# Patient Record
Sex: FEMALE | Race: WHITE | NOT HISPANIC OR LATINO | Employment: UNEMPLOYED | URBAN - NONMETROPOLITAN AREA
[De-identification: names, ages, dates, MRNs, and addresses within clinical notes are randomized per-mention and may not be internally consistent; named-entity substitution may affect disease eponyms.]

---

## 2017-01-01 ENCOUNTER — APPOINTMENT (OUTPATIENT)
Dept: GENERAL RADIOLOGY | Facility: HOSPITAL | Age: 82
End: 2017-01-01

## 2017-01-01 ENCOUNTER — HOSPITAL ENCOUNTER (INPATIENT)
Facility: HOSPITAL | Age: 82
LOS: 5 days | End: 2017-08-09
Attending: EMERGENCY MEDICINE | Admitting: INTERNAL MEDICINE

## 2017-01-01 ENCOUNTER — LAB REQUISITION (OUTPATIENT)
Dept: LAB | Facility: HOSPITAL | Age: 82
End: 2017-01-01

## 2017-01-01 ENCOUNTER — INFUSION (OUTPATIENT)
Dept: ONCOLOGY | Facility: HOSPITAL | Age: 82
End: 2017-01-01

## 2017-01-01 ENCOUNTER — HOSPITAL ENCOUNTER (OUTPATIENT)
Dept: GENERAL RADIOLOGY | Facility: HOSPITAL | Age: 82
Discharge: HOME | End: 2017-07-07

## 2017-01-01 ENCOUNTER — APPOINTMENT (OUTPATIENT)
Dept: ULTRASOUND IMAGING | Facility: HOSPITAL | Age: 82
End: 2017-01-01

## 2017-01-01 ENCOUNTER — APPOINTMENT (OUTPATIENT)
Dept: ONCOLOGY | Facility: HOSPITAL | Age: 82
End: 2017-01-01

## 2017-01-01 ENCOUNTER — HOSPITAL ENCOUNTER (INPATIENT)
Facility: HOSPITAL | Age: 82
LOS: 6 days | Discharge: NURSING, REHAB OR LONG-TERM CARE | End: 2017-06-28
Attending: EMERGENCY MEDICINE | Admitting: INTERNAL MEDICINE

## 2017-01-01 ENCOUNTER — HOSPITAL ENCOUNTER (INPATIENT)
Facility: HOSPITAL | Age: 82
LOS: 4 days | End: 2017-05-24
Attending: EMERGENCY MEDICINE | Admitting: HOSPITALIST

## 2017-01-01 ENCOUNTER — TELEPHONE (OUTPATIENT)
Dept: ORTHOPEDIC SURGERY | Facility: CLINIC | Age: 82
End: 2017-01-01

## 2017-01-01 ENCOUNTER — DOCUMENTATION (OUTPATIENT)
Dept: NUTRITION | Facility: HOSPITAL | Age: 82
End: 2017-01-01

## 2017-01-01 ENCOUNTER — OFFICE VISIT (OUTPATIENT)
Dept: ONCOLOGY | Facility: CLINIC | Age: 82
End: 2017-01-01

## 2017-01-01 ENCOUNTER — APPOINTMENT (OUTPATIENT)
Dept: CT IMAGING | Facility: HOSPITAL | Age: 82
End: 2017-01-01

## 2017-01-01 ENCOUNTER — LAB (OUTPATIENT)
Dept: ONCOLOGY | Facility: HOSPITAL | Age: 82
End: 2017-01-01

## 2017-01-01 ENCOUNTER — OFFICE VISIT (OUTPATIENT)
Dept: ORTHOPEDIC SURGERY | Facility: CLINIC | Age: 82
End: 2017-01-01

## 2017-01-01 ENCOUNTER — HOSPITAL ENCOUNTER (OUTPATIENT)
Dept: INTERVENTIONAL RADIOLOGY/VASCULAR | Facility: HOSPITAL | Age: 82
Discharge: HOME | End: 2017-07-07
Admitting: INTERNAL MEDICINE

## 2017-01-01 ENCOUNTER — ANESTHESIA (OUTPATIENT)
Dept: PERIOP | Facility: HOSPITAL | Age: 82
End: 2017-01-01

## 2017-01-01 ENCOUNTER — HOSPITAL ENCOUNTER (INPATIENT)
Facility: HOSPITAL | Age: 82
LOS: 14 days | Discharge: NURSING, REHAB OR LONG-TERM CARE | End: 2017-06-07
Attending: FAMILY MEDICINE | Admitting: FAMILY MEDICINE

## 2017-01-01 ENCOUNTER — APPOINTMENT (OUTPATIENT)
Dept: CARDIOLOGY | Facility: HOSPITAL | Age: 82
End: 2017-01-01
Attending: INTERNAL MEDICINE

## 2017-01-01 ENCOUNTER — HOSPITAL ENCOUNTER (OUTPATIENT)
Dept: ULTRASOUND IMAGING | Facility: HOSPITAL | Age: 82
Discharge: HOME | End: 2017-07-07

## 2017-01-01 ENCOUNTER — TELEPHONE (OUTPATIENT)
Dept: ONCOLOGY | Facility: HOSPITAL | Age: 82
End: 2017-01-01

## 2017-01-01 ENCOUNTER — APPOINTMENT (OUTPATIENT)
Dept: INTERVENTIONAL RADIOLOGY/VASCULAR | Facility: HOSPITAL | Age: 82
End: 2017-01-01
Attending: FAMILY MEDICINE

## 2017-01-01 ENCOUNTER — ANESTHESIA EVENT (OUTPATIENT)
Dept: PERIOP | Facility: HOSPITAL | Age: 82
End: 2017-01-01

## 2017-01-01 ENCOUNTER — HOSPITAL ENCOUNTER (EMERGENCY)
Facility: HOSPITAL | Age: 82
Discharge: NURSING, REHAB OR LONG-TERM CARE | End: 2017-06-13
Attending: EMERGENCY MEDICINE | Admitting: EMERGENCY MEDICINE

## 2017-01-01 VITALS
OXYGEN SATURATION: 97 % | BODY MASS INDEX: 24.55 KG/M2 | TEMPERATURE: 95.6 F | WEIGHT: 133.4 LBS | SYSTOLIC BLOOD PRESSURE: 134 MMHG | DIASTOLIC BLOOD PRESSURE: 61 MMHG | HEIGHT: 62 IN | RESPIRATION RATE: 18 BRPM | HEART RATE: 61 BPM

## 2017-01-01 VITALS
HEART RATE: 64 BPM | OXYGEN SATURATION: 90 % | BODY MASS INDEX: 21.71 KG/M2 | RESPIRATION RATE: 20 BRPM | HEIGHT: 62 IN | WEIGHT: 118 LBS | SYSTOLIC BLOOD PRESSURE: 115 MMHG | DIASTOLIC BLOOD PRESSURE: 56 MMHG | TEMPERATURE: 96.6 F

## 2017-01-01 VITALS
TEMPERATURE: 97.7 F | SYSTOLIC BLOOD PRESSURE: 141 MMHG | HEART RATE: 67 BPM | RESPIRATION RATE: 18 BRPM | DIASTOLIC BLOOD PRESSURE: 64 MMHG

## 2017-01-01 VITALS
DIASTOLIC BLOOD PRESSURE: 57 MMHG | RESPIRATION RATE: 17 BRPM | OXYGEN SATURATION: 88 % | HEART RATE: 60 BPM | HEIGHT: 62 IN | WEIGHT: 121.7 LBS | SYSTOLIC BLOOD PRESSURE: 107 MMHG | TEMPERATURE: 98.6 F | BODY MASS INDEX: 22.39 KG/M2

## 2017-01-01 VITALS
TEMPERATURE: 98.9 F | DIASTOLIC BLOOD PRESSURE: 69 MMHG | SYSTOLIC BLOOD PRESSURE: 121 MMHG | RESPIRATION RATE: 18 BRPM | HEART RATE: 59 BPM

## 2017-01-01 VITALS
WEIGHT: 128.8 LBS | RESPIRATION RATE: 20 BRPM | DIASTOLIC BLOOD PRESSURE: 53 MMHG | OXYGEN SATURATION: 86 % | HEIGHT: 61 IN | BODY MASS INDEX: 24.32 KG/M2 | SYSTOLIC BLOOD PRESSURE: 110 MMHG | HEART RATE: 60 BPM | TEMPERATURE: 97 F

## 2017-01-01 VITALS
DIASTOLIC BLOOD PRESSURE: 72 MMHG | BODY MASS INDEX: 20.98 KG/M2 | TEMPERATURE: 97.7 F | SYSTOLIC BLOOD PRESSURE: 163 MMHG | RESPIRATION RATE: 18 BRPM | WEIGHT: 114 LBS | HEIGHT: 62 IN | HEART RATE: 76 BPM | OXYGEN SATURATION: 93 %

## 2017-01-01 VITALS
SYSTOLIC BLOOD PRESSURE: 128 MMHG | HEART RATE: 61 BPM | RESPIRATION RATE: 16 BRPM | TEMPERATURE: 96.8 F | DIASTOLIC BLOOD PRESSURE: 72 MMHG | BODY MASS INDEX: 22.14 KG/M2 | WEIGHT: 120.3 LBS | HEIGHT: 62 IN

## 2017-01-01 DIAGNOSIS — R53.81 OTHER MALAISE: ICD-10-CM

## 2017-01-01 DIAGNOSIS — I50.9 ACUTE ON CHRONIC CONGESTIVE HEART FAILURE, UNSPECIFIED CONGESTIVE HEART FAILURE TYPE: ICD-10-CM

## 2017-01-01 DIAGNOSIS — D64.9 ANEMIA, UNSPECIFIED TYPE: ICD-10-CM

## 2017-01-01 DIAGNOSIS — D50.9 IRON DEFICIENCY ANEMIA: ICD-10-CM

## 2017-01-01 DIAGNOSIS — Z45.2 ENCOUNTER FOR ASSESSMENT OF PERIPHERALLY INSERTED CENTRAL VENOUS CATHETER (PICC): ICD-10-CM

## 2017-01-01 DIAGNOSIS — S72.145A CLOSED NONDISPLACED INTERTROCHANTERIC FRACTURE OF LEFT FEMUR, INITIAL ENCOUNTER (HCC): Primary | ICD-10-CM

## 2017-01-01 DIAGNOSIS — S72.145D CLOSED NONDISPLACED INTERTROCHANTERIC FRACTURE OF LEFT FEMUR WITH ROUTINE HEALING, SUBSEQUENT ENCOUNTER: Primary | ICD-10-CM

## 2017-01-01 DIAGNOSIS — D50.0 IRON DEFICIENCY ANEMIA DUE TO CHRONIC BLOOD LOSS: ICD-10-CM

## 2017-01-01 DIAGNOSIS — D50.0 IRON DEFICIENCY ANEMIA DUE TO CHRONIC BLOOD LOSS: Primary | ICD-10-CM

## 2017-01-01 DIAGNOSIS — Z45.2 ENCOUNTER FOR VENOUS ACCESS DEVICE CARE: Primary | ICD-10-CM

## 2017-01-01 DIAGNOSIS — Z78.9 IMPAIRED MOBILITY AND ACTIVITIES OF DAILY LIVING: ICD-10-CM

## 2017-01-01 DIAGNOSIS — J90 PLEURAL EFFUSION: Primary | ICD-10-CM

## 2017-01-01 DIAGNOSIS — Z74.09 IMPAIRED MOBILITY AND ACTIVITIES OF DAILY LIVING: Primary | ICD-10-CM

## 2017-01-01 DIAGNOSIS — J18.9 PNEUMONIA OF LEFT LOWER LOBE DUE TO INFECTIOUS ORGANISM: Primary | ICD-10-CM

## 2017-01-01 DIAGNOSIS — D64.9 ANEMIA, UNSPECIFIED TYPE: Primary | ICD-10-CM

## 2017-01-01 DIAGNOSIS — R13.12 OROPHARYNGEAL DYSPHAGIA: ICD-10-CM

## 2017-01-01 DIAGNOSIS — Z74.09 IMPAIRED MOBILITY AND ACTIVITIES OF DAILY LIVING: ICD-10-CM

## 2017-01-01 DIAGNOSIS — Z74.09 IMPAIRED PHYSICAL MOBILITY: ICD-10-CM

## 2017-01-01 DIAGNOSIS — D50.9 IRON DEFICIENCY ANEMIA, UNSPECIFIED: ICD-10-CM

## 2017-01-01 DIAGNOSIS — Z45.2 ENCOUNTER FOR VENOUS ACCESS DEVICE CARE: ICD-10-CM

## 2017-01-01 DIAGNOSIS — S72.002D CLOSED LEFT HIP FRACTURE, WITH ROUTINE HEALING, SUBSEQUENT ENCOUNTER: ICD-10-CM

## 2017-01-01 DIAGNOSIS — J90 PLEURAL EFFUSION: ICD-10-CM

## 2017-01-01 DIAGNOSIS — I50.9 HEART FAILURE (HCC): ICD-10-CM

## 2017-01-01 DIAGNOSIS — R53.1 GENERAL WEAKNESS: ICD-10-CM

## 2017-01-01 DIAGNOSIS — R26.9 ABNORMALITY OF GAIT AND MOBILITY: ICD-10-CM
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
## 2017-01-01 DIAGNOSIS — R53.83 FATIGUE, UNSPECIFIED TYPE: ICD-10-CM

## 2017-01-01 DIAGNOSIS — J44.0 CHRONIC OBSTRUCTIVE PULMONARY DISEASE WITH ACUTE LOWER RESPIRATORY INFECTION (HCC): ICD-10-CM

## 2017-01-01 DIAGNOSIS — Z78.9 IMPAIRED MOBILITY AND ACTIVITIES OF DAILY LIVING: Primary | ICD-10-CM

## 2017-01-01 DIAGNOSIS — M62.81 MUSCLE WEAKNESS (GENERALIZED): ICD-10-CM

## 2017-01-01 DIAGNOSIS — D50.9 IRON DEFICIENCY ANEMIA, UNSPECIFIED: Primary | ICD-10-CM

## 2017-01-01 DIAGNOSIS — R09.02 HYPOXEMIA: ICD-10-CM

## 2017-01-01 LAB
ABO + RH BLD: NORMAL
ABO GROUP BLD: NORMAL
ALBUMIN SERPL-MCNC: 2.6 G/DL (ref 3.4–4.8)
ALBUMIN SERPL-MCNC: 2.7 G/DL (ref 3.4–4.8)
ALBUMIN SERPL-MCNC: 2.8 G/DL (ref 3.4–4.8)
ALBUMIN SERPL-MCNC: 2.9 G/DL (ref 3.4–4.8)
ALBUMIN SERPL-MCNC: 3 G/DL (ref 3.4–4.8)
ALBUMIN SERPL-MCNC: 3 G/DL (ref 3.4–4.8)
ALBUMIN SERPL-MCNC: 3.1 G/DL (ref 3.4–4.8)
ALBUMIN SERPL-MCNC: 3.2 G/DL (ref 3.4–4.8)
ALBUMIN SERPL-MCNC: 3.2 G/DL (ref 3.4–4.8)
ALBUMIN SERPL-MCNC: 3.3 G/DL (ref 3.4–4.8)
ALBUMIN SERPL-MCNC: 3.5 G/DL (ref 3.4–4.8)
ALBUMIN SERPL-MCNC: 3.9 G/DL (ref 3.4–4.8)
ALBUMIN/GLOB SERPL: 0.9 G/DL (ref 1.1–1.8)
ALBUMIN/GLOB SERPL: 0.9 G/DL (ref 1.1–1.8)
ALBUMIN/GLOB SERPL: 1 G/DL (ref 1.1–1.8)
ALBUMIN/GLOB SERPL: 1.1 G/DL (ref 1.1–1.8)
ALBUMIN/GLOB SERPL: 1.2 G/DL (ref 1.1–1.8)
ALBUMIN/GLOB SERPL: 1.3 G/DL (ref 1.1–1.8)
ALP SERPL-CCNC: 100 U/L (ref 38–126)
ALP SERPL-CCNC: 100 U/L (ref 38–126)
ALP SERPL-CCNC: 109 U/L (ref 38–126)
ALP SERPL-CCNC: 111 U/L (ref 38–126)
ALP SERPL-CCNC: 120 U/L (ref 38–126)
ALP SERPL-CCNC: 136 U/L (ref 38–126)
ALP SERPL-CCNC: 139 U/L (ref 38–126)
ALP SERPL-CCNC: 141 U/L (ref 38–126)
ALP SERPL-CCNC: 142 U/L (ref 38–126)
ALP SERPL-CCNC: 144 U/L (ref 38–126)
ALP SERPL-CCNC: 147 U/L (ref 38–126)
ALP SERPL-CCNC: 147 U/L (ref 38–126)
ALP SERPL-CCNC: 166 U/L (ref 38–126)
ALP SERPL-CCNC: 166 U/L (ref 38–126)
ALP SERPL-CCNC: 169 U/L (ref 38–126)
ALP SERPL-CCNC: 171 U/L (ref 38–126)
ALP SERPL-CCNC: 203 U/L (ref 38–126)
ALP SERPL-CCNC: 74 U/L (ref 38–126)
ALP SERPL-CCNC: 81 U/L (ref 38–126)
ALP SERPL-CCNC: 99 U/L (ref 38–126)
ALT SERPL W P-5'-P-CCNC: 26 U/L (ref 9–52)
ALT SERPL W P-5'-P-CCNC: 27 U/L (ref 9–52)
ALT SERPL W P-5'-P-CCNC: 29 U/L (ref 9–52)
ALT SERPL W P-5'-P-CCNC: 33 U/L (ref 9–52)
ALT SERPL W P-5'-P-CCNC: 34 U/L (ref 9–52)
ALT SERPL W P-5'-P-CCNC: 34 U/L (ref 9–52)
ALT SERPL W P-5'-P-CCNC: 35 U/L (ref 9–52)
ALT SERPL W P-5'-P-CCNC: 37 U/L (ref 9–52)
ALT SERPL W P-5'-P-CCNC: 37 U/L (ref 9–52)
ALT SERPL W P-5'-P-CCNC: 38 U/L (ref 9–52)
ALT SERPL W P-5'-P-CCNC: 39 U/L (ref 9–52)
ALT SERPL W P-5'-P-CCNC: 40 U/L (ref 9–52)
ALT SERPL W P-5'-P-CCNC: 40 U/L (ref 9–52)
ALT SERPL W P-5'-P-CCNC: 41 U/L (ref 9–52)
ALT SERPL W P-5'-P-CCNC: 41 U/L (ref 9–52)
ALT SERPL W P-5'-P-CCNC: 42 U/L (ref 9–52)
ALT SERPL W P-5'-P-CCNC: 42 U/L (ref 9–52)
ALT SERPL W P-5'-P-CCNC: 44 U/L (ref 9–52)
ANION GAP SERPL CALCULATED.3IONS-SCNC: 10 MMOL/L (ref 5–15)
ANION GAP SERPL CALCULATED.3IONS-SCNC: 18 MMOL/L (ref 5–15)
ANION GAP SERPL CALCULATED.3IONS-SCNC: 2 MMOL/L (ref 5–15)
ANION GAP SERPL CALCULATED.3IONS-SCNC: 4 MMOL/L (ref 5–15)
ANION GAP SERPL CALCULATED.3IONS-SCNC: 5 MMOL/L (ref 5–15)
ANION GAP SERPL CALCULATED.3IONS-SCNC: 6 MMOL/L (ref 5–15)
ANION GAP SERPL CALCULATED.3IONS-SCNC: 7 MMOL/L (ref 5–15)
ANION GAP SERPL CALCULATED.3IONS-SCNC: 8 MMOL/L (ref 5–15)
ANION GAP SERPL CALCULATED.3IONS-SCNC: 8 MMOL/L (ref 5–15)
ANION GAP SERPL CALCULATED.3IONS-SCNC: 9 MMOL/L (ref 5–15)
ANISOCYTOSIS BLD QL: NORMAL
ANTI-E: NORMAL
ANTI-E: NORMAL
ANTI-JKA: NORMAL
ANTI-K: NORMAL
ANTI-K: NORMAL
ANTI-LEA: NORMAL
APTT PPP: 28.4 SECONDS (ref 20–40.3)
APTT PPP: 29.4 SECONDS (ref 20–40.3)
ARTERIAL PATENCY WRIST A: ABNORMAL
AST SERPL-CCNC: 25 U/L (ref 14–36)
AST SERPL-CCNC: 27 U/L (ref 14–36)
AST SERPL-CCNC: 28 U/L (ref 14–36)
AST SERPL-CCNC: 29 U/L (ref 14–36)
AST SERPL-CCNC: 30 U/L (ref 14–36)
AST SERPL-CCNC: 34 U/L (ref 14–36)
AST SERPL-CCNC: 34 U/L (ref 14–36)
AST SERPL-CCNC: 35 U/L (ref 14–36)
AST SERPL-CCNC: 39 U/L (ref 14–36)
AST SERPL-CCNC: 40 U/L (ref 14–36)
AST SERPL-CCNC: 41 U/L (ref 14–36)
AST SERPL-CCNC: 41 U/L (ref 14–36)
AST SERPL-CCNC: 42 U/L (ref 14–36)
AST SERPL-CCNC: 43 U/L (ref 14–36)
AST SERPL-CCNC: 66 U/L (ref 14–36)
ATMOSPHERIC PRESS: ABNORMAL MMHG
BACTERIA SPEC AEROBE CULT: NORMAL
BACTERIA SPEC AEROBE CULT: NORMAL
BACTERIA UR QL AUTO: ABNORMAL /HPF
BASE EXCESS BLDA CALC-SCNC: 11.1 MMOL/L (ref -2.4–2.4)
BASE EXCESS BLDA CALC-SCNC: 11.4 MMOL/L (ref -2.4–2.4)
BASE EXCESS BLDA CALC-SCNC: 14 MMOL/L (ref -2.4–2.4)
BASE EXCESS BLDA CALC-SCNC: 17.7 MMOL/L (ref -2.4–2.4)
BASOPHILS # BLD AUTO: 0 10*3/MM3 (ref 0–0.2)
BASOPHILS # BLD AUTO: 0.01 10*3/MM3 (ref 0–0.2)
BASOPHILS # BLD AUTO: 0.01 10*3/MM3 (ref 0–0.2)
BASOPHILS # BLD AUTO: 0.02 10*3/MM3 (ref 0–0.2)
BASOPHILS # BLD AUTO: 0.03 10*3/MM3 (ref 0–0.2)
BASOPHILS # BLD AUTO: 0.04 10*3/MM3 (ref 0–0.2)
BASOPHILS # BLD AUTO: 0.05 10*3/MM3 (ref 0–0.2)
BASOPHILS # BLD AUTO: 0.06 10*3/MM3 (ref 0–0.2)
BASOPHILS # BLD AUTO: 0.06 10*3/MM3 (ref 0–0.2)
BASOPHILS # BLD AUTO: 0.07 10*3/MM3 (ref 0–0.2)
BASOPHILS NFR BLD AUTO: 0 % (ref 0–2)
BASOPHILS NFR BLD AUTO: 0.1 % (ref 0–2)
BASOPHILS NFR BLD AUTO: 0.2 % (ref 0–2)
BASOPHILS NFR BLD AUTO: 0.2 % (ref 0–2)
BASOPHILS NFR BLD AUTO: 0.4 % (ref 0–2)
BASOPHILS NFR BLD AUTO: 0.5 % (ref 0–2)
BASOPHILS NFR BLD AUTO: 0.6 % (ref 0–2)
BASOPHILS NFR BLD AUTO: 0.7 % (ref 0–2)
BASOPHILS NFR BLD AUTO: 0.7 % (ref 0–2)
BASOPHILS NFR BLD AUTO: 0.8 % (ref 0–2)
BASOPHILS NFR BLD AUTO: 0.9 % (ref 0–2)
BASOPHILS NFR BLD AUTO: 1.1 % (ref 0–2)
BASOPHILS NFR BLD AUTO: 1.3 % (ref 0–2)
BDY SITE: ABNORMAL
BH BB BLOOD EXPIRATION DATE: NORMAL
BH BB BLOOD TYPE BARCODE: 5100
BH BB BLOOD TYPE BARCODE: 9500
BH BB DISPENSE STATUS: NORMAL
BH BB PRODUCT CODE: NORMAL
BH BB UNIT NUMBER: NORMAL
BH CV ECHO MEAS - ACS: 1.2 CM
BH CV ECHO MEAS - AO MAX PG (FULL): 18 MMHG
BH CV ECHO MEAS - AO MAX PG: 24 MMHG
BH CV ECHO MEAS - AO MEAN PG (FULL): 9.7 MMHG
BH CV ECHO MEAS - AO MEAN PG: 13 MMHG
BH CV ECHO MEAS - AO ROOT AREA: 2.2 CM^2
BH CV ECHO MEAS - AO ROOT DIAM: 1.7 CM
BH CV ECHO MEAS - AO V2 MAX: 245 CM/SEC
BH CV ECHO MEAS - AO V2 MEAN: 169.9 CM/SEC
BH CV ECHO MEAS - AO V2 VTI: 59.6 CM
BH CV ECHO MEAS - AVA(I,A): 1.4 CM^2
BH CV ECHO MEAS - AVA(I,D): 1.4 CM^2
BH CV ECHO MEAS - AVA(V,A): 1.4 CM^2
BH CV ECHO MEAS - AVA(V,D): 1.4 CM^2
BH CV ECHO MEAS - EDV(CUBED): 69 ML
BH CV ECHO MEAS - EDV(TEICH): 74.3 ML
BH CV ECHO MEAS - EF(CUBED): 84 %
BH CV ECHO MEAS - EF(MOD-SP4): 60 %
BH CV ECHO MEAS - EF(TEICH): 77.5 %
BH CV ECHO MEAS - ESV(CUBED): 11.1 ML
BH CV ECHO MEAS - ESV(TEICH): 16.7 ML
BH CV ECHO MEAS - FS: 45.7 %
BH CV ECHO MEAS - IVS/LVPW: 0.89
BH CV ECHO MEAS - IVSD: 0.99 CM
BH CV ECHO MEAS - LA DIMENSION: 4.5 CM
BH CV ECHO MEAS - LA/AO: 2.7
BH CV ECHO MEAS - LV MASS(C)D: 141.7 GRAMS
BH CV ECHO MEAS - LV MAX PG: 6.1 MMHG
BH CV ECHO MEAS - LV MEAN PG: 3.3 MMHG
BH CV ECHO MEAS - LV V1 MAX: 123 CM/SEC
BH CV ECHO MEAS - LV V1 MEAN: 86 CM/SEC
BH CV ECHO MEAS - LV V1 VTI: 29.8 CM
BH CV ECHO MEAS - LVIDD: 4.1 CM
BH CV ECHO MEAS - LVIDS: 2.2 CM
BH CV ECHO MEAS - LVOT AREA: 2.8 CM^2
BH CV ECHO MEAS - LVOT DIAM: 1.9 CM
BH CV ECHO MEAS - LVPWD: 1.1 CM
BH CV ECHO MEAS - MR MAX PG: 96 MMHG
BH CV ECHO MEAS - MR MAX VEL: 489.9 CM/SEC
BH CV ECHO MEAS - MV A MAX VEL: 108.7 CM/SEC
BH CV ECHO MEAS - MV E MAX VEL: 134.6 CM/SEC
BH CV ECHO MEAS - MV E/A: 1.2
BH CV ECHO MEAS - MV P1/2T MAX VEL: 141 CM/SEC
BH CV ECHO MEAS - MVA P1/2T LCG: 1.6 CM^2
BH CV ECHO MEAS - PA MAX PG: 5.4 MMHG
BH CV ECHO MEAS - PA V2 MAX: 116.6 CM/SEC
BH CV ECHO MEAS - RAP SYSTOLE: 5 MMHG
BH CV ECHO MEAS - SV(AO): 128.6 ML
BH CV ECHO MEAS - SV(CUBED): 57.9 ML
BH CV ECHO MEAS - SV(LVOT): 84.7 ML
BH CV ECHO MEAS - SV(TEICH): 57.5 ML
BILIRUB CONJ SERPL-MCNC: 0 MG/DL
BILIRUB CONJ SERPL-MCNC: 0 MG/DL
BILIRUB INDIRECT SERPL-MCNC: 0.2 MG/DL (ref 0–1.1)
BILIRUB SERPL-MCNC: 0.3 MG/DL (ref 0.2–1.3)
BILIRUB SERPL-MCNC: 0.4 MG/DL (ref 0.2–1.3)
BILIRUB SERPL-MCNC: 0.5 MG/DL (ref 0.2–1.3)
BILIRUB SERPL-MCNC: 0.5 MG/DL (ref 0.2–1.3)
BILIRUB SERPL-MCNC: 0.6 MG/DL (ref 0.2–1.3)
BILIRUB SERPL-MCNC: 0.6 MG/DL (ref 0.2–1.3)
BILIRUB SERPL-MCNC: 0.8 MG/DL (ref 0.2–1.3)
BILIRUB SERPL-MCNC: 0.9 MG/DL (ref 0.2–1.3)
BILIRUB SERPL-MCNC: 0.9 MG/DL (ref 0.2–1.3)
BILIRUB SERPL-MCNC: 1.1 MG/DL (ref 0.2–1.3)
BILIRUB SERPL-MCNC: 2 MG/DL (ref 0.2–1.3)
BILIRUB UR QL STRIP: NEGATIVE
BLD GP AB SCN SERPL QL: NEGATIVE
BLD GP AB SCN SERPL QL: NEGATIVE
BLD GP AB SCN SERPL QL: POSITIVE
BUN BLD-MCNC: 14 MG/DL (ref 7–21)
BUN BLD-MCNC: 14 MG/DL (ref 7–21)
BUN BLD-MCNC: 16 MG/DL (ref 7–21)
BUN BLD-MCNC: 17 MG/DL (ref 7–21)
BUN BLD-MCNC: 17 MG/DL (ref 7–21)
BUN BLD-MCNC: 18 MG/DL (ref 7–21)
BUN BLD-MCNC: 18 MG/DL (ref 7–21)
BUN BLD-MCNC: 19 MG/DL (ref 7–21)
BUN BLD-MCNC: 20 MG/DL (ref 7–21)
BUN BLD-MCNC: 20 MG/DL (ref 7–21)
BUN BLD-MCNC: 21 MG/DL (ref 7–21)
BUN BLD-MCNC: 23 MG/DL (ref 7–21)
BUN BLD-MCNC: 24 MG/DL (ref 7–21)
BUN BLD-MCNC: 24 MG/DL (ref 7–21)
BUN BLD-MCNC: 31 MG/DL (ref 7–21)
BUN BLD-MCNC: 31 MG/DL (ref 7–21)
BUN BLD-MCNC: 32 MG/DL (ref 7–21)
BUN BLD-MCNC: 36 MG/DL (ref 7–21)
BUN BLD-MCNC: 40 MG/DL (ref 7–21)
BUN/CREAT SERPL: 16.5 (ref 7–25)
BUN/CREAT SERPL: 20 (ref 7–25)
BUN/CREAT SERPL: 20.7 (ref 7–25)
BUN/CREAT SERPL: 21.1 (ref 7–25)
BUN/CREAT SERPL: 22.2 (ref 7–25)
BUN/CREAT SERPL: 22.6 (ref 7–25)
BUN/CREAT SERPL: 23.9 (ref 7–25)
BUN/CREAT SERPL: 24.7 (ref 7–25)
BUN/CREAT SERPL: 25.3 (ref 7–25)
BUN/CREAT SERPL: 26.2 (ref 7–25)
BUN/CREAT SERPL: 26.6 (ref 7–25)
BUN/CREAT SERPL: 27.4 (ref 7–25)
BUN/CREAT SERPL: 27.7 (ref 7–25)
BUN/CREAT SERPL: 28.4 (ref 7–25)
BUN/CREAT SERPL: 29 (ref 7–25)
BUN/CREAT SERPL: 29.6 (ref 7–25)
BUN/CREAT SERPL: 30.4 (ref 7–25)
BUN/CREAT SERPL: 31.1 (ref 7–25)
BUN/CREAT SERPL: 31.7 (ref 7–25)
BUN/CREAT SERPL: 32.8 (ref 7–25)
BUN/CREAT SERPL: 35.3 (ref 7–25)
BUN/CREAT SERPL: 38.3 (ref 7–25)
BUN/CREAT SERPL: 41.3 (ref 7–25)
CA-I BLD-MCNC: 4.6 MG/DL (ref 4.5–4.9)
CA-I BLD-MCNC: 4.6 MG/DL (ref 4.5–4.9)
CA-I BLD-MCNC: 4.7 MG/DL (ref 4.5–4.9)
CA-I BLD-MCNC: 5 MG/DL (ref 4.5–4.9)
CALCIUM SPEC-SCNC: 8 MG/DL (ref 8.4–10.2)
CALCIUM SPEC-SCNC: 8.4 MG/DL (ref 8.4–10.2)
CALCIUM SPEC-SCNC: 8.6 MG/DL (ref 8.4–10.2)
CALCIUM SPEC-SCNC: 8.6 MG/DL (ref 8.4–10.2)
CALCIUM SPEC-SCNC: 8.7 MG/DL (ref 8.4–10.2)
CALCIUM SPEC-SCNC: 8.8 MG/DL (ref 8.4–10.2)
CALCIUM SPEC-SCNC: 8.9 MG/DL (ref 8.4–10.2)
CALCIUM SPEC-SCNC: 8.9 MG/DL (ref 8.4–10.2)
CALCIUM SPEC-SCNC: 9 MG/DL (ref 8.4–10.2)
CALCIUM SPEC-SCNC: 9.1 MG/DL (ref 8.4–10.2)
CALCIUM SPEC-SCNC: 9.3 MG/DL (ref 8.4–10.2)
CALCIUM SPEC-SCNC: 9.3 MG/DL (ref 8.4–10.2)
CHLORIDE SERPL-SCNC: 100 MMOL/L (ref 95–110)
CHLORIDE SERPL-SCNC: 100 MMOL/L (ref 95–110)
CHLORIDE SERPL-SCNC: 80 MMOL/L (ref 95–110)
CHLORIDE SERPL-SCNC: 84 MMOL/L (ref 95–110)
CHLORIDE SERPL-SCNC: 85 MMOL/L (ref 95–110)
CHLORIDE SERPL-SCNC: 89 MMOL/L (ref 95–110)
CHLORIDE SERPL-SCNC: 90 MMOL/L (ref 95–110)
CHLORIDE SERPL-SCNC: 91 MMOL/L (ref 95–110)
CHLORIDE SERPL-SCNC: 92 MMOL/L (ref 95–110)
CHLORIDE SERPL-SCNC: 94 MMOL/L (ref 95–110)
CHLORIDE SERPL-SCNC: 95 MMOL/L (ref 95–110)
CHLORIDE SERPL-SCNC: 95 MMOL/L (ref 95–110)
CHLORIDE SERPL-SCNC: 96 MMOL/L (ref 95–110)
CHLORIDE SERPL-SCNC: 96 MMOL/L (ref 95–110)
CHLORIDE SERPL-SCNC: 97 MMOL/L (ref 95–110)
CHLORIDE SERPL-SCNC: 98 MMOL/L (ref 95–110)
CK MB SERPL-CCNC: 1.26 NG/ML (ref 0–5)
CK MB SERPL-CCNC: 1.3 NG/ML (ref 0–5)
CK MB SERPL-CCNC: 1.41 NG/ML (ref 0–5)
CK MB SERPL-CCNC: 2.13 NG/ML (ref 0–5)
CK SERPL-CCNC: 24 U/L (ref 30–135)
CK SERPL-CCNC: 50 U/L (ref 30–135)
CK SERPL-CCNC: <20 U/L (ref 30–135)
CK SERPL-CCNC: <20 U/L (ref 30–135)
CLARITY UR: CLEAR
CO2 BLDA-SCNC: 40.8 MMOL/L (ref 23–27)
CO2 BLDA-SCNC: 42.7 MMOL/L (ref 23–27)
CO2 BLDA-SCNC: 43.5 MMOL/L (ref 23–27)
CO2 BLDA-SCNC: 54.5 MMOL/L (ref 23–27)
CO2 SERPL-SCNC: 29 MMOL/L (ref 22–31)
CO2 SERPL-SCNC: 30 MMOL/L (ref 22–31)
CO2 SERPL-SCNC: 30 MMOL/L (ref 22–31)
CO2 SERPL-SCNC: 31 MMOL/L (ref 22–31)
CO2 SERPL-SCNC: 31 MMOL/L (ref 22–31)
CO2 SERPL-SCNC: 32 MMOL/L (ref 22–31)
CO2 SERPL-SCNC: 33 MMOL/L (ref 22–31)
CO2 SERPL-SCNC: 34 MMOL/L (ref 22–31)
CO2 SERPL-SCNC: 36 MMOL/L (ref 22–31)
CO2 SERPL-SCNC: 36 MMOL/L (ref 22–31)
CO2 SERPL-SCNC: 38 MMOL/L (ref 22–31)
CO2 SERPL-SCNC: 39 MMOL/L (ref 22–31)
CO2 SERPL-SCNC: 40 MMOL/L (ref 22–31)
CO2 SERPL-SCNC: 41 MMOL/L (ref 22–31)
CO2 SERPL-SCNC: 42 MMOL/L (ref 22–31)
CO2 SERPL-SCNC: 43 MMOL/L (ref 22–31)
CO2 SERPL-SCNC: 43 MMOL/L (ref 22–31)
CO2 SERPL-SCNC: 45 MMOL/L (ref 22–31)
CO2 SERPL-SCNC: 54 MMOL/L (ref 22–31)
COLOR UR: YELLOW
CREAT BLD-MCNC: 0.62 MG/DL (ref 0.5–1)
CREAT BLD-MCNC: 0.64 MG/DL (ref 0.5–1)
CREAT BLD-MCNC: 0.65 MG/DL (ref 0.5–1)
CREAT BLD-MCNC: 0.65 MG/DL (ref 0.5–1)
CREAT BLD-MCNC: 0.67 MG/DL (ref 0.5–1)
CREAT BLD-MCNC: 0.67 MG/DL (ref 0.5–1)
CREAT BLD-MCNC: 0.69 MG/DL (ref 0.5–1)
CREAT BLD-MCNC: 0.72 MG/DL (ref 0.5–1)
CREAT BLD-MCNC: 0.73 MG/DL (ref 0.5–1)
CREAT BLD-MCNC: 0.74 MG/DL (ref 0.5–1)
CREAT BLD-MCNC: 0.75 MG/DL (ref 0.5–1)
CREAT BLD-MCNC: 0.76 MG/DL (ref 0.5–1)
CREAT BLD-MCNC: 0.77 MG/DL (ref 0.5–1)
CREAT BLD-MCNC: 0.79 MG/DL (ref 0.5–1)
CREAT BLD-MCNC: 0.81 MG/DL (ref 0.5–1)
CREAT BLD-MCNC: 0.81 MG/DL (ref 0.5–1)
CREAT BLD-MCNC: 0.83 MG/DL (ref 0.5–1)
CREAT BLD-MCNC: 0.85 MG/DL (ref 0.5–1)
CREAT BLD-MCNC: 0.87 MG/DL (ref 0.5–1)
CREAT BLD-MCNC: 0.95 MG/DL (ref 0.5–1)
CREAT BLD-MCNC: 1.01 MG/DL (ref 0.5–1)
CREAT BLD-MCNC: 1.02 MG/DL (ref 0.5–1)
CREAT BLD-MCNC: 1.22 MG/DL (ref 0.5–1)
CROSSMATCH INTERPRETATION: NORMAL
D-DIMER, QUANTITATIVE (MAD,POW, STR): 1656 NG/ML (FEU) (ref 0–470)
D-LACTATE SERPL-SCNC: 1.5 MMOL/L (ref 0.5–2)
DEPRECATED RDW RBC AUTO: 47.1 FL (ref 36.4–46.3)
DEPRECATED RDW RBC AUTO: 48.4 FL (ref 36.4–46.3)
DEPRECATED RDW RBC AUTO: 49.3 FL (ref 36.4–46.3)
DEPRECATED RDW RBC AUTO: 49.7 FL (ref 36.4–46.3)
DEPRECATED RDW RBC AUTO: 49.9 FL (ref 36.4–46.3)
DEPRECATED RDW RBC AUTO: 50 FL (ref 36.4–46.3)
DEPRECATED RDW RBC AUTO: 50 FL (ref 36.4–46.3)
DEPRECATED RDW RBC AUTO: 51.7 FL (ref 36.4–46.3)
DEPRECATED RDW RBC AUTO: 52.5 FL (ref 36.4–46.3)
DEPRECATED RDW RBC AUTO: 57.7 FL (ref 36.4–46.3)
DEPRECATED RDW RBC AUTO: 68.3 FL (ref 36.4–46.3)
DEPRECATED RDW RBC AUTO: 69.7 FL (ref 36.4–46.3)
DEPRECATED RDW RBC AUTO: 70 FL (ref 36.4–46.3)
DEPRECATED RDW RBC AUTO: 72.3 FL (ref 36.4–46.3)
DEPRECATED RDW RBC AUTO: 74.9 FL (ref 36.4–46.3)
DEPRECATED RDW RBC AUTO: 77.4 FL (ref 36.4–46.3)
DEPRECATED RDW RBC AUTO: 77.7 FL (ref 36.4–46.3)
DEPRECATED RDW RBC AUTO: 78.3 FL (ref 36.4–46.3)
DEPRECATED RDW RBC AUTO: 79.1 FL (ref 36.4–46.3)
DEPRECATED RDW RBC AUTO: 80.4 FL (ref 36.4–46.3)
DEPRECATED RDW RBC AUTO: 87.8 FL (ref 36.4–46.3)
DEPRECATED RDW RBC AUTO: 88.3 FL (ref 36.4–46.3)
DEPRECATED RDW RBC AUTO: 88.7 FL (ref 36.4–46.3)
E AG RBC QL: NEGATIVE
EOSINOPHIL # BLD AUTO: 0 10*3/MM3 (ref 0–0.7)
EOSINOPHIL # BLD AUTO: 0.03 10*3/MM3 (ref 0–0.7)
EOSINOPHIL # BLD AUTO: 0.06 10*3/MM3 (ref 0–0.7)
EOSINOPHIL # BLD AUTO: 0.07 10*3/MM3 (ref 0–0.7)
EOSINOPHIL # BLD AUTO: 0.08 10*3/MM3 (ref 0–0.7)
EOSINOPHIL # BLD AUTO: 0.11 10*3/MM3 (ref 0–0.7)
EOSINOPHIL # BLD AUTO: 0.11 10*3/MM3 (ref 0–0.7)
EOSINOPHIL # BLD AUTO: 0.17 10*3/MM3 (ref 0–0.7)
EOSINOPHIL # BLD AUTO: 0.2 10*3/MM3 (ref 0–0.7)
EOSINOPHIL # BLD AUTO: 0.21 10*3/MM3 (ref 0–0.7)
EOSINOPHIL # BLD AUTO: 0.22 10*3/MM3 (ref 0–0.7)
EOSINOPHIL # BLD AUTO: 0.23 10*3/MM3 (ref 0–0.7)
EOSINOPHIL # BLD AUTO: 0.25 10*3/MM3 (ref 0–0.7)
EOSINOPHIL # BLD AUTO: 0.3 10*3/MM3 (ref 0–0.7)
EOSINOPHIL # BLD AUTO: 0.31 10*3/MM3 (ref 0–0.7)
EOSINOPHIL # BLD AUTO: 0.37 10*3/MM3 (ref 0–0.7)
EOSINOPHIL # BLD AUTO: 0.4 10*3/MM3 (ref 0–0.7)
EOSINOPHIL # BLD AUTO: 0.4 10*3/MM3 (ref 0–0.7)
EOSINOPHIL # BLD AUTO: 0.41 10*3/MM3 (ref 0–0.7)
EOSINOPHIL NFR BLD AUTO: 0 % (ref 0–7)
EOSINOPHIL NFR BLD AUTO: 0.3 % (ref 0–7)
EOSINOPHIL NFR BLD AUTO: 1 % (ref 0–7)
EOSINOPHIL NFR BLD AUTO: 1 % (ref 0–7)
EOSINOPHIL NFR BLD AUTO: 1.2 % (ref 0–7)
EOSINOPHIL NFR BLD AUTO: 1.7 % (ref 0–7)
EOSINOPHIL NFR BLD AUTO: 2.1 % (ref 0–7)
EOSINOPHIL NFR BLD AUTO: 2.5 % (ref 0–7)
EOSINOPHIL NFR BLD AUTO: 2.7 % (ref 0–7)
EOSINOPHIL NFR BLD AUTO: 2.7 % (ref 0–7)
EOSINOPHIL NFR BLD AUTO: 3.2 % (ref 0–7)
EOSINOPHIL NFR BLD AUTO: 3.3 % (ref 0–7)
EOSINOPHIL NFR BLD AUTO: 3.7 % (ref 0–7)
EOSINOPHIL NFR BLD AUTO: 3.8 % (ref 0–7)
EOSINOPHIL NFR BLD AUTO: 3.8 % (ref 0–7)
EOSINOPHIL NFR BLD AUTO: 4.2 % (ref 0–7)
EOSINOPHIL NFR BLD AUTO: 4.3 % (ref 0–7)
EOSINOPHIL NFR BLD AUTO: 4.4 % (ref 0–7)
EOSINOPHIL NFR BLD AUTO: 4.4 % (ref 0–7)
EOSINOPHIL NFR BLD AUTO: 4.6 % (ref 0–7)
EOSINOPHIL NFR BLD AUTO: 5.2 % (ref 0–7)
ERYTHROCYTE [DISTWIDTH] IN BLOOD BY AUTOMATED COUNT: 14 % (ref 11.5–14.5)
ERYTHROCYTE [DISTWIDTH] IN BLOOD BY AUTOMATED COUNT: 14.3 % (ref 11.5–14.5)
ERYTHROCYTE [DISTWIDTH] IN BLOOD BY AUTOMATED COUNT: 14.3 % (ref 11.5–14.5)
ERYTHROCYTE [DISTWIDTH] IN BLOOD BY AUTOMATED COUNT: 14.4 % (ref 11.5–14.5)
ERYTHROCYTE [DISTWIDTH] IN BLOOD BY AUTOMATED COUNT: 14.5 % (ref 11.5–14.5)
ERYTHROCYTE [DISTWIDTH] IN BLOOD BY AUTOMATED COUNT: 14.8 % (ref 11.5–14.5)
ERYTHROCYTE [DISTWIDTH] IN BLOOD BY AUTOMATED COUNT: 14.8 % (ref 11.5–14.5)
ERYTHROCYTE [DISTWIDTH] IN BLOOD BY AUTOMATED COUNT: 15.4 % (ref 11.5–14.5)
ERYTHROCYTE [DISTWIDTH] IN BLOOD BY AUTOMATED COUNT: 16.3 % (ref 11.5–14.5)
ERYTHROCYTE [DISTWIDTH] IN BLOOD BY AUTOMATED COUNT: 17 % (ref 11.5–14.5)
ERYTHROCYTE [DISTWIDTH] IN BLOOD BY AUTOMATED COUNT: 17.3 % (ref 11.5–14.5)
ERYTHROCYTE [DISTWIDTH] IN BLOOD BY AUTOMATED COUNT: 18.5 % (ref 11.5–14.5)
ERYTHROCYTE [DISTWIDTH] IN BLOOD BY AUTOMATED COUNT: 18.5 % (ref 11.5–14.5)
ERYTHROCYTE [DISTWIDTH] IN BLOOD BY AUTOMATED COUNT: 20.1 % (ref 11.5–14.5)
ERYTHROCYTE [DISTWIDTH] IN BLOOD BY AUTOMATED COUNT: 20.5 % (ref 11.5–14.5)
ERYTHROCYTE [DISTWIDTH] IN BLOOD BY AUTOMATED COUNT: 20.6 % (ref 11.5–14.5)
ERYTHROCYTE [DISTWIDTH] IN BLOOD BY AUTOMATED COUNT: 21.3 % (ref 11.5–14.5)
ERYTHROCYTE [DISTWIDTH] IN BLOOD BY AUTOMATED COUNT: 21.5 % (ref 11.5–14.5)
ERYTHROCYTE [DISTWIDTH] IN BLOOD BY AUTOMATED COUNT: 21.7 % (ref 11.5–14.5)
ERYTHROCYTE [DISTWIDTH] IN BLOOD BY AUTOMATED COUNT: 21.9 % (ref 11.5–14.5)
ERYTHROCYTE [DISTWIDTH] IN BLOOD BY AUTOMATED COUNT: 22.2 % (ref 11.5–14.5)
ERYTHROCYTE [DISTWIDTH] IN BLOOD BY AUTOMATED COUNT: 22.4 % (ref 11.5–14.5)
ERYTHROCYTE [DISTWIDTH] IN BLOOD BY AUTOMATED COUNT: 24.1 % (ref 11.5–14.5)
FERRITIN SERPL-MCNC: 16.8 NG/ML (ref 11.1–264)
FERRITIN SERPL-MCNC: 19.1 NG/ML (ref 11.1–264)
FERRITIN SERPL-MCNC: 22.3 NG/ML (ref 11.1–264)
FERRITIN SERPL-MCNC: 45.1 NG/ML (ref 11.1–264)
FOLATE SERPL-MCNC: >20 NG/ML (ref 2.76–21)
FOLATE SERPL-MCNC: >20 NG/ML (ref 2.76–21)
GFR SERPL CREATININE-BSD FRML MDRD: 41 ML/MIN/1.73 (ref 39–90)
GFR SERPL CREATININE-BSD FRML MDRD: 51 ML/MIN/1.73
GFR SERPL CREATININE-BSD FRML MDRD: 51 ML/MIN/1.73 (ref 60–90)
GFR SERPL CREATININE-BSD FRML MDRD: 55 ML/MIN/1.73
GFR SERPL CREATININE-BSD FRML MDRD: 61 ML/MIN/1.73
GFR SERPL CREATININE-BSD FRML MDRD: 63 ML/MIN/1.73 (ref 39–90)
GFR SERPL CREATININE-BSD FRML MDRD: 64 ML/MIN/1.73 (ref 39–90)
GFR SERPL CREATININE-BSD FRML MDRD: 66 ML/MIN/1.73 (ref 39–90)
GFR SERPL CREATININE-BSD FRML MDRD: 66 ML/MIN/1.73 (ref 39–90)
GFR SERPL CREATININE-BSD FRML MDRD: 68 ML/MIN/1.73 (ref 39–90)
GFR SERPL CREATININE-BSD FRML MDRD: 70 ML/MIN/1.73 (ref 39–90)
GFR SERPL CREATININE-BSD FRML MDRD: 71 ML/MIN/1.73 (ref 39–90)
GFR SERPL CREATININE-BSD FRML MDRD: 72 ML/MIN/1.73 (ref 60–90)
GFR SERPL CREATININE-BSD FRML MDRD: 73 ML/MIN/1.73 (ref 39–90)
GFR SERPL CREATININE-BSD FRML MDRD: 75 ML/MIN/1.73 (ref 39–90)
GFR SERPL CREATININE-BSD FRML MDRD: 76 ML/MIN/1.73 (ref 39–90)
GFR SERPL CREATININE-BSD FRML MDRD: 80 ML/MIN/1.73 (ref 39–90)
GFR SERPL CREATININE-BSD FRML MDRD: 82 ML/MIN/1.73 (ref 39–90)
GFR SERPL CREATININE-BSD FRML MDRD: 82 ML/MIN/1.73 (ref 60–90)
GFR SERPL CREATININE-BSD FRML MDRD: 85 ML/MIN/1.73 (ref 39–90)
GFR SERPL CREATININE-BSD FRML MDRD: 85 ML/MIN/1.73 (ref 60–90)
GFR SERPL CREATININE-BSD FRML MDRD: 87 ML/MIN/1.73 (ref 60–90)
GFR SERPL CREATININE-BSD FRML MDRD: 90 ML/MIN/1.73 (ref 39–90)
GLOBULIN UR ELPH-MCNC: 2.4 GM/DL (ref 2.3–3.5)
GLOBULIN UR ELPH-MCNC: 2.4 GM/DL (ref 2.3–3.5)
GLOBULIN UR ELPH-MCNC: 2.5 GM/DL (ref 2.3–3.5)
GLOBULIN UR ELPH-MCNC: 2.6 GM/DL (ref 2.3–3.5)
GLOBULIN UR ELPH-MCNC: 2.6 GM/DL (ref 2.3–3.5)
GLOBULIN UR ELPH-MCNC: 2.7 GM/DL (ref 2.3–3.5)
GLOBULIN UR ELPH-MCNC: 2.7 GM/DL (ref 2.3–3.5)
GLOBULIN UR ELPH-MCNC: 2.8 GM/DL (ref 2.3–3.5)
GLOBULIN UR ELPH-MCNC: 2.9 GM/DL (ref 2.3–3.5)
GLOBULIN UR ELPH-MCNC: 2.9 GM/DL (ref 2.3–3.5)
GLOBULIN UR ELPH-MCNC: 3 GM/DL (ref 2.3–3.5)
GLOBULIN UR ELPH-MCNC: 3 GM/DL (ref 2.3–3.5)
GLOBULIN UR ELPH-MCNC: 3.1 GM/DL (ref 2.3–3.5)
GLOBULIN UR ELPH-MCNC: 3.1 GM/DL (ref 2.3–3.5)
GLOBULIN UR ELPH-MCNC: 3.2 GM/DL (ref 2.3–3.5)
GLOBULIN UR ELPH-MCNC: 3.4 GM/DL (ref 2.3–3.5)
GLUCOSE BLD-MCNC: 103 MG/DL (ref 60–100)
GLUCOSE BLD-MCNC: 103 MG/DL (ref 60–100)
GLUCOSE BLD-MCNC: 107 MG/DL (ref 60–100)
GLUCOSE BLD-MCNC: 125 MG/DL (ref 60–100)
GLUCOSE BLD-MCNC: 132 MG/DL (ref 60–100)
GLUCOSE BLD-MCNC: 138 MG/DL (ref 60–100)
GLUCOSE BLD-MCNC: 81 MG/DL (ref 60–100)
GLUCOSE BLD-MCNC: 81 MG/DL (ref 60–100)
GLUCOSE BLD-MCNC: 82 MG/DL (ref 60–100)
GLUCOSE BLD-MCNC: 82 MG/DL (ref 60–100)
GLUCOSE BLD-MCNC: 87 MG/DL (ref 60–100)
GLUCOSE BLD-MCNC: 89 MG/DL (ref 60–100)
GLUCOSE BLD-MCNC: 92 MG/DL (ref 60–100)
GLUCOSE BLD-MCNC: 92 MG/DL (ref 60–100)
GLUCOSE BLD-MCNC: 93 MG/DL (ref 60–100)
GLUCOSE BLD-MCNC: 94 MG/DL (ref 60–100)
GLUCOSE BLD-MCNC: 98 MG/DL (ref 60–100)
GLUCOSE BLD-MCNC: 99 MG/DL (ref 60–100)
GLUCOSE BLDA-MCNC: 113 MMOL/L
GLUCOSE BLDA-MCNC: 71 MMOL/L
GLUCOSE BLDA-MCNC: 93 MMOL/L
GLUCOSE BLDA-MCNC: 93 MMOL/L
GLUCOSE BLDC GLUCOMTR-MCNC: 101 MG/DL (ref 70–130)
GLUCOSE BLDC GLUCOMTR-MCNC: 93 MG/DL (ref 70–130)
GLUCOSE UR STRIP-MCNC: NEGATIVE MG/DL
HAPTOGLOB SERPL-MCNC: 60 MG/DL (ref 34–200)
HBA1C MFR BLD: 5.56 % (ref 4–5.6)
HCO3 BLDA-SCNC: 38.6 MMOL/L (ref 22–26)
HCO3 BLDA-SCNC: 40.6 MMOL/L (ref 22–26)
HCO3 BLDA-SCNC: 40.6 MMOL/L (ref 22–26)
HCO3 BLDA-SCNC: 50.1 MMOL/L (ref 22–26)
HCT VFR BLD AUTO: 22.2 % (ref 35–45)
HCT VFR BLD AUTO: 24.1 % (ref 35–45)
HCT VFR BLD AUTO: 24.8 % (ref 35–45)
HCT VFR BLD AUTO: 24.9 % (ref 35–45)
HCT VFR BLD AUTO: 25.5 % (ref 35–45)
HCT VFR BLD AUTO: 25.7 % (ref 35–45)
HCT VFR BLD AUTO: 25.8 % (ref 35–45)
HCT VFR BLD AUTO: 26.3 % (ref 35–45)
HCT VFR BLD AUTO: 26.6 % (ref 35–45)
HCT VFR BLD AUTO: 26.6 % (ref 35–45)
HCT VFR BLD AUTO: 26.8 % (ref 35–45)
HCT VFR BLD AUTO: 26.9 % (ref 35–45)
HCT VFR BLD AUTO: 27.1 % (ref 35–45)
HCT VFR BLD AUTO: 27.2 % (ref 35–45)
HCT VFR BLD AUTO: 27.5 % (ref 35–45)
HCT VFR BLD AUTO: 27.6 % (ref 35–45)
HCT VFR BLD AUTO: 27.7 % (ref 35–45)
HCT VFR BLD AUTO: 28 % (ref 35–45)
HCT VFR BLD AUTO: 28.1 % (ref 35–45)
HCT VFR BLD AUTO: 28.2 % (ref 35–45)
HCT VFR BLD AUTO: 28.3 % (ref 35–45)
HCT VFR BLD AUTO: 28.3 % (ref 35–45)
HCT VFR BLD AUTO: 28.7 % (ref 35–45)
HCT VFR BLD AUTO: 28.9 % (ref 35–45)
HCT VFR BLD AUTO: 29.2 % (ref 35–45)
HCT VFR BLD AUTO: 29.2 % (ref 35–45)
HCT VFR BLD AUTO: 29.5 % (ref 35–45)
HCT VFR BLD AUTO: 30 % (ref 35–45)
HCT VFR BLD AUTO: 32.6 % (ref 35–45)
HCT VFR BLD AUTO: 33.6 % (ref 35–45)
HCT VFR BLD AUTO: 35.1 % (ref 35–45)
HCT VFR BLD AUTO: 35.1 % (ref 35–45)
HCT VFR BLD AUTO: 35.4 % (ref 35–45)
HCT VFR BLD AUTO: 35.9 % (ref 35–45)
HCT VFR BLD AUTO: 36.4 % (ref 35–45)
HCT VFR BLD AUTO: 38.9 % (ref 35–45)
HCT VFR BLD CALC: 23 % (ref 38–47)
HCT VFR BLD CALC: 24 % (ref 38–47)
HCT VFR BLD CALC: 24 % (ref 38–47)
HCT VFR BLD CALC: 26 % (ref 38–47)
HEMOCCULT STL QL: POSITIVE
HGB BLD-MCNC: 10.5 G/DL (ref 12–15.5)
HGB BLD-MCNC: 10.6 G/DL (ref 12–15.5)
HGB BLD-MCNC: 11 G/DL (ref 12–15.5)
HGB BLD-MCNC: 11.5 G/DL (ref 12–15.5)
HGB BLD-MCNC: 11.7 G/DL (ref 12–15.5)
HGB BLD-MCNC: 13.3 G/DL (ref 12–15.5)
HGB BLD-MCNC: 6.3 G/DL (ref 12–15.5)
HGB BLD-MCNC: 7.3 G/DL (ref 12–15.5)
HGB BLD-MCNC: 7.4 G/DL (ref 12–15.5)
HGB BLD-MCNC: 7.5 G/DL (ref 12–15.5)
HGB BLD-MCNC: 7.6 G/DL (ref 12–15.5)
HGB BLD-MCNC: 7.7 G/DL (ref 12–15.5)
HGB BLD-MCNC: 7.8 G/DL (ref 12–15.5)
HGB BLD-MCNC: 7.8 G/DL (ref 12–15.5)
HGB BLD-MCNC: 7.9 G/DL (ref 12–15.5)
HGB BLD-MCNC: 8 G/DL (ref 12–15.5)
HGB BLD-MCNC: 8.1 G/DL (ref 12–15.5)
HGB BLD-MCNC: 8.3 G/DL (ref 12–15.5)
HGB BLD-MCNC: 8.4 G/DL (ref 12–15.5)
HGB BLD-MCNC: 8.6 G/DL (ref 12–15.5)
HGB BLD-MCNC: 8.8 G/DL (ref 12–15.5)
HGB BLD-MCNC: 9 G/DL (ref 12–15.5)
HGB BLD-MCNC: 9 G/DL (ref 12–15.5)
HGB BLD-MCNC: 9.2 G/DL (ref 12–15.5)
HGB BLD-MCNC: 9.8 G/DL (ref 12–15.5)
HGB BLDA-MCNC: 7.9 G/DL (ref 12–16)
HGB BLDA-MCNC: 8.3 G/DL (ref 12–16)
HGB BLDA-MCNC: 8.3 G/DL (ref 12–16)
HGB BLDA-MCNC: 9 G/DL (ref 12–16)
HGB UR QL STRIP.AUTO: NEGATIVE
HOLD SPECIMEN: NORMAL
HOLD SPECIMEN: NORMAL
HYALINE CASTS UR QL AUTO: ABNORMAL /LPF
HYPOCHROMIA BLD QL: NORMAL
IMM GRANULOCYTES # BLD: 0.01 10*3/MM3 (ref 0–0.02)
IMM GRANULOCYTES # BLD: 0.02 10*3/MM3 (ref 0–0.02)
IMM GRANULOCYTES # BLD: 0.03 10*3/MM3 (ref 0–0.02)
IMM GRANULOCYTES # BLD: 0.05 10*3/MM3 (ref 0–0.02)
IMM GRANULOCYTES NFR BLD: 0.1 % (ref 0–0.5)
IMM GRANULOCYTES NFR BLD: 0.1 % (ref 0–0.5)
IMM GRANULOCYTES NFR BLD: 0.2 % (ref 0–0.5)
IMM GRANULOCYTES NFR BLD: 0.3 % (ref 0–0.5)
IMM GRANULOCYTES NFR BLD: 0.4 % (ref 0–0.5)
IMM GRANULOCYTES NFR BLD: 0.5 % (ref 0–0.5)
IMM GRANULOCYTES NFR BLD: 0.6 % (ref 0–0.5)
IMM GRANULOCYTES NFR BLD: 0.7 % (ref 0–0.5)
INR PPP: 1.11 (ref 0.8–1.2)
INR PPP: 1.13 (ref 0.8–1.2)
INR PPP: 1.22 (ref 0.8–1.2)
IRON 24H UR-MRATE: 10 MCG/DL (ref 37–170)
IRON 24H UR-MRATE: 26 MCG/DL (ref 37–170)
IRON 24H UR-MRATE: 35 MCG/DL (ref 37–170)
IRON 24H UR-MRATE: <10 MCG/DL (ref 37–170)
IRON SATN MFR SERPL: 12 % (ref 15–50)
IRON SATN MFR SERPL: 3 % (ref 15–50)
IRON SATN MFR SERPL: 8 % (ref 15–50)
IRON SATN MFR SERPL: ABNORMAL % (ref 15–50)
KELL: NEGATIVE
KETONES UR QL STRIP: ABNORMAL
KETONES UR QL STRIP: NEGATIVE
KIDD A ANTIGEN: NEGATIVE
LDH SERPL-CCNC: 432 U/L (ref 313–618)
LDH SERPL-CCNC: 477 U/L (ref 313–618)
LEUKOCYTE ESTERASE UR QL STRIP.AUTO: ABNORMAL
LEUKOCYTE ESTERASE UR QL STRIP.AUTO: NEGATIVE
LV EF 2D ECHO EST: 55 %
LYMPHOCYTES # BLD AUTO: 0.57 10*3/MM3 (ref 0.6–4.2)
LYMPHOCYTES # BLD AUTO: 0.58 10*3/MM3 (ref 0.6–4.2)
LYMPHOCYTES # BLD AUTO: 0.6 10*3/MM3 (ref 0.6–4.2)
LYMPHOCYTES # BLD AUTO: 0.61 10*3/MM3 (ref 0.6–4.2)
LYMPHOCYTES # BLD AUTO: 0.65 10*3/MM3 (ref 0.6–4.2)
LYMPHOCYTES # BLD AUTO: 0.7 10*3/MM3 (ref 0.6–4.2)
LYMPHOCYTES # BLD AUTO: 0.74 10*3/MM3 (ref 0.6–4.2)
LYMPHOCYTES # BLD AUTO: 0.83 10*3/MM3 (ref 0.6–4.2)
LYMPHOCYTES # BLD AUTO: 0.84 10*3/MM3 (ref 0.6–4.2)
LYMPHOCYTES # BLD AUTO: 0.86 10*3/MM3 (ref 0.6–4.2)
LYMPHOCYTES # BLD AUTO: 0.91 10*3/MM3 (ref 0.6–4.2)
LYMPHOCYTES # BLD AUTO: 0.93 10*3/MM3 (ref 0.6–4.2)
LYMPHOCYTES # BLD AUTO: 1.02 10*3/MM3 (ref 0.6–4.2)
LYMPHOCYTES # BLD AUTO: 1.03 10*3/MM3 (ref 0.6–4.2)
LYMPHOCYTES # BLD AUTO: 1.06 10*3/MM3 (ref 0.6–4.2)
LYMPHOCYTES # BLD AUTO: 1.08 10*3/MM3 (ref 0.6–4.2)
LYMPHOCYTES # BLD AUTO: 1.14 10*3/MM3 (ref 0.6–4.2)
LYMPHOCYTES # BLD AUTO: 1.16 10*3/MM3 (ref 0.6–4.2)
LYMPHOCYTES # BLD AUTO: 1.19 10*3/MM3 (ref 0.6–4.2)
LYMPHOCYTES # BLD AUTO: 1.2 10*3/MM3 (ref 0.6–4.2)
LYMPHOCYTES # BLD AUTO: 1.34 10*3/MM3 (ref 0.6–4.2)
LYMPHOCYTES NFR BLD AUTO: 10.5 % (ref 10–50)
LYMPHOCYTES NFR BLD AUTO: 11.2 % (ref 10–50)
LYMPHOCYTES NFR BLD AUTO: 11.5 % (ref 10–50)
LYMPHOCYTES NFR BLD AUTO: 11.9 % (ref 10–50)
LYMPHOCYTES NFR BLD AUTO: 11.9 % (ref 10–50)
LYMPHOCYTES NFR BLD AUTO: 12.9 % (ref 10–50)
LYMPHOCYTES NFR BLD AUTO: 12.9 % (ref 10–50)
LYMPHOCYTES NFR BLD AUTO: 13 % (ref 10–50)
LYMPHOCYTES NFR BLD AUTO: 13.4 % (ref 10–50)
LYMPHOCYTES NFR BLD AUTO: 13.8 % (ref 10–50)
LYMPHOCYTES NFR BLD AUTO: 14.1 % (ref 10–50)
LYMPHOCYTES NFR BLD AUTO: 15.3 % (ref 10–50)
LYMPHOCYTES NFR BLD AUTO: 16.9 % (ref 10–50)
LYMPHOCYTES NFR BLD AUTO: 18.6 % (ref 10–50)
LYMPHOCYTES NFR BLD AUTO: 19.7 % (ref 10–50)
LYMPHOCYTES NFR BLD AUTO: 24.6 % (ref 10–50)
LYMPHOCYTES NFR BLD AUTO: 7.2 % (ref 10–50)
LYMPHOCYTES NFR BLD AUTO: 7.7 % (ref 10–50)
LYMPHOCYTES NFR BLD AUTO: 8.1 % (ref 10–50)
LYMPHOCYTES NFR BLD AUTO: 8.3 % (ref 10–50)
LYMPHOCYTES NFR BLD AUTO: 8.7 % (ref 10–50)
Lab: NORMAL
MACROCYTES BLD QL SMEAR: NORMAL
MACROCYTES BLD QL SMEAR: NORMAL
MAGNESIUM SERPL-MCNC: 1.6 MG/DL (ref 1.6–2.3)
MAGNESIUM SERPL-MCNC: 1.7 MG/DL (ref 1.6–2.3)
MAGNESIUM SERPL-MCNC: 1.7 MG/DL (ref 1.6–2.3)
MAGNESIUM SERPL-MCNC: 1.9 MG/DL (ref 1.6–2.3)
MAGNESIUM SERPL-MCNC: 2 MG/DL (ref 1.6–2.3)
MAGNESIUM SERPL-MCNC: 2.2 MG/DL (ref 1.6–2.3)
MAGNESIUM SERPL-MCNC: 2.3 MG/DL (ref 1.6–2.3)
MAGNESIUM SERPL-MCNC: 2.4 MG/DL (ref 1.6–2.3)
MCH RBC QN AUTO: 29.7 PG (ref 26.5–34)
MCH RBC QN AUTO: 29.7 PG (ref 26.5–34)
MCH RBC QN AUTO: 30 PG (ref 26.5–34)
MCH RBC QN AUTO: 30 PG (ref 26.5–34)
MCH RBC QN AUTO: 30.1 PG (ref 26.5–34)
MCH RBC QN AUTO: 30.2 PG (ref 26.5–34)
MCH RBC QN AUTO: 30.3 PG (ref 26.5–34)
MCH RBC QN AUTO: 30.3 PG (ref 26.5–34)
MCH RBC QN AUTO: 30.4 PG (ref 26.5–34)
MCH RBC QN AUTO: 30.5 PG (ref 26.5–34)
MCH RBC QN AUTO: 30.7 PG (ref 26.5–34)
MCH RBC QN AUTO: 31.6 PG (ref 26.5–34)
MCH RBC QN AUTO: 31.8 PG (ref 26.5–34)
MCH RBC QN AUTO: 31.8 PG (ref 26.5–34)
MCH RBC QN AUTO: 31.9 PG (ref 26.5–34)
MCH RBC QN AUTO: 32.1 PG (ref 26.5–34)
MCH RBC QN AUTO: 32.1 PG (ref 26.5–34)
MCH RBC QN AUTO: 32.4 PG (ref 26.5–34)
MCH RBC QN AUTO: 32.4 PG (ref 26.5–34)
MCH RBC QN AUTO: 32.5 PG (ref 26.5–34)
MCH RBC QN AUTO: 32.6 PG (ref 26.5–34)
MCH RBC QN AUTO: 32.7 PG (ref 26.5–34)
MCH RBC QN AUTO: 32.9 PG (ref 26.5–34)
MCHC RBC AUTO-ENTMCNC: 27.4 G/DL (ref 31.4–36)
MCHC RBC AUTO-ENTMCNC: 28.7 G/DL (ref 31.4–36)
MCHC RBC AUTO-ENTMCNC: 28.9 G/DL (ref 31.4–36)
MCHC RBC AUTO-ENTMCNC: 29.1 G/DL (ref 31.4–36)
MCHC RBC AUTO-ENTMCNC: 30.1 G/DL (ref 31.4–36)
MCHC RBC AUTO-ENTMCNC: 30.3 G/DL (ref 31.4–36)
MCHC RBC AUTO-ENTMCNC: 30.5 G/DL (ref 31.4–36)
MCHC RBC AUTO-ENTMCNC: 30.6 G/DL (ref 31.4–36)
MCHC RBC AUTO-ENTMCNC: 31 G/DL (ref 31.4–36)
MCHC RBC AUTO-ENTMCNC: 31 G/DL (ref 31.4–36)
MCHC RBC AUTO-ENTMCNC: 31.1 G/DL (ref 31.4–36)
MCHC RBC AUTO-ENTMCNC: 31.3 G/DL (ref 31.4–36)
MCHC RBC AUTO-ENTMCNC: 31.5 G/DL (ref 31.4–36)
MCHC RBC AUTO-ENTMCNC: 32.1 G/DL (ref 31.4–36)
MCHC RBC AUTO-ENTMCNC: 32.2 G/DL (ref 31.4–36)
MCHC RBC AUTO-ENTMCNC: 32.3 G/DL (ref 31.4–36)
MCHC RBC AUTO-ENTMCNC: 32.3 G/DL (ref 31.4–36)
MCHC RBC AUTO-ENTMCNC: 32.5 G/DL (ref 31.4–36)
MCHC RBC AUTO-ENTMCNC: 32.5 G/DL (ref 31.4–36)
MCHC RBC AUTO-ENTMCNC: 32.7 G/DL (ref 31.4–36)
MCHC RBC AUTO-ENTMCNC: 34.2 G/DL (ref 31.4–36)
MCV RBC AUTO: 101.1 FL (ref 80–98)
MCV RBC AUTO: 101.9 FL (ref 80–98)
MCV RBC AUTO: 102.3 FL (ref 80–98)
MCV RBC AUTO: 103.8 FL (ref 80–98)
MCV RBC AUTO: 104.1 FL (ref 80–98)
MCV RBC AUTO: 104.2 FL (ref 80–98)
MCV RBC AUTO: 104.5 FL (ref 80–98)
MCV RBC AUTO: 104.7 FL (ref 80–98)
MCV RBC AUTO: 106.6 FL (ref 80–98)
MCV RBC AUTO: 107.1 FL (ref 80–98)
MCV RBC AUTO: 109.3 FL (ref 80–98)
MCV RBC AUTO: 111 FL (ref 80–98)
MCV RBC AUTO: 113.1 FL (ref 80–98)
MCV RBC AUTO: 92.3 FL (ref 80–98)
MCV RBC AUTO: 92.4 FL (ref 80–98)
MCV RBC AUTO: 93.7 FL (ref 80–98)
MCV RBC AUTO: 93.7 FL (ref 80–98)
MCV RBC AUTO: 93.8 FL (ref 80–98)
MCV RBC AUTO: 94.5 FL (ref 80–98)
MCV RBC AUTO: 94.9 FL (ref 80–98)
MCV RBC AUTO: 94.9 FL (ref 80–98)
MCV RBC AUTO: 96.1 FL (ref 80–98)
MCV RBC AUTO: 97.6 FL (ref 80–98)
MODALITY: ABNORMAL
MONOCYTES # BLD AUTO: 0.41 10*3/MM3 (ref 0–0.9)
MONOCYTES # BLD AUTO: 0.53 10*3/MM3 (ref 0–0.9)
MONOCYTES # BLD AUTO: 0.57 10*3/MM3 (ref 0–0.9)
MONOCYTES # BLD AUTO: 0.66 10*3/MM3 (ref 0–0.9)
MONOCYTES # BLD AUTO: 0.73 10*3/MM3 (ref 0–0.9)
MONOCYTES # BLD AUTO: 0.8 10*3/MM3 (ref 0–0.9)
MONOCYTES # BLD AUTO: 0.91 10*3/MM3 (ref 0–0.9)
MONOCYTES # BLD AUTO: 0.93 10*3/MM3 (ref 0–0.9)
MONOCYTES # BLD AUTO: 0.95 10*3/MM3 (ref 0–0.9)
MONOCYTES # BLD AUTO: 0.97 10*3/MM3 (ref 0–0.9)
MONOCYTES # BLD AUTO: 1.03 10*3/MM3 (ref 0–0.9)
MONOCYTES # BLD AUTO: 1.05 10*3/MM3 (ref 0–0.9)
MONOCYTES # BLD AUTO: 1.09 10*3/MM3 (ref 0–0.9)
MONOCYTES # BLD AUTO: 1.12 10*3/MM3 (ref 0–0.9)
MONOCYTES # BLD AUTO: 1.14 10*3/MM3 (ref 0–0.9)
MONOCYTES # BLD AUTO: 1.16 10*3/MM3 (ref 0–0.9)
MONOCYTES # BLD AUTO: 1.21 10*3/MM3 (ref 0–0.9)
MONOCYTES # BLD AUTO: 1.23 10*3/MM3 (ref 0–0.9)
MONOCYTES # BLD AUTO: 1.24 10*3/MM3 (ref 0–0.9)
MONOCYTES # BLD AUTO: 1.26 10*3/MM3 (ref 0–0.9)
MONOCYTES # BLD AUTO: 1.44 10*3/MM3 (ref 0–0.9)
MONOCYTES NFR BLD AUTO: 11.4 % (ref 0–12)
MONOCYTES NFR BLD AUTO: 11.7 % (ref 0–12)
MONOCYTES NFR BLD AUTO: 11.8 % (ref 0–12)
MONOCYTES NFR BLD AUTO: 12.1 % (ref 0–12)
MONOCYTES NFR BLD AUTO: 12.5 % (ref 0–12)
MONOCYTES NFR BLD AUTO: 12.6 % (ref 0–12)
MONOCYTES NFR BLD AUTO: 13.4 % (ref 0–12)
MONOCYTES NFR BLD AUTO: 13.5 % (ref 0–12)
MONOCYTES NFR BLD AUTO: 13.7 % (ref 0–12)
MONOCYTES NFR BLD AUTO: 14.2 % (ref 0–12)
MONOCYTES NFR BLD AUTO: 15 % (ref 0–12)
MONOCYTES NFR BLD AUTO: 15.1 % (ref 0–12)
MONOCYTES NFR BLD AUTO: 15.3 % (ref 0–12)
MONOCYTES NFR BLD AUTO: 15.5 % (ref 0–12)
MONOCYTES NFR BLD AUTO: 16.8 % (ref 0–12)
MONOCYTES NFR BLD AUTO: 17.8 % (ref 0–12)
MONOCYTES NFR BLD AUTO: 18.5 % (ref 0–12)
MONOCYTES NFR BLD AUTO: 18.7 % (ref 0–12)
MONOCYTES NFR BLD AUTO: 5.6 % (ref 0–12)
MONOCYTES NFR BLD AUTO: 8.8 % (ref 0–12)
MONOCYTES NFR BLD AUTO: 9.9 % (ref 0–12)
NEUTROPHILS # BLD AUTO: 3.14 10*3/MM3 (ref 2–8.6)
NEUTROPHILS # BLD AUTO: 3.39 10*3/MM3 (ref 2–8.6)
NEUTROPHILS # BLD AUTO: 3.43 10*3/MM3 (ref 2–8.6)
NEUTROPHILS # BLD AUTO: 3.63 10*3/MM3 (ref 2–8.6)
NEUTROPHILS # BLD AUTO: 4.28 10*3/MM3 (ref 2–8.6)
NEUTROPHILS # BLD AUTO: 4.35 10*3/MM3 (ref 2–8.6)
NEUTROPHILS # BLD AUTO: 4.36 10*3/MM3 (ref 2–8.6)
NEUTROPHILS # BLD AUTO: 4.37 10*3/MM3 (ref 2–8.6)
NEUTROPHILS # BLD AUTO: 4.76 10*3/MM3 (ref 2–8.6)
NEUTROPHILS # BLD AUTO: 5.05 10*3/MM3 (ref 2–8.6)
NEUTROPHILS # BLD AUTO: 5.27 10*3/MM3 (ref 2–8.6)
NEUTROPHILS # BLD AUTO: 5.41 10*3/MM3 (ref 2–8.6)
NEUTROPHILS # BLD AUTO: 5.68 10*3/MM3 (ref 2–8.6)
NEUTROPHILS # BLD AUTO: 5.72 10*3/MM3 (ref 2–8.6)
NEUTROPHILS # BLD AUTO: 5.79 10*3/MM3 (ref 2–8.6)
NEUTROPHILS # BLD AUTO: 5.91 10*3/MM3 (ref 2–8.6)
NEUTROPHILS # BLD AUTO: 6.06 10*3/MM3 (ref 2–8.6)
NEUTROPHILS # BLD AUTO: 6.25 10*3/MM3 (ref 2–8.6)
NEUTROPHILS # BLD AUTO: 6.27 10*3/MM3 (ref 2–8.6)
NEUTROPHILS # BLD AUTO: 6.59 10*3/MM3 (ref 2–8.6)
NEUTROPHILS # BLD AUTO: 6.67 10*3/MM3 (ref 2–8.6)
NEUTROPHILS NFR BLD AUTO: 57.8 % (ref 37–80)
NEUTROPHILS NFR BLD AUTO: 62.5 % (ref 37–80)
NEUTROPHILS NFR BLD AUTO: 64.3 % (ref 37–80)
NEUTROPHILS NFR BLD AUTO: 65.2 % (ref 37–80)
NEUTROPHILS NFR BLD AUTO: 65.5 % (ref 37–80)
NEUTROPHILS NFR BLD AUTO: 66 % (ref 37–80)
NEUTROPHILS NFR BLD AUTO: 66.7 % (ref 37–80)
NEUTROPHILS NFR BLD AUTO: 67.3 % (ref 37–80)
NEUTROPHILS NFR BLD AUTO: 67.7 % (ref 37–80)
NEUTROPHILS NFR BLD AUTO: 67.8 % (ref 37–80)
NEUTROPHILS NFR BLD AUTO: 68.1 % (ref 37–80)
NEUTROPHILS NFR BLD AUTO: 68.2 % (ref 37–80)
NEUTROPHILS NFR BLD AUTO: 71.7 % (ref 37–80)
NEUTROPHILS NFR BLD AUTO: 71.8 % (ref 37–80)
NEUTROPHILS NFR BLD AUTO: 72.6 % (ref 37–80)
NEUTROPHILS NFR BLD AUTO: 73.3 % (ref 37–80)
NEUTROPHILS NFR BLD AUTO: 74.6 % (ref 37–80)
NEUTROPHILS NFR BLD AUTO: 74.6 % (ref 37–80)
NEUTROPHILS NFR BLD AUTO: 75.2 % (ref 37–80)
NEUTROPHILS NFR BLD AUTO: 79.4 % (ref 37–80)
NEUTROPHILS NFR BLD AUTO: 85.7 % (ref 37–80)
NITRITE UR QL STRIP: NEGATIVE
NRBC BLD MANUAL-RTO: 0 /100 WBC (ref 0–0)
NRBC BLD MANUAL-RTO: 1.3 /100 WBC (ref 0–0)
NRBC BLD MANUAL-RTO: 1.5 /100 WBC (ref 0–0)
NT-PROBNP SERPL-MCNC: 1220 PG/ML (ref 0–1800)
NT-PROBNP SERPL-MCNC: 1510 PG/ML (ref 0–1800)
NT-PROBNP SERPL-MCNC: 2050 PG/ML (ref 0–1800)
PCO2 BLDA: 145.3 MM HG (ref 35–45)
PCO2 BLDA: 67.9 MM HG (ref 35–45)
PCO2 BLDA: 73.3 MM HG (ref 35–45)
PCO2 BLDA: 94.8 MM HG (ref 35–45)
PH BLDA: 7.16 PH UNITS (ref 7.35–7.45)
PH BLDA: 7.25 PH UNITS (ref 7.35–7.45)
PH BLDA: 7.34 PH UNITS (ref 7.35–7.45)
PH BLDA: 7.39 PH UNITS (ref 7.35–7.45)
PH UR STRIP.AUTO: 5.5 [PH] (ref 5–9)
PH UR STRIP.AUTO: 6 [PH] (ref 5–9)
PH UR STRIP.AUTO: 6 [PH] (ref 5–9)
PH UR STRIP.AUTO: 6.5 [PH] (ref 5–9)
PH UR STRIP.AUTO: 7.5 [PH] (ref 5–9)
PHENYTOIN FREE SERPL-MCNC: ABNORMAL UG/ML (ref 1–2)
PHENYTOIN SERPL-MCNC: 1.1 UG/ML (ref 10–20)
PHENYTOIN SERPL-MCNC: <3 MCG/ML (ref 10–20)
PHOSPHATE SERPL-MCNC: 2.8 MG/DL (ref 2.4–4.4)
PHOSPHATE SERPL-MCNC: 3.6 MG/DL (ref 2.4–4.4)
PLATELET # BLD AUTO: 114 10*3/MM3 (ref 150–450)
PLATELET # BLD AUTO: 122 10*3/MM3 (ref 150–450)
PLATELET # BLD AUTO: 124 10*3/MM3 (ref 150–450)
PLATELET # BLD AUTO: 137 10*3/MM3 (ref 150–450)
PLATELET # BLD AUTO: 139 10*3/MM3 (ref 150–450)
PLATELET # BLD AUTO: 142 10*3/MM3 (ref 150–450)
PLATELET # BLD AUTO: 162 10*3/MM3 (ref 150–450)
PLATELET # BLD AUTO: 163 10*3/MM3 (ref 150–450)
PLATELET # BLD AUTO: 164 10*3/MM3 (ref 150–450)
PLATELET # BLD AUTO: 166 10*3/MM3 (ref 150–450)
PLATELET # BLD AUTO: 177 10*3/MM3 (ref 150–450)
PLATELET # BLD AUTO: 183 10*3/MM3 (ref 150–450)
PLATELET # BLD AUTO: 184 10*3/MM3 (ref 150–450)
PLATELET # BLD AUTO: 187 10*3/MM3 (ref 150–450)
PLATELET # BLD AUTO: 203 10*3/MM3 (ref 150–450)
PLATELET # BLD AUTO: 207 10*3/MM3 (ref 150–450)
PLATELET # BLD AUTO: 224 10*3/MM3 (ref 150–450)
PLATELET # BLD AUTO: 250 10*3/MM3 (ref 150–450)
PLATELET # BLD AUTO: 274 10*3/MM3 (ref 150–450)
PLATELET # BLD AUTO: 340 10*3/MM3 (ref 150–450)
PLATELET # BLD AUTO: 353 10*3/MM3 (ref 150–450)
PLATELET # BLD AUTO: 94 10*3/MM3 (ref 150–450)
PLATELET # BLD AUTO: 97 10*3/MM3 (ref 150–450)
PMV BLD AUTO: 10 FL (ref 8–12)
PMV BLD AUTO: 10 FL (ref 8–12)
PMV BLD AUTO: 10.1 FL (ref 8–12)
PMV BLD AUTO: 10.2 FL (ref 8–12)
PMV BLD AUTO: 10.3 FL (ref 8–12)
PMV BLD AUTO: 10.5 FL (ref 8–12)
PMV BLD AUTO: 10.6 FL (ref 8–12)
PMV BLD AUTO: 10.7 FL (ref 8–12)
PMV BLD AUTO: 10.7 FL (ref 8–12)
PMV BLD AUTO: 11 FL (ref 8–12)
PMV BLD AUTO: 11.7 FL (ref 8–12)
PMV BLD AUTO: 8.6 FL (ref 8–12)
PMV BLD AUTO: 8.8 FL (ref 8–12)
PMV BLD AUTO: 8.8 FL (ref 8–12)
PMV BLD AUTO: 9.3 FL (ref 8–12)
PMV BLD AUTO: 9.4 FL (ref 8–12)
PMV BLD AUTO: 9.5 FL (ref 8–12)
PMV BLD AUTO: 9.7 FL (ref 8–12)
PMV BLD AUTO: 9.7 FL (ref 8–12)
PMV BLD AUTO: 9.8 FL (ref 8–12)
PMV BLD AUTO: 9.9 FL (ref 8–12)
PO2 BLDA: 109.5 MM HG (ref 80–105)
PO2 BLDA: 53 MM HG (ref 80–105)
PO2 BLDA: 67.1 MM HG (ref 80–105)
PO2 BLDA: 97.1 MM HG (ref 80–105)
POTASSIUM BLD-SCNC: 2.8 MMOL/L (ref 3.5–5.1)
POTASSIUM BLD-SCNC: 3.1 MMOL/L (ref 3.5–5.1)
POTASSIUM BLD-SCNC: 3.6 MMOL/L (ref 3.5–5.1)
POTASSIUM BLD-SCNC: 3.7 MMOL/L (ref 3.5–5.1)
POTASSIUM BLD-SCNC: 3.7 MMOL/L (ref 3.5–5.1)
POTASSIUM BLD-SCNC: 3.8 MMOL/L (ref 3.5–5.1)
POTASSIUM BLD-SCNC: 3.9 MMOL/L (ref 3.5–5.1)
POTASSIUM BLD-SCNC: 4 MMOL/L (ref 3.5–5.1)
POTASSIUM BLD-SCNC: 4 MMOL/L (ref 3.5–5.1)
POTASSIUM BLD-SCNC: 4.1 MMOL/L (ref 3.5–5.1)
POTASSIUM BLD-SCNC: 4.2 MMOL/L (ref 3.5–5.1)
POTASSIUM BLD-SCNC: 4.2 MMOL/L (ref 3.5–5.1)
POTASSIUM BLD-SCNC: 4.3 MMOL/L (ref 3.5–5.1)
POTASSIUM BLD-SCNC: 4.5 MMOL/L (ref 3.5–5.1)
POTASSIUM BLD-SCNC: 4.6 MMOL/L (ref 3.5–5.1)
POTASSIUM BLD-SCNC: 4.6 MMOL/L (ref 3.5–5.1)
POTASSIUM BLD-SCNC: 4.7 MMOL/L (ref 3.5–5.1)
POTASSIUM BLD-SCNC: 4.7 MMOL/L (ref 3.5–5.1)
POTASSIUM BLD-SCNC: 4.8 MMOL/L (ref 3.5–5.1)
POTASSIUM BLD-SCNC: 4.9 MMOL/L (ref 3.5–5.1)
POTASSIUM BLD-SCNC: 5.1 MMOL/L (ref 3.5–5.1)
POTASSIUM BLDA-SCNC: 4.34 MMOL/L (ref 3.6–4.9)
POTASSIUM BLDA-SCNC: 4.39 MMOL/L (ref 3.6–4.9)
POTASSIUM BLDA-SCNC: 4.64 MMOL/L (ref 3.6–4.9)
POTASSIUM BLDA-SCNC: 4.75 MMOL/L (ref 3.6–4.9)
PROT SERPL-MCNC: 5.1 G/DL (ref 6.3–8.6)
PROT SERPL-MCNC: 5.1 G/DL (ref 6.3–8.6)
PROT SERPL-MCNC: 5.2 G/DL (ref 6.3–8.6)
PROT SERPL-MCNC: 5.2 G/DL (ref 6.3–8.6)
PROT SERPL-MCNC: 5.4 G/DL (ref 6.3–8.6)
PROT SERPL-MCNC: 5.5 G/DL (ref 6.3–8.6)
PROT SERPL-MCNC: 5.6 G/DL (ref 6.3–8.6)
PROT SERPL-MCNC: 5.6 G/DL (ref 6.3–8.6)
PROT SERPL-MCNC: 5.7 G/DL (ref 6.3–8.6)
PROT SERPL-MCNC: 5.8 G/DL (ref 6.3–8.6)
PROT SERPL-MCNC: 5.8 G/DL (ref 6.3–8.6)
PROT SERPL-MCNC: 5.9 G/DL (ref 6.3–8.6)
PROT SERPL-MCNC: 6 G/DL (ref 6.3–8.6)
PROT SERPL-MCNC: 6 G/DL (ref 6.3–8.6)
PROT SERPL-MCNC: 6.3 G/DL (ref 6.3–8.6)
PROT SERPL-MCNC: 6.4 G/DL (ref 6.3–8.6)
PROT SERPL-MCNC: 6.6 G/DL (ref 6.3–8.6)
PROT SERPL-MCNC: 7.3 G/DL (ref 6.3–8.6)
PROT UR QL STRIP: NEGATIVE
PROTHROMBIN TIME: 14.2 SECONDS (ref 11.1–15.3)
PROTHROMBIN TIME: 14.4 SECONDS (ref 11.1–15.3)
PROTHROMBIN TIME: 15.4 SECONDS (ref 11.1–15.3)
RBC # BLD AUTO: 2.25 10*6/MM3 (ref 3.77–5.16)
RBC # BLD AUTO: 2.37 10*6/MM3 (ref 3.77–5.16)
RBC # BLD AUTO: 2.54 10*6/MM3 (ref 3.77–5.16)
RBC # BLD AUTO: 2.54 10*6/MM3 (ref 3.77–5.16)
RBC # BLD AUTO: 2.57 10*6/MM3 (ref 3.77–5.16)
RBC # BLD AUTO: 2.64 10*6/MM3 (ref 3.77–5.16)
RBC # BLD AUTO: 2.66 10*6/MM3 (ref 3.77–5.16)
RBC # BLD AUTO: 2.69 10*6/MM3 (ref 3.77–5.16)
RBC # BLD AUTO: 2.73 10*6/MM3 (ref 3.77–5.16)
RBC # BLD AUTO: 2.74 10*6/MM3 (ref 3.77–5.16)
RBC # BLD AUTO: 2.74 10*6/MM3 (ref 3.77–5.16)
RBC # BLD AUTO: 2.76 10*6/MM3 (ref 3.77–5.16)
RBC # BLD AUTO: 2.79 10*6/MM3 (ref 3.77–5.16)
RBC # BLD AUTO: 2.84 10*6/MM3 (ref 3.77–5.16)
RBC # BLD AUTO: 3.02 10*6/MM3 (ref 3.77–5.16)
RBC # BLD AUTO: 3.25 10*6/MM3 (ref 3.77–5.16)
RBC # BLD AUTO: 3.36 10*6/MM3 (ref 3.77–5.16)
RBC # BLD AUTO: 3.43 10*6/MM3 (ref 3.77–5.16)
RBC # BLD AUTO: 3.46 10*6/MM3 (ref 3.77–5.16)
RBC # BLD AUTO: 3.54 10*6/MM3 (ref 3.77–5.16)
RBC # BLD AUTO: 3.6 10*6/MM3 (ref 3.77–5.16)
RBC # BLD AUTO: 3.83 10*6/MM3 (ref 3.77–5.16)
RBC # BLD AUTO: 4.1 10*6/MM3 (ref 3.77–5.16)
RBC # UR: ABNORMAL /HPF
REF LAB TEST METHOD: ABNORMAL
RH BLD: POSITIVE
SAO2 % BLDCOA: 85.8 %
SAO2 % BLDCOA: 87.8 %
SAO2 % BLDCOA: 96.9 % (ref 94–100)
SAO2 % BLDCOA: 98.2 % (ref 94–100)
SMALL PLATELETS BLD QL SMEAR: ADEQUATE
SMALL PLATELETS BLD QL SMEAR: ADEQUATE
SMALL PLATELETS BLD QL SMEAR: NORMAL
SODIUM BLD-SCNC: 131 MMOL/L (ref 137–145)
SODIUM BLD-SCNC: 133 MMOL/L (ref 137–145)
SODIUM BLD-SCNC: 134 MMOL/L (ref 137–145)
SODIUM BLD-SCNC: 135 MMOL/L (ref 137–145)
SODIUM BLD-SCNC: 136 MMOL/L (ref 137–145)
SODIUM BLD-SCNC: 137 MMOL/L (ref 137–145)
SODIUM BLD-SCNC: 138 MMOL/L (ref 137–145)
SODIUM BLD-SCNC: 139 MMOL/L (ref 137–145)
SODIUM BLD-SCNC: 140 MMOL/L (ref 137–145)
SODIUM BLD-SCNC: 141 MMOL/L (ref 137–145)
SODIUM BLD-SCNC: 141 MMOL/L (ref 137–145)
SODIUM BLD-SCNC: 148 MMOL/L (ref 137–145)
SODIUM BLDA-SCNC: 136.4 MMOL/L (ref 138–146)
SODIUM BLDA-SCNC: 136.5 MMOL/L (ref 138–146)
SODIUM BLDA-SCNC: 138.9 MMOL/L (ref 138–146)
SODIUM BLDA-SCNC: 138.9 MMOL/L (ref 138–146)
SP GR UR STRIP: 1.01 (ref 1–1.03)
SP GR UR STRIP: 1.02 (ref 1–1.03)
SQUAMOUS #/AREA URNS HPF: ABNORMAL /HPF
TIBC SERPL-MCNC: 296 MCG/DL (ref 265–497)
TIBC SERPL-MCNC: 338 MCG/DL (ref 265–497)
TIBC SERPL-MCNC: 377 MCG/DL (ref 265–497)
TIBC SERPL-MCNC: 395 MCG/DL (ref 265–497)
TROPONIN I SERPL-MCNC: 0.01 NG/ML
TROPONIN I SERPL-MCNC: 0.02 NG/ML
TROPONIN I SERPL-MCNC: <0.012 NG/ML
TSH SERPL DL<=0.05 MIU/L-ACNC: 1.72 MIU/ML (ref 0.46–4.68)
TSH SERPL DL<=0.05 MIU/L-ACNC: 6 MIU/ML (ref 0.46–4.68)
UNIT  ABO: NORMAL
UNIT  RH: NORMAL
UROBILINOGEN UR QL STRIP: ABNORMAL
UROBILINOGEN UR QL STRIP: ABNORMAL
UROBILINOGEN UR QL STRIP: NORMAL
VIT B12 BLD-MCNC: 996 PG/ML (ref 239–931)
VIT B12 BLD-MCNC: >1000 PG/ML (ref 239–931)
VIT B12 BLD-MCNC: >1000 PG/ML (ref 239–931)
WBC MORPH BLD: NORMAL
WBC NRBC COR # BLD: 5 10*3/MM3 (ref 3.2–9.8)
WBC NRBC COR # BLD: 5.2 10*3/MM3 (ref 3.2–9.8)
WBC NRBC COR # BLD: 5.44 10*3/MM3 (ref 3.2–9.8)
WBC NRBC COR # BLD: 5.8 10*3/MM3 (ref 3.2–9.8)
WBC NRBC COR # BLD: 5.83 10*3/MM3 (ref 3.2–9.8)
WBC NRBC COR # BLD: 6.02 10*3/MM3 (ref 3.2–9.8)
WBC NRBC COR # BLD: 6.39 10*3/MM3 (ref 3.2–9.8)
WBC NRBC COR # BLD: 6.65 10*3/MM3 (ref 3.2–9.8)
WBC NRBC COR # BLD: 6.69 10*3/MM3 (ref 3.2–9.8)
WBC NRBC COR # BLD: 7.03 10*3/MM3 (ref 3.2–9.8)
WBC NRBC COR # BLD: 7.06 10*3/MM3 (ref 3.2–9.8)
WBC NRBC COR # BLD: 7.32 10*3/MM3 (ref 3.2–9.8)
WBC NRBC COR # BLD: 7.54 10*3/MM3 (ref 3.2–9.8)
WBC NRBC COR # BLD: 7.7 10*3/MM3 (ref 3.2–9.8)
WBC NRBC COR # BLD: 7.81 10*3/MM3 (ref 3.2–9.8)
WBC NRBC COR # BLD: 7.88 10*3/MM3 (ref 3.2–9.8)
WBC NRBC COR # BLD: 8.06 10*3/MM3 (ref 3.2–9.8)
WBC NRBC COR # BLD: 8.07 10*3/MM3 (ref 3.2–9.8)
WBC NRBC COR # BLD: 8.11 10*3/MM3 (ref 3.2–9.8)
WBC NRBC COR # BLD: 8.5 10*3/MM3 (ref 3.2–9.8)
WBC NRBC COR # BLD: 8.88 10*3/MM3 (ref 3.2–9.8)
WBC NRBC COR # BLD: 8.96 10*3/MM3 (ref 3.2–9.8)
WBC NRBC COR # BLD: 9.18 10*3/MM3 (ref 3.2–9.8)
WBC UR QL AUTO: ABNORMAL /HPF
WHOLE BLOOD HOLD SPECIMEN: NORMAL

## 2017-01-01 PROCEDURE — 97166 OT EVAL MOD COMPLEX 45 MIN: CPT

## 2017-01-01 PROCEDURE — 94799 UNLISTED PULMONARY SVC/PX: CPT

## 2017-01-01 PROCEDURE — 82550 ASSAY OF CK (CPK): CPT | Performed by: INTERNAL MEDICINE

## 2017-01-01 PROCEDURE — 99284 EMERGENCY DEPT VISIT MOD MDM: CPT

## 2017-01-01 PROCEDURE — 85025 COMPLETE CBC W/AUTO DIFF WBC: CPT | Performed by: FAMILY MEDICINE

## 2017-01-01 PROCEDURE — 92610 EVALUATE SWALLOWING FUNCTION: CPT | Performed by: SPEECH-LANGUAGE PATHOLOGIST

## 2017-01-01 PROCEDURE — 97530 THERAPEUTIC ACTIVITIES: CPT

## 2017-01-01 PROCEDURE — G8979 MOBILITY GOAL STATUS: HCPCS

## 2017-01-01 PROCEDURE — 80053 COMPREHEN METABOLIC PANEL: CPT | Performed by: FAMILY MEDICINE

## 2017-01-01 PROCEDURE — G8987 SELF CARE CURRENT STATUS: HCPCS

## 2017-01-01 PROCEDURE — 97535 SELF CARE MNGMENT TRAINING: CPT

## 2017-01-01 PROCEDURE — 25010000002 FUROSEMIDE PER 20 MG: Performed by: EMERGENCY MEDICINE

## 2017-01-01 PROCEDURE — 25010000002 ENOXAPARIN PER 10 MG: Performed by: FAMILY MEDICINE

## 2017-01-01 PROCEDURE — 94760 N-INVAS EAR/PLS OXIMETRY 1: CPT

## 2017-01-01 PROCEDURE — 83880 ASSAY OF NATRIURETIC PEPTIDE: CPT | Performed by: EMERGENCY MEDICINE

## 2017-01-01 PROCEDURE — 82550 ASSAY OF CK (CPK): CPT | Performed by: EMERGENCY MEDICINE

## 2017-01-01 PROCEDURE — 83615 LACTATE (LD) (LDH) ENZYME: CPT | Performed by: INTERNAL MEDICINE

## 2017-01-01 PROCEDURE — 99285 EMERGENCY DEPT VISIT HI MDM: CPT

## 2017-01-01 PROCEDURE — 86870 RBC ANTIBODY IDENTIFICATION: CPT | Performed by: INTERNAL MEDICINE

## 2017-01-01 PROCEDURE — G8978 MOBILITY CURRENT STATUS: HCPCS

## 2017-01-01 PROCEDURE — 27245 TREAT THIGH FRACTURE: CPT | Performed by: ORTHOPAEDIC SURGERY

## 2017-01-01 PROCEDURE — 71275 CT ANGIOGRAPHY CHEST: CPT

## 2017-01-01 PROCEDURE — 93010 ELECTROCARDIOGRAM REPORT: CPT | Performed by: INTERNAL MEDICINE

## 2017-01-01 PROCEDURE — 93005 ELECTROCARDIOGRAM TRACING: CPT | Performed by: EMERGENCY MEDICINE

## 2017-01-01 PROCEDURE — 76937 US GUIDE VASCULAR ACCESS: CPT

## 2017-01-01 PROCEDURE — 86900 BLOOD TYPING SEROLOGIC ABO: CPT

## 2017-01-01 PROCEDURE — 86850 RBC ANTIBODY SCREEN: CPT

## 2017-01-01 PROCEDURE — 97110 THERAPEUTIC EXERCISES: CPT

## 2017-01-01 PROCEDURE — 86905 BLOOD TYPING RBC ANTIGENS: CPT

## 2017-01-01 PROCEDURE — 82553 CREATINE MB FRACTION: CPT | Performed by: EMERGENCY MEDICINE

## 2017-01-01 PROCEDURE — 83735 ASSAY OF MAGNESIUM: CPT | Performed by: INTERNAL MEDICINE

## 2017-01-01 PROCEDURE — 85730 THROMBOPLASTIN TIME PARTIAL: CPT | Performed by: EMERGENCY MEDICINE

## 2017-01-01 PROCEDURE — 71010 HC CHEST PA OR AP: CPT

## 2017-01-01 PROCEDURE — 97116 GAIT TRAINING THERAPY: CPT

## 2017-01-01 PROCEDURE — P9016 RBC LEUKOCYTES REDUCED: HCPCS

## 2017-01-01 PROCEDURE — 25010000002 FUROSEMIDE PER 20 MG: Performed by: FAMILY MEDICINE

## 2017-01-01 PROCEDURE — 84443 ASSAY THYROID STIM HORMONE: CPT | Performed by: FAMILY MEDICINE

## 2017-01-01 PROCEDURE — 97162 PT EVAL MOD COMPLEX 30 MIN: CPT | Performed by: PHYSICAL THERAPIST

## 2017-01-01 PROCEDURE — 97530 THERAPEUTIC ACTIVITIES: CPT | Performed by: PHYSICAL THERAPIST

## 2017-01-01 PROCEDURE — 85014 HEMATOCRIT: CPT | Performed by: ORTHOPAEDIC SURGERY

## 2017-01-01 PROCEDURE — 99221 1ST HOSP IP/OBS SF/LOW 40: CPT | Performed by: FAMILY MEDICINE

## 2017-01-01 PROCEDURE — 85014 HEMATOCRIT: CPT | Performed by: FAMILY MEDICINE

## 2017-01-01 PROCEDURE — 25010000002 HEPARIN (PORCINE) PER 1000 UNITS: Performed by: INTERNAL MEDICINE

## 2017-01-01 PROCEDURE — 25010000002 ONDANSETRON PER 1 MG: Performed by: NURSE ANESTHETIST, CERTIFIED REGISTERED

## 2017-01-01 PROCEDURE — 86923 COMPATIBILITY TEST ELECTRIC: CPT

## 2017-01-01 PROCEDURE — 97163 PT EVAL HIGH COMPLEX 45 MIN: CPT | Performed by: PHYSICAL THERAPIST

## 2017-01-01 PROCEDURE — 85025 COMPLETE CBC W/AUTO DIFF WBC: CPT | Performed by: EMERGENCY MEDICINE

## 2017-01-01 PROCEDURE — 97116 GAIT TRAINING THERAPY: CPT | Performed by: PHYSICAL THERAPIST

## 2017-01-01 PROCEDURE — 25010000002 FUROSEMIDE PER 20 MG: Performed by: INTERNAL MEDICINE

## 2017-01-01 PROCEDURE — 82247 BILIRUBIN TOTAL: CPT | Performed by: FAMILY MEDICINE

## 2017-01-01 PROCEDURE — 99223 1ST HOSP IP/OBS HIGH 75: CPT | Performed by: ORTHOPAEDIC SURGERY

## 2017-01-01 PROCEDURE — 99232 SBSQ HOSP IP/OBS MODERATE 35: CPT | Performed by: FAMILY MEDICINE

## 2017-01-01 PROCEDURE — 99024 POSTOP FOLLOW-UP VISIT: CPT | Performed by: ORTHOPAEDIC SURGERY

## 2017-01-01 PROCEDURE — 81001 URINALYSIS AUTO W/SCOPE: CPT | Performed by: EMERGENCY MEDICINE

## 2017-01-01 PROCEDURE — 82803 BLOOD GASES ANY COMBINATION: CPT | Performed by: FAMILY MEDICINE

## 2017-01-01 PROCEDURE — 80053 COMPREHEN METABOLIC PANEL: CPT | Performed by: EMERGENCY MEDICINE

## 2017-01-01 PROCEDURE — 82248 BILIRUBIN DIRECT: CPT | Performed by: INTERNAL MEDICINE

## 2017-01-01 PROCEDURE — 83540 ASSAY OF IRON: CPT | Performed by: FAMILY MEDICINE

## 2017-01-01 PROCEDURE — 99238 HOSP IP/OBS DSCHRG MGMT 30/<: CPT | Performed by: FAMILY MEDICINE

## 2017-01-01 PROCEDURE — 97755 ASSISTIVE TECHNOLOGY ASSESS: CPT

## 2017-01-01 PROCEDURE — 70450 CT HEAD/BRAIN W/O DYE: CPT

## 2017-01-01 PROCEDURE — 85610 PROTHROMBIN TIME: CPT | Performed by: EMERGENCY MEDICINE

## 2017-01-01 PROCEDURE — 0QS706Z REPOSITION LEFT UPPER FEMUR WITH INTRAMEDULLARY INTERNAL FIXATION DEVICE, OPEN APPROACH: ICD-10-PCS | Performed by: ORTHOPAEDIC SURGERY

## 2017-01-01 PROCEDURE — 97542 WHEELCHAIR MNGMENT TRAINING: CPT

## 2017-01-01 PROCEDURE — 85018 HEMOGLOBIN: CPT | Performed by: FAMILY MEDICINE

## 2017-01-01 PROCEDURE — 85027 COMPLETE CBC AUTOMATED: CPT | Performed by: FAMILY MEDICINE

## 2017-01-01 PROCEDURE — 74177 CT ABD & PELVIS W/CONTRAST: CPT

## 2017-01-01 PROCEDURE — 92507 TX SP LANG VOICE COMM INDIV: CPT | Performed by: SPEECH-LANGUAGE PATHOLOGIST

## 2017-01-01 PROCEDURE — 25010000002 ENOXAPARIN PER 10 MG: Performed by: HOSPITALIST

## 2017-01-01 PROCEDURE — 25010000002 PIPERACILLIN SOD-TAZOBACTAM PER 1 G: Performed by: INTERNAL MEDICINE

## 2017-01-01 PROCEDURE — 97542 WHEELCHAIR MNGMENT TRAINING: CPT | Performed by: PHYSICAL THERAPIST

## 2017-01-01 PROCEDURE — 86901 BLOOD TYPING SEROLOGIC RH(D): CPT | Performed by: EMERGENCY MEDICINE

## 2017-01-01 PROCEDURE — 86900 BLOOD TYPING SEROLOGIC ABO: CPT | Performed by: INTERNAL MEDICINE

## 2017-01-01 PROCEDURE — 96365 THER/PROPH/DIAG IV INF INIT: CPT

## 2017-01-01 PROCEDURE — 94640 AIRWAY INHALATION TREATMENT: CPT

## 2017-01-01 PROCEDURE — 71020 HC CHEST PA AND LATERAL: CPT

## 2017-01-01 PROCEDURE — 36415 COLL VENOUS BLD VENIPUNCTURE: CPT | Performed by: INTERNAL MEDICINE

## 2017-01-01 PROCEDURE — 81003 URINALYSIS AUTO W/O SCOPE: CPT | Performed by: INTERNAL MEDICINE

## 2017-01-01 PROCEDURE — G8979 MOBILITY GOAL STATUS: HCPCS | Performed by: PHYSICAL THERAPIST

## 2017-01-01 PROCEDURE — 80053 COMPREHEN METABOLIC PANEL: CPT | Performed by: INTERNAL MEDICINE

## 2017-01-01 PROCEDURE — C1713 ANCHOR/SCREW BN/BN,TIS/BN: HCPCS | Performed by: ORTHOPAEDIC SURGERY

## 2017-01-01 PROCEDURE — 86901 BLOOD TYPING SEROLOGIC RH(D): CPT

## 2017-01-01 PROCEDURE — 86902 BLOOD TYPE ANTIGEN DONOR EA: CPT

## 2017-01-01 PROCEDURE — 85025 COMPLETE CBC W/AUTO DIFF WBC: CPT | Performed by: INTERNAL MEDICINE

## 2017-01-01 PROCEDURE — 82962 GLUCOSE BLOOD TEST: CPT

## 2017-01-01 PROCEDURE — 97110 THERAPEUTIC EXERCISES: CPT | Performed by: PHYSICAL THERAPIST

## 2017-01-01 PROCEDURE — 85025 COMPLETE CBC W/AUTO DIFF WBC: CPT | Performed by: HOSPITALIST

## 2017-01-01 PROCEDURE — 83550 IRON BINDING TEST: CPT | Performed by: FAMILY MEDICINE

## 2017-01-01 PROCEDURE — 86922 COMPATIBILITY TEST ANTIGLOB: CPT

## 2017-01-01 PROCEDURE — 93306 TTE W/DOPPLER COMPLETE: CPT

## 2017-01-01 PROCEDURE — 97162 PT EVAL MOD COMPLEX 30 MIN: CPT

## 2017-01-01 PROCEDURE — 83605 ASSAY OF LACTIC ACID: CPT | Performed by: EMERGENCY MEDICINE

## 2017-01-01 PROCEDURE — 82803 BLOOD GASES ANY COMBINATION: CPT | Performed by: EMERGENCY MEDICINE

## 2017-01-01 PROCEDURE — 25010000002 DEXAMETHASONE PER 1 MG: Performed by: INTERNAL MEDICINE

## 2017-01-01 PROCEDURE — 85007 BL SMEAR W/DIFF WBC COUNT: CPT | Performed by: EMERGENCY MEDICINE

## 2017-01-01 PROCEDURE — 80069 RENAL FUNCTION PANEL: CPT | Performed by: FAMILY MEDICINE

## 2017-01-01 PROCEDURE — 94660 CPAP INITIATION&MGMT: CPT

## 2017-01-01 PROCEDURE — 83615 LACTATE (LD) (LDH) ENZYME: CPT | Performed by: FAMILY MEDICINE

## 2017-01-01 PROCEDURE — 86920 COMPATIBILITY TEST SPIN: CPT

## 2017-01-01 PROCEDURE — 25010000002 LEVOFLOXACIN PER 250 MG: Performed by: INTERNAL MEDICINE

## 2017-01-01 PROCEDURE — C1751 CATH, INF, PER/CENT/MIDLINE: HCPCS

## 2017-01-01 PROCEDURE — 25010000002 MORPHINE PER 10 MG: Performed by: FAMILY MEDICINE

## 2017-01-01 PROCEDURE — 25010000002 ENOXAPARIN PER 10 MG: Performed by: ORTHOPAEDIC SURGERY

## 2017-01-01 PROCEDURE — 82607 VITAMIN B-12: CPT | Performed by: INTERNAL MEDICINE

## 2017-01-01 PROCEDURE — 80048 BASIC METABOLIC PNL TOTAL CA: CPT | Performed by: FAMILY MEDICINE

## 2017-01-01 PROCEDURE — G8988 SELF CARE GOAL STATUS: HCPCS

## 2017-01-01 PROCEDURE — 92526 ORAL FUNCTION THERAPY: CPT | Performed by: SPEECH-LANGUAGE PATHOLOGIST

## 2017-01-01 PROCEDURE — 93306 TTE W/DOPPLER COMPLETE: CPT | Performed by: INTERNAL MEDICINE

## 2017-01-01 PROCEDURE — 25010000002 PIPERACILLIN-TAZOBACTAM: Performed by: EMERGENCY MEDICINE

## 2017-01-01 PROCEDURE — 36430 TRANSFUSION BLD/BLD COMPNT: CPT

## 2017-01-01 PROCEDURE — 25010000002 CYANOCOBALAMIN PER 1000 MCG: Performed by: FAMILY MEDICINE

## 2017-01-01 PROCEDURE — G8978 MOBILITY CURRENT STATUS: HCPCS | Performed by: PHYSICAL THERAPIST

## 2017-01-01 PROCEDURE — 82728 ASSAY OF FERRITIN: CPT | Performed by: FAMILY MEDICINE

## 2017-01-01 PROCEDURE — 86900 BLOOD TYPING SEROLOGIC ABO: CPT | Performed by: EMERGENCY MEDICINE

## 2017-01-01 PROCEDURE — 83036 HEMOGLOBIN GLYCOSYLATED A1C: CPT | Performed by: FAMILY MEDICINE

## 2017-01-01 PROCEDURE — 87040 BLOOD CULTURE FOR BACTERIA: CPT | Performed by: EMERGENCY MEDICINE

## 2017-01-01 PROCEDURE — 25010000002 MORPHINE PER 10 MG: Performed by: EMERGENCY MEDICINE

## 2017-01-01 PROCEDURE — 25010000002 AZITHROMYCIN: Performed by: EMERGENCY MEDICINE

## 2017-01-01 PROCEDURE — 25010000002 ONDANSETRON PER 1 MG: Performed by: EMERGENCY MEDICINE

## 2017-01-01 PROCEDURE — 36600 WITHDRAWAL OF ARTERIAL BLOOD: CPT

## 2017-01-01 PROCEDURE — 84484 ASSAY OF TROPONIN QUANT: CPT | Performed by: INTERNAL MEDICINE

## 2017-01-01 PROCEDURE — 82803 BLOOD GASES ANY COMBINATION: CPT | Performed by: INTERNAL MEDICINE

## 2017-01-01 PROCEDURE — 80053 COMPREHEN METABOLIC PANEL: CPT | Performed by: HOSPITALIST

## 2017-01-01 PROCEDURE — 83550 IRON BINDING TEST: CPT | Performed by: INTERNAL MEDICINE

## 2017-01-01 PROCEDURE — 25010000002 PROPOFOL 10 MG/ML EMULSION: Performed by: NURSE ANESTHETIST, CERTIFIED REGISTERED

## 2017-01-01 PROCEDURE — 25010000002 MORPHINE SULFATE (PF) 2 MG/ML SOLUTION: Performed by: ORTHOPAEDIC SURGERY

## 2017-01-01 PROCEDURE — 80053 COMPREHEN METABOLIC PANEL: CPT | Performed by: NURSE PRACTITIONER

## 2017-01-01 PROCEDURE — 97167 OT EVAL HIGH COMPLEX 60 MIN: CPT

## 2017-01-01 PROCEDURE — 73552 X-RAY EXAM OF FEMUR 2/>: CPT

## 2017-01-01 PROCEDURE — 96375 TX/PRO/DX INJ NEW DRUG ADDON: CPT

## 2017-01-01 PROCEDURE — 80186 ASSAY OF PHENYTOIN FREE: CPT | Performed by: ORTHOPAEDIC SURGERY

## 2017-01-01 PROCEDURE — 82248 BILIRUBIN DIRECT: CPT | Performed by: FAMILY MEDICINE

## 2017-01-01 PROCEDURE — 25010000002 MORPHINE SULFATE (PF) 2 MG/ML SOLUTION: Performed by: FAMILY MEDICINE

## 2017-01-01 PROCEDURE — 82607 VITAMIN B-12: CPT | Performed by: FAMILY MEDICINE

## 2017-01-01 PROCEDURE — 83880 ASSAY OF NATRIURETIC PEPTIDE: CPT | Performed by: FAMILY MEDICINE

## 2017-01-01 PROCEDURE — 97163 PT EVAL HIGH COMPLEX 45 MIN: CPT

## 2017-01-01 PROCEDURE — 82728 ASSAY OF FERRITIN: CPT | Performed by: INTERNAL MEDICINE

## 2017-01-01 PROCEDURE — 25010000002 LEVOFLOXACIN PER 250 MG: Performed by: EMERGENCY MEDICINE

## 2017-01-01 PROCEDURE — 83010 ASSAY OF HAPTOGLOBIN QUANT: CPT | Performed by: FAMILY MEDICINE

## 2017-01-01 PROCEDURE — 76000 FLUOROSCOPY <1 HR PHYS/QHP: CPT

## 2017-01-01 PROCEDURE — 84484 ASSAY OF TROPONIN QUANT: CPT | Performed by: EMERGENCY MEDICINE

## 2017-01-01 PROCEDURE — 82272 OCCULT BLD FECES 1-3 TESTS: CPT | Performed by: INTERNAL MEDICINE

## 2017-01-01 PROCEDURE — 86850 RBC ANTIBODY SCREEN: CPT | Performed by: INTERNAL MEDICINE

## 2017-01-01 PROCEDURE — 86850 RBC ANTIBODY SCREEN: CPT | Performed by: EMERGENCY MEDICINE

## 2017-01-01 PROCEDURE — P9021 RED BLOOD CELLS UNIT: HCPCS

## 2017-01-01 PROCEDURE — 92523 SPEECH SOUND LANG COMPREHEN: CPT | Performed by: SPEECH-LANGUAGE PATHOLOGIST

## 2017-01-01 PROCEDURE — 81003 URINALYSIS AUTO W/O SCOPE: CPT | Performed by: EMERGENCY MEDICINE

## 2017-01-01 PROCEDURE — 80185 ASSAY OF PHENYTOIN TOTAL: CPT | Performed by: EMERGENCY MEDICINE

## 2017-01-01 PROCEDURE — 72170 X-RAY EXAM OF PELVIS: CPT

## 2017-01-01 PROCEDURE — 25010000002 NA FERRIC GLUC CPLX PER 12.5 MG: Performed by: FAMILY MEDICINE

## 2017-01-01 PROCEDURE — 86870 RBC ANTIBODY IDENTIFICATION: CPT

## 2017-01-01 PROCEDURE — 99213 OFFICE O/P EST LOW 20 MIN: CPT | Performed by: INTERNAL MEDICINE

## 2017-01-01 PROCEDURE — 82746 ASSAY OF FOLIC ACID SERUM: CPT | Performed by: INTERNAL MEDICINE

## 2017-01-01 PROCEDURE — 63710000001 DIPHENHYDRAMINE PER 50 MG: Performed by: FAMILY MEDICINE

## 2017-01-01 PROCEDURE — 83735 ASSAY OF MAGNESIUM: CPT | Performed by: FAMILY MEDICINE

## 2017-01-01 PROCEDURE — 25010000002 PHENYLEPHRINE PER 1 ML: Performed by: NURSE ANESTHETIST, CERTIFIED REGISTERED

## 2017-01-01 PROCEDURE — 81003 URINALYSIS AUTO W/O SCOPE: CPT | Performed by: FAMILY MEDICINE

## 2017-01-01 PROCEDURE — 86901 BLOOD TYPING SEROLOGIC RH(D): CPT | Performed by: INTERNAL MEDICINE

## 2017-01-01 PROCEDURE — 80185 ASSAY OF PHENYTOIN TOTAL: CPT | Performed by: FAMILY MEDICINE

## 2017-01-01 PROCEDURE — 80185 ASSAY OF PHENYTOIN TOTAL: CPT | Performed by: ORTHOPAEDIC SURGERY

## 2017-01-01 PROCEDURE — 84443 ASSAY THYROID STIM HORMONE: CPT | Performed by: INTERNAL MEDICINE

## 2017-01-01 PROCEDURE — 99233 SBSQ HOSP IP/OBS HIGH 50: CPT | Performed by: INTERNAL MEDICINE

## 2017-01-01 PROCEDURE — 0 IOPAMIDOL 61 % SOLUTION: Performed by: EMERGENCY MEDICINE

## 2017-01-01 PROCEDURE — G0463 HOSPITAL OUTPT CLINIC VISIT: HCPCS | Performed by: INTERNAL MEDICINE

## 2017-01-01 PROCEDURE — 82553 CREATINE MB FRACTION: CPT | Performed by: INTERNAL MEDICINE

## 2017-01-01 PROCEDURE — 85379 FIBRIN DEGRADATION QUANT: CPT | Performed by: EMERGENCY MEDICINE

## 2017-01-01 PROCEDURE — 85610 PROTHROMBIN TIME: CPT | Performed by: FAMILY MEDICINE

## 2017-01-01 PROCEDURE — 85027 COMPLETE CBC AUTOMATED: CPT | Performed by: INTERNAL MEDICINE

## 2017-01-01 PROCEDURE — 85007 BL SMEAR W/DIFF WBC COUNT: CPT | Performed by: INTERNAL MEDICINE

## 2017-01-01 PROCEDURE — 25010000002 MORPHINE PER 10 MG: Performed by: HOSPITALIST

## 2017-01-01 PROCEDURE — 0 IOPAMIDOL PER 1 ML: Performed by: INTERNAL MEDICINE

## 2017-01-01 PROCEDURE — 97150 GROUP THERAPEUTIC PROCEDURES: CPT

## 2017-01-01 PROCEDURE — 25010000003 CEFAZOLIN PER 500 MG: Performed by: ORTHOPAEDIC SURGERY

## 2017-01-01 PROCEDURE — 85025 COMPLETE CBC W/AUTO DIFF WBC: CPT | Performed by: NURSE PRACTITIONER

## 2017-01-01 PROCEDURE — 93005 ELECTROCARDIOGRAM TRACING: CPT | Performed by: HOSPITALIST

## 2017-01-01 PROCEDURE — 83540 ASSAY OF IRON: CPT | Performed by: INTERNAL MEDICINE

## 2017-01-01 PROCEDURE — 85018 HEMOGLOBIN: CPT | Performed by: ORTHOPAEDIC SURGERY

## 2017-01-01 PROCEDURE — 36592 COLLECT BLOOD FROM PICC: CPT | Performed by: INTERNAL MEDICINE

## 2017-01-01 PROCEDURE — 25010000002 DEXAMETHASONE PER 1 MG: Performed by: NURSE ANESTHETIST, CERTIFIED REGISTERED

## 2017-01-01 DEVICE — LAG SCREW, TI
Type: IMPLANTABLE DEVICE | Site: TROCHANTER | Status: FUNCTIONAL
Brand: GAMMA

## 2017-01-01 DEVICE — LOCKING SCREW, FULLY THREADED: Type: IMPLANTABLE DEVICE | Site: TROCHANTER | Status: FUNCTIONAL

## 2017-01-01 DEVICE — LONG NAIL KIT R1.5, TI, LEFT
Type: IMPLANTABLE DEVICE | Site: TROCHANTER | Status: FUNCTIONAL
Brand: GAMMA

## 2017-01-01 DEVICE — K-WIRE: Type: IMPLANTABLE DEVICE | Status: FUNCTIONAL

## 2017-01-01 RX ORDER — LANSOPRAZOLE 15 MG/1
15 CAPSULE, DELAYED RELEASE ORAL
Status: DISCONTINUED | OUTPATIENT
Start: 2017-01-01 | End: 2017-01-01 | Stop reason: HOSPADM

## 2017-01-01 RX ORDER — FUROSEMIDE 20 MG/1
20 TABLET ORAL DAILY
COMMUNITY
Start: 2016-04-25 | End: 2017-01-01 | Stop reason: HOSPADM

## 2017-01-01 RX ORDER — FUROSEMIDE 10 MG/ML
80 INJECTION INTRAMUSCULAR; INTRAVENOUS EVERY 8 HOURS
Status: DISCONTINUED | OUTPATIENT
Start: 2017-01-01 | End: 2017-01-01

## 2017-01-01 RX ORDER — FERROUS SULFATE TAB EC 324 MG (65 MG FE EQUIVALENT) 324 (65 FE) MG
324 TABLET DELAYED RESPONSE ORAL 2 TIMES DAILY WITH MEALS
Status: DISCONTINUED | OUTPATIENT
Start: 2017-01-01 | End: 2017-01-01 | Stop reason: HOSPADM

## 2017-01-01 RX ORDER — FLUTICASONE PROPIONATE 50 MCG
2 SPRAY, SUSPENSION (ML) NASAL DAILY
Status: DISCONTINUED | OUTPATIENT
Start: 2017-01-01 | End: 2017-01-01 | Stop reason: HOSPADM

## 2017-01-01 RX ORDER — FUROSEMIDE 20 MG/1
20 TABLET ORAL DAILY
Status: CANCELLED | OUTPATIENT
Start: 2017-01-01

## 2017-01-01 RX ORDER — MORPHINE SULFATE 4 MG/ML
0.5 INJECTION, SOLUTION INTRAMUSCULAR; INTRAVENOUS EVERY 8 HOURS PRN
Status: DISCONTINUED | OUTPATIENT
Start: 2017-01-01 | End: 2017-01-01 | Stop reason: HOSPADM

## 2017-01-01 RX ORDER — ALPRAZOLAM 0.25 MG/1
0.25 TABLET ORAL NIGHTLY
Status: DISCONTINUED | OUTPATIENT
Start: 2017-01-01 | End: 2017-01-01 | Stop reason: HOSPADM

## 2017-01-01 RX ORDER — SODIUM CHLORIDE 9 MG/ML
75 INJECTION, SOLUTION INTRAVENOUS CONTINUOUS
Status: DISCONTINUED | OUTPATIENT
Start: 2017-01-01 | End: 2017-01-01

## 2017-01-01 RX ORDER — DORZOLAMIDE HYDROCHLORIDE AND TIMOLOL MALEATE 20; 5 MG/ML; MG/ML
1 SOLUTION/ DROPS OPHTHALMIC 2 TIMES DAILY
Status: DISCONTINUED | OUTPATIENT
Start: 2017-01-01 | End: 2017-01-01 | Stop reason: HOSPADM

## 2017-01-01 RX ORDER — I-VITE, TAB 1000-60-2MG (60/BT) 300MCG-200
1 TAB ORAL 2 TIMES DAILY
Status: DISCONTINUED | OUTPATIENT
Start: 2017-01-01 | End: 2017-01-01 | Stop reason: HOSPADM

## 2017-01-01 RX ORDER — FERROUS SULFATE TAB EC 324 MG (65 MG FE EQUIVALENT) 324 (65 FE) MG
324 TABLET DELAYED RESPONSE ORAL
Status: DISCONTINUED | OUTPATIENT
Start: 2017-01-01 | End: 2017-01-01 | Stop reason: HOSPADM

## 2017-01-01 RX ORDER — NALOXONE HCL 0.4 MG/ML
0.2 VIAL (ML) INJECTION AS NEEDED
Status: DISCONTINUED | OUTPATIENT
Start: 2017-01-01 | End: 2017-01-01 | Stop reason: HOSPADM

## 2017-01-01 RX ORDER — LEVOTHYROXINE SODIUM 0.05 MG/1
50 TABLET ORAL
Status: DISCONTINUED | OUTPATIENT
Start: 2017-01-01 | End: 2017-01-01 | Stop reason: HOSPADM

## 2017-01-01 RX ORDER — ATROPINE SULFATE 10 MG/ML
2 SOLUTION/ DROPS OPHTHALMIC EVERY 4 HOURS PRN
Status: DISCONTINUED | OUTPATIENT
Start: 2017-01-01 | End: 2017-01-01 | Stop reason: HOSPADM

## 2017-01-01 RX ORDER — ASPIRIN 325 MG
325 TABLET, DELAYED RELEASE (ENTERIC COATED) ORAL DAILY
Status: DISCONTINUED | OUTPATIENT
Start: 2017-01-01 | End: 2017-01-01

## 2017-01-01 RX ORDER — PROPRANOLOL HYDROCHLORIDE 20 MG/1
20 TABLET ORAL 2 TIMES DAILY
Status: DISCONTINUED | OUTPATIENT
Start: 2017-01-01 | End: 2017-01-01 | Stop reason: HOSPADM

## 2017-01-01 RX ORDER — SODIUM CHLORIDE, SODIUM GLUCONATE, SODIUM ACETATE, POTASSIUM CHLORIDE, AND MAGNESIUM CHLORIDE 526; 502; 368; 37; 30 MG/100ML; MG/100ML; MG/100ML; MG/100ML; MG/100ML
30 INJECTION, SOLUTION INTRAVENOUS CONTINUOUS
Status: DISCONTINUED | OUTPATIENT
Start: 2017-01-01 | End: 2017-01-01

## 2017-01-01 RX ORDER — PANTOPRAZOLE SODIUM 40 MG/1
40 TABLET, DELAYED RELEASE ORAL
Status: DISCONTINUED | OUTPATIENT
Start: 2017-01-01 | End: 2017-01-01 | Stop reason: HOSPADM

## 2017-01-01 RX ORDER — PROPRANOLOL HYDROCHLORIDE 20 MG/1
20 TABLET ORAL ONCE
Status: DISCONTINUED | OUTPATIENT
Start: 2017-01-01 | End: 2017-01-01 | Stop reason: CLARIF

## 2017-01-01 RX ORDER — LEVOTHYROXINE SODIUM 0.07 MG/1
75 TABLET ORAL
Status: DISCONTINUED | OUTPATIENT
Start: 2017-01-01 | End: 2017-01-01 | Stop reason: HOSPADM

## 2017-01-01 RX ORDER — ACETAMINOPHEN 500 MG
1000 TABLET ORAL 4 TIMES DAILY
Qty: 1 TABLET | Refills: 1
Start: 2017-01-01 | End: 2017-01-01

## 2017-01-01 RX ORDER — ACETAMINOPHEN 325 MG/1
650 TABLET ORAL EVERY 6 HOURS PRN
Status: DISCONTINUED | OUTPATIENT
Start: 2017-01-01 | End: 2017-01-01

## 2017-01-01 RX ORDER — SODIUM CHLORIDE 0.9 % (FLUSH) 0.9 %
1-10 SYRINGE (ML) INJECTION AS NEEDED
Status: CANCELLED | OUTPATIENT
Start: 2017-01-01

## 2017-01-01 RX ORDER — DEXAMETHASONE SODIUM PHOSPHATE 4 MG/ML
INJECTION, SOLUTION INTRA-ARTICULAR; INTRALESIONAL; INTRAMUSCULAR; INTRAVENOUS; SOFT TISSUE AS NEEDED
Status: DISCONTINUED | OUTPATIENT
Start: 2017-01-01 | End: 2017-01-01 | Stop reason: SURG

## 2017-01-01 RX ORDER — NALOXONE HCL 0.4 MG/ML
0.4 VIAL (ML) INJECTION
Status: CANCELLED | OUTPATIENT
Start: 2017-01-01

## 2017-01-01 RX ORDER — PHENYTOIN SODIUM 100 MG/1
100 CAPSULE, EXTENDED RELEASE ORAL NIGHTLY
Status: DISCONTINUED | OUTPATIENT
Start: 2017-01-01 | End: 2017-01-01 | Stop reason: HOSPADM

## 2017-01-01 RX ORDER — IPRATROPIUM BROMIDE AND ALBUTEROL SULFATE 2.5; .5 MG/3ML; MG/3ML
3 SOLUTION RESPIRATORY (INHALATION) EVERY 6 HOURS PRN
Status: DISCONTINUED | OUTPATIENT
Start: 2017-01-01 | End: 2017-01-01 | Stop reason: HOSPADM

## 2017-01-01 RX ORDER — PROPRANOLOL HYDROCHLORIDE 10 MG/1
20 TABLET ORAL ONCE
Status: COMPLETED | OUTPATIENT
Start: 2017-01-01 | End: 2017-01-01

## 2017-01-01 RX ORDER — ALPRAZOLAM 0.25 MG/1
0.25 TABLET ORAL DAILY
Status: DISCONTINUED | OUTPATIENT
Start: 2017-01-01 | End: 2017-01-01 | Stop reason: HOSPADM

## 2017-01-01 RX ORDER — PANTOPRAZOLE SODIUM 40 MG/1
40 TABLET, DELAYED RELEASE ORAL
Status: DISCONTINUED | OUTPATIENT
Start: 2017-01-01 | End: 2017-01-01

## 2017-01-01 RX ORDER — CALCIUM CARBONATE 200(500)MG
2 TABLET,CHEWABLE ORAL 2 TIMES DAILY PRN
Status: DISCONTINUED | OUTPATIENT
Start: 2017-01-01 | End: 2017-01-01 | Stop reason: HOSPADM

## 2017-01-01 RX ORDER — BISACODYL 10 MG
10 SUPPOSITORY, RECTAL RECTAL DAILY
Status: DISCONTINUED | OUTPATIENT
Start: 2017-01-01 | End: 2017-01-01

## 2017-01-01 RX ORDER — PROPOFOL 10 MG/ML
VIAL (ML) INTRAVENOUS AS NEEDED
Status: DISCONTINUED | OUTPATIENT
Start: 2017-01-01 | End: 2017-01-01 | Stop reason: SURG

## 2017-01-01 RX ORDER — SODIUM CHLORIDE 0.9 % (FLUSH) 0.9 %
1-10 SYRINGE (ML) INJECTION AS NEEDED
Status: DISCONTINUED | OUTPATIENT
Start: 2017-01-01 | End: 2017-01-01 | Stop reason: HOSPADM

## 2017-01-01 RX ORDER — BISACODYL 10 MG
10 SUPPOSITORY, RECTAL RECTAL DAILY PRN
Status: DISCONTINUED | OUTPATIENT
Start: 2017-01-01 | End: 2017-01-01 | Stop reason: HOSPADM

## 2017-01-01 RX ORDER — ONDANSETRON 2 MG/ML
4 INJECTION INTRAMUSCULAR; INTRAVENOUS ONCE
Status: COMPLETED | OUTPATIENT
Start: 2017-01-01 | End: 2017-01-01

## 2017-01-01 RX ORDER — LEVOFLOXACIN 5 MG/ML
750 INJECTION, SOLUTION INTRAVENOUS ONCE
Status: COMPLETED | OUTPATIENT
Start: 2017-01-01 | End: 2017-01-01

## 2017-01-01 RX ORDER — FERROUS SULFATE TAB EC 324 MG (65 MG FE EQUIVALENT) 324 (65 FE) MG
324 TABLET DELAYED RESPONSE ORAL
Status: DISCONTINUED | OUTPATIENT
Start: 2017-01-01 | End: 2017-01-01

## 2017-01-01 RX ORDER — SENNA AND DOCUSATE SODIUM 50; 8.6 MG/1; MG/1
1 TABLET, FILM COATED ORAL 2 TIMES DAILY
Status: DISCONTINUED | OUTPATIENT
Start: 2017-01-01 | End: 2017-01-01

## 2017-01-01 RX ORDER — SODIUM CHLORIDE 0.9 % (FLUSH) 0.9 %
10 SYRINGE (ML) INJECTION AS NEEDED
Status: DISCONTINUED | OUTPATIENT
Start: 2017-01-01 | End: 2017-01-01 | Stop reason: HOSPADM

## 2017-01-01 RX ORDER — MORPHINE SULFATE 2 MG/ML
1 INJECTION, SOLUTION INTRAMUSCULAR; INTRAVENOUS
Status: DISCONTINUED | OUTPATIENT
Start: 2017-01-01 | End: 2017-01-01

## 2017-01-01 RX ORDER — LEVOTHYROXINE SODIUM 0.05 MG/1
50 TABLET ORAL
Status: DISCONTINUED | OUTPATIENT
Start: 2017-01-01 | End: 2017-01-01

## 2017-01-01 RX ORDER — FUROSEMIDE 10 MG/ML
40 INJECTION INTRAMUSCULAR; INTRAVENOUS EVERY 12 HOURS
Status: DISCONTINUED | OUTPATIENT
Start: 2017-01-01 | End: 2017-01-01

## 2017-01-01 RX ORDER — ACETAMINOPHEN 500 MG
1000 TABLET ORAL 4 TIMES DAILY
Status: DISCONTINUED | OUTPATIENT
Start: 2017-01-01 | End: 2017-01-01 | Stop reason: HOSPADM

## 2017-01-01 RX ORDER — BACLOFEN 10 MG/1
10 TABLET ORAL 3 TIMES DAILY PRN
Status: DISCONTINUED | OUTPATIENT
Start: 2017-01-01 | End: 2017-01-01

## 2017-01-01 RX ORDER — ALPRAZOLAM 0.25 MG/1
0.25 TABLET ORAL 4 TIMES DAILY PRN
Status: DISCONTINUED | OUTPATIENT
Start: 2017-01-01 | End: 2017-01-01 | Stop reason: HOSPADM

## 2017-01-01 RX ORDER — FUROSEMIDE 20 MG/1
20 TABLET ORAL DAILY
Status: DISCONTINUED | OUTPATIENT
Start: 2017-01-01 | End: 2017-01-01 | Stop reason: HOSPADM

## 2017-01-01 RX ORDER — ONDANSETRON 2 MG/ML
INJECTION INTRAMUSCULAR; INTRAVENOUS AS NEEDED
Status: DISCONTINUED | OUTPATIENT
Start: 2017-01-01 | End: 2017-01-01 | Stop reason: SURG

## 2017-01-01 RX ORDER — FERROUS SULFATE TAB EC 324 MG (65 MG FE EQUIVALENT) 324 (65 FE) MG
324 TABLET DELAYED RESPONSE ORAL 2 TIMES DAILY WITH MEALS
Status: DISCONTINUED | OUTPATIENT
Start: 2017-01-01 | End: 2017-01-01

## 2017-01-01 RX ORDER — FUROSEMIDE 10 MG/ML
40 INJECTION INTRAMUSCULAR; INTRAVENOUS DAILY
Status: DISCONTINUED | OUTPATIENT
Start: 2017-01-01 | End: 2017-01-01 | Stop reason: HOSPADM

## 2017-01-01 RX ORDER — MORPHINE SULFATE 4 MG/ML
4 INJECTION, SOLUTION INTRAMUSCULAR; INTRAVENOUS ONCE
Status: COMPLETED | OUTPATIENT
Start: 2017-01-01 | End: 2017-01-01

## 2017-01-01 RX ORDER — ACETAMINOPHEN 325 MG/1
650 TABLET ORAL EVERY 4 HOURS PRN
Status: DISCONTINUED | OUTPATIENT
Start: 2017-01-01 | End: 2017-01-01 | Stop reason: HOSPADM

## 2017-01-01 RX ORDER — LEVOTHYROXINE SODIUM 0.03 MG/1
25 TABLET ORAL 2 TIMES WEEKLY
Status: DISCONTINUED | OUTPATIENT
Start: 2017-01-01 | End: 2017-01-01

## 2017-01-01 RX ORDER — DORZOLAMIDE HYDROCHLORIDE AND TIMOLOL MALEATE 20; 5 MG/ML; MG/ML
1 SOLUTION/ DROPS OPHTHALMIC EVERY 12 HOURS SCHEDULED
Status: DISCONTINUED | OUTPATIENT
Start: 2017-01-01 | End: 2017-01-01 | Stop reason: HOSPADM

## 2017-01-01 RX ORDER — LIDOCAINE 50 MG/G
1 PATCH TOPICAL
Qty: 20 PATCH | Refills: 1
Start: 2017-01-01

## 2017-01-01 RX ORDER — CELECOXIB 200 MG/1
200 CAPSULE ORAL DAILY
Status: DISCONTINUED | OUTPATIENT
Start: 2017-01-01 | End: 2017-01-01 | Stop reason: HOSPADM

## 2017-01-01 RX ORDER — DIPHENHYDRAMINE HYDROCHLORIDE 12.5 MG/1
12.5 BAR, CHEWABLE ORAL ONCE
Status: DISCONTINUED | OUTPATIENT
Start: 2017-01-01 | End: 2017-01-01

## 2017-01-01 RX ORDER — LATANOPROST 50 UG/ML
1 SOLUTION/ DROPS OPHTHALMIC NIGHTLY
Status: DISCONTINUED | OUTPATIENT
Start: 2017-01-01 | End: 2017-01-01 | Stop reason: HOSPADM

## 2017-01-01 RX ORDER — SCOLOPAMINE TRANSDERMAL SYSTEM 1 MG/1
1 PATCH, EXTENDED RELEASE TRANSDERMAL
Status: DISCONTINUED | OUTPATIENT
Start: 2017-01-01 | End: 2017-01-01 | Stop reason: HOSPADM

## 2017-01-01 RX ORDER — SODIUM CHLORIDE 9 MG/ML
250 INJECTION, SOLUTION INTRAVENOUS AS NEEDED
Status: DISCONTINUED | OUTPATIENT
Start: 2017-01-01 | End: 2017-01-01 | Stop reason: HOSPADM

## 2017-01-01 RX ORDER — LABETALOL HYDROCHLORIDE 5 MG/ML
5 INJECTION, SOLUTION INTRAVENOUS
Status: DISCONTINUED | OUTPATIENT
Start: 2017-01-01 | End: 2017-01-01 | Stop reason: HOSPADM

## 2017-01-01 RX ORDER — FUROSEMIDE 10 MG/ML
80 INJECTION INTRAMUSCULAR; INTRAVENOUS ONCE
Status: DISCONTINUED | OUTPATIENT
Start: 2017-01-01 | End: 2017-01-01

## 2017-01-01 RX ORDER — MORPHINE SULFATE 2 MG/ML
1 INJECTION, SOLUTION INTRAMUSCULAR; INTRAVENOUS EVERY 4 HOURS PRN
Status: DISCONTINUED | OUTPATIENT
Start: 2017-01-01 | End: 2017-01-01

## 2017-01-01 RX ORDER — FUROSEMIDE 40 MG/1
40 TABLET ORAL DAILY
Status: COMPLETED | OUTPATIENT
Start: 2017-01-01 | End: 2017-01-01

## 2017-01-01 RX ORDER — PHENYTOIN SODIUM 100 MG/1
100 CAPSULE, EXTENDED RELEASE ORAL NIGHTLY
Status: DISCONTINUED | OUTPATIENT
Start: 2017-01-01 | End: 2017-01-01

## 2017-01-01 RX ORDER — KETOROLAC TROMETHAMINE 30 MG/ML
INJECTION, SOLUTION INTRAMUSCULAR; INTRAVENOUS AS NEEDED
Status: DISCONTINUED | OUTPATIENT
Start: 2017-01-01 | End: 2017-01-01

## 2017-01-01 RX ORDER — IPRATROPIUM BROMIDE AND ALBUTEROL SULFATE 2.5; .5 MG/3ML; MG/3ML
3 SOLUTION RESPIRATORY (INHALATION) ONCE
Status: COMPLETED | OUTPATIENT
Start: 2017-01-01 | End: 2017-01-01

## 2017-01-01 RX ORDER — HYDROCODONE BITARTRATE AND ACETAMINOPHEN 5; 325 MG/1; MG/1
1 TABLET ORAL EVERY 6 HOURS PRN
Status: DISCONTINUED | OUTPATIENT
Start: 2017-01-01 | End: 2017-01-01

## 2017-01-01 RX ORDER — ONDANSETRON 2 MG/ML
4 INJECTION INTRAMUSCULAR; INTRAVENOUS EVERY 6 HOURS PRN
Status: DISCONTINUED | OUTPATIENT
Start: 2017-01-01 | End: 2017-01-01 | Stop reason: HOSPADM

## 2017-01-01 RX ORDER — NYSTATIN 100000 [USP'U]/G
POWDER TOPICAL EVERY 12 HOURS SCHEDULED
Status: DISCONTINUED | OUTPATIENT
Start: 2017-01-01 | End: 2017-01-01 | Stop reason: HOSPADM

## 2017-01-01 RX ORDER — ALPRAZOLAM 0.25 MG/1
0.25 TABLET ORAL NIGHTLY PRN
Status: DISCONTINUED | OUTPATIENT
Start: 2017-01-01 | End: 2017-01-01

## 2017-01-01 RX ORDER — FLUMAZENIL 0.1 MG/ML
0.2 INJECTION INTRAVENOUS AS NEEDED
Status: DISCONTINUED | OUTPATIENT
Start: 2017-01-01 | End: 2017-01-01 | Stop reason: HOSPADM

## 2017-01-01 RX ORDER — PHENYTOIN SODIUM 100 MG/1
100 CAPSULE, EXTENDED RELEASE ORAL NIGHTLY
COMMUNITY
Start: 2016-01-01

## 2017-01-01 RX ORDER — ONDANSETRON 4 MG/1
4 TABLET, ORALLY DISINTEGRATING ORAL EVERY 6 HOURS PRN
Status: DISCONTINUED | OUTPATIENT
Start: 2017-01-01 | End: 2017-01-01 | Stop reason: HOSPADM

## 2017-01-01 RX ORDER — BISACODYL 10 MG
10 SUPPOSITORY, RECTAL RECTAL DAILY
Status: CANCELLED | OUTPATIENT
Start: 2017-01-01

## 2017-01-01 RX ORDER — SENNA AND DOCUSATE SODIUM 50; 8.6 MG/1; MG/1
1 TABLET, FILM COATED ORAL 2 TIMES DAILY
Qty: 1 TABLET | Refills: 1
Start: 2017-01-01

## 2017-01-01 RX ORDER — FUROSEMIDE 10 MG/ML
20 INJECTION INTRAMUSCULAR; INTRAVENOUS ONCE
Status: COMPLETED | OUTPATIENT
Start: 2017-01-01 | End: 2017-01-01

## 2017-01-01 RX ORDER — OXYCODONE HYDROCHLORIDE 5 MG/1
5 TABLET ORAL EVERY 4 HOURS PRN
Status: DISCONTINUED | OUTPATIENT
Start: 2017-01-01 | End: 2017-01-01 | Stop reason: HOSPADM

## 2017-01-01 RX ORDER — ACETAMINOPHEN AND CODEINE PHOSPHATE 300; 30 MG/1; MG/1
1 TABLET ORAL EVERY 4 HOURS PRN
Status: DISCONTINUED | OUTPATIENT
Start: 2017-01-01 | End: 2017-01-01 | Stop reason: ALTCHOICE

## 2017-01-01 RX ORDER — HYDROXYZINE PAMOATE 25 MG/1
25 CAPSULE ORAL 3 TIMES DAILY PRN
Status: DISCONTINUED | OUTPATIENT
Start: 2017-01-01 | End: 2017-01-01

## 2017-01-01 RX ORDER — ALPRAZOLAM 0.25 MG/1
0.25 TABLET ORAL 3 TIMES DAILY PRN
Status: DISCONTINUED | OUTPATIENT
Start: 2017-01-01 | End: 2017-01-01

## 2017-01-01 RX ORDER — FUROSEMIDE 10 MG/ML
40 INJECTION INTRAMUSCULAR; INTRAVENOUS ONCE
Status: COMPLETED | OUTPATIENT
Start: 2017-01-01 | End: 2017-01-01

## 2017-01-01 RX ORDER — FERROUS SULFATE TAB EC 324 MG (65 MG FE EQUIVALENT) 324 (65 FE) MG
324 TABLET DELAYED RESPONSE ORAL 2 TIMES DAILY WITH MEALS
Status: CANCELLED | OUTPATIENT
Start: 2017-01-01

## 2017-01-01 RX ORDER — SODIUM CHLORIDE 0.9 % (FLUSH) 0.9 %
10 SYRINGE (ML) INJECTION EVERY 12 HOURS SCHEDULED
Status: DISCONTINUED | OUTPATIENT
Start: 2017-01-01 | End: 2017-01-01 | Stop reason: HOSPADM

## 2017-01-01 RX ORDER — LEVOFLOXACIN 5 MG/ML
500 INJECTION, SOLUTION INTRAVENOUS EVERY 24 HOURS
Status: DISCONTINUED | OUTPATIENT
Start: 2017-01-01 | End: 2017-01-01

## 2017-01-01 RX ORDER — SENNA AND DOCUSATE SODIUM 50; 8.6 MG/1; MG/1
1 TABLET, FILM COATED ORAL 2 TIMES DAILY
Status: DISCONTINUED | OUTPATIENT
Start: 2017-01-01 | End: 2017-01-01 | Stop reason: HOSPADM

## 2017-01-01 RX ORDER — SODIUM CHLORIDE 9 MG/ML
85 INJECTION, SOLUTION INTRAVENOUS CONTINUOUS
Status: CANCELLED | OUTPATIENT
Start: 2017-01-01

## 2017-01-01 RX ORDER — ONDANSETRON 2 MG/ML
4 INJECTION INTRAMUSCULAR; INTRAVENOUS ONCE AS NEEDED
Status: DISCONTINUED | OUTPATIENT
Start: 2017-01-01 | End: 2017-01-01 | Stop reason: HOSPADM

## 2017-01-01 RX ORDER — DORZOLAMIDE HYDROCHLORIDE AND TIMOLOL MALEATE 20; 5 MG/ML; MG/ML
1 SOLUTION/ DROPS OPHTHALMIC 2 TIMES DAILY
Status: CANCELLED | OUTPATIENT
Start: 2017-01-01

## 2017-01-01 RX ORDER — LEVOTHYROXINE SODIUM 0.1 MG/1
100 TABLET ORAL DAILY
Status: DISCONTINUED | OUTPATIENT
Start: 2017-01-01 | End: 2017-01-01

## 2017-01-01 RX ORDER — SODIUM CHLORIDE 0.9 % (FLUSH) 0.9 %
SYRINGE (ML) INJECTION
Status: COMPLETED
Start: 2017-01-01 | End: 2017-01-01

## 2017-01-01 RX ORDER — DEXAMETHASONE SODIUM PHOSPHATE 4 MG/ML
4 INJECTION, SOLUTION INTRA-ARTICULAR; INTRALESIONAL; INTRAMUSCULAR; INTRAVENOUS; SOFT TISSUE EVERY 6 HOURS
Status: DISCONTINUED | OUTPATIENT
Start: 2017-01-01 | End: 2017-01-01

## 2017-01-01 RX ORDER — OXYCODONE HYDROCHLORIDE 5 MG/1
5 TABLET ORAL EVERY 4 HOURS PRN
Status: CANCELLED | OUTPATIENT
Start: 2017-01-01 | End: 2017-01-01

## 2017-01-01 RX ORDER — GUAIFENESIN/DEXTROMETHORPHAN 100-10MG/5
10 SYRUP ORAL EVERY 4 HOURS PRN
Status: DISCONTINUED | OUTPATIENT
Start: 2017-01-01 | End: 2017-01-01 | Stop reason: HOSPADM

## 2017-01-01 RX ORDER — MULTIVIT WITH MINERALS/LUTEIN
250 TABLET ORAL 2 TIMES DAILY
Status: DISCONTINUED | OUTPATIENT
Start: 2017-01-01 | End: 2017-01-01 | Stop reason: HOSPADM

## 2017-01-01 RX ORDER — PROMETHAZINE HYDROCHLORIDE AND CODEINE PHOSPHATE 6.25; 1 MG/5ML; MG/5ML
2.5 SYRUP ORAL NIGHTLY
Status: DISCONTINUED | OUTPATIENT
Start: 2017-01-01 | End: 2017-01-01 | Stop reason: HOSPADM

## 2017-01-01 RX ORDER — NALOXONE HCL 0.4 MG/ML
0.4 VIAL (ML) INJECTION
Status: DISCONTINUED | OUTPATIENT
Start: 2017-01-01 | End: 2017-01-01 | Stop reason: HOSPADM

## 2017-01-01 RX ORDER — LORAZEPAM 2 MG/ML
0.25 INJECTION INTRAMUSCULAR EVERY 4 HOURS PRN
Status: DISCONTINUED | OUTPATIENT
Start: 2017-01-01 | End: 2017-01-01 | Stop reason: HOSPADM

## 2017-01-01 RX ORDER — PROPRANOLOL HYDROCHLORIDE 20 MG/1
20 TABLET ORAL 2 TIMES DAILY
Status: DISCONTINUED | OUTPATIENT
Start: 2017-01-01 | End: 2017-01-01

## 2017-01-01 RX ORDER — ALBUTEROL SULFATE 2.5 MG/3ML
2.5 SOLUTION RESPIRATORY (INHALATION)
Status: DISPENSED | OUTPATIENT
Start: 2017-01-01 | End: 2017-01-01

## 2017-01-01 RX ORDER — FAMOTIDINE 20 MG/1
20 TABLET, FILM COATED ORAL 2 TIMES DAILY
Status: DISCONTINUED | OUTPATIENT
Start: 2017-01-01 | End: 2017-01-01 | Stop reason: HOSPADM

## 2017-01-01 RX ORDER — MORPHINE SULFATE 2 MG/ML
2 INJECTION, SOLUTION INTRAMUSCULAR; INTRAVENOUS EVERY 4 HOURS PRN
Status: DISCONTINUED | OUTPATIENT
Start: 2017-01-01 | End: 2017-01-01

## 2017-01-01 RX ORDER — LEVOTHYROXINE SODIUM 0.07 MG/1
75 TABLET ORAL DAILY
Qty: 30 TABLET | Refills: 1 | Status: SHIPPED | OUTPATIENT
Start: 2017-01-01

## 2017-01-01 RX ORDER — BRIMONIDINE TARTRATE AND TIMOLOL MALEATE 2; 5 MG/ML; MG/ML
1 SOLUTION OPHTHALMIC EVERY 12 HOURS
COMMUNITY
End: 2017-01-01 | Stop reason: HOSPADM

## 2017-01-01 RX ORDER — POTASSIUM CHLORIDE 750 MG/1
20 CAPSULE, EXTENDED RELEASE ORAL DAILY
Status: DISCONTINUED | OUTPATIENT
Start: 2017-01-01 | End: 2017-01-01

## 2017-01-01 RX ORDER — PHENYTOIN SODIUM 100 MG/1
100 CAPSULE, EXTENDED RELEASE ORAL DAILY
Status: DISCONTINUED | OUTPATIENT
Start: 2017-01-01 | End: 2017-01-01 | Stop reason: HOSPADM

## 2017-01-01 RX ORDER — PRENATAL VIT/IRON FUM/FOLIC AC 27MG-0.8MG
1 TABLET ORAL DAILY
Status: DISCONTINUED | OUTPATIENT
Start: 2017-01-01 | End: 2017-01-01

## 2017-01-01 RX ORDER — PROMETHAZINE HYDROCHLORIDE AND CODEINE PHOSPHATE 6.25; 1 MG/5ML; MG/5ML
2.5 SYRUP ORAL NIGHTLY
Status: DISCONTINUED | OUTPATIENT
Start: 2017-01-01 | End: 2017-01-01 | Stop reason: SDUPTHER

## 2017-01-01 RX ORDER — MORPHINE SULFATE 4 MG/ML
0.25 INJECTION, SOLUTION INTRAMUSCULAR; INTRAVENOUS EVERY 12 HOURS
Status: DISCONTINUED | OUTPATIENT
Start: 2017-01-01 | End: 2017-01-01 | Stop reason: HOSPADM

## 2017-01-01 RX ORDER — DIPHENHYDRAMINE HYDROCHLORIDE 50 MG/ML
12.5 INJECTION INTRAMUSCULAR; INTRAVENOUS
Status: DISCONTINUED | OUTPATIENT
Start: 2017-01-01 | End: 2017-01-01 | Stop reason: HOSPADM

## 2017-01-01 RX ORDER — FLUTICASONE PROPIONATE 50 MCG
2 SPRAY, SUSPENSION (ML) NASAL DAILY
Status: DISCONTINUED | OUTPATIENT
Start: 2017-01-01 | End: 2017-01-01

## 2017-01-01 RX ORDER — SODIUM CHLORIDE 9 MG/ML
250 INJECTION, SOLUTION INTRAVENOUS AS NEEDED
Status: CANCELLED | OUTPATIENT
Start: 2017-01-01

## 2017-01-01 RX ORDER — IPRATROPIUM BROMIDE AND ALBUTEROL SULFATE 2.5; .5 MG/3ML; MG/3ML
3 SOLUTION RESPIRATORY (INHALATION)
Status: CANCELLED | OUTPATIENT
Start: 2017-01-01

## 2017-01-01 RX ORDER — FUROSEMIDE 20 MG/1
20 TABLET ORAL DAILY
Status: DISCONTINUED | OUTPATIENT
Start: 2017-01-01 | End: 2017-01-01

## 2017-01-01 RX ORDER — IPRATROPIUM BROMIDE AND ALBUTEROL SULFATE 2.5; .5 MG/3ML; MG/3ML
3 SOLUTION RESPIRATORY (INHALATION)
Status: DISCONTINUED | OUTPATIENT
Start: 2017-01-01 | End: 2017-01-01 | Stop reason: HOSPADM

## 2017-01-01 RX ORDER — ALPRAZOLAM 0.25 MG/1
0.25 TABLET ORAL NIGHTLY PRN
Status: DISCONTINUED | OUTPATIENT
Start: 2017-01-01 | End: 2017-01-01 | Stop reason: HOSPADM

## 2017-01-01 RX ORDER — LIDOCAINE HYDROCHLORIDE 20 MG/ML
INJECTION, SOLUTION INFILTRATION; PERINEURAL AS NEEDED
Status: DISCONTINUED | OUTPATIENT
Start: 2017-01-01 | End: 2017-01-01 | Stop reason: SURG

## 2017-01-01 RX ORDER — LIDOCAINE 50 MG/G
1 PATCH TOPICAL
Status: DISCONTINUED | OUTPATIENT
Start: 2017-01-01 | End: 2017-01-01 | Stop reason: HOSPADM

## 2017-01-01 RX ORDER — BACLOFEN 10 MG/1
10 TABLET ORAL 3 TIMES DAILY PRN
Status: CANCELLED | OUTPATIENT
Start: 2017-01-01

## 2017-01-01 RX ORDER — ASPIRIN 81 MG/1
81 TABLET, CHEWABLE ORAL DAILY
Status: DISCONTINUED | OUTPATIENT
Start: 2017-01-01 | End: 2017-01-01 | Stop reason: HOSPADM

## 2017-01-01 RX ORDER — LANSOPRAZOLE 15 MG/1
15 CAPSULE, DELAYED RELEASE ORAL DAILY
COMMUNITY
End: 2017-01-01 | Stop reason: HOSPADM

## 2017-01-01 RX ORDER — IPRATROPIUM BROMIDE AND ALBUTEROL SULFATE 2.5; .5 MG/3ML; MG/3ML
3 SOLUTION RESPIRATORY (INHALATION)
Status: DISCONTINUED | OUTPATIENT
Start: 2017-01-01 | End: 2017-01-01

## 2017-01-01 RX ORDER — PROMETHAZINE HYDROCHLORIDE AND CODEINE PHOSPHATE 6.25; 1 MG/5ML; MG/5ML
2.5 SYRUP ORAL NIGHTLY
Qty: 180 ML | Refills: 2 | Status: SHIPPED | OUTPATIENT
Start: 2017-01-01

## 2017-01-01 RX ORDER — LEVOTHYROXINE SODIUM 0.05 MG/1
TABLET ORAL
Status: ON HOLD | COMMUNITY
Start: 2017-01-01 | End: 2017-01-01

## 2017-01-01 RX ORDER — HEPARIN SODIUM 5000 [USP'U]/ML
5000 INJECTION, SOLUTION INTRAVENOUS; SUBCUTANEOUS EVERY 8 HOURS SCHEDULED
Status: DISCONTINUED | OUTPATIENT
Start: 2017-01-01 | End: 2017-01-01 | Stop reason: HOSPADM

## 2017-01-01 RX ORDER — TRAMADOL HYDROCHLORIDE 50 MG/1
25 TABLET ORAL EVERY 12 HOURS PRN
Status: DISCONTINUED | OUTPATIENT
Start: 2017-01-01 | End: 2017-01-01

## 2017-01-01 RX ORDER — ACETAMINOPHEN 650 MG/1
650 SUPPOSITORY RECTAL ONCE AS NEEDED
Status: DISCONTINUED | OUTPATIENT
Start: 2017-01-01 | End: 2017-01-01 | Stop reason: HOSPADM

## 2017-01-01 RX ORDER — CELECOXIB 200 MG/1
200 CAPSULE ORAL DAILY
Qty: 1 CAPSULE | Refills: 1
Start: 2017-01-01 | End: 2017-01-01 | Stop reason: HOSPADM

## 2017-01-01 RX ORDER — ALPRAZOLAM 0.25 MG/1
0.25 TABLET ORAL DAILY
Qty: 30 TABLET | Refills: 0 | Status: SHIPPED | OUTPATIENT
Start: 2017-01-01

## 2017-01-01 RX ORDER — BISACODYL 10 MG
10 SUPPOSITORY, RECTAL RECTAL DAILY
Status: DISCONTINUED | OUTPATIENT
Start: 2017-01-01 | End: 2017-01-01 | Stop reason: HOSPADM

## 2017-01-01 RX ORDER — ASPIRIN 325 MG
325 TABLET, DELAYED RELEASE (ENTERIC COATED) ORAL DAILY
Status: DISCONTINUED | OUTPATIENT
Start: 2017-01-01 | End: 2017-01-01 | Stop reason: HOSPADM

## 2017-01-01 RX ORDER — ALPRAZOLAM 0.25 MG/1
0.25 TABLET ORAL DAILY
Status: DISCONTINUED | OUTPATIENT
Start: 2017-01-01 | End: 2017-01-01

## 2017-01-01 RX ORDER — PHENYTOIN SODIUM 100 MG/1
100 CAPSULE, EXTENDED RELEASE ORAL DAILY
Status: CANCELLED | OUTPATIENT
Start: 2017-01-01

## 2017-01-01 RX ORDER — FLUTICASONE PROPIONATE 50 MCG
2 SPRAY, SUSPENSION (ML) NASAL DAILY
COMMUNITY
Start: 2017-01-01

## 2017-01-01 RX ORDER — BACLOFEN 10 MG/1
10 TABLET ORAL 3 TIMES DAILY PRN
Status: DISCONTINUED | OUTPATIENT
Start: 2017-01-01 | End: 2017-01-01 | Stop reason: HOSPADM

## 2017-01-01 RX ORDER — POTASSIUM CHLORIDE 750 MG/1
20 CAPSULE, EXTENDED RELEASE ORAL DAILY
Qty: 30 CAPSULE | Refills: 1 | Status: SHIPPED | OUTPATIENT
Start: 2017-01-01

## 2017-01-01 RX ORDER — FUROSEMIDE 20 MG/1
40 TABLET ORAL DAILY
Qty: 30 TABLET | Refills: 1 | Status: SHIPPED | OUTPATIENT
Start: 2017-01-01

## 2017-01-01 RX ORDER — MULTIVIT WITH MINERALS/LUTEIN
250 TABLET ORAL 2 TIMES DAILY
Status: DISCONTINUED | OUTPATIENT
Start: 2017-01-01 | End: 2017-01-01

## 2017-01-01 RX ORDER — ACETAMINOPHEN 325 MG/1
650 TABLET ORAL ONCE AS NEEDED
Status: DISCONTINUED | OUTPATIENT
Start: 2017-01-01 | End: 2017-01-01 | Stop reason: HOSPADM

## 2017-01-01 RX ORDER — DIPHENHYDRAMINE HCL 25 MG
25 CAPSULE ORAL NIGHTLY PRN
Status: DISCONTINUED | OUTPATIENT
Start: 2017-01-01 | End: 2017-01-01 | Stop reason: HOSPADM

## 2017-01-01 RX ORDER — ACETAMINOPHEN 500 MG
1000 TABLET ORAL 4 TIMES DAILY
Status: CANCELLED | OUTPATIENT
Start: 2017-01-01

## 2017-01-01 RX ORDER — ACETAMINOPHEN 325 MG/1
650 TABLET ORAL EVERY 4 HOURS PRN
Status: DISCONTINUED | OUTPATIENT
Start: 2017-01-01 | End: 2017-01-01

## 2017-01-01 RX ORDER — DORZOLAMIDE HYDROCHLORIDE AND TIMOLOL MALEATE 20; 5 MG/ML; MG/ML
1 SOLUTION/ DROPS OPHTHALMIC 2 TIMES DAILY
Status: DISCONTINUED | OUTPATIENT
Start: 2017-01-01 | End: 2017-01-01

## 2017-01-01 RX ORDER — ALPRAZOLAM 0.5 MG/1
0.5 TABLET ORAL 4 TIMES DAILY PRN
Status: DISCONTINUED | OUTPATIENT
Start: 2017-01-01 | End: 2017-01-01

## 2017-01-01 RX ORDER — CYANOCOBALAMIN 1000 UG/ML
1000 INJECTION, SOLUTION INTRAMUSCULAR; SUBCUTANEOUS ONCE
Status: COMPLETED | OUTPATIENT
Start: 2017-01-01 | End: 2017-01-01

## 2017-01-01 RX ORDER — I-VITE, TAB 1000-60-2MG (60/BT) 300MCG-200
1 TAB ORAL 2 TIMES DAILY
Status: CANCELLED | OUTPATIENT
Start: 2017-01-01

## 2017-01-01 RX ORDER — FUROSEMIDE 10 MG/ML
80 INJECTION INTRAMUSCULAR; INTRAVENOUS ONCE
Status: COMPLETED | OUTPATIENT
Start: 2017-01-01 | End: 2017-01-01

## 2017-01-01 RX ORDER — LATANOPROST 50 UG/ML
1 SOLUTION/ DROPS OPHTHALMIC NIGHTLY
Status: DISCONTINUED | OUTPATIENT
Start: 2017-01-01 | End: 2017-01-01

## 2017-01-01 RX ORDER — PANTOPRAZOLE SODIUM 40 MG/1
40 TABLET, DELAYED RELEASE ORAL
Status: CANCELLED | OUTPATIENT
Start: 2017-01-01

## 2017-01-01 RX ORDER — SODIUM CHLORIDE 9 MG/ML
85 INJECTION, SOLUTION INTRAVENOUS CONTINUOUS
Status: DISCONTINUED | OUTPATIENT
Start: 2017-01-01 | End: 2017-01-01 | Stop reason: HOSPADM

## 2017-01-01 RX ORDER — OXYCODONE HYDROCHLORIDE 5 MG/1
5 TABLET ORAL EVERY 4 HOURS PRN
Status: DISCONTINUED | OUTPATIENT
Start: 2017-01-01 | End: 2017-01-01

## 2017-01-01 RX ORDER — LEVOTHYROXINE SODIUM 0.05 MG/1
50 TABLET ORAL
Status: DISCONTINUED | OUTPATIENT
Start: 2017-01-01 | End: 2017-01-01 | Stop reason: SDUPTHER

## 2017-01-01 RX ORDER — LEVOTHYROXINE SODIUM 0.07 MG/1
75 TABLET ORAL DAILY
Status: DISCONTINUED | OUTPATIENT
Start: 2017-01-01 | End: 2017-01-01

## 2017-01-01 RX ORDER — ALBUTEROL SULFATE 90 UG/1
2 AEROSOL, METERED RESPIRATORY (INHALATION) EVERY 6 HOURS PRN
COMMUNITY
Start: 2017-01-01 | End: 2017-01-01

## 2017-01-01 RX ORDER — LEVOTHYROXINE SODIUM 0.05 MG/1
50 TABLET ORAL DAILY
COMMUNITY
Start: 2017-01-01 | End: 2017-01-01 | Stop reason: HOSPADM

## 2017-01-01 RX ORDER — POTASSIUM CHLORIDE 750 MG/1
20 CAPSULE, EXTENDED RELEASE ORAL DAILY
Status: DISCONTINUED | OUTPATIENT
Start: 2017-01-01 | End: 2017-01-01 | Stop reason: HOSPADM

## 2017-01-01 RX ORDER — TRIAMCINOLONE ACETONIDE 5 MG/G
15 OINTMENT TOPICAL
COMMUNITY
Start: 2016-01-01 | End: 2017-01-01 | Stop reason: HOSPADM

## 2017-01-01 RX ORDER — MULTIVIT WITH MINERALS/LUTEIN
250 TABLET ORAL 2 TIMES DAILY
Status: CANCELLED | OUTPATIENT
Start: 2017-01-01

## 2017-01-01 RX ORDER — ALBUTEROL SULFATE 2.5 MG/3ML
2.5 SOLUTION RESPIRATORY (INHALATION)
Status: DISCONTINUED | OUTPATIENT
Start: 2017-01-01 | End: 2017-01-01 | Stop reason: HOSPADM

## 2017-01-01 RX ORDER — FERROUS SULFATE TAB EC 324 MG (65 MG FE EQUIVALENT) 324 (65 FE) MG
324 TABLET DELAYED RESPONSE ORAL
Qty: 30 TABLET | Refills: 0
Start: 2017-01-01 | End: 2017-01-01

## 2017-01-01 RX ORDER — NALOXONE HCL 0.4 MG/ML
0.4 VIAL (ML) INJECTION
Status: DISCONTINUED | OUTPATIENT
Start: 2017-01-01 | End: 2017-01-01 | Stop reason: SDUPTHER

## 2017-01-01 RX ORDER — BIMATOPROST 0.3 MG/ML
1 SOLUTION/ DROPS OPHTHALMIC NIGHTLY
COMMUNITY
End: 2017-01-01 | Stop reason: HOSPADM

## 2017-01-01 RX ORDER — PROPRANOLOL HYDROCHLORIDE 20 MG/1
20 TABLET ORAL 2 TIMES DAILY
COMMUNITY
Start: 2017-01-01

## 2017-01-01 RX ORDER — HYDRALAZINE HYDROCHLORIDE 20 MG/ML
10 INJECTION INTRAMUSCULAR; INTRAVENOUS EVERY 6 HOURS PRN
Status: DISCONTINUED | OUTPATIENT
Start: 2017-01-01 | End: 2017-01-01 | Stop reason: HOSPADM

## 2017-01-01 RX ORDER — LEVOTHYROXINE SODIUM 0.05 MG/1
50 TABLET ORAL
Status: CANCELLED | OUTPATIENT
Start: 2017-01-01

## 2017-01-01 RX ORDER — ASPIRIN 325 MG
325 TABLET, DELAYED RELEASE (ENTERIC COATED) ORAL DAILY
Status: CANCELLED | OUTPATIENT
Start: 2017-01-01

## 2017-01-01 RX ORDER — ALBUTEROL SULFATE 90 UG/1
2 AEROSOL, METERED RESPIRATORY (INHALATION) EVERY 6 HOURS
Status: DISCONTINUED | OUTPATIENT
Start: 2017-01-01 | End: 2017-01-01 | Stop reason: ALTCHOICE

## 2017-01-01 RX ORDER — FUROSEMIDE 40 MG/1
40 TABLET ORAL DAILY
Status: DISCONTINUED | OUTPATIENT
Start: 2017-01-01 | End: 2017-01-01

## 2017-01-01 RX ORDER — ONDANSETRON 4 MG/1
4 TABLET, FILM COATED ORAL EVERY 6 HOURS PRN
Status: DISCONTINUED | OUTPATIENT
Start: 2017-01-01 | End: 2017-01-01 | Stop reason: HOSPADM

## 2017-01-01 RX ORDER — PROPRANOLOL HYDROCHLORIDE 20 MG/1
20 TABLET ORAL 2 TIMES DAILY
Status: CANCELLED | OUTPATIENT
Start: 2017-01-01

## 2017-01-01 RX ORDER — ALPRAZOLAM 0.25 MG/1
0.25 TABLET ORAL DAILY
COMMUNITY
Start: 2017-01-01 | End: 2017-01-01 | Stop reason: HOSPADM

## 2017-01-01 RX ORDER — ALPRAZOLAM 0.25 MG/1
0.25 TABLET ORAL NIGHTLY PRN
Status: CANCELLED | OUTPATIENT
Start: 2017-01-01

## 2017-01-01 RX ADMIN — DIPHENHYDRAMINE HYDROCHLORIDE 25 MG: 25 CAPSULE ORAL at 20:51

## 2017-01-01 RX ADMIN — TAZOBACTAM SODIUM AND PIPERACILLIN SODIUM 3.38 G: 375; 3 INJECTION, SOLUTION INTRAVENOUS at 05:57

## 2017-01-01 RX ADMIN — ACETAMINOPHEN 650 MG: 325 TABLET ORAL at 13:17

## 2017-01-01 RX ADMIN — DORZOLAMIDE HYDROCHLORIDE AND TIMOLOL MALEATE 1 DROP: 20; 5 SOLUTION/ DROPS OPHTHALMIC at 17:36

## 2017-01-01 RX ADMIN — PANTOPRAZOLE SODIUM 40 MG: 40 TABLET, DELAYED RELEASE ORAL at 06:00

## 2017-01-01 RX ADMIN — Medication 1 TABLET: at 08:07

## 2017-01-01 RX ADMIN — Medication 1 TABLET: at 08:12

## 2017-01-01 RX ADMIN — SODIUM CHLORIDE 125 MG: 9 INJECTION, SOLUTION INTRAVENOUS at 18:47

## 2017-01-01 RX ADMIN — IPRATROPIUM BROMIDE AND ALBUTEROL SULFATE 3 ML: 2.5; .5 SOLUTION RESPIRATORY (INHALATION) at 12:29

## 2017-01-01 RX ADMIN — ALBUTEROL SULFATE 2.5 MG: 2.5 SOLUTION RESPIRATORY (INHALATION) at 03:24

## 2017-01-01 RX ADMIN — MORPHINE SULFATE 0.24 MG: 4 INJECTION, SOLUTION INTRAMUSCULAR; INTRAVENOUS at 01:24

## 2017-01-01 RX ADMIN — ENOXAPARIN SODIUM 40 MG: 40 INJECTION SUBCUTANEOUS at 08:06

## 2017-01-01 RX ADMIN — ASCORBIC ACID TAB 250 MG 250 MG: 250 TAB at 17:18

## 2017-01-01 RX ADMIN — LEVOTHYROXINE SODIUM 75 MCG: 75 TABLET ORAL at 06:23

## 2017-01-01 RX ADMIN — MORPHINE SULFATE 1 MG: 2 INJECTION, SOLUTION INTRAMUSCULAR; INTRAVENOUS at 00:00

## 2017-01-01 RX ADMIN — FUROSEMIDE 40 MG: 10 INJECTION, SOLUTION INTRAMUSCULAR; INTRAVENOUS at 08:53

## 2017-01-01 RX ADMIN — DORZOLAMIDE HYDROCHLORIDE AND TIMOLOL MALEATE 1 DROP: 20; 5 SOLUTION/ DROPS OPHTHALMIC at 08:11

## 2017-01-01 RX ADMIN — ACETAMINOPHEN 1000 MG: 500 TABLET ORAL at 06:07

## 2017-01-01 RX ADMIN — PANTOPRAZOLE SODIUM 8 MG/HR: 40 INJECTION, POWDER, FOR SOLUTION INTRAVENOUS at 01:53

## 2017-01-01 RX ADMIN — ACETAMINOPHEN 1000 MG: 500 TABLET ORAL at 18:00

## 2017-01-01 RX ADMIN — IPRATROPIUM BROMIDE AND ALBUTEROL SULFATE 3 ML: 2.5; .5 SOLUTION RESPIRATORY (INHALATION) at 20:02

## 2017-01-01 RX ADMIN — DORZOLAMIDE HYDROCHLORIDE AND TIMOLOL MALEATE 1 DROP: 20; 5 SOLUTION/ DROPS OPHTHALMIC at 20:27

## 2017-01-01 RX ADMIN — PROPRANOLOL HYDROCHLORIDE 20 MG: 20 TABLET ORAL at 20:44

## 2017-01-01 RX ADMIN — FLUTICASONE PROPIONATE 2 SPRAY: 50 SPRAY, METERED NASAL at 09:15

## 2017-01-01 RX ADMIN — PHENYTOIN SODIUM 100 MG: 100 CAPSULE, EXTENDED RELEASE ORAL at 20:25

## 2017-01-01 RX ADMIN — ACETAMINOPHEN 650 MG: 325 TABLET ORAL at 17:35

## 2017-01-01 RX ADMIN — IPRATROPIUM BROMIDE AND ALBUTEROL SULFATE 3 ML: 2.5; .5 SOLUTION RESPIRATORY (INHALATION) at 19:45

## 2017-01-01 RX ADMIN — PROPRANOLOL HYDROCHLORIDE 20 MG: 20 TABLET ORAL at 08:21

## 2017-01-01 RX ADMIN — ALPRAZOLAM 0.25 MG: 0.25 TABLET ORAL at 21:42

## 2017-01-01 RX ADMIN — DORZOLAMIDE HYDROCHLORIDE AND TIMOLOL MALEATE 1 DROP: 20; 5 SOLUTION/ DROPS OPHTHALMIC at 08:29

## 2017-01-01 RX ADMIN — PROPRANOLOL HYDROCHLORIDE 20 MG: 20 TABLET ORAL at 20:46

## 2017-01-01 RX ADMIN — TAZOBACTAM SODIUM AND PIPERACILLIN SODIUM 3.38 G: 375; 3 INJECTION, SOLUTION INTRAVENOUS at 10:21

## 2017-01-01 RX ADMIN — LEVOTHYROXINE SODIUM 50 MCG: 50 TABLET ORAL at 05:55

## 2017-01-01 RX ADMIN — IPRATROPIUM BROMIDE AND ALBUTEROL SULFATE 3 ML: 2.5; .5 SOLUTION RESPIRATORY (INHALATION) at 21:11

## 2017-01-01 RX ADMIN — DORZOLAMIDE HYDROCHLORIDE AND TIMOLOL MALEATE 1 DROP: 20; 5 SOLUTION/ DROPS OPHTHALMIC at 18:05

## 2017-01-01 RX ADMIN — ONDANSETRON 4 MG: 2 INJECTION INTRAMUSCULAR; INTRAVENOUS at 12:17

## 2017-01-01 RX ADMIN — POTASSIUM CHLORIDE 20 MEQ: 750 CAPSULE, EXTENDED RELEASE ORAL at 08:57

## 2017-01-01 RX ADMIN — ACETAMINOPHEN 1000 MG: 500 TABLET ORAL at 20:37

## 2017-01-01 RX ADMIN — FUROSEMIDE 80 MG: 10 INJECTION, SOLUTION INTRAMUSCULAR; INTRAVENOUS at 06:15

## 2017-01-01 RX ADMIN — SENNOSIDES AND DOCUSATE SODIUM 1 TABLET: 8.6; 5 TABLET ORAL at 08:30

## 2017-01-01 RX ADMIN — DORZOLAMIDE HYDROCHLORIDE AND TIMOLOL MALEATE 1 DROP: 20; 5 SOLUTION/ DROPS OPHTHALMIC at 08:13

## 2017-01-01 RX ADMIN — ALPRAZOLAM 0.25 MG: 0.25 TABLET ORAL at 22:03

## 2017-01-01 RX ADMIN — PROPRANOLOL HYDROCHLORIDE 20 MG: 20 TABLET ORAL at 20:10

## 2017-01-01 RX ADMIN — PHENYTOIN SODIUM 100 MG: 100 CAPSULE, EXTENDED RELEASE ORAL at 20:06

## 2017-01-01 RX ADMIN — TAZOBACTAM SODIUM AND PIPERACILLIN SODIUM 3.38 G: 375; 3 INJECTION, SOLUTION INTRAVENOUS at 23:30

## 2017-01-01 RX ADMIN — ALPRAZOLAM 0.25 MG: 0.25 TABLET ORAL at 20:20

## 2017-01-01 RX ADMIN — SENNOSIDES AND DOCUSATE SODIUM 1 TABLET: 8.6; 5 TABLET ORAL at 17:41

## 2017-01-01 RX ADMIN — LIDOCAINE 1 PATCH: 50 PATCH CUTANEOUS at 08:11

## 2017-01-01 RX ADMIN — SENNOSIDES AND DOCUSATE SODIUM 1 TABLET: 8.6; 5 TABLET ORAL at 18:05

## 2017-01-01 RX ADMIN — SENNOSIDES AND DOCUSATE SODIUM 1 TABLET: 8.6; 5 TABLET ORAL at 08:57

## 2017-01-01 RX ADMIN — DORZOLAMIDE HYDROCHLORIDE AND TIMOLOL MALEATE 1 DROP: 20; 5 SOLUTION/ DROPS OPHTHALMIC at 08:08

## 2017-01-01 RX ADMIN — DORZOLAMIDE HYDROCHLORIDE AND TIMOLOL MALEATE 1 DROP: 20; 5 SOLUTION/ DROPS OPHTHALMIC at 09:15

## 2017-01-01 RX ADMIN — DORZOLAMIDE HYDROCHLORIDE AND TIMOLOL MALEATE 1 DROP: 20; 5 SOLUTION/ DROPS OPHTHALMIC at 20:00

## 2017-01-01 RX ADMIN — ALPRAZOLAM 0.25 MG: 0.25 TABLET ORAL at 20:46

## 2017-01-01 RX ADMIN — ASPIRIN 81 MG 81 MG: 81 TABLET ORAL at 09:13

## 2017-01-01 RX ADMIN — IOPAMIDOL 80 ML: 755 INJECTION, SOLUTION INTRAVENOUS at 21:45

## 2017-01-01 RX ADMIN — SENNOSIDES AND DOCUSATE SODIUM 1 TABLET: 8.6; 5 TABLET ORAL at 10:18

## 2017-01-01 RX ADMIN — ACETAMINOPHEN 1000 MG: 500 TABLET ORAL at 13:05

## 2017-01-01 RX ADMIN — FUROSEMIDE 20 MG: 20 TABLET ORAL at 08:06

## 2017-01-01 RX ADMIN — FERROUS SULFATE TAB EC 324 MG (65 MG FE EQUIVALENT) 324 MG: 324 (65 FE) TABLET DELAYED RESPONSE at 11:56

## 2017-01-01 RX ADMIN — IPRATROPIUM BROMIDE AND ALBUTEROL SULFATE 3 ML: 2.5; .5 SOLUTION RESPIRATORY (INHALATION) at 05:00

## 2017-01-01 RX ADMIN — BIMATOPROST 1 DROP: 0.1 SOLUTION/ DROPS OPHTHALMIC at 20:21

## 2017-01-01 RX ADMIN — BIMATOPROST 1 DROP: 0.1 SOLUTION/ DROPS OPHTHALMIC at 21:11

## 2017-01-01 RX ADMIN — FERROUS SULFATE TAB EC 324 MG (65 MG FE EQUIVALENT) 324 MG: 324 (65 FE) TABLET DELAYED RESPONSE at 08:19

## 2017-01-01 RX ADMIN — FUROSEMIDE 20 MG: 20 TABLET ORAL at 08:29

## 2017-01-01 RX ADMIN — FERROUS SULFATE TAB EC 324 MG (65 MG FE EQUIVALENT) 324 MG: 324 (65 FE) TABLET DELAYED RESPONSE at 08:06

## 2017-01-01 RX ADMIN — Medication 1 TABLET: at 08:47

## 2017-01-01 RX ADMIN — SENNOSIDES AND DOCUSATE SODIUM 1 TABLET: 8.6; 5 TABLET ORAL at 21:26

## 2017-01-01 RX ADMIN — FERROUS SULFATE TAB EC 324 MG (65 MG FE EQUIVALENT) 324 MG: 324 (65 FE) TABLET DELAYED RESPONSE at 10:06

## 2017-01-01 RX ADMIN — BIMATOPROST 1 DROP: 0.1 SOLUTION/ DROPS OPHTHALMIC at 20:44

## 2017-01-01 RX ADMIN — ACETAMINOPHEN 650 MG: 325 TABLET ORAL at 18:02

## 2017-01-01 RX ADMIN — IPRATROPIUM BROMIDE AND ALBUTEROL SULFATE 3 ML: 2.5; .5 SOLUTION RESPIRATORY (INHALATION) at 10:40

## 2017-01-01 RX ADMIN — HEPARIN SODIUM 5000 UNITS: 5000 INJECTION, SOLUTION INTRAVENOUS; SUBCUTANEOUS at 17:27

## 2017-01-01 RX ADMIN — Medication 1 TABLET: at 21:27

## 2017-01-01 RX ADMIN — PROPRANOLOL HYDROCHLORIDE 20 MG: 20 TABLET ORAL at 21:18

## 2017-01-01 RX ADMIN — TAZOBACTAM SODIUM AND PIPERACILLIN SODIUM 3.38 G: 375; 3 INJECTION, SOLUTION INTRAVENOUS at 05:59

## 2017-01-01 RX ADMIN — IPRATROPIUM BROMIDE AND ALBUTEROL SULFATE 3 ML: 2.5; .5 SOLUTION RESPIRATORY (INHALATION) at 07:27

## 2017-01-01 RX ADMIN — SENNOSIDES AND DOCUSATE SODIUM 1 TABLET: 8.6; 5 TABLET ORAL at 17:40

## 2017-01-01 RX ADMIN — FUROSEMIDE 20 MG: 20 TABLET ORAL at 08:11

## 2017-01-01 RX ADMIN — ENOXAPARIN SODIUM 40 MG: 40 INJECTION SUBCUTANEOUS at 10:10

## 2017-01-01 RX ADMIN — Medication 1 TABLET: at 08:06

## 2017-01-01 RX ADMIN — CEFAZOLIN SODIUM 2 G: 1 INJECTION, POWDER, FOR SOLUTION INTRAMUSCULAR; INTRAVENOUS at 22:10

## 2017-01-01 RX ADMIN — I-VITE, TAB 1000-60-2MG (60/BT) 1 TABLET: TAB at 17:37

## 2017-01-01 RX ADMIN — FAMOTIDINE 20 MG: 20 TABLET ORAL at 17:37

## 2017-01-01 RX ADMIN — ALPRAZOLAM 0.25 MG: 0.25 TABLET ORAL at 20:26

## 2017-01-01 RX ADMIN — SENNOSIDES AND DOCUSATE SODIUM 1 TABLET: 8.6; 5 TABLET ORAL at 08:07

## 2017-01-01 RX ADMIN — ACETAMINOPHEN 1000 MG: 500 TABLET ORAL at 06:36

## 2017-01-01 RX ADMIN — PROMETHAZINE HYDROCHLORIDE AND CODEINE PHOSPHATE 2.5 ML: 6.25; 1 SYRUP ORAL at 21:23

## 2017-01-01 RX ADMIN — IPRATROPIUM BROMIDE AND ALBUTEROL SULFATE 3 ML: 2.5; .5 SOLUTION RESPIRATORY (INHALATION) at 14:53

## 2017-01-01 RX ADMIN — PANTOPRAZOLE SODIUM 40 MG: 40 TABLET, DELAYED RELEASE ORAL at 06:17

## 2017-01-01 RX ADMIN — DORZOLAMIDE HYDROCHLORIDE AND TIMOLOL MALEATE 1 DROP: 20; 5 SOLUTION/ DROPS OPHTHALMIC at 08:06

## 2017-01-01 RX ADMIN — PROPRANOLOL HYDROCHLORIDE 20 MG: 20 TABLET ORAL at 17:35

## 2017-01-01 RX ADMIN — LATANOPROST 1 DROP: 50 SOLUTION OPHTHALMIC at 20:28

## 2017-01-01 RX ADMIN — LEVOFLOXACIN 500 MG: 5 INJECTION, SOLUTION INTRAVENOUS at 21:29

## 2017-01-01 RX ADMIN — I-VITE, TAB 1000-60-2MG (60/BT) 1 TABLET: TAB at 08:50

## 2017-01-01 RX ADMIN — PROPRANOLOL HYDROCHLORIDE 20 MG: 20 TABLET ORAL at 08:11

## 2017-01-01 RX ADMIN — SENNOSIDES AND DOCUSATE SODIUM 1 TABLET: 8.6; 5 TABLET ORAL at 17:37

## 2017-01-01 RX ADMIN — PROPRANOLOL HYDROCHLORIDE 20 MG: 20 TABLET ORAL at 09:14

## 2017-01-01 RX ADMIN — HEPARIN SODIUM 5000 UNITS: 5000 INJECTION, SOLUTION INTRAVENOUS; SUBCUTANEOUS at 06:15

## 2017-01-01 RX ADMIN — ACETAMINOPHEN 1000 MG: 500 TABLET ORAL at 01:18

## 2017-01-01 RX ADMIN — EPHEDRINE SULFATE 10 MG: 50 INJECTION INTRAMUSCULAR; INTRAVENOUS; SUBCUTANEOUS at 11:32

## 2017-01-01 RX ADMIN — FERROUS SULFATE TAB EC 324 MG (65 MG FE EQUIVALENT) 324 MG: 324 (65 FE) TABLET DELAYED RESPONSE at 10:58

## 2017-01-01 RX ADMIN — LEVOTHYROXINE SODIUM 50 MCG: 50 TABLET ORAL at 06:07

## 2017-01-01 RX ADMIN — PANTOPRAZOLE SODIUM 8 MG/HR: 40 INJECTION, POWDER, FOR SOLUTION INTRAVENOUS at 14:31

## 2017-01-01 RX ADMIN — FERROUS SULFATE TAB EC 324 MG (65 MG FE EQUIVALENT) 324 MG: 324 (65 FE) TABLET DELAYED RESPONSE at 08:22

## 2017-01-01 RX ADMIN — FUROSEMIDE 20 MG: 20 TABLET ORAL at 08:31

## 2017-01-01 RX ADMIN — ACETAMINOPHEN 650 MG: 325 TABLET ORAL at 08:41

## 2017-01-01 RX ADMIN — ACETAMINOPHEN 1000 MG: 500 TABLET ORAL at 06:01

## 2017-01-01 RX ADMIN — DORZOLAMIDE HYDROCHLORIDE AND TIMOLOL MALEATE 1 DROP: 20; 5 SOLUTION/ DROPS OPHTHALMIC at 08:31

## 2017-01-01 RX ADMIN — ALPRAZOLAM 0.25 MG: 0.25 TABLET ORAL at 03:00

## 2017-01-01 RX ADMIN — ACETAMINOPHEN 1000 MG: 500 TABLET ORAL at 19:00

## 2017-01-01 RX ADMIN — MORPHINE SULFATE 1 MG: 2 INJECTION, SOLUTION INTRAMUSCULAR; INTRAVENOUS at 06:00

## 2017-01-01 RX ADMIN — DORZOLAMIDE HYDROCHLORIDE AND TIMOLOL MALEATE 1 DROP: 20; 5 SOLUTION/ DROPS OPHTHALMIC at 17:48

## 2017-01-01 RX ADMIN — ASPIRIN 325 MG: 325 TABLET, DELAYED RELEASE ORAL at 08:50

## 2017-01-01 RX ADMIN — PIPERACILLIN SODIUM,TAZOBACTAM SODIUM 3.38 G: 3; .375 INJECTION, POWDER, FOR SOLUTION INTRAVENOUS at 12:28

## 2017-01-01 RX ADMIN — ASCORBIC ACID TAB 250 MG 250 MG: 250 TAB at 09:21

## 2017-01-01 RX ADMIN — DORZOLAMIDE HYDROCHLORIDE AND TIMOLOL MALEATE 1 DROP: 20; 5 SOLUTION/ DROPS OPHTHALMIC at 17:13

## 2017-01-01 RX ADMIN — PHENYTOIN SODIUM 100 MG: 100 CAPSULE, EXTENDED RELEASE ORAL at 03:00

## 2017-01-01 RX ADMIN — FUROSEMIDE 40 MG: 10 INJECTION, SOLUTION INTRAMUSCULAR; INTRAVENOUS at 08:58

## 2017-01-01 RX ADMIN — LANSOPRAZOLE 15 MG: 15 CAPSULE, DELAYED RELEASE ORAL at 07:38

## 2017-01-01 RX ADMIN — PANTOPRAZOLE SODIUM 8 MG/HR: 40 INJECTION, POWDER, FOR SOLUTION INTRAVENOUS at 11:55

## 2017-01-01 RX ADMIN — SENNOSIDES AND DOCUSATE SODIUM 1 TABLET: 8.6; 5 TABLET ORAL at 08:29

## 2017-01-01 RX ADMIN — ENOXAPARIN SODIUM 40 MG: 40 INJECTION SUBCUTANEOUS at 08:29

## 2017-01-01 RX ADMIN — GUAIFENESIN AND DEXTROMETHORPHAN 10 ML: 100; 10 SYRUP ORAL at 13:46

## 2017-01-01 RX ADMIN — ACETAMINOPHEN 650 MG: 325 TABLET ORAL at 23:11

## 2017-01-01 RX ADMIN — FAMOTIDINE 20 MG: 20 TABLET ORAL at 10:05

## 2017-01-01 RX ADMIN — PHENYTOIN SODIUM 100 MG: 100 CAPSULE, EXTENDED RELEASE ORAL at 21:07

## 2017-01-01 RX ADMIN — DORZOLAMIDE HYDROCHLORIDE AND TIMOLOL MALEATE 1 DROP: 20; 5 SOLUTION/ DROPS OPHTHALMIC at 09:33

## 2017-01-01 RX ADMIN — SENNOSIDES AND DOCUSATE SODIUM 1 TABLET: 8.6; 5 TABLET ORAL at 17:07

## 2017-01-01 RX ADMIN — IPRATROPIUM BROMIDE AND ALBUTEROL SULFATE 3 ML: 2.5; .5 SOLUTION RESPIRATORY (INHALATION) at 15:58

## 2017-01-01 RX ADMIN — DORZOLAMIDE HYDROCHLORIDE AND TIMOLOL MALEATE 1 DROP: 20; 5 SOLUTION/ DROPS OPHTHALMIC at 10:20

## 2017-01-01 RX ADMIN — PROPRANOLOL HYDROCHLORIDE 20 MG: 20 TABLET ORAL at 09:33

## 2017-01-01 RX ADMIN — CELECOXIB 200 MG: 200 CAPSULE ORAL at 08:12

## 2017-01-01 RX ADMIN — PANTOPRAZOLE SODIUM 40 MG: 40 TABLET, DELAYED RELEASE ORAL at 05:55

## 2017-01-01 RX ADMIN — SENNOSIDES AND DOCUSATE SODIUM 1 TABLET: 8.6; 5 TABLET ORAL at 18:48

## 2017-01-01 RX ADMIN — FERROUS SULFATE TAB EC 324 MG (65 MG FE EQUIVALENT) 324 MG: 324 (65 FE) TABLET DELAYED RESPONSE at 10:20

## 2017-01-01 RX ADMIN — ALPRAZOLAM 0.25 MG: 0.25 TABLET ORAL at 21:08

## 2017-01-01 RX ADMIN — IPRATROPIUM BROMIDE AND ALBUTEROL SULFATE 3 ML: 2.5; .5 SOLUTION RESPIRATORY (INHALATION) at 19:35

## 2017-01-01 RX ADMIN — PHENYTOIN SODIUM 100 MG: 100 CAPSULE, EXTENDED RELEASE ORAL at 21:01

## 2017-01-01 RX ADMIN — TAZOBACTAM SODIUM AND PIPERACILLIN SODIUM 3.38 G: 375; 3 INJECTION, SOLUTION INTRAVENOUS at 13:46

## 2017-01-01 RX ADMIN — ALBUTEROL SULFATE 2.5 MG: 2.5 SOLUTION RESPIRATORY (INHALATION) at 18:50

## 2017-01-01 RX ADMIN — DORZOLAMIDE HYDROCHLORIDE AND TIMOLOL MALEATE 1 DROP: 20; 5 SOLUTION/ DROPS OPHTHALMIC at 17:49

## 2017-01-01 RX ADMIN — ACETAMINOPHEN 1000 MG: 500 TABLET ORAL at 13:14

## 2017-01-01 RX ADMIN — ALPRAZOLAM 0.25 MG: 0.25 TABLET ORAL at 20:10

## 2017-01-01 RX ADMIN — CELECOXIB 200 MG: 200 CAPSULE ORAL at 08:22

## 2017-01-01 RX ADMIN — Medication 1 TABLET: at 08:21

## 2017-01-01 RX ADMIN — FERROUS SULFATE TAB EC 324 MG (65 MG FE EQUIVALENT) 324 MG: 324 (65 FE) TABLET DELAYED RESPONSE at 08:07

## 2017-01-01 RX ADMIN — DIPHENHYDRAMINE HYDROCHLORIDE 25 MG: 25 CAPSULE ORAL at 20:41

## 2017-01-01 RX ADMIN — LANSOPRAZOLE 15 MG: 15 CAPSULE, DELAYED RELEASE ORAL at 06:15

## 2017-01-01 RX ADMIN — Medication 1 TABLET: at 08:22

## 2017-01-01 RX ADMIN — IPRATROPIUM BROMIDE AND ALBUTEROL SULFATE 3 ML: 2.5; .5 SOLUTION RESPIRATORY (INHALATION) at 19:13

## 2017-01-01 RX ADMIN — Medication 5 ML: at 18:40

## 2017-01-01 RX ADMIN — NYSTATIN: 100000 POWDER TOPICAL at 08:55

## 2017-01-01 RX ADMIN — PANTOPRAZOLE SODIUM 8 MG/HR: 40 INJECTION, POWDER, FOR SOLUTION INTRAVENOUS at 21:45

## 2017-01-01 RX ADMIN — FAMOTIDINE 20 MG: 20 TABLET ORAL at 08:46

## 2017-01-01 RX ADMIN — ACETAMINOPHEN 1000 MG: 500 TABLET ORAL at 06:10

## 2017-01-01 RX ADMIN — BIMATOPROST 1 DROP: 0.1 SOLUTION/ DROPS OPHTHALMIC at 20:10

## 2017-01-01 RX ADMIN — PROPRANOLOL HYDROCHLORIDE 20 MG: 20 TABLET ORAL at 10:04

## 2017-01-01 RX ADMIN — PROMETHAZINE HYDROCHLORIDE AND CODEINE PHOSPHATE 2.5 ML: 6.25; 1 SYRUP ORAL at 21:58

## 2017-01-01 RX ADMIN — SODIUM CHLORIDE 250 ML: 9 INJECTION, SOLUTION INTRAVENOUS at 14:48

## 2017-01-01 RX ADMIN — I-VITE, TAB 1000-60-2MG (60/BT) 1 TABLET: TAB at 08:30

## 2017-01-01 RX ADMIN — IPRATROPIUM BROMIDE AND ALBUTEROL SULFATE 3 ML: 2.5; .5 SOLUTION RESPIRATORY (INHALATION) at 20:16

## 2017-01-01 RX ADMIN — I-VITE, TAB 1000-60-2MG (60/BT) 1 TABLET: TAB at 08:11

## 2017-01-01 RX ADMIN — ACETAMINOPHEN 1000 MG: 500 TABLET ORAL at 11:58

## 2017-01-01 RX ADMIN — SODIUM CHLORIDE 85 ML/HR: 900 INJECTION, SOLUTION INTRAVENOUS at 04:10

## 2017-01-01 RX ADMIN — ALPRAZOLAM 0.25 MG: 0.25 TABLET ORAL at 20:38

## 2017-01-01 RX ADMIN — MORPHINE SULFATE 0.52 MG: 4 INJECTION, SOLUTION INTRAMUSCULAR; INTRAVENOUS at 14:01

## 2017-01-01 RX ADMIN — SENNOSIDES AND DOCUSATE SODIUM 1 TABLET: 8.6; 5 TABLET ORAL at 17:38

## 2017-01-01 RX ADMIN — PANTOPRAZOLE SODIUM 8 MG/HR: 40 INJECTION, POWDER, FOR SOLUTION INTRAVENOUS at 01:38

## 2017-01-01 RX ADMIN — LEVOFLOXACIN 500 MG: 5 INJECTION, SOLUTION INTRAVENOUS at 22:15

## 2017-01-01 RX ADMIN — FUROSEMIDE 40 MG: 10 INJECTION, SOLUTION INTRAMUSCULAR; INTRAVENOUS at 20:33

## 2017-01-01 RX ADMIN — ALPRAZOLAM 0.25 MG: 0.25 TABLET ORAL at 20:24

## 2017-01-01 RX ADMIN — IPRATROPIUM BROMIDE AND ALBUTEROL SULFATE 3 ML: 2.5; .5 SOLUTION RESPIRATORY (INHALATION) at 11:22

## 2017-01-01 RX ADMIN — ACETAMINOPHEN 1000 MG: 500 TABLET ORAL at 20:44

## 2017-01-01 RX ADMIN — LEVOTHYROXINE SODIUM 75 MCG: 75 TABLET ORAL at 06:06

## 2017-01-01 RX ADMIN — I-VITE, TAB 1000-60-2MG (60/BT) 1 TABLET: TAB at 17:08

## 2017-01-01 RX ADMIN — DORZOLAMIDE HYDROCHLORIDE AND TIMOLOL MALEATE 1 DROP: 20; 5 SOLUTION/ DROPS OPHTHALMIC at 18:02

## 2017-01-01 RX ADMIN — PRENATAL VIT W/ FE FUMARATE-FA TAB 27-0.8 MG 1 TABLET: 27-0.8 TAB at 08:29

## 2017-01-01 RX ADMIN — FUROSEMIDE 40 MG: 10 INJECTION, SOLUTION INTRAMUSCULAR; INTRAVENOUS at 08:19

## 2017-01-01 RX ADMIN — LATANOPROST 1 DROP: 50 SOLUTION OPHTHALMIC at 21:26

## 2017-01-01 RX ADMIN — HEPARIN SODIUM 5000 UNITS: 5000 INJECTION, SOLUTION INTRAVENOUS; SUBCUTANEOUS at 21:28

## 2017-01-01 RX ADMIN — LANSOPRAZOLE 15 MG: 15 CAPSULE, DELAYED RELEASE ORAL at 06:30

## 2017-01-01 RX ADMIN — FERROUS SULFATE TAB EC 324 MG (65 MG FE EQUIVALENT) 324 MG: 324 (65 FE) TABLET DELAYED RESPONSE at 08:50

## 2017-01-01 RX ADMIN — DORZOLAMIDE HYDROCHLORIDE AND TIMOLOL MALEATE 1 DROP: 20; 5 SOLUTION/ DROPS OPHTHALMIC at 17:38

## 2017-01-01 RX ADMIN — PHENYTOIN SODIUM 100 MG: 100 CAPSULE, EXTENDED RELEASE ORAL at 20:59

## 2017-01-01 RX ADMIN — TAZOBACTAM SODIUM AND PIPERACILLIN SODIUM 3.38 G: 375; 3 INJECTION, SOLUTION INTRAVENOUS at 17:27

## 2017-01-01 RX ADMIN — FERROUS SULFATE TAB EC 324 MG (65 MG FE EQUIVALENT) 324 MG: 324 (65 FE) TABLET DELAYED RESPONSE at 17:52

## 2017-01-01 RX ADMIN — ALPRAZOLAM 0.25 MG: 0.25 TABLET ORAL at 20:06

## 2017-01-01 RX ADMIN — LEVOTHYROXINE SODIUM 50 MCG: 50 TABLET ORAL at 06:09

## 2017-01-01 RX ADMIN — FERROUS SULFATE TAB EC 324 MG (65 MG FE EQUIVALENT) 324 MG: 324 (65 FE) TABLET DELAYED RESPONSE at 17:49

## 2017-01-01 RX ADMIN — IPRATROPIUM BROMIDE AND ALBUTEROL SULFATE 3 ML: 2.5; .5 SOLUTION RESPIRATORY (INHALATION) at 13:35

## 2017-01-01 RX ADMIN — IPRATROPIUM BROMIDE AND ALBUTEROL SULFATE 3 ML: 2.5; .5 SOLUTION RESPIRATORY (INHALATION) at 04:58

## 2017-01-01 RX ADMIN — CEFAZOLIN SODIUM 2 G: 1 INJECTION, POWDER, FOR SOLUTION INTRAMUSCULAR; INTRAVENOUS at 15:20

## 2017-01-01 RX ADMIN — PANTOPRAZOLE SODIUM 8 MG/HR: 40 INJECTION, POWDER, FOR SOLUTION INTRAVENOUS at 20:48

## 2017-01-01 RX ADMIN — PANTOPRAZOLE SODIUM 8 MG/HR: 40 INJECTION, POWDER, FOR SOLUTION INTRAVENOUS at 02:30

## 2017-01-01 RX ADMIN — LIDOCAINE HYDROCHLORIDE 100 MG: 20 INJECTION, SOLUTION INFILTRATION; PERINEURAL at 11:14

## 2017-01-01 RX ADMIN — FERROUS SULFATE TAB EC 324 MG (65 MG FE EQUIVALENT) 324 MG: 324 (65 FE) TABLET DELAYED RESPONSE at 18:01

## 2017-01-01 RX ADMIN — LANSOPRAZOLE 15 MG: 15 CAPSULE, DELAYED RELEASE ORAL at 08:30

## 2017-01-01 RX ADMIN — FAMOTIDINE 20 MG: 20 TABLET ORAL at 17:36

## 2017-01-01 RX ADMIN — ACETAMINOPHEN 1000 MG: 500 TABLET ORAL at 07:37

## 2017-01-01 RX ADMIN — I-VITE, TAB 1000-60-2MG (60/BT) 1 TABLET: TAB at 17:49

## 2017-01-01 RX ADMIN — ALPRAZOLAM 0.25 MG: 0.25 TABLET ORAL at 20:12

## 2017-01-01 RX ADMIN — LEVOTHYROXINE SODIUM 75 MCG: 75 TABLET ORAL at 06:18

## 2017-01-01 RX ADMIN — IPRATROPIUM BROMIDE AND ALBUTEROL SULFATE 3 ML: 2.5; .5 SOLUTION RESPIRATORY (INHALATION) at 07:46

## 2017-01-01 RX ADMIN — PROPRANOLOL HYDROCHLORIDE 20 MG: 20 TABLET ORAL at 09:13

## 2017-01-01 RX ADMIN — ONDANSETRON 4 MG: 4 TABLET, ORALLY DISINTEGRATING ORAL at 11:55

## 2017-01-01 RX ADMIN — ONDANSETRON 4 MG: 2 INJECTION INTRAMUSCULAR; INTRAVENOUS at 21:43

## 2017-01-01 RX ADMIN — PHENYTOIN SODIUM 100 MG: 100 CAPSULE, EXTENDED RELEASE ORAL at 21:04

## 2017-01-01 RX ADMIN — DORZOLAMIDE HYDROCHLORIDE AND TIMOLOL MALEATE 1 DROP: 20; 5 SOLUTION/ DROPS OPHTHALMIC at 18:00

## 2017-01-01 RX ADMIN — LEVOTHYROXINE SODIUM 100 MCG: 100 TABLET ORAL at 10:25

## 2017-01-01 RX ADMIN — ACETAMINOPHEN 650 MG: 325 TABLET ORAL at 05:56

## 2017-01-01 RX ADMIN — IPRATROPIUM BROMIDE AND ALBUTEROL SULFATE 3 ML: 2.5; .5 SOLUTION RESPIRATORY (INHALATION) at 06:29

## 2017-01-01 RX ADMIN — PHENYTOIN SODIUM 100 MG: 100 CAPSULE, EXTENDED RELEASE ORAL at 22:03

## 2017-01-01 RX ADMIN — DEXAMETHASONE SODIUM PHOSPHATE 4 MG: 4 INJECTION, SOLUTION INTRAMUSCULAR; INTRAVENOUS at 06:42

## 2017-01-01 RX ADMIN — PANTOPRAZOLE SODIUM 40 MG: 40 TABLET, DELAYED RELEASE ORAL at 05:48

## 2017-01-01 RX ADMIN — FERROUS SULFATE TAB EC 324 MG (65 MG FE EQUIVALENT) 324 MG: 324 (65 FE) TABLET DELAYED RESPONSE at 08:10

## 2017-01-01 RX ADMIN — HEPARIN SODIUM 5000 UNITS: 5000 INJECTION, SOLUTION INTRAVENOUS; SUBCUTANEOUS at 15:00

## 2017-01-01 RX ADMIN — DORZOLAMIDE HYDROCHLORIDE AND TIMOLOL MALEATE 1 DROP: 20; 5 SOLUTION/ DROPS OPHTHALMIC at 08:43

## 2017-01-01 RX ADMIN — PHENYTOIN SODIUM 100 MG: 100 CAPSULE, EXTENDED RELEASE ORAL at 20:20

## 2017-01-01 RX ADMIN — ACETAMINOPHEN 1000 MG: 500 TABLET ORAL at 13:22

## 2017-01-01 RX ADMIN — TAZOBACTAM SODIUM AND PIPERACILLIN SODIUM 3.38 G: 375; 3 INJECTION, SOLUTION INTRAVENOUS at 03:30

## 2017-01-01 RX ADMIN — ACETAMINOPHEN 1000 MG: 500 TABLET ORAL at 20:11

## 2017-01-01 RX ADMIN — FERROUS SULFATE TAB EC 324 MG (65 MG FE EQUIVALENT) 324 MG: 324 (65 FE) TABLET DELAYED RESPONSE at 17:17

## 2017-01-01 RX ADMIN — I-VITE, TAB 1000-60-2MG (60/BT) 1 TABLET: TAB at 08:06

## 2017-01-01 RX ADMIN — CELECOXIB 200 MG: 200 CAPSULE ORAL at 08:06

## 2017-01-01 RX ADMIN — I-VITE, TAB 1000-60-2MG (60/BT) 1 TABLET: TAB at 08:29

## 2017-01-01 RX ADMIN — ENOXAPARIN SODIUM 40 MG: 40 INJECTION SUBCUTANEOUS at 08:56

## 2017-01-01 RX ADMIN — ACETAMINOPHEN 1000 MG: 500 TABLET ORAL at 17:18

## 2017-01-01 RX ADMIN — POTASSIUM CHLORIDE 20 MEQ: 750 CAPSULE, EXTENDED RELEASE ORAL at 08:20

## 2017-01-01 RX ADMIN — ENOXAPARIN SODIUM 40 MG: 40 INJECTION SUBCUTANEOUS at 08:30

## 2017-01-01 RX ADMIN — DORZOLAMIDE HYDROCHLORIDE AND TIMOLOL MALEATE 1 DROP: 20; 5 SOLUTION/ DROPS OPHTHALMIC at 08:55

## 2017-01-01 RX ADMIN — Medication 10 ML: at 09:28

## 2017-01-01 RX ADMIN — MAGNESIUM HYDROXIDE 10 ML: 2400 SUSPENSION ORAL at 13:46

## 2017-01-01 RX ADMIN — SENNOSIDES AND DOCUSATE SODIUM 1 TABLET: 8.6; 5 TABLET ORAL at 17:59

## 2017-01-01 RX ADMIN — IPRATROPIUM BROMIDE AND ALBUTEROL SULFATE 3 ML: 2.5; .5 SOLUTION RESPIRATORY (INHALATION) at 14:55

## 2017-01-01 RX ADMIN — HYDROCODONE BITARTRATE AND ACETAMINOPHEN 1 TABLET: 5; 325 TABLET ORAL at 06:55

## 2017-01-01 RX ADMIN — BIMATOPROST 1 DROP: 0.1 SOLUTION/ DROPS OPHTHALMIC at 21:45

## 2017-01-01 RX ADMIN — LANSOPRAZOLE 15 MG: 15 CAPSULE, DELAYED RELEASE ORAL at 06:38

## 2017-01-01 RX ADMIN — PANTOPRAZOLE SODIUM 40 MG: 40 TABLET, DELAYED RELEASE ORAL at 09:50

## 2017-01-01 RX ADMIN — PHENYLEPHRINE HYDROCHLORIDE 100 MCG: 10 INJECTION INTRAVENOUS at 11:15

## 2017-01-01 RX ADMIN — IPRATROPIUM BROMIDE AND ALBUTEROL SULFATE 3 ML: 2.5; .5 SOLUTION RESPIRATORY (INHALATION) at 13:54

## 2017-01-01 RX ADMIN — EPHEDRINE SULFATE 10 MG: 50 INJECTION INTRAMUSCULAR; INTRAVENOUS; SUBCUTANEOUS at 11:25

## 2017-01-01 RX ADMIN — PANTOPRAZOLE SODIUM 8 MG/HR: 40 INJECTION, POWDER, FOR SOLUTION INTRAVENOUS at 17:53

## 2017-01-01 RX ADMIN — ONDANSETRON 4 MG: 4 TABLET, ORALLY DISINTEGRATING ORAL at 20:08

## 2017-01-01 RX ADMIN — ALPRAZOLAM 0.25 MG: 0.25 TABLET ORAL at 21:01

## 2017-01-01 RX ADMIN — DORZOLAMIDE HYDROCHLORIDE AND TIMOLOL MALEATE 1 DROP: 20; 5 SOLUTION/ DROPS OPHTHALMIC at 17:39

## 2017-01-01 RX ADMIN — LEVOTHYROXINE SODIUM 100 MCG: 100 TABLET ORAL at 09:33

## 2017-01-01 RX ADMIN — FERROUS SULFATE TAB EC 324 MG (65 MG FE EQUIVALENT) 324 MG: 324 (65 FE) TABLET DELAYED RESPONSE at 17:37

## 2017-01-01 RX ADMIN — TAZOBACTAM SODIUM AND PIPERACILLIN SODIUM 3.38 G: 375; 3 INJECTION, SOLUTION INTRAVENOUS at 21:25

## 2017-01-01 RX ADMIN — IPRATROPIUM BROMIDE AND ALBUTEROL SULFATE 3 ML: 2.5; .5 SOLUTION RESPIRATORY (INHALATION) at 19:46

## 2017-01-01 RX ADMIN — PROMETHAZINE HYDROCHLORIDE AND CODEINE PHOSPHATE 2.5 ML: 6.25; 1 SYRUP ORAL at 21:50

## 2017-01-01 RX ADMIN — ACETAMINOPHEN 1000 MG: 500 TABLET ORAL at 08:22

## 2017-01-01 RX ADMIN — PHENYLEPHRINE HYDROCHLORIDE 100 MCG: 10 INJECTION INTRAVENOUS at 11:18

## 2017-01-01 RX ADMIN — I-VITE, TAB 1000-60-2MG (60/BT) 1 TABLET: TAB at 10:11

## 2017-01-01 RX ADMIN — ALPRAZOLAM 0.25 MG: 0.25 TABLET ORAL at 20:53

## 2017-01-01 RX ADMIN — FUROSEMIDE 80 MG: 10 INJECTION, SOLUTION INTRAMUSCULAR; INTRAVENOUS at 21:27

## 2017-01-01 RX ADMIN — FUROSEMIDE 80 MG: 10 INJECTION, SOLUTION INTRAMUSCULAR; INTRAVENOUS at 16:04

## 2017-01-01 RX ADMIN — PHENYTOIN SODIUM 100 MG: 100 CAPSULE, EXTENDED RELEASE ORAL at 20:09

## 2017-01-01 RX ADMIN — LEVOTHYROXINE SODIUM 50 MCG: 50 TABLET ORAL at 11:54

## 2017-01-01 RX ADMIN — ALPRAZOLAM 0.25 MG: 0.25 TABLET ORAL at 21:03

## 2017-01-01 RX ADMIN — SENNOSIDES AND DOCUSATE SODIUM 1 TABLET: 8.6; 5 TABLET ORAL at 08:06

## 2017-01-01 RX ADMIN — OXYCODONE HYDROCHLORIDE 5 MG: 5 TABLET ORAL at 22:08

## 2017-01-01 RX ADMIN — IPRATROPIUM BROMIDE AND ALBUTEROL SULFATE 3 ML: 2.5; .5 SOLUTION RESPIRATORY (INHALATION) at 21:22

## 2017-01-01 RX ADMIN — LATANOPROST 1 DROP: 50 SOLUTION OPHTHALMIC at 21:42

## 2017-01-01 RX ADMIN — PROPOFOL 100 MG: 10 INJECTION, EMULSION INTRAVENOUS at 11:14

## 2017-01-01 RX ADMIN — Medication 1 TABLET: at 10:05

## 2017-01-01 RX ADMIN — SODIUM CHLORIDE 75 ML/HR: 9 INJECTION, SOLUTION INTRAVENOUS at 11:56

## 2017-01-01 RX ADMIN — I-VITE, TAB 1000-60-2MG (60/BT) 1 TABLET: TAB at 08:21

## 2017-01-01 RX ADMIN — FAMOTIDINE 20 MG: 20 TABLET ORAL at 08:18

## 2017-01-01 RX ADMIN — DORZOLAMIDE HYDROCHLORIDE AND TIMOLOL MALEATE 1 DROP: 20; 5 SOLUTION/ DROPS OPHTHALMIC at 08:30

## 2017-01-01 RX ADMIN — IPRATROPIUM BROMIDE AND ALBUTEROL SULFATE 3 ML: 2.5; .5 SOLUTION RESPIRATORY (INHALATION) at 13:04

## 2017-01-01 RX ADMIN — LANSOPRAZOLE 15 MG: 15 CAPSULE, DELAYED RELEASE ORAL at 08:09

## 2017-01-01 RX ADMIN — PHENYLEPHRINE HYDROCHLORIDE 100 MCG: 10 INJECTION INTRAVENOUS at 11:10

## 2017-01-01 RX ADMIN — DORZOLAMIDE HYDROCHLORIDE AND TIMOLOL MALEATE 1 DROP: 20; 5 SOLUTION/ DROPS OPHTHALMIC at 09:16

## 2017-01-01 RX ADMIN — PROMETHAZINE HYDROCHLORIDE AND CODEINE PHOSPHATE 2.5 ML: 6.25; 1 SYRUP ORAL at 21:30

## 2017-01-01 RX ADMIN — DORZOLAMIDE HYDROCHLORIDE AND TIMOLOL MALEATE 1 DROP: 20; 5 SOLUTION/ DROPS OPHTHALMIC at 17:18

## 2017-01-01 RX ADMIN — LEVOTHYROXINE SODIUM 50 MCG: 50 TABLET ORAL at 06:00

## 2017-01-01 RX ADMIN — ALBUTEROL SULFATE 2.5 MG: 2.5 SOLUTION RESPIRATORY (INHALATION) at 01:08

## 2017-01-01 RX ADMIN — IPRATROPIUM BROMIDE AND ALBUTEROL SULFATE 3 ML: 2.5; .5 SOLUTION RESPIRATORY (INHALATION) at 05:35

## 2017-01-01 RX ADMIN — DEXAMETHASONE SODIUM PHOSPHATE 4 MG: 4 INJECTION, SOLUTION INTRAMUSCULAR; INTRAVENOUS at 18:21

## 2017-01-01 RX ADMIN — ACETAMINOPHEN 1000 MG: 500 TABLET ORAL at 13:00

## 2017-01-01 RX ADMIN — ALPRAZOLAM 0.25 MG: 0.25 TABLET ORAL at 21:21

## 2017-01-01 RX ADMIN — FUROSEMIDE 20 MG: 20 TABLET ORAL at 08:56

## 2017-01-01 RX ADMIN — ALBUTEROL SULFATE 2.5 MG: 2.5 SOLUTION RESPIRATORY (INHALATION) at 07:47

## 2017-01-01 RX ADMIN — DORZOLAMIDE HYDROCHLORIDE AND TIMOLOL MALEATE 1 DROP: 20; 5 SOLUTION/ DROPS OPHTHALMIC at 09:21

## 2017-01-01 RX ADMIN — DORZOLAMIDE HYDROCHLORIDE AND TIMOLOL MALEATE 1 DROP: 20; 5 SOLUTION/ DROPS OPHTHALMIC at 18:17

## 2017-01-01 RX ADMIN — LEVOFLOXACIN 500 MG: 5 INJECTION, SOLUTION INTRAVENOUS at 18:48

## 2017-01-01 RX ADMIN — ACETAMINOPHEN 1000 MG: 500 TABLET ORAL at 08:49

## 2017-01-01 RX ADMIN — SENNOSIDES AND DOCUSATE SODIUM 1 TABLET: 8.6; 5 TABLET ORAL at 09:15

## 2017-01-01 RX ADMIN — SENNOSIDES AND DOCUSATE SODIUM 1 TABLET: 8.6; 5 TABLET ORAL at 10:06

## 2017-01-01 RX ADMIN — BIMATOPROST 1 DROP: 0.1 SOLUTION/ DROPS OPHTHALMIC at 21:13

## 2017-01-01 RX ADMIN — FUROSEMIDE 80 MG: 10 INJECTION, SOLUTION INTRAMUSCULAR; INTRAVENOUS at 22:15

## 2017-01-01 RX ADMIN — PROPRANOLOL HYDROCHLORIDE 20 MG: 20 TABLET ORAL at 22:09

## 2017-01-01 RX ADMIN — CELECOXIB 200 MG: 200 CAPSULE ORAL at 08:30

## 2017-01-01 RX ADMIN — ACETAMINOPHEN 1000 MG: 500 TABLET ORAL at 08:57

## 2017-01-01 RX ADMIN — LIDOCAINE 1 PATCH: 50 PATCH CUTANEOUS at 10:59

## 2017-01-01 RX ADMIN — PANTOPRAZOLE SODIUM 40 MG: 40 TABLET, DELAYED RELEASE ORAL at 05:51

## 2017-01-01 RX ADMIN — IPRATROPIUM BROMIDE AND ALBUTEROL SULFATE 3 ML: 2.5; .5 SOLUTION RESPIRATORY (INHALATION) at 21:33

## 2017-01-01 RX ADMIN — SENNOSIDES AND DOCUSATE SODIUM 1 TABLET: 8.6; 5 TABLET ORAL at 17:13

## 2017-01-01 RX ADMIN — IPRATROPIUM BROMIDE AND ALBUTEROL SULFATE 3 ML: 2.5; .5 SOLUTION RESPIRATORY (INHALATION) at 05:04

## 2017-01-01 RX ADMIN — I-VITE, TAB 1000-60-2MG (60/BT) 1 TABLET: TAB at 17:36

## 2017-01-01 RX ADMIN — ASPIRIN 325 MG: 325 TABLET, DELAYED RELEASE ORAL at 15:19

## 2017-01-01 RX ADMIN — ENOXAPARIN SODIUM 40 MG: 40 INJECTION SUBCUTANEOUS at 08:08

## 2017-01-01 RX ADMIN — PANTOPRAZOLE SODIUM 40 MG: 40 TABLET, DELAYED RELEASE ORAL at 06:31

## 2017-01-01 RX ADMIN — LIDOCAINE 1 PATCH: 50 PATCH CUTANEOUS at 08:21

## 2017-01-01 RX ADMIN — PROPRANOLOL HYDROCHLORIDE 20 MG: 20 TABLET ORAL at 21:01

## 2017-01-01 RX ADMIN — SENNOSIDES AND DOCUSATE SODIUM 1 TABLET: 8.6; 5 TABLET ORAL at 08:49

## 2017-01-01 RX ADMIN — PROPRANOLOL HYDROCHLORIDE 20 MG: 20 TABLET ORAL at 08:06

## 2017-01-01 RX ADMIN — IPRATROPIUM BROMIDE AND ALBUTEROL SULFATE 3 ML: 2.5; .5 SOLUTION RESPIRATORY (INHALATION) at 14:52

## 2017-01-01 RX ADMIN — TAZOBACTAM SODIUM AND PIPERACILLIN SODIUM 3.38 G: 375; 3 INJECTION, SOLUTION INTRAVENOUS at 12:29

## 2017-01-01 RX ADMIN — PROPRANOLOL HYDROCHLORIDE 20 MG: 20 TABLET ORAL at 08:10

## 2017-01-01 RX ADMIN — BIMATOPROST 1 DROP: 0.1 SOLUTION/ DROPS OPHTHALMIC at 21:03

## 2017-01-01 RX ADMIN — DORZOLAMIDE HYDROCHLORIDE AND TIMOLOL MALEATE 1 DROP: 20; 5 SOLUTION/ DROPS OPHTHALMIC at 10:06

## 2017-01-01 RX ADMIN — I-VITE, TAB 1000-60-2MG (60/BT) 1 TABLET: TAB at 08:22

## 2017-01-01 RX ADMIN — PHENYTOIN SODIUM 100 MG: 100 CAPSULE, EXTENDED RELEASE ORAL at 20:12

## 2017-01-01 RX ADMIN — DORZOLAMIDE HYDROCHLORIDE AND TIMOLOL MALEATE 1 DROP: 20; 5 SOLUTION/ DROPS OPHTHALMIC at 09:00

## 2017-01-01 RX ADMIN — ENOXAPARIN SODIUM 40 MG: 40 INJECTION SUBCUTANEOUS at 08:05

## 2017-01-01 RX ADMIN — Medication 5 ML: at 04:29

## 2017-01-01 RX ADMIN — ACETAMINOPHEN 1000 MG: 500 TABLET ORAL at 06:53

## 2017-01-01 RX ADMIN — SODIUM CHLORIDE 500 ML: 9 INJECTION, SOLUTION INTRAVENOUS at 21:44

## 2017-01-01 RX ADMIN — I-VITE, TAB 1000-60-2MG (60/BT) 1 TABLET: TAB at 08:07

## 2017-01-01 RX ADMIN — IPRATROPIUM BROMIDE AND ALBUTEROL SULFATE 3 ML: 2.5; .5 SOLUTION RESPIRATORY (INHALATION) at 11:08

## 2017-01-01 RX ADMIN — FERROUS SULFATE TAB EC 324 MG (65 MG FE EQUIVALENT) 324 MG: 324 (65 FE) TABLET DELAYED RESPONSE at 08:11

## 2017-01-01 RX ADMIN — PROPRANOLOL HYDROCHLORIDE 20 MG: 20 TABLET ORAL at 20:24

## 2017-01-01 RX ADMIN — DORZOLAMIDE HYDROCHLORIDE AND TIMOLOL MALEATE 1 DROP: 20; 5 SOLUTION/ DROPS OPHTHALMIC at 08:58

## 2017-01-01 RX ADMIN — ACETAMINOPHEN 1000 MG: 500 TABLET ORAL at 01:33

## 2017-01-01 RX ADMIN — PHENYTOIN SODIUM 100 MG: 100 CAPSULE, EXTENDED RELEASE ORAL at 20:46

## 2017-01-01 RX ADMIN — FUROSEMIDE 20 MG: 10 INJECTION, SOLUTION INTRAMUSCULAR; INTRAVENOUS at 02:18

## 2017-01-01 RX ADMIN — ALPRAZOLAM 0.25 MG: 0.25 TABLET ORAL at 20:21

## 2017-01-01 RX ADMIN — DORZOLAMIDE HYDROCHLORIDE AND TIMOLOL MALEATE 1 DROP: 20; 5 SOLUTION/ DROPS OPHTHALMIC at 17:59

## 2017-01-01 RX ADMIN — PANTOPRAZOLE SODIUM 8 MG/HR: 40 INJECTION, POWDER, FOR SOLUTION INTRAVENOUS at 11:33

## 2017-01-01 RX ADMIN — CELECOXIB 200 MG: 200 CAPSULE ORAL at 08:42

## 2017-01-01 RX ADMIN — ASPIRIN 81 MG 81 MG: 81 TABLET ORAL at 08:57

## 2017-01-01 RX ADMIN — FERROUS SULFATE TAB EC 324 MG (65 MG FE EQUIVALENT) 324 MG: 324 (65 FE) TABLET DELAYED RESPONSE at 18:47

## 2017-01-01 RX ADMIN — ALBUTEROL SULFATE 2.5 MG: 2.5 SOLUTION RESPIRATORY (INHALATION) at 12:33

## 2017-01-01 RX ADMIN — NYSTATIN: 100000 POWDER TOPICAL at 20:26

## 2017-01-01 RX ADMIN — ACETAMINOPHEN 1000 MG: 500 TABLET ORAL at 11:54

## 2017-01-01 RX ADMIN — BIMATOPROST 1 DROP: 0.1 SOLUTION/ DROPS OPHTHALMIC at 20:25

## 2017-01-01 RX ADMIN — HEPARIN SODIUM 5000 UNITS: 5000 INJECTION, SOLUTION INTRAVENOUS; SUBCUTANEOUS at 05:47

## 2017-01-01 RX ADMIN — FUROSEMIDE 40 MG: 40 TABLET ORAL at 08:21

## 2017-01-01 RX ADMIN — LATANOPROST 1 DROP: 50 SOLUTION OPHTHALMIC at 20:00

## 2017-01-01 RX ADMIN — ACETAMINOPHEN 1000 MG: 500 TABLET ORAL at 14:20

## 2017-01-01 RX ADMIN — FLUTICASONE PROPIONATE 2 SPRAY: 50 SPRAY, METERED NASAL at 10:06

## 2017-01-01 RX ADMIN — ACETAMINOPHEN 1000 MG: 500 TABLET ORAL at 17:48

## 2017-01-01 RX ADMIN — ACETAMINOPHEN 1000 MG: 500 TABLET ORAL at 20:20

## 2017-01-01 RX ADMIN — TAZOBACTAM SODIUM AND PIPERACILLIN SODIUM 3.38 G: 375; 3 INJECTION, SOLUTION INTRAVENOUS at 23:45

## 2017-01-01 RX ADMIN — LEVOTHYROXINE SODIUM 50 MCG: 50 TABLET ORAL at 06:24

## 2017-01-01 RX ADMIN — FUROSEMIDE 20 MG: 20 TABLET ORAL at 08:30

## 2017-01-01 RX ADMIN — PROPRANOLOL HYDROCHLORIDE 20 MG: 20 TABLET ORAL at 21:42

## 2017-01-01 RX ADMIN — ALPRAZOLAM 0.5 MG: 0.5 TABLET ORAL at 21:17

## 2017-01-01 RX ADMIN — ACETAMINOPHEN 1000 MG: 500 TABLET ORAL at 20:51

## 2017-01-01 RX ADMIN — LEVOTHYROXINE SODIUM 50 MCG: 50 TABLET ORAL at 06:01

## 2017-01-01 RX ADMIN — LIDOCAINE 1 PATCH: 50 PATCH CUTANEOUS at 09:48

## 2017-01-01 RX ADMIN — FERROUS SULFATE TAB EC 324 MG (65 MG FE EQUIVALENT) 324 MG: 324 (65 FE) TABLET DELAYED RESPONSE at 17:40

## 2017-01-01 RX ADMIN — PROPRANOLOL HYDROCHLORIDE 20 MG: 20 TABLET ORAL at 21:41

## 2017-01-01 RX ADMIN — PROPOFOL 100 MG: 10 INJECTION, EMULSION INTRAVENOUS at 11:18

## 2017-01-01 RX ADMIN — LEVOTHYROXINE SODIUM 50 MCG: 50 TABLET ORAL at 09:46

## 2017-01-01 RX ADMIN — DORZOLAMIDE HYDROCHLORIDE AND TIMOLOL MALEATE 1 DROP: 20; 5 SOLUTION/ DROPS OPHTHALMIC at 18:08

## 2017-01-01 RX ADMIN — ENOXAPARIN SODIUM 40 MG: 40 INJECTION SUBCUTANEOUS at 08:26

## 2017-01-01 RX ADMIN — SCOPALAMINE 1 PATCH: 1 PATCH, EXTENDED RELEASE TRANSDERMAL at 14:31

## 2017-01-01 RX ADMIN — FUROSEMIDE 20 MG: 20 TABLET ORAL at 15:19

## 2017-01-01 RX ADMIN — PROPRANOLOL HYDROCHLORIDE 20 MG: 20 TABLET ORAL at 08:50

## 2017-01-01 RX ADMIN — ALPRAZOLAM 0.25 MG: 0.25 TABLET ORAL at 21:45

## 2017-01-01 RX ADMIN — DORZOLAMIDE HYDROCHLORIDE AND TIMOLOL MALEATE 1 DROP: 20; 5 SOLUTION/ DROPS OPHTHALMIC at 17:55

## 2017-01-01 RX ADMIN — LANSOPRAZOLE 15 MG: 15 CAPSULE, DELAYED RELEASE ORAL at 06:23

## 2017-01-01 RX ADMIN — CELECOXIB 200 MG: 200 CAPSULE ORAL at 08:31

## 2017-01-01 RX ADMIN — FLUTICASONE PROPIONATE 2 SPRAY: 50 SPRAY, METERED NASAL at 21:25

## 2017-01-01 RX ADMIN — FERROUS SULFATE TAB EC 324 MG (65 MG FE EQUIVALENT) 324 MG: 324 (65 FE) TABLET DELAYED RESPONSE at 14:20

## 2017-01-01 RX ADMIN — ACETAMINOPHEN 1000 MG: 500 TABLET ORAL at 02:47

## 2017-01-01 RX ADMIN — ACETAMINOPHEN 1000 MG: 500 TABLET ORAL at 18:01

## 2017-01-01 RX ADMIN — PANTOPRAZOLE SODIUM 8 MG/HR: 40 INJECTION, POWDER, FOR SOLUTION INTRAVENOUS at 03:37

## 2017-01-01 RX ADMIN — IPRATROPIUM BROMIDE AND ALBUTEROL SULFATE 3 ML: 2.5; .5 SOLUTION RESPIRATORY (INHALATION) at 16:17

## 2017-01-01 RX ADMIN — Medication 10 ML: at 17:31

## 2017-01-01 RX ADMIN — Medication 1 TABLET: at 08:19

## 2017-01-01 RX ADMIN — ACETAMINOPHEN 650 MG: 325 TABLET ORAL at 05:30

## 2017-01-01 RX ADMIN — DORZOLAMIDE HYDROCHLORIDE AND TIMOLOL MALEATE 1 DROP: 20; 5 SOLUTION/ DROPS OPHTHALMIC at 08:10

## 2017-01-01 RX ADMIN — FUROSEMIDE 20 MG: 20 TABLET ORAL at 10:11

## 2017-01-01 RX ADMIN — PROPRANOLOL HYDROCHLORIDE 20 MG: 20 TABLET ORAL at 10:24

## 2017-01-01 RX ADMIN — LANSOPRAZOLE 15 MG: 15 CAPSULE, DELAYED RELEASE ORAL at 06:35

## 2017-01-01 RX ADMIN — DEXAMETHASONE SODIUM PHOSPHATE 4 MG: 4 INJECTION, SOLUTION INTRAMUSCULAR; INTRAVENOUS at 18:47

## 2017-01-01 RX ADMIN — FUROSEMIDE 20 MG: 40 TABLET ORAL at 09:28

## 2017-01-01 RX ADMIN — LEVOTHYROXINE SODIUM 50 MCG: 50 TABLET ORAL at 06:10

## 2017-01-01 RX ADMIN — SENNOSIDES AND DOCUSATE SODIUM 1 TABLET: 8.6; 5 TABLET ORAL at 17:36

## 2017-01-01 RX ADMIN — FUROSEMIDE 20 MG: 20 TABLET ORAL at 15:00

## 2017-01-01 RX ADMIN — ALBUTEROL SULFATE 2.5 MG: 2.5 SOLUTION RESPIRATORY (INHALATION) at 19:16

## 2017-01-01 RX ADMIN — ENOXAPARIN SODIUM 40 MG: 40 INJECTION SUBCUTANEOUS at 08:10

## 2017-01-01 RX ADMIN — CYANOCOBALAMIN 1000 MCG: 1000 INJECTION, SOLUTION INTRAMUSCULAR; SUBCUTANEOUS at 10:11

## 2017-01-01 RX ADMIN — LEVOTHYROXINE SODIUM 50 MCG: 50 TABLET ORAL at 06:38

## 2017-01-01 RX ADMIN — ACETAMINOPHEN 1000 MG: 500 TABLET ORAL at 12:29

## 2017-01-01 RX ADMIN — PHENYTOIN SODIUM 100 MG: 100 CAPSULE, EXTENDED RELEASE ORAL at 21:13

## 2017-01-01 RX ADMIN — Medication 10 ML: at 21:30

## 2017-01-01 RX ADMIN — NYSTATIN 1 APPLICATION: 100000 POWDER TOPICAL at 21:43

## 2017-01-01 RX ADMIN — PROPRANOLOL HYDROCHLORIDE 20 MG: 20 TABLET ORAL at 20:53

## 2017-01-01 RX ADMIN — DORZOLAMIDE HYDROCHLORIDE AND TIMOLOL MALEATE 1 DROP: 20; 5 SOLUTION/ DROPS OPHTHALMIC at 17:42

## 2017-01-01 RX ADMIN — TAZOBACTAM SODIUM AND PIPERACILLIN SODIUM 3.38 G: 375; 3 INJECTION, SOLUTION INTRAVENOUS at 17:13

## 2017-01-01 RX ADMIN — PANTOPRAZOLE SODIUM 40 MG: 40 TABLET, DELAYED RELEASE ORAL at 05:59

## 2017-01-01 RX ADMIN — SENNOSIDES AND DOCUSATE SODIUM 1 TABLET: 8.6; 5 TABLET ORAL at 17:52

## 2017-01-01 RX ADMIN — AZITHROMYCIN 500 MG: 500 INJECTION, POWDER, LYOPHILIZED, FOR SOLUTION INTRAVENOUS at 20:33

## 2017-01-01 RX ADMIN — FLUTICASONE PROPIONATE 2 SPRAY: 50 SPRAY, METERED NASAL at 08:12

## 2017-01-01 RX ADMIN — ASPIRIN 81 MG 81 MG: 81 TABLET ORAL at 22:09

## 2017-01-01 RX ADMIN — BIMATOPROST 1 DROP: 0.1 SOLUTION/ DROPS OPHTHALMIC at 20:23

## 2017-01-01 RX ADMIN — ALPRAZOLAM 0.25 MG: 0.25 TABLET ORAL at 20:09

## 2017-01-01 RX ADMIN — PROPRANOLOL HYDROCHLORIDE 20 MG: 20 TABLET ORAL at 20:23

## 2017-01-01 RX ADMIN — DORZOLAMIDE HYDROCHLORIDE AND TIMOLOL MALEATE 1 DROP: 20; 5 SOLUTION/ DROPS OPHTHALMIC at 17:53

## 2017-01-01 RX ADMIN — BIMATOPROST 1 DROP: 0.1 SOLUTION/ DROPS OPHTHALMIC at 20:07

## 2017-01-01 RX ADMIN — BIMATOPROST 1 DROP: 0.1 SOLUTION/ DROPS OPHTHALMIC at 20:51

## 2017-01-01 RX ADMIN — HEPARIN SODIUM 5000 UNITS: 5000 INJECTION, SOLUTION INTRAVENOUS; SUBCUTANEOUS at 21:23

## 2017-01-01 RX ADMIN — IPRATROPIUM BROMIDE AND ALBUTEROL SULFATE 3 ML: 2.5; .5 SOLUTION RESPIRATORY (INHALATION) at 23:04

## 2017-01-01 RX ADMIN — ASCORBIC ACID TAB 250 MG 250 MG: 250 TAB at 21:44

## 2017-01-01 RX ADMIN — PROPRANOLOL HYDROCHLORIDE 20 MG: 20 TABLET ORAL at 20:59

## 2017-01-01 RX ADMIN — IPRATROPIUM BROMIDE AND ALBUTEROL SULFATE 3 ML: 2.5; .5 SOLUTION RESPIRATORY (INHALATION) at 13:30

## 2017-01-01 RX ADMIN — ACETAMINOPHEN 1000 MG: 500 TABLET ORAL at 18:07

## 2017-01-01 RX ADMIN — ENOXAPARIN SODIUM 40 MG: 40 INJECTION SUBCUTANEOUS at 00:06

## 2017-01-01 RX ADMIN — I-VITE, TAB 1000-60-2MG (60/BT) 1 TABLET: TAB at 17:40

## 2017-01-01 RX ADMIN — FAMOTIDINE 20 MG: 20 TABLET ORAL at 17:13

## 2017-01-01 RX ADMIN — DORZOLAMIDE HYDROCHLORIDE AND TIMOLOL MALEATE 1 DROP: 20; 5 SOLUTION/ DROPS OPHTHALMIC at 17:41

## 2017-01-01 RX ADMIN — ACETAMINOPHEN 1000 MG: 500 TABLET ORAL at 01:45

## 2017-01-01 RX ADMIN — BISACODYL 10 MG: 10 SUPPOSITORY RECTAL at 10:05

## 2017-01-01 RX ADMIN — PROPRANOLOL HYDROCHLORIDE 20 MG: 20 TABLET ORAL at 10:18

## 2017-01-01 RX ADMIN — POTASSIUM CHLORIDE 20 MEQ: 750 CAPSULE, EXTENDED RELEASE ORAL at 13:46

## 2017-01-01 RX ADMIN — FLUTICASONE PROPIONATE 2 SPRAY: 50 SPRAY, METERED NASAL at 08:48

## 2017-01-01 RX ADMIN — HEPARIN SODIUM 5000 UNITS: 5000 INJECTION, SOLUTION INTRAVENOUS; SUBCUTANEOUS at 05:56

## 2017-01-01 RX ADMIN — PROPRANOLOL HYDROCHLORIDE 20 MG: 20 TABLET ORAL at 08:48

## 2017-01-01 RX ADMIN — PHENYTOIN SODIUM 100 MG: 100 CAPSULE, EXTENDED RELEASE ORAL at 21:20

## 2017-01-01 RX ADMIN — ACETAMINOPHEN 1000 MG: 500 TABLET ORAL at 17:08

## 2017-01-01 RX ADMIN — FERROUS SULFATE TAB EC 324 MG (65 MG FE EQUIVALENT) 324 MG: 324 (65 FE) TABLET DELAYED RESPONSE at 08:56

## 2017-01-01 RX ADMIN — IPRATROPIUM BROMIDE AND ALBUTEROL SULFATE 3 ML: 2.5; .5 SOLUTION RESPIRATORY (INHALATION) at 12:15

## 2017-01-01 RX ADMIN — SENNOSIDES AND DOCUSATE SODIUM 1 TABLET: 8.6; 5 TABLET ORAL at 08:10

## 2017-01-01 RX ADMIN — FAMOTIDINE 20 MG: 20 TABLET ORAL at 17:55

## 2017-01-01 RX ADMIN — IPRATROPIUM BROMIDE AND ALBUTEROL SULFATE 3 ML: 2.5; .5 SOLUTION RESPIRATORY (INHALATION) at 16:31

## 2017-01-01 RX ADMIN — IPRATROPIUM BROMIDE AND ALBUTEROL SULFATE 3 ML: 2.5; .5 SOLUTION RESPIRATORY (INHALATION) at 04:57

## 2017-01-01 RX ADMIN — ALBUTEROL SULFATE 2.5 MG: 2.5 SOLUTION RESPIRATORY (INHALATION) at 01:18

## 2017-01-01 RX ADMIN — PHENYTOIN SODIUM 100 MG: 100 CAPSULE, EXTENDED RELEASE ORAL at 21:26

## 2017-01-01 RX ADMIN — Medication 1 TABLET: at 08:31

## 2017-01-01 RX ADMIN — LEVOTHYROXINE SODIUM 50 MCG: 50 TABLET ORAL at 05:18

## 2017-01-01 RX ADMIN — FUROSEMIDE 20 MG: 20 TABLET ORAL at 08:46

## 2017-01-01 RX ADMIN — PROPRANOLOL HYDROCHLORIDE 20 MG: 20 TABLET ORAL at 08:31

## 2017-01-01 RX ADMIN — DORZOLAMIDE HYDROCHLORIDE AND TIMOLOL MALEATE 1 DROP: 20; 5 SOLUTION/ DROPS OPHTHALMIC at 21:26

## 2017-01-01 RX ADMIN — SENNOSIDES AND DOCUSATE SODIUM 1 TABLET: 8.6; 5 TABLET ORAL at 08:41

## 2017-01-01 RX ADMIN — SODIUM CHLORIDE 85 ML/HR: 900 INJECTION, SOLUTION INTRAVENOUS at 15:21

## 2017-01-01 RX ADMIN — FUROSEMIDE 20 MG: 20 TABLET ORAL at 08:40

## 2017-01-01 RX ADMIN — PHENYTOIN SODIUM 100 MG: 100 CAPSULE, EXTENDED RELEASE ORAL at 20:24

## 2017-01-01 RX ADMIN — LEVOTHYROXINE SODIUM 50 MCG: 50 TABLET ORAL at 06:17

## 2017-01-01 RX ADMIN — FERROUS SULFATE TAB EC 324 MG (65 MG FE EQUIVALENT) 324 MG: 324 (65 FE) TABLET DELAYED RESPONSE at 17:07

## 2017-01-01 RX ADMIN — PHENYTOIN SODIUM 100 MG: 100 CAPSULE, EXTENDED RELEASE ORAL at 20:44

## 2017-01-01 RX ADMIN — DEXAMETHASONE SODIUM PHOSPHATE 4 MG: 4 INJECTION, SOLUTION INTRAMUSCULAR; INTRAVENOUS at 12:17

## 2017-01-01 RX ADMIN — LANSOPRAZOLE 15 MG: 15 CAPSULE, DELAYED RELEASE ORAL at 06:51

## 2017-01-01 RX ADMIN — ACETAMINOPHEN 1000 MG: 500 TABLET ORAL at 00:12

## 2017-01-01 RX ADMIN — IPRATROPIUM BROMIDE AND ALBUTEROL SULFATE 3 ML: 2.5; .5 SOLUTION RESPIRATORY (INHALATION) at 11:34

## 2017-01-01 RX ADMIN — FUROSEMIDE 40 MG: 10 INJECTION, SOLUTION INTRAMUSCULAR; INTRAVENOUS at 13:46

## 2017-01-01 RX ADMIN — PROPRANOLOL HYDROCHLORIDE 20 MG: 20 TABLET ORAL at 08:47

## 2017-01-01 RX ADMIN — FAMOTIDINE 20 MG: 20 TABLET ORAL at 08:56

## 2017-01-01 RX ADMIN — LIDOCAINE 1 PATCH: 50 PATCH CUTANEOUS at 08:31

## 2017-01-01 RX ADMIN — HYDROXYZINE PAMOATE 25 MG: 25 CAPSULE ORAL at 10:25

## 2017-01-01 RX ADMIN — PROPRANOLOL HYDROCHLORIDE 20 MG: 20 TABLET ORAL at 20:40

## 2017-01-01 RX ADMIN — ENOXAPARIN SODIUM 40 MG: 40 INJECTION SUBCUTANEOUS at 08:48

## 2017-01-01 RX ADMIN — LANSOPRAZOLE 15 MG: 15 CAPSULE, DELAYED RELEASE ORAL at 06:53

## 2017-01-01 RX ADMIN — ACETAMINOPHEN 1000 MG: 500 TABLET ORAL at 01:28

## 2017-01-01 RX ADMIN — PHENYTOIN SODIUM 100 MG: 100 CAPSULE, EXTENDED RELEASE ORAL at 21:18

## 2017-01-01 RX ADMIN — Medication 1 TABLET: at 09:00

## 2017-01-01 RX ADMIN — ACETAMINOPHEN 1000 MG: 500 TABLET ORAL at 08:50

## 2017-01-01 RX ADMIN — HEPARIN SODIUM 5000 UNITS: 5000 INJECTION, SOLUTION INTRAVENOUS; SUBCUTANEOUS at 06:31

## 2017-01-01 RX ADMIN — DORZOLAMIDE HYDROCHLORIDE AND TIMOLOL MALEATE 1 DROP: 20; 5 SOLUTION/ DROPS OPHTHALMIC at 18:31

## 2017-01-01 RX ADMIN — TAZOBACTAM SODIUM AND PIPERACILLIN SODIUM 3.38 G: 375; 3 INJECTION, SOLUTION INTRAVENOUS at 23:57

## 2017-01-01 RX ADMIN — DORZOLAMIDE HYDROCHLORIDE AND TIMOLOL MALEATE 1 DROP: 20; 5 SOLUTION/ DROPS OPHTHALMIC at 17:37

## 2017-01-01 RX ADMIN — PROPRANOLOL HYDROCHLORIDE 20 MG: 20 TABLET ORAL at 08:53

## 2017-01-01 RX ADMIN — BIMATOPROST 1 DROP: 0.1 SOLUTION/ DROPS OPHTHALMIC at 20:38

## 2017-01-01 RX ADMIN — ENOXAPARIN SODIUM 40 MG: 40 INJECTION SUBCUTANEOUS at 08:11

## 2017-01-01 RX ADMIN — Medication 1 TABLET: at 10:10

## 2017-01-01 RX ADMIN — ACETAMINOPHEN 1000 MG: 500 TABLET ORAL at 08:07

## 2017-01-01 RX ADMIN — LEVOTHYROXINE SODIUM 50 MCG: 50 TABLET ORAL at 06:47

## 2017-01-01 RX ADMIN — Medication 1 TABLET: at 09:15

## 2017-01-01 RX ADMIN — IPRATROPIUM BROMIDE AND ALBUTEROL SULFATE 3 ML: 2.5; .5 SOLUTION RESPIRATORY (INHALATION) at 11:18

## 2017-01-01 RX ADMIN — HEPARIN SODIUM 5000 UNITS: 5000 INJECTION, SOLUTION INTRAVENOUS; SUBCUTANEOUS at 06:17

## 2017-01-01 RX ADMIN — LEVOTHYROXINE SODIUM 50 MCG: 50 TABLET ORAL at 06:14

## 2017-01-01 RX ADMIN — ACETAMINOPHEN 650 MG: 325 TABLET ORAL at 11:20

## 2017-01-01 RX ADMIN — ALPRAZOLAM 0.25 MG: 0.25 TABLET ORAL at 20:23

## 2017-01-01 RX ADMIN — HEPARIN SODIUM 5000 UNITS: 5000 INJECTION, SOLUTION INTRAVENOUS; SUBCUTANEOUS at 21:01

## 2017-01-01 RX ADMIN — LEVOFLOXACIN 500 MG: 5 INJECTION, SOLUTION INTRAVENOUS at 18:24

## 2017-01-01 RX ADMIN — I-VITE, TAB 1000-60-2MG (60/BT) 1 TABLET: TAB at 17:18

## 2017-01-01 RX ADMIN — DORZOLAMIDE HYDROCHLORIDE AND TIMOLOL MALEATE 1 DROP: 20; 5 SOLUTION/ DROPS OPHTHALMIC at 10:26

## 2017-01-01 RX ADMIN — SENNOSIDES AND DOCUSATE SODIUM 1 TABLET: 8.6; 5 TABLET ORAL at 17:49

## 2017-01-01 RX ADMIN — SENNOSIDES AND DOCUSATE SODIUM 1 TABLET: 8.6; 5 TABLET ORAL at 08:54

## 2017-01-01 RX ADMIN — SENNOSIDES AND DOCUSATE SODIUM 1 TABLET: 8.6; 5 TABLET ORAL at 18:31

## 2017-01-01 RX ADMIN — DORZOLAMIDE HYDROCHLORIDE AND TIMOLOL MALEATE 1 DROP: 20; 5 SOLUTION/ DROPS OPHTHALMIC at 17:22

## 2017-01-01 RX ADMIN — HEPARIN SODIUM 5000 UNITS: 5000 INJECTION, SOLUTION INTRAVENOUS; SUBCUTANEOUS at 22:16

## 2017-01-01 RX ADMIN — IPRATROPIUM BROMIDE AND ALBUTEROL SULFATE 3 ML: 2.5; .5 SOLUTION RESPIRATORY (INHALATION) at 04:42

## 2017-01-01 RX ADMIN — PROPRANOLOL HYDROCHLORIDE 20 MG: 20 TABLET ORAL at 08:56

## 2017-01-01 RX ADMIN — MORPHINE SULFATE 1 MG: 2 INJECTION, SOLUTION INTRAMUSCULAR; INTRAVENOUS at 22:30

## 2017-01-01 RX ADMIN — LIDOCAINE 1 PATCH: 50 PATCH CUTANEOUS at 08:05

## 2017-01-01 RX ADMIN — PROPRANOLOL HYDROCHLORIDE 20 MG: 20 TABLET ORAL at 20:21

## 2017-01-01 RX ADMIN — Medication 1 TABLET: at 10:20

## 2017-01-01 RX ADMIN — ACETAMINOPHEN 1000 MG: 500 TABLET ORAL at 06:39

## 2017-01-01 RX ADMIN — Medication: at 12:40

## 2017-01-01 RX ADMIN — PROMETHAZINE HYDROCHLORIDE AND CODEINE PHOSPHATE 2.5 ML: 6.25; 1 SYRUP ORAL at 21:37

## 2017-01-01 RX ADMIN — IPRATROPIUM BROMIDE AND ALBUTEROL SULFATE 3 ML: 2.5; .5 SOLUTION RESPIRATORY (INHALATION) at 15:29

## 2017-01-01 RX ADMIN — PROPRANOLOL HYDROCHLORIDE 20 MG: 20 TABLET ORAL at 08:29

## 2017-01-01 RX ADMIN — TAZOBACTAM SODIUM AND PIPERACILLIN SODIUM 3.38 G: 375; 3 INJECTION, SOLUTION INTRAVENOUS at 17:55

## 2017-01-01 RX ADMIN — PROPRANOLOL HYDROCHLORIDE 20 MG: 20 TABLET ORAL at 20:06

## 2017-01-01 RX ADMIN — I-VITE, TAB 1000-60-2MG (60/BT) 1 TABLET: TAB at 17:41

## 2017-01-01 RX ADMIN — CELECOXIB 200 MG: 200 CAPSULE ORAL at 08:11

## 2017-01-01 RX ADMIN — ENOXAPARIN SODIUM 40 MG: 40 INJECTION SUBCUTANEOUS at 08:42

## 2017-01-01 RX ADMIN — FAMOTIDINE 20 MG: 20 TABLET ORAL at 10:20

## 2017-01-01 RX ADMIN — HEPARIN SODIUM 5000 UNITS: 5000 INJECTION, SOLUTION INTRAVENOUS; SUBCUTANEOUS at 21:26

## 2017-01-01 RX ADMIN — I-VITE, TAB 1000-60-2MG (60/BT) 1 TABLET: TAB at 08:49

## 2017-01-01 RX ADMIN — CELECOXIB 200 MG: 200 CAPSULE ORAL at 08:49

## 2017-01-01 RX ADMIN — DORZOLAMIDE HYDROCHLORIDE AND TIMOLOL MALEATE 1 DROP: 20; 5 SOLUTION/ DROPS OPHTHALMIC at 08:57

## 2017-01-01 RX ADMIN — FUROSEMIDE 20 MG: 20 TABLET ORAL at 08:49

## 2017-01-01 RX ADMIN — IPRATROPIUM BROMIDE AND ALBUTEROL SULFATE 3 ML: 2.5; .5 SOLUTION RESPIRATORY (INHALATION) at 10:56

## 2017-01-01 RX ADMIN — PROPRANOLOL HYDROCHLORIDE 20 MG: 20 TABLET ORAL at 08:35

## 2017-01-01 RX ADMIN — BIMATOPROST 1 DROP: 0.1 SOLUTION/ DROPS OPHTHALMIC at 20:24

## 2017-01-01 RX ADMIN — DORZOLAMIDE HYDROCHLORIDE AND TIMOLOL MALEATE 1 DROP: 20; 5 SOLUTION/ DROPS OPHTHALMIC at 17:56

## 2017-01-01 RX ADMIN — PROPRANOLOL HYDROCHLORIDE 20 MG: 20 TABLET ORAL at 21:19

## 2017-01-01 RX ADMIN — FUROSEMIDE 80 MG: 10 INJECTION, SOLUTION INTRAMUSCULAR; INTRAVENOUS at 17:27

## 2017-01-01 RX ADMIN — DEXAMETHASONE SODIUM PHOSPHATE 4 MG: 4 INJECTION, SOLUTION INTRAMUSCULAR; INTRAVENOUS at 06:38

## 2017-01-01 RX ADMIN — GUAIFENESIN AND DEXTROMETHORPHAN 10 ML: 100; 10 SYRUP ORAL at 06:31

## 2017-01-01 RX ADMIN — CELECOXIB 200 MG: 200 CAPSULE ORAL at 08:29

## 2017-01-01 RX ADMIN — PHENYTOIN SODIUM 100 MG: 100 CAPSULE, EXTENDED RELEASE ORAL at 21:42

## 2017-01-01 RX ADMIN — ASPIRIN 81 MG 81 MG: 81 TABLET ORAL at 08:17

## 2017-01-01 RX ADMIN — ALBUTEROL SULFATE 2.5 MG: 2.5 SOLUTION RESPIRATORY (INHALATION) at 12:36

## 2017-01-01 RX ADMIN — DORZOLAMIDE HYDROCHLORIDE AND TIMOLOL MALEATE 1 DROP: 20; 5 SOLUTION/ DROPS OPHTHALMIC at 08:22

## 2017-01-01 RX ADMIN — Medication 10 ML: at 21:04

## 2017-01-01 RX ADMIN — IPRATROPIUM BROMIDE AND ALBUTEROL SULFATE 3 ML: 2.5; .5 SOLUTION RESPIRATORY (INHALATION) at 04:11

## 2017-01-01 RX ADMIN — PHENYTOIN SODIUM 100 MG: 100 CAPSULE, EXTENDED RELEASE ORAL at 21:41

## 2017-01-01 RX ADMIN — FERROUS SULFATE TAB EC 324 MG (65 MG FE EQUIVALENT) 324 MG: 324 (65 FE) TABLET DELAYED RESPONSE at 08:28

## 2017-01-01 RX ADMIN — PHENYTOIN SODIUM 100 MG: 100 CAPSULE, EXTENDED RELEASE ORAL at 20:53

## 2017-01-01 RX ADMIN — SENNOSIDES AND DOCUSATE SODIUM 1 TABLET: 8.6; 5 TABLET ORAL at 08:46

## 2017-01-01 RX ADMIN — ACETAMINOPHEN 650 MG: 325 TABLET ORAL at 15:19

## 2017-01-01 RX ADMIN — IPRATROPIUM BROMIDE AND ALBUTEROL SULFATE 3 ML: 2.5; .5 SOLUTION RESPIRATORY (INHALATION) at 07:44

## 2017-01-01 RX ADMIN — PANTOPRAZOLE SODIUM 8 MG/HR: 40 INJECTION, POWDER, FOR SOLUTION INTRAVENOUS at 16:58

## 2017-01-01 RX ADMIN — HYDROCODONE BITARTRATE AND ACETAMINOPHEN 1 TABLET: 5; 325 TABLET ORAL at 17:53

## 2017-01-01 RX ADMIN — PANTOPRAZOLE SODIUM 8 MG/HR: 40 INJECTION, POWDER, FOR SOLUTION INTRAVENOUS at 21:26

## 2017-01-01 RX ADMIN — SENNOSIDES AND DOCUSATE SODIUM 1 TABLET: 8.6; 5 TABLET ORAL at 08:11

## 2017-01-01 RX ADMIN — LEVOTHYROXINE SODIUM 50 MCG: 50 TABLET ORAL at 05:30

## 2017-01-01 RX ADMIN — IPRATROPIUM BROMIDE AND ALBUTEROL SULFATE 3 ML: 2.5; .5 SOLUTION RESPIRATORY (INHALATION) at 09:04

## 2017-01-01 RX ADMIN — PROPRANOLOL HYDROCHLORIDE 20 MG: 20 TABLET ORAL at 03:00

## 2017-01-01 RX ADMIN — HEPARIN SODIUM 5000 UNITS: 5000 INJECTION, SOLUTION INTRAVENOUS; SUBCUTANEOUS at 21:08

## 2017-01-01 RX ADMIN — SENNOSIDES AND DOCUSATE SODIUM 1 TABLET: 8.6; 5 TABLET ORAL at 09:14

## 2017-01-01 RX ADMIN — BIMATOPROST 1 DROP: 0.1 SOLUTION/ DROPS OPHTHALMIC at 21:00

## 2017-01-01 RX ADMIN — BIMATOPROST 1 DROP: 0.1 SOLUTION/ DROPS OPHTHALMIC at 22:02

## 2017-01-01 RX ADMIN — MORPHINE SULFATE 4 MG: 4 INJECTION, SOLUTION INTRAMUSCULAR; INTRAVENOUS at 21:33

## 2017-01-01 RX ADMIN — ACETAMINOPHEN 1000 MG: 500 TABLET ORAL at 12:45

## 2017-01-01 RX ADMIN — ACETAMINOPHEN 1000 MG: 500 TABLET ORAL at 18:44

## 2017-01-01 RX ADMIN — IPRATROPIUM BROMIDE AND ALBUTEROL SULFATE 3 ML: 2.5; .5 SOLUTION RESPIRATORY (INHALATION) at 11:02

## 2017-01-01 RX ADMIN — LEVOTHYROXINE SODIUM 50 MCG: 50 TABLET ORAL at 06:26

## 2017-01-01 RX ADMIN — PANTOPRAZOLE SODIUM 40 MG: 40 TABLET, DELAYED RELEASE ORAL at 06:18

## 2017-01-01 RX ADMIN — NYSTATIN: 100000 POWDER TOPICAL at 08:29

## 2017-01-01 RX ADMIN — DORZOLAMIDE HYDROCHLORIDE AND TIMOLOL MALEATE 1 DROP: 20; 5 SOLUTION/ DROPS OPHTHALMIC at 21:42

## 2017-01-01 RX ADMIN — FAMOTIDINE 20 MG: 20 TABLET ORAL at 21:26

## 2017-01-01 RX ADMIN — I-VITE, TAB 1000-60-2MG (60/BT) 1 TABLET: TAB at 18:41

## 2017-01-01 RX ADMIN — FUROSEMIDE 20 MG: 20 TABLET ORAL at 08:35

## 2017-01-01 RX ADMIN — PHENYTOIN SODIUM 100 MG: 100 CAPSULE, EXTENDED RELEASE ORAL at 21:44

## 2017-01-01 RX ADMIN — IPRATROPIUM BROMIDE AND ALBUTEROL SULFATE 3 ML: 2.5; .5 SOLUTION RESPIRATORY (INHALATION) at 18:36

## 2017-01-01 RX ADMIN — I-VITE, TAB 1000-60-2MG (60/BT) 1 TABLET: TAB at 08:41

## 2017-01-01 RX ADMIN — LEVOTHYROXINE SODIUM 100 MCG: 100 TABLET ORAL at 08:30

## 2017-01-01 RX ADMIN — IOPAMIDOL 57 ML: 612 INJECTION, SOLUTION INTRAVENOUS at 19:18

## 2017-01-01 RX ADMIN — ALPRAZOLAM 0.25 MG: 0.25 TABLET ORAL at 21:41

## 2017-01-01 RX ADMIN — PROPRANOLOL HYDROCHLORIDE 20 MG: 20 TABLET ORAL at 21:07

## 2017-01-01 RX ADMIN — I-VITE, TAB 1000-60-2MG (60/BT) 1 TABLET: TAB at 17:52

## 2017-01-01 RX ADMIN — PHENYTOIN SODIUM 100 MG: 100 CAPSULE, EXTENDED RELEASE ORAL at 20:21

## 2017-01-01 RX ADMIN — DEXAMETHASONE SODIUM PHOSPHATE 4 MG: 4 INJECTION, SOLUTION INTRAMUSCULAR; INTRAVENOUS at 12:42

## 2017-01-01 RX ADMIN — Medication 10 ML: at 11:01

## 2017-01-01 RX ADMIN — FERROUS SULFATE TAB EC 324 MG (65 MG FE EQUIVALENT) 324 MG: 324 (65 FE) TABLET DELAYED RESPONSE at 21:44

## 2017-01-01 RX ADMIN — PRENATAL VIT W/ FE FUMARATE-FA TAB 27-0.8 MG 1 TABLET: 27-0.8 TAB at 10:11

## 2017-01-01 RX ADMIN — VANCOMYCIN HYDROCHLORIDE 1000 MG: 1 INJECTION, POWDER, LYOPHILIZED, FOR SOLUTION INTRAVENOUS at 15:01

## 2017-01-01 RX ADMIN — FERROUS SULFATE TAB EC 324 MG (65 MG FE EQUIVALENT) 324 MG: 324 (65 FE) TABLET DELAYED RESPONSE at 08:21

## 2017-01-01 RX ADMIN — NYSTATIN: 100000 POWDER TOPICAL at 10:25

## 2017-01-01 RX ADMIN — ACETAMINOPHEN 1000 MG: 500 TABLET ORAL at 17:41

## 2017-01-01 RX ADMIN — FUROSEMIDE 40 MG: 10 INJECTION, SOLUTION INTRAMUSCULAR; INTRAVENOUS at 10:06

## 2017-01-01 RX ADMIN — TAZOBACTAM SODIUM AND PIPERACILLIN SODIUM 3.38 G: 375; 3 INJECTION, SOLUTION INTRAVENOUS at 05:48

## 2017-01-01 RX ADMIN — ACETAMINOPHEN 1000 MG: 500 TABLET ORAL at 20:58

## 2017-01-01 RX ADMIN — DEXAMETHASONE SODIUM PHOSPHATE 4 MG: 4 INJECTION, SOLUTION INTRAMUSCULAR; INTRAVENOUS at 14:21

## 2017-01-01 RX ADMIN — DEXAMETHASONE SODIUM PHOSPHATE 4 MG: 4 INJECTION, SOLUTION INTRAMUSCULAR; INTRAVENOUS at 01:16

## 2017-01-01 RX ADMIN — DORZOLAMIDE HYDROCHLORIDE AND TIMOLOL MALEATE 1 DROP: 20; 5 SOLUTION/ DROPS OPHTHALMIC at 17:07

## 2017-01-01 RX ADMIN — IPRATROPIUM BROMIDE AND ALBUTEROL SULFATE 3 ML: 2.5; .5 SOLUTION RESPIRATORY (INHALATION) at 02:59

## 2017-01-01 RX ADMIN — PROPRANOLOL HYDROCHLORIDE 20 MG: 20 TABLET ORAL at 20:26

## 2017-01-01 RX ADMIN — ACETAMINOPHEN AND CODEINE PHOSPHATE 1 TABLET: 300; 30 TABLET ORAL at 11:55

## 2017-01-01 RX ADMIN — ALBUTEROL SULFATE 2.5 MG: 2.5 SOLUTION RESPIRATORY (INHALATION) at 06:25

## 2017-01-01 RX ADMIN — BIMATOPROST 1 DROP: 0.1 SOLUTION/ DROPS OPHTHALMIC at 20:12

## 2017-01-01 RX ADMIN — LEVOTHYROXINE SODIUM 100 MCG: 100 TABLET ORAL at 08:54

## 2017-01-01 RX ADMIN — SENNOSIDES AND DOCUSATE SODIUM 1 TABLET: 8.6; 5 TABLET ORAL at 10:25

## 2017-01-01 RX ADMIN — DORZOLAMIDE HYDROCHLORIDE AND TIMOLOL MALEATE 1 DROP: 20; 5 SOLUTION/ DROPS OPHTHALMIC at 09:36

## 2017-01-01 RX ADMIN — PROPRANOLOL HYDROCHLORIDE 20 MG: 20 TABLET ORAL at 21:02

## 2017-01-01 RX ADMIN — GUAIFENESIN AND DEXTROMETHORPHAN 10 ML: 100; 10 SYRUP ORAL at 00:36

## 2017-01-01 RX ADMIN — SENNOSIDES AND DOCUSATE SODIUM 1 TABLET: 8.6; 5 TABLET ORAL at 08:21

## 2017-01-01 RX ADMIN — IPRATROPIUM BROMIDE AND ALBUTEROL SULFATE 3 ML: 2.5; .5 SOLUTION RESPIRATORY (INHALATION) at 07:13

## 2017-01-01 RX ADMIN — FERROUS SULFATE TAB EC 324 MG (65 MG FE EQUIVALENT) 324 MG: 324 (65 FE) TABLET DELAYED RESPONSE at 08:30

## 2017-01-01 RX ADMIN — SENNOSIDES AND DOCUSATE SODIUM 1 TABLET: 8.6; 5 TABLET ORAL at 18:01

## 2017-01-01 RX ADMIN — PROPRANOLOL HYDROCHLORIDE 20 MG: 20 TABLET ORAL at 08:07

## 2017-01-01 RX ADMIN — IPRATROPIUM BROMIDE AND ALBUTEROL SULFATE 3 ML: 2.5; .5 SOLUTION RESPIRATORY (INHALATION) at 16:41

## 2017-01-01 RX ADMIN — I-VITE, TAB 1000-60-2MG (60/BT) 1 TABLET: TAB at 18:31

## 2017-01-01 RX ADMIN — I-VITE, TAB 1000-60-2MG (60/BT) 1 TABLET: TAB at 18:01

## 2017-01-01 RX ADMIN — Medication 5 ML: at 20:12

## 2017-01-01 RX ADMIN — PHENYTOIN SODIUM 100 MG: 100 CAPSULE, EXTENDED RELEASE ORAL at 20:26

## 2017-01-01 RX ADMIN — LEVOTHYROXINE SODIUM 75 MCG: 75 TABLET ORAL at 06:31

## 2017-01-01 RX ADMIN — POTASSIUM CHLORIDE 20 MEQ: 750 CAPSULE, EXTENDED RELEASE ORAL at 10:04

## 2017-01-01 RX ADMIN — CELECOXIB 200 MG: 200 CAPSULE ORAL at 09:00

## 2017-01-01 RX ADMIN — CELECOXIB 200 MG: 200 CAPSULE ORAL at 08:07

## 2017-01-01 RX ADMIN — ATROPINE SULFATE 2 DROP: 10 SOLUTION/ DROPS OPHTHALMIC at 14:05

## 2017-01-01 RX ADMIN — FUROSEMIDE 20 MG: 20 TABLET ORAL at 09:21

## 2017-01-01 RX ADMIN — ACETAMINOPHEN 1000 MG: 500 TABLET ORAL at 03:54

## 2017-01-01 RX ADMIN — LEVOTHYROXINE SODIUM 75 MCG: 75 TABLET ORAL at 06:01

## 2017-01-01 RX ADMIN — IPRATROPIUM BROMIDE AND ALBUTEROL SULFATE 3 ML: 2.5; .5 SOLUTION RESPIRATORY (INHALATION) at 14:47

## 2017-01-01 RX ADMIN — FUROSEMIDE 40 MG: 10 INJECTION, SOLUTION INTRAMUSCULAR; INTRAVENOUS at 20:00

## 2017-01-01 RX ADMIN — FERROUS SULFATE TAB EC 324 MG (65 MG FE EQUIVALENT) 324 MG: 324 (65 FE) TABLET DELAYED RESPONSE at 08:47

## 2017-01-01 RX ADMIN — ASCORBIC ACID TAB 250 MG 250 MG: 250 TAB at 08:50

## 2017-01-01 RX ADMIN — ACETAMINOPHEN 325 MG: 325 SUPPOSITORY RECTAL at 04:00

## 2017-01-01 RX ADMIN — ACETAMINOPHEN 1000 MG: 500 TABLET ORAL at 12:02

## 2017-01-01 RX ADMIN — ALBUTEROL SULFATE 2.5 MG: 2.5 SOLUTION RESPIRATORY (INHALATION) at 01:15

## 2017-01-01 RX ADMIN — HYDROCODONE BITARTRATE AND ACETAMINOPHEN 1 TABLET: 5; 325 TABLET ORAL at 00:07

## 2017-01-01 RX ADMIN — ALPRAZOLAM 0.25 MG: 0.25 TABLET ORAL at 21:13

## 2017-01-01 RX ADMIN — I-VITE, TAB 1000-60-2MG (60/BT) 1 TABLET: TAB at 18:08

## 2017-01-01 RX ADMIN — PROPRANOLOL HYDROCHLORIDE 20 MG: 20 TABLET ORAL at 20:20

## 2017-01-01 RX ADMIN — FAMOTIDINE 20 MG: 20 TABLET ORAL at 09:14

## 2017-01-01 RX ADMIN — PANTOPRAZOLE SODIUM 8 MG/HR: 40 INJECTION, POWDER, FOR SOLUTION INTRAVENOUS at 22:36

## 2017-01-01 RX ADMIN — PROPRANOLOL HYDROCHLORIDE 20 MG: 20 TABLET ORAL at 08:22

## 2017-01-01 RX ADMIN — ENOXAPARIN SODIUM 40 MG: 40 INJECTION SUBCUTANEOUS at 08:21

## 2017-01-01 RX ADMIN — ACETAMINOPHEN 1000 MG: 500 TABLET ORAL at 18:30

## 2017-01-01 RX ADMIN — IPRATROPIUM BROMIDE AND ALBUTEROL SULFATE 3 ML: 2.5; .5 SOLUTION RESPIRATORY (INHALATION) at 21:21

## 2017-01-01 RX ADMIN — ACETAMINOPHEN AND CODEINE PHOSPHATE 1 TABLET: 300; 30 TABLET ORAL at 02:28

## 2017-01-01 RX ADMIN — PROPRANOLOL HYDROCHLORIDE 20 MG: 20 TABLET ORAL at 08:40

## 2017-01-01 RX ADMIN — ONDANSETRON 4 MG: 4 TABLET, ORALLY DISINTEGRATING ORAL at 07:10

## 2017-01-01 RX ADMIN — FLUTICASONE PROPIONATE 2 SPRAY: 50 SPRAY, METERED NASAL at 08:58

## 2017-01-01 RX ADMIN — ACETAMINOPHEN 1000 MG: 500 TABLET ORAL at 21:03

## 2017-01-01 RX ADMIN — ACETAMINOPHEN 1000 MG: 500 TABLET ORAL at 20:39

## 2017-01-01 RX ADMIN — I-VITE, TAB 1000-60-2MG (60/BT) 1 TABLET: TAB at 22:19

## 2017-01-01 RX ADMIN — ALBUTEROL SULFATE 2.5 MG: 2.5 SOLUTION RESPIRATORY (INHALATION) at 06:39

## 2017-01-01 RX ADMIN — DORZOLAMIDE HYDROCHLORIDE AND TIMOLOL MALEATE 1 DROP: 20; 5 SOLUTION/ DROPS OPHTHALMIC at 08:48

## 2017-01-01 RX ADMIN — ALPRAZOLAM 0.25 MG: 0.25 TABLET ORAL at 20:39

## 2017-01-01 RX ADMIN — SODIUM CHLORIDE 75 ML/HR: 900 INJECTION, SOLUTION INTRAVENOUS at 14:54

## 2017-01-01 RX ADMIN — ACETAMINOPHEN 650 MG: 325 TABLET ORAL at 10:38

## 2017-01-01 RX ADMIN — FERROUS SULFATE TAB EC 324 MG (65 MG FE EQUIVALENT) 324 MG: 324 (65 FE) TABLET DELAYED RESPONSE at 09:14

## 2017-01-01 RX ADMIN — FUROSEMIDE 20 MG: 20 TABLET ORAL at 09:11

## 2017-01-01 RX ADMIN — PROPRANOLOL HYDROCHLORIDE 20 MG: 20 TABLET ORAL at 09:21

## 2017-01-01 RX ADMIN — DORZOLAMIDE HYDROCHLORIDE AND TIMOLOL MALEATE 1 DROP: 20; 5 SOLUTION/ DROPS OPHTHALMIC at 08:49

## 2017-01-01 RX ADMIN — FAMOTIDINE 20 MG: 20 TABLET ORAL at 18:05

## 2017-01-01 RX ADMIN — DORZOLAMIDE HYDROCHLORIDE AND TIMOLOL MALEATE 1 DROP: 20; 5 SOLUTION/ DROPS OPHTHALMIC at 21:24

## 2017-01-01 RX ADMIN — ACETAMINOPHEN 1000 MG: 500 TABLET ORAL at 12:11

## 2017-01-01 RX ADMIN — PROPRANOLOL HYDROCHLORIDE 20 MG: 20 TABLET ORAL at 21:16

## 2017-01-01 RX ADMIN — PANTOPRAZOLE SODIUM 8 MG/HR: 40 INJECTION, POWDER, FOR SOLUTION INTRAVENOUS at 09:04

## 2017-01-01 RX ADMIN — DORZOLAMIDE HYDROCHLORIDE AND TIMOLOL MALEATE 1 DROP: 20; 5 SOLUTION/ DROPS OPHTHALMIC at 08:51

## 2017-01-01 RX ADMIN — PANTOPRAZOLE SODIUM 40 MG: 40 TABLET, DELAYED RELEASE ORAL at 05:56

## 2017-01-01 RX ADMIN — FLUTICASONE PROPIONATE 2 SPRAY: 50 SPRAY, METERED NASAL at 10:21

## 2017-01-01 RX ADMIN — SENNOSIDES AND DOCUSATE SODIUM 1 TABLET: 8.6; 5 TABLET ORAL at 08:22

## 2017-01-01 RX ADMIN — ALBUTEROL SULFATE 2.5 MG: 2.5 SOLUTION RESPIRATORY (INHALATION) at 19:39

## 2017-01-01 RX ADMIN — LEVOFLOXACIN 750 MG: 5 INJECTION, SOLUTION INTRAVENOUS at 14:54

## 2017-01-01 RX ADMIN — IPRATROPIUM BROMIDE AND ALBUTEROL SULFATE 3 ML: 2.5; .5 SOLUTION RESPIRATORY (INHALATION) at 19:31

## 2017-01-01 RX ADMIN — IPRATROPIUM BROMIDE AND ALBUTEROL SULFATE 3 ML: 2.5; .5 SOLUTION RESPIRATORY (INHALATION) at 10:02

## 2017-01-01 RX ADMIN — Medication 1 TABLET: at 08:11

## 2017-01-01 RX ADMIN — LEVOTHYROXINE SODIUM 50 MCG: 50 TABLET ORAL at 06:13

## 2017-01-01 RX ADMIN — ASPIRIN 325 MG: 325 TABLET, DELAYED RELEASE ORAL at 09:21

## 2017-01-01 RX ADMIN — FUROSEMIDE 20 MG: 20 TABLET ORAL at 08:50

## 2017-01-01 RX ADMIN — SENNOSIDES AND DOCUSATE SODIUM 1 TABLET: 8.6; 5 TABLET ORAL at 18:08

## 2017-01-01 RX ADMIN — MORPHINE SULFATE 4 MG: 4 INJECTION, SOLUTION INTRAMUSCULAR; INTRAVENOUS at 02:29

## 2017-01-01 RX ADMIN — PANTOPRAZOLE SODIUM 40 MG: 40 TABLET, DELAYED RELEASE ORAL at 05:30

## 2017-01-01 RX ADMIN — ACETAMINOPHEN 1000 MG: 500 TABLET ORAL at 06:12

## 2017-01-01 RX ADMIN — HEPARIN SODIUM 5000 UNITS: 5000 INJECTION, SOLUTION INTRAVENOUS; SUBCUTANEOUS at 13:46

## 2017-01-01 RX ADMIN — BACLOFEN 10 MG: 10 TABLET ORAL at 17:18

## 2017-01-01 RX ADMIN — HEPARIN SODIUM 5000 UNITS: 5000 INJECTION, SOLUTION INTRAVENOUS; SUBCUTANEOUS at 05:59

## 2017-01-01 RX ADMIN — LEVOTHYROXINE SODIUM 50 MCG: 50 TABLET ORAL at 05:50

## 2017-01-01 RX ADMIN — PROPRANOLOL HYDROCHLORIDE 20 MG: 20 TABLET ORAL at 22:03

## 2017-01-01 RX ADMIN — SODIUM CHLORIDE, SODIUM GLUCONATE, SODIUM ACETATE, POTASSIUM CHLORIDE, AND MAGNESIUM CHLORIDE: 526; 502; 368; 37; 30 INJECTION, SOLUTION INTRAVENOUS at 11:00

## 2017-01-01 RX ADMIN — CELECOXIB 200 MG: 200 CAPSULE ORAL at 08:21

## 2017-01-01 RX ADMIN — IPRATROPIUM BROMIDE AND ALBUTEROL SULFATE 3 ML: 2.5; .5 SOLUTION RESPIRATORY (INHALATION) at 20:12

## 2017-01-01 RX ADMIN — HEPARIN SODIUM 5000 UNITS: 5000 INJECTION, SOLUTION INTRAVENOUS; SUBCUTANEOUS at 16:05

## 2017-01-01 RX ADMIN — SENNOSIDES AND DOCUSATE SODIUM 1 TABLET: 8.6; 5 TABLET ORAL at 17:55

## 2017-01-01 RX ADMIN — Medication 10 ML: at 21:20

## 2017-01-01 RX ADMIN — PHENYTOIN SODIUM 100 MG: 100 CAPSULE, EXTENDED RELEASE ORAL at 20:40

## 2017-01-01 RX ADMIN — FUROSEMIDE 20 MG: 10 INJECTION, SOLUTION INTRAVENOUS at 05:10

## 2017-01-01 RX ADMIN — ALPRAZOLAM 0.25 MG: 0.25 TABLET ORAL at 21:18

## 2017-01-01 RX ADMIN — PROPRANOLOL HYDROCHLORIDE 20 MG: 20 TABLET ORAL at 18:00

## 2017-01-01 RX ADMIN — IPRATROPIUM BROMIDE AND ALBUTEROL SULFATE 3 ML: 2.5; .5 SOLUTION RESPIRATORY (INHALATION) at 17:55

## 2017-01-01 RX ADMIN — DEXAMETHASONE SODIUM PHOSPHATE 4 MG: 4 INJECTION, SOLUTION INTRAMUSCULAR; INTRAVENOUS at 23:58

## 2017-01-01 RX ADMIN — SENNOSIDES AND DOCUSATE SODIUM 1 TABLET: 8.6; 5 TABLET ORAL at 21:40

## 2017-01-01 RX ADMIN — ASPIRIN 325 MG: 325 TABLET, DELAYED RELEASE ORAL at 08:35

## 2017-01-01 RX ADMIN — FUROSEMIDE 20 MG: 20 TABLET ORAL at 09:44

## 2017-01-01 RX ADMIN — IPRATROPIUM BROMIDE AND ALBUTEROL SULFATE 3 ML: 2.5; .5 SOLUTION RESPIRATORY (INHALATION) at 22:55

## 2017-01-01 RX ADMIN — FERROUS SULFATE TAB EC 324 MG (65 MG FE EQUIVALENT) 324 MG: 324 (65 FE) TABLET DELAYED RESPONSE at 09:21

## 2017-01-01 RX ADMIN — ASPIRIN 81 MG 81 MG: 81 TABLET ORAL at 10:05

## 2017-01-01 RX ADMIN — Medication 5 ML: at 11:15

## 2017-01-01 RX ADMIN — IPRATROPIUM BROMIDE AND ALBUTEROL SULFATE 3 ML: 2.5; .5 SOLUTION RESPIRATORY (INHALATION) at 23:58

## 2017-01-01 RX ADMIN — Medication 1 TABLET: at 08:29

## 2017-01-01 RX ADMIN — FERROUS SULFATE TAB EC 324 MG (65 MG FE EQUIVALENT) 324 MG: 324 (65 FE) TABLET DELAYED RESPONSE at 17:59

## 2017-01-01 RX ADMIN — PROPRANOLOL HYDROCHLORIDE 20 MG: 20 TABLET ORAL at 10:10

## 2017-01-01 RX ADMIN — ACETAMINOPHEN AND CODEINE PHOSPHATE 1 TABLET: 300; 30 TABLET ORAL at 22:35

## 2017-01-01 RX ADMIN — ALBUTEROL SULFATE 2.5 MG: 2.5 SOLUTION RESPIRATORY (INHALATION) at 18:56

## 2017-01-01 RX ADMIN — PROMETHAZINE HYDROCHLORIDE AND CODEINE PHOSPHATE 2.5 ML: 6.25; 1 SOLUTION ORAL at 22:08

## 2017-01-01 RX ADMIN — ACETAMINOPHEN 650 MG: 325 TABLET ORAL at 09:30

## 2017-01-01 RX ADMIN — PANTOPRAZOLE SODIUM 40 MG: 40 TABLET, DELAYED RELEASE ORAL at 06:26

## 2017-01-01 RX ADMIN — PROPRANOLOL HYDROCHLORIDE 20 MG: 20 TABLET ORAL at 21:29

## 2017-01-01 RX ADMIN — ASPIRIN 81 MG 81 MG: 81 TABLET ORAL at 08:46

## 2017-01-01 RX ADMIN — SENNOSIDES AND DOCUSATE SODIUM 1 TABLET: 8.6; 5 TABLET ORAL at 10:11

## 2017-01-01 RX ADMIN — PANTOPRAZOLE SODIUM 8 MG/HR: 40 INJECTION, POWDER, FOR SOLUTION INTRAVENOUS at 07:31

## 2017-01-01 RX ADMIN — PHENYTOIN SODIUM 100 MG: 100 CAPSULE, EXTENDED RELEASE ORAL at 20:23

## 2017-01-01 RX ADMIN — I-VITE, TAB 1000-60-2MG (60/BT) 1 TABLET: TAB at 17:59

## 2017-01-01 RX ADMIN — FUROSEMIDE 40 MG: 10 INJECTION, SOLUTION INTRAMUSCULAR; INTRAVENOUS at 20:26

## 2017-01-01 RX ADMIN — PHENYLEPHRINE HYDROCHLORIDE 100 MCG: 10 INJECTION INTRAVENOUS at 11:40

## 2017-01-01 RX ADMIN — ENOXAPARIN SODIUM 40 MG: 40 INJECTION SUBCUTANEOUS at 10:48

## 2017-01-01 RX ADMIN — ALPRAZOLAM 0.25 MG: 0.25 TABLET ORAL at 21:29

## 2017-01-01 RX ADMIN — TAZOBACTAM SODIUM AND PIPERACILLIN SODIUM 3.38 G: 375; 3 INJECTION, SOLUTION INTRAVENOUS at 11:30

## 2017-01-01 RX ADMIN — PHENYTOIN SODIUM 100 MG: 100 CAPSULE, EXTENDED RELEASE ORAL at 21:16

## 2017-01-01 RX ADMIN — HEPARIN SODIUM 5000 UNITS: 5000 INJECTION, SOLUTION INTRAVENOUS; SUBCUTANEOUS at 13:53

## 2017-03-21 PROBLEM — D50.0 IRON DEFICIENCY ANEMIA DUE TO CHRONIC BLOOD LOSS: Status: ACTIVE | Noted: 2017-01-01

## 2017-05-20 PROBLEM — S72.145A CLOSED NONDISPLACED INTERTROCHANTERIC FRACTURE OF LEFT FEMUR (HCC): Status: ACTIVE | Noted: 2017-01-01

## 2017-05-22 PROBLEM — G14 POST-POLIO SYNDROME: Status: ACTIVE | Noted: 2017-01-01

## 2017-05-22 PROBLEM — G40.909 SEIZURE DISORDER (HCC): Status: ACTIVE | Noted: 2017-01-01

## 2017-05-22 PROBLEM — J41.0 SIMPLE CHRONIC BRONCHITIS (HCC): Status: ACTIVE | Noted: 2017-01-01

## 2017-05-24 PROBLEM — S72.145A CLOSED NONDISPLACED INTERTROCHANTERIC FRACTURE OF LEFT FEMUR (HCC): Status: RESOLVED | Noted: 2017-01-01 | Resolved: 2017-01-01

## 2017-05-25 PROBLEM — G93.41 METABOLIC ENCEPHALOPATHY: Status: ACTIVE | Noted: 2017-01-01

## 2017-05-25 PROBLEM — Z51.89 ENCOUNTER FOR REHABILITATION: Status: ACTIVE | Noted: 2017-01-01

## 2017-05-25 PROBLEM — J44.9 COPD (CHRONIC OBSTRUCTIVE PULMONARY DISEASE) (HCC): Status: ACTIVE | Noted: 2017-01-01

## 2017-05-25 PROBLEM — G40.909 SEIZURE DISORDER (HCC): Status: ACTIVE | Noted: 2017-01-01

## 2017-05-25 PROBLEM — G14 POST-POLIO SYNDROME: Status: ACTIVE | Noted: 2017-01-01

## 2017-05-25 PROBLEM — S72.002A CLOSED LEFT HIP FRACTURE (HCC): Status: ACTIVE | Noted: 2017-01-01

## 2017-05-25 PROBLEM — D50.9 IRON DEFICIENCY ANEMIA: Status: ACTIVE | Noted: 2017-01-01

## 2017-06-01 NOTE — PLAN OF CARE
Problem: Patient Care Overview (Adult)  Goal: Plan of Care Review  Outcome: Ongoing (interventions implemented as appropriate)    06/01/17 1612   Coping/Psychosocial Response Interventions   Plan Of Care Reviewed With patient;daughter;caregiver   Patient Care Overview   Progress improving   Outcome Evaluation   Outcome Summary/Follow up Plan family in room and pt demonstrated her functional mobility that has improved to min assist . pt pleased with her success. cont wiht mobility         Problem: Inpatient Physical Therapy  Goal: Bed Mobility Goal LTG- PT  Outcome: Ongoing (interventions implemented as appropriate)    05/25/17 1033 06/01/17 1612   Bed Mobility PT LTG   Bed Mobility PT LTG, Date Established 05/25/17 --    Bed Mobility PT LTG, Time to Achieve by discharge --    Bed Mobility PT LTG, Activity Type supine to sit/sit to supine --    Bed Mobility PT LTG, [place] Level minimum assist (75% patient effort) --    Bed Mobility PT Goal LTG, Assist Device bed rails --    Bed Mobility PT LTG, Date Goal Reviewed --  06/01/17   Bed Mobility PT LTG, Outcome --  goal ongoing       Goal: Transfer Training Goal 1 LTG- PT  Outcome: Outcome(s) achieved Date Met:  06/01/17 05/25/17 1033 06/01/17 1612   Transfer Training PT LTG   Transfer Training PT LTG, Date Established 05/25/17 --    Transfer Training PT LTG, Time to Achieve by discharge --    Transfer Training PT LTG, Activity Type bed to chair /chair to bed --    Transfer Training PT LTG, [place] Level minimum assist (75% patient effort) --    Transfer Training PT LTG, Assist Device (AAD) --    Transfer Training PT LTG, Additional Goal Maintaining TTWB LLE --    Transfer Training PT LTG, Date Goal Reviewed --  06/01/17   Transfer Training PT LTG, Outcome --  goal met

## 2017-06-01 NOTE — PLAN OF CARE
Problem: Patient Care Overview (Adult)  Goal: Plan of Care Review  Outcome: Ongoing (interventions implemented as appropriate)    06/01/17 0755 06/01/17 1015   Coping/Psychosocial Response Interventions   Plan Of Care Reviewed With --  patient   Patient Care Overview   Progress --  progress toward functional goals as expected   Outcome Evaluation   Outcome Summary/Follow up Plan Pt making slow progress toward OT goals, family very supportive and assist pt with all needs  --        Goal: Discharge Needs Assessment  Outcome: Ongoing (interventions implemented as appropriate)    05/24/17 1624 05/25/17 1033 05/25/17 1306   Discharge Needs Assessment   Concerns To Be Addressed --  --  --    Equipment Needed After Discharge --  wheelchair;walker, rolling;commode;shower chair;hospital bed --    Discharge Facility/Level Of Care Needs --  --  nursing facility, skilled   Current Discharge Risk --  --  dependent with mobility/activities of daily living   Current Health   Outpatient/Agency/Support Group Needs homecare agency (specify level of care) --  --    Self-Care   Equipment Currently Used at Home --  cane, straight --    Living Environment   Transportation Available --  family or friend will provide --      06/01/17 0531   Discharge Needs Assessment   Concerns To Be Addressed no discharge needs identified   Equipment Needed After Discharge --    Discharge Facility/Level Of Care Needs --    Current Discharge Risk --    Current Health   Outpatient/Agency/Support Group Needs --    Self-Care   Equipment Currently Used at Home --    Living Environment   Transportation Available --          Problem: Inpatient Occupational Therapy  Goal: Transfer Training Goal 1 LTG- OT  Outcome: Ongoing (interventions implemented as appropriate)    05/25/17 1306 05/31/17 1301 06/01/17 0755   Transfer Training OT LTG   Transfer Training OT LTG, Date Established 05/25/17 --  --    Transfer Training OT LTG, Time to Achieve by discharge --  --     Transfer Training OT LTG, Activity Type all transfers --  --    Transfer Training OT LTG, [place] Level contact guard assist --  --    Transfer Training OT LTG, Assist Device walker, rolling;commode, bedside --  --    Transfer Training OT LTG, Date Goal Reviewed --  --  06/01/17   Transfer Training OT LTG, Outcome --  goal ongoing --        Goal: Strength Goal LTG- OT  Outcome: Ongoing (interventions implemented as appropriate)    05/25/17 1306 05/31/17 1301 06/01/17 0755   Strength OT LTG   Strength Goal OT LTG, Date Established 05/25/17 --  --    Strength Goal OT LTG, Time to Achieve by discharge --  --    Strength Goal OT LTG, Measure to Achieve 1 set of 10 reps all planes with 1/2 to 1 lb wrist wts or dumbells to increase UE strength. --  --    Strength Goal OT LTG, Date Goal Reviewed --  --  06/01/17   Strength Goal OT LTG, Outcome --  goal ongoing --        Goal: Static Standing Balance Goal OT- STG  Outcome: Ongoing (interventions implemented as appropriate)    05/25/17 1306 05/31/17 1301 06/01/17 0755   Static Standing Balance OT STG   Static Standing Balance OT STG, Date Established 05/25/17 --  --    Static Standing Balance OT STG, Time to Achieve 2 wks --  --    Static Standing Balance OT STG, [place] Level contact guard assist  (5 minutes with functional activity and 0-1 rest break.) --  --    Static Standing Balance OT STG, Assist Device assistive device  (Rolling wakler.) --  --    Static Standing Balance OT STG, Date Goal Reviewed --  --  06/01/17   Static Standing Balance OT STG, Outcome --  goal ongoing --        Goal: ADL Goal LTG- OT  Outcome: Ongoing (interventions implemented as appropriate)    05/25/17 1306 05/31/17 1301 06/01/17 0755   ADL OT LTG   ADL OT LTG, Date Established --  --  06/01/17   ADL OT LTG, Time to Achieve by discharge --  --    ADL OT LTG, Activity Type ADL skills  (Sponge bath and dress.) --  --    ADL OT LTG, [place] Level contact guard;assistive  device  (Rolling walker and shower chair if needed.) --  --    ADL OT LTG, Date Goal Reviewed --  05/31/17 --    ADL OT LTG, Outcome --  goal ongoing --

## 2017-06-01 NOTE — PLAN OF CARE
Problem: Patient Care Overview (Adult)  Goal: Plan of Care Review  Outcome: Ongoing (interventions implemented as appropriate)    06/01/17 1015   Coping/Psychosocial Response Interventions   Plan Of Care Reviewed With patient   Patient Care Overview   Progress progress toward functional goals as expected   Outcome Evaluation   Outcome Summary/Follow up Plan pt is much more alert from inital eval. pt has met all goals and returned to baseline cognition. pt continues to need assist for physical needs. d/c skilled ST this date.          Problem: Inpatient SLP  Goal: Cognitive-linguistic-Patient will improve Cognitive-linguistic skills to improve safety and safety awareness in environment  Outcome: Outcome(s) achieved Date Met:  06/01/17 05/25/17 1542 05/26/17 1218 06/01/17 1015   Cognitive Linguistic- Improve Safety and Awareness   Cognitive Linguistic Improve Safety and Awareness- SLP, Date Established 05/25/17 --  --    Cognitive Linguistic Improve Safety and Awareness- SLP, Time to Achieve by discharge --  --    Cognitive Linguistic Improve Safety and Awareness- SLP, Activity Level --  Patient will improve awareness of basic personal information for increased safety in environment;Patient will improve orientation for increased safety in environment;Patient will improve memory skills for increased safety in environment;Patient will improve functional problem solving skills for increased safety in environment --    Cognitive Linguistic Improve Safety and Awareness- SLP, Date Goal Reviewed --  --  06/01/17   Cognitive Linguistic Improve Safety and Awareness- SLP, Outcome --  --  goal met

## 2017-06-01 NOTE — PROGRESS NOTES
LOS: 8 days   Patient Care Team:  Benito Kaur MD as PCP - General  Blake Oseguera MD as Consulting Physician (Hematology and Oncology)    Chief Complaint:  Fracture left  hip postoperative day 10        Interval History:     SUBJECTIVE:  Good spirits pain is well controlled slept poorly eating well  History taken from: patient chart family RN Discussed with therapy staff today as well    Objective     Vital Signs  Temp:  [96.9 °F (36.1 °C)-97.2 °F (36.2 °C)] 97.2 °F (36.2 °C)  Heart Rate:  [62-73] 69  Resp:  [16-18] 17  BP: (127-148)/(66-79) 146/66  Last 3 weights    05/28/17  0648 05/30/17  0517 05/31/17  0455   Weight: 121 lb 4 oz (55 kg) 121 lb 7 oz (55.1 kg) 124 lb (56.2 kg)         Physical Exam:     General Appearance:    Alert, cooperative, in no acute distress   Head:    Normocephalic, without obvious abnormality, atraumatic   Eyes:            Lids and lashes normal, conjunctivae and sclerae normal, no   icterus, no pallor, corneas clear, PERRLA   Throat:   No oral lesions, no thrush, oral mucosa moist   Neck:   No adenopathy, supple, trachea midline, no thyromegaly, no   carotid bruit, no JVD       Lungs:     Clear to auscultation,respirations regular, even and                  Unlabored     Heart:    Regular rhythm and normal rate, normal S1 and S2, no            murmur, no gallop, no rub, no click    Chest Wall:    No abnormalities observed   Abdomen:     Normal bowel sounds, no masses, no organomegaly, soft        non-tender, non-distended, no guarding, no rebound                Tenderness    Extremities:   Moves all extremities well, no edema, no cyanosis, no             Redness        Skin:   No bleeding, bruising or rash   Lymph nodes:   No palpable adenopathy   Neurologic:   Cranial nerves 2 - 12 grossly intact, sensation intact, DTR       present and equal bilaterally       O2 sat on room air 92%       RESULTS REVIEW:     Lab Results (last 24 hours)     ** No results found for the last 24  hours. **        Imaging Results (last 72 hours)     ** No results found for the last 72 hours. **          Assessment/Plan     Principal Problem:    Closed left hip fracture  Active Problems:    Iron deficiency anemia    Post-polio syndrome    Seizure disorder    Metabolic encephalopathy    COPD (chronic obstructive pulmonary disease)    Encounter for rehabilitation        She feels good is participating well with therapy  She is making progress but her ambulation is minimal because of weightbearing restrictions advanced age.  Ambulating up to 3 feet with a rolling walker  O2 sats much better off of oxygen O2 sat is 92% during therapy    Continue intensive therapy  Discussed with daughter today considering discharge plans     Tariq Aparicio MD  06/01/17  11:10 AM

## 2017-06-01 NOTE — THERAPY TREATMENT NOTE
Acute Care - Occupational Therapy Treatment Note  [facility]     Patient Name: Mariela Woodson  : 1925  MRN: [id number]  Today's Date: 2017  Onset of Illness/Injury or Date of Surgery Date: 17  Date of Referral to OT: 17  Referring Physician: Dr. Aparicio      Admit Date: 2017    Visit Dx:     ICD-10-CM ICD-9-CM   1. Impaired mobility and activities of daily living Z74.09 799.89   2. Abnormality of gait and mobility R26.9 781.2   3. Muscle weakness (generalized) M62.81 728.87     Patient Active Problem List   Diagnosis   • Iron deficiency anemia due to chronic blood loss   • Post-polio syndrome   • Simple chronic bronchitis   • Seizure disorder   • Closed left hip fracture   • Iron deficiency anemia   • Post-polio syndrome   • Seizure disorder   • Metabolic encephalopathy   • COPD (chronic obstructive pulmonary disease)   • Encounter for rehabilitation             Adult Rehabilitation Note       17 1005 17 0905 17 0755    Rehab Assessment/Intervention    Discipline physical therapy assistant  -RW speech language pathologist  -EC occupational therapy assistant  -    Document Type therapy note (daily note)  -RW therapy note (daily note);discharge summary  - therapy note (daily note)  -    Subjective Information agree to therapy  -RW no complaints;agree to therapy  -EC agree to therapy;complains of;pain;swelling  -    Patient Effort, Rehab Treatment good  -RW excellent  -EC adequate  -JH    Recorded by [RW] Clay Recio, RHODA [EC] Rema Grande CCC-SLP [] PATTI Ludwig/L    Vital Signs    Pre Systolic BP Rehab   144  -JH    Pre Treatment Diastolic BP   66  -JH    Pretreatment Heart Rate (beats/min)   66  -JH    Pre SpO2 (%)   92  -JH    O2 Delivery Pre Treatment   room air  -    Intra SpO2 (%)   90  -JH    O2 Delivery Intra Treatment   room air  -    Post SpO2 (%)   93  -JH    O2 Delivery Post Treatment   room air  -    Recorded by   []  PATTI Ludwig/PIPER    Pain Assessment    Pain Assessment No/denies pain  -RW No/denies pain  -EC 0-10  -JH    Pain Score   6  -JH    Post Pain Score   6  -JH    Pain Type   Surgical pain  -JH    Pain Location   Hip  -JH    Pain Orientation   Left  -    Pain Intervention(s)   Medication (See MAR);Repositioned  -    Recorded by [RW] Clay Recio PTA [EC] REJI Gutierrez [] PATTI Ludwig/L    Cognitive Assessment/Intervention    Current Cognitive/Communication Assessment functional  -RW  functional  -    Orientation Status oriented x 4  -RW  oriented to;person;place;required verbal cueing (specifiy in comments)   weight bearing, and safety  -    Follows Commands/Answers Questions 100% of the time  -RW  75% of the time  -    Personal Safety Interventions gait belt;nonskid shoes/slippers when out of bed  -RW      Recorded by [RW] Clay Recio PTA  [JH] PATTI Ludwig/L    Communication Treatment Objective and Progress    Cognitive Linguistic Treatment Objectives  Improve orientation;Improve awareness of basic personal information;Improve memory skills  -EC     Recorded by  [EC] REJI Gutierrez     Improve memory skills    Improve memory skills through:  listen to a paragraph and answer questions;80%;without cues  -EC     Status: Improve memory skills  Achieved  -EC     Memory Skills Progress  80%  -EC     Recorded by  [EC] REJI Gutierrez     Improve functional problem solving    Improve functional problem solving through:  tell similarity between items;sequence steps in a task;complete organization/home management task;80%;with inconsistent cues  -EC     Status: Improve functional problem solving  Achieved  -EC     Functional Problem Solving Progress  90%;with inconsistent cues  -EC     Recorded by  [EC] REJI Gutierrez     Mobility Assessment/Training    Left Lower Extremity Weight-Bearing touch down weight-bearing  -RW      Recorded by  [RW] Clay Recio PTA      Bed Mobility, Assessment/Treatment    Bed Mobility, Assistive Device --  -RW      Bed Mob, Sit to Supine, [place] --  -RW      Recorded by [RW] Clay Recio PTA      Transfer Assessment/Treatment    Transfers, Bed-Chair [place]   moderate assist (50% patient effort);1 person + 1 person to manage equipment  -JH    Transfers, Chair-Bed [place] --  -RW      Transfers, Bed-Chair-Bed, Assist Device --  -RW      Transfers, Sit-Stand [place] minimum assist (75% patient effort)  -RW  minimum assist (75% patient effort);moderate assist (50% patient effort);1 person + 1 person to manage equipment  -JH    Transfers, Stand-Sit [place] minimum assist (75% patient effort)  -RW  verbal cues required;minimum assist (75% patient effort)  -    Transfers, Sit-Stand-Sit, Assist Device rolling walker  -RW  elevated surface;rolling walker  -    Recorded by [RW] Clay Recio PTA  [] PATTI Ludwig/L    Gait Assessment/Treatment    Gait, [place] Level minimum assist (75% patient effort)  -RW      Gait, Assistive Device rolling walker  -      Gait, Distance (Feet) 6  -RW      Gait, Maintain Weight Bearing Status assist to maintain weight bearing status;cues to maintain weight bearing status  -RW      Recorded by [RW] Clay Recio PTA      Functional Mobility    Functional Mobility- Ind. Level   minimum assist (75% patient effort);moderate assist (50% patient effort);1 person + 1 person to manage equipment  -    Functional Mobility- Device   rolling walker  -    Functional Mobility-Maintain WBing   unable to maintain weight bearing status;cues to maintain weight bearing status  -    Recorded by   [] PATTI Ludwig/L    Wheelchair Training/Management    Wheelchair Propulsion Training   forward propulsion;backward propulsion;moving through doorways;carpet  -    Wheelchair, Distance Propelled   75  -JH    Recorded by   [JH] PATTI Ludwig/PIPER     Upper Body Bathing Assessment/Training    UB Bathing Assess/Train, Comment   Family/pt state they have taken care of her bath   -    Recorded by   [JH] PATTI Ludwig/L    Lower Body Dressing Assessment/Training     Dressing Assess/Train, Clothing Type   doffing:;donning:;shoes;socks  -    LB Dressing Assess/Train, [place]   dependent (less than 25% patient effort)  -    Recorded by   [JH] PATTI Ludwig/L    Balance Skills Training    Standing-Level of Assistance   Close supervision;Contact guard  -    Static Standing Balance Support   assistive device  -    Standing Balance # of Minutes   3min, VC's for WB into arms and RLE  -    Recorded by   [JH] PATTI Ludwig/L    Therapy Exercises    Left Lower Extremity AAROM:;10 reps;standing;hip flexion  -RW      Bilateral Lower Extremities AROM:;ankle pumps/circles;20 reps;sidelying;glut sets;hip abduction/adduction;LAQ;knee flexion   2 sets  -RW      Recorded by [RW] Clay Recio PTA      Positioning and Restraints    Pre-Treatment Position sitting in chair/recliner  -RW  in bed  -    Post Treatment Position wheelchair  -RW  wheelchair  -JH    In Wheelchair with family/caregiver  -RW  with SLP  -    Recorded by [RW] Clay Recio PTA  [] SAURAV Ludwig      05/31/17 1445 05/31/17 1340 05/31/17 1130    Rehab Assessment/Intervention    Discipline physical therapy assistant  -RW occupational therapy assistant  -LM occupational therapy assistant  -LM    Document Type therapy note (daily note)  -RW therapy note (daily note)  -LM therapy note (daily note)  -LM    Subjective Information agree to therapy  -RW agree to therapy  -LM agree to therapy  -LM    Patient Effort, Rehab Treatment good  -RW      Recorded by [RW] Clay Recio PTA [LM] PATTI Yeager/L [LM] FARHAN YeagerA/L    Pain Assessment    Pain Assessment No/denies pain  -RW      Pain Score  0  -LM 0  -LM    Post Pain Score   0  -LM     Recorded by [RW] Clay Recio PTA [LM] SAURAV Yeager [LM] SAURAV Yeager    Cognitive Assessment/Intervention    Current Cognitive/Communication Assessment functional  -RW      Orientation Status oriented x 4  -RW      Follows Commands/Answers Questions 100% of the time  -RW      Personal Safety Interventions gait belt;nonskid shoes/slippers when out of bed  -RW      Recorded by [RW] Clay Recio PTA      Mobility Assessment/Training    Left Lower Extremity Weight-Bearing touch down weight-bearing  -RW      Recorded by [RW] Clay Recio PTA      Bed Mobility, Assessment/Treatment    Bed Mobility, Assistive Device head of bed elevated  -RW      Bed Mob, Sit to Supine, [place] minimum assist (75% patient effort)  -RW      Recorded by [RW] Clay Recio PTA      Transfer Assessment/Treatment    Transfers, Chair-Bed [place] minimum assist (75% patient effort)  -RW      Transfers, Bed-Chair-Bed, Assist Device rolling walker  -RW      Transfers, Sit-Stand [place] minimum assist (75% patient effort)  -RW      Transfers, Stand-Sit [place] minimum assist (75% patient effort)  -RW      Transfers, Sit-Stand-Sit, Assist Device rolling walker  -RW      Recorded by [RW] Clay Recio PTA      Wheelchair Training/Management    Wheelchair, Distance Propelled  --   50  -LM 20-50  -LM    Recorded by  [LM] PATTI Yeager/L [LM] PATTI Yeager/L    Therapy Exercises    Bilateral Lower Extremities AROM:;ankle pumps/circles;AAROM:;20 reps;supine;heel slides;quad sets   2 sets  -RW      Bilateral Upper Extremity  AROM:;20 reps   level 1 tband all diana planes   -LM elbow flexion/extension;shoulder abduction/adduction;shoulder extension/flexion;shoulder horizontal abd/add;shoulder rolls/shrugs   UEE bike x 10 min also 2 lb wt   -LM    BUE Resistance  manual resistance- minimal   uee bike x 15 min   -LM manual resistance- minimal  -LM    Recorded by [RW] Clay Recio,  PTA [LM] Genet Hicks, ARMSTRONG/L [LM] Genet Hicks, ARMSTRONG/L    Positioning and Restraints    Pre-Treatment Position sitting in chair/recliner  -RW sitting in chair/recliner  -LM in bed  -LM    Post Treatment Position bed  -RW chair  -LM wheelchair  -LM    In Bed side rails up x2;legs elevated;heels elevated;with family/caregiver  -RW      Recorded by [RW] Clay Recio PTA [LM] Genet Hicks, ARMSTRONG/L [LM] Genet Hicks ARMSTRONG/L      05/31/17 1050 05/31/17 0950 05/31/17 0924    Rehab Assessment/Intervention    Discipline speech language pathologist  -EC physical therapy assistant  -RW --  -RW    Document Type therapy note (daily note)  -EC therapy note (daily note)  -RW --  -RW    Subjective Information agree to therapy  -EC agree to therapy  -RW --  -RW    Recorded by [EC] Rema Grande CCC-SLP [RW] Clay Recio PTA [RW] Clay Recio PTA    Pain Assessment    Pain Assessment No/denies pain  -EC No/denies pain  -RW     Recorded by [EC] Rema Grande CCC-SLP [RW] Clay Recio PTA     Communication Treatment Objective and Progress    Cognitive Linguistic Treatment Objectives Improve orientation;Improve awareness of basic personal information;Improve memory skills  -EC      Recorded by [EC] REJI Gutierrez      Improve memory skills    Improve memory skills through: listen to a paragraph and answer questions;80%;without cues  -EC      Status: Improve memory skills Progressing as expected  -EC      Memory Skills Progress continue to address  -EC      Recorded by [EC] GWEN GutierrezSLP      Improve functional problem solving    Improve functional problem solving through: tell similarity between items;sequence steps in a task;complete organization/home management task;80%;with inconsistent cues  -EC      Status: Improve functional problem solving Progressing as expected  -EC      Functional Problem Solving Progress 90%;with inconsistent cues  -EC      Recorded by  [EC] Rema Grande, CCC-SLP      Mobility Assessment/Training    Left Lower Extremity Weight-Bearing  touch down weight-bearing  -RW --  -RW    Recorded by  [RW] Clay Recio PTA [RW] Clay Recio PTA    Bed Mobility, Assessment/Treatment    Bed Mob, Sit to Supine, [place]   --  -RW    Recorded by   [RW] Clay Recio PTA    Transfer Assessment/Treatment    Transfers, Bed-Chair [place]  minimum assist (75% patient effort)  -RW --  -RW    Transfers, Chair-Bed [place]  minimum assist (75% patient effort)  -RW     Transfers, Bed-Chair-Bed, Assist Device  rolling walker  -RW --  -RW    Transfers, Sit-Stand [place]  minimum assist (75% patient effort)  -RW --  -RW    Transfers, Stand-Sit [place]  minimum assist (75% patient effort)  -RW --  -RW    Transfers, Sit-Stand-Sit, Assist Device  rolling walker  -RW --  -RW    Recorded by  [RW] Clay Recio PTA [RW] Clay Recio PTA    Gait Assessment/Treatment    Gait, [place] Level  minimum assist (75% patient effort)  -RW     Gait, Assistive Device  rolling walker  -RW     Gait, Distance (Feet)  3  -RW     Recorded by  [RW] Clay Recio PTA     Wheelchair Training/Management    Wheelchair, Distance Propelled  100  -RW     Wheelchair Training Comment  sba  -RW     Recorded by  [RW] Clay Recio PTA     Therapy Exercises    Right Lower Extremity   AROM:;20 reps;supine;heel slides;hip abduction/adduction  -RW    Left Lower Extremity   AAROM:;20 reps;supine;heel slides;hip abduction/adduction  -RW    Bilateral Lower Extremities  AROM:;sitting;ankle pumps/circles;glut sets;hip abduction/adduction;knee flexion;LAQ  -RW AROM:;20 reps;supine;ankle pumps/circles;glut sets;quad sets  -RW    Recorded by  [RW] Clay Recio PTA [RW] Clay Recio PTA    Positioning and Restraints    Pre-Treatment Position  sitting in chair/recliner  -RW     Post Treatment Position  wheelchair  -RW     In Wheelchair  with SLP  -RW     Recorded by   [RW] Clay Recio, RHODA       05/30/17 1525 05/30/17 1333 05/30/17 1045    Rehab Assessment/Intervention    Discipline physical therapy assistant  -JA occupational therapist  - occupational therapy assistant  -    Document Type therapy note (daily note)  - therapy note (daily note)  - therapy note (daily note)  -LM    Subjective Information agree to therapy;complains of  - agree to therapy;complains of;pain  - agree to therapy  -LM    Patient Effort, Rehab Treatment good  - good  - good  -LM    Precautions/Limitations fall precautions;oxygen therapy device and L/min;non-weight bearing status  - fall precautions;oxygen therapy device and L/min;non-weight bearing status   toe touch weight LLE, no BP on L  - fall precautions;oxygen therapy device and L/min;other (see comments)   ttwb  -LM    Recorded by [JA] Darci Harris PTA [] Katherine Pena OTR/PIPER [LM] Genet Hicks, ARMSTRONG/L    Vital Signs    Pretreatment Heart Rate (beats/min)  68  -BH     Posttreatment Heart Rate (beats/min)  74  -BH     Pre SpO2 (%)  95  -     O2 Delivery Pre Treatment  supplemental O2  -     Post SpO2 (%)  99  -     O2 Delivery Post Treatment  supplemental O2  -     Pre Patient Position  Sitting  -     Post Patient Position  Supine  -     Recorded by  [] Katherine Pena OTR/L     Pain Assessment    Pain Assessment No/denies pain  -  --  -LM    Pain Score  6  -BH 4  -LM    Post Pain Score  5  -BH 4  -LM    Pain Type   Surgical pain  -    Pain Location  Hip  - Hip  -    Pain Intervention(s) Medication (See MAR);Repositioned  - Medication (See MAR);Repositioned;Ambulation/increased activity   RN aware, applied pain patch  - Medication (See MAR)  -LM    Recorded by [RUIZ] Darci Harris PTA [] ARVIND Garcia/PIPER [LM] FARHAN YeagerA/L    Cognitive Assessment/Intervention    Current Cognitive/Communication Assessment  functional  - functional  -    Orientation Status   oriented x 4  - oriented x 4  -    Follows Commands/Answers Questions  75% of the time;able to follow single-step instructions;needs cueing;needs increased time;needs repetition  - able to follow multi-step instructions  -    Personal Safety  mild impairment  - WNL/WFL  -    Personal Safety Interventions  fall prevention program maintained;gait belt;muscle strengthening facilitated;nonskid shoes/slippers when out of bed;supervised activity  -     Recorded by  [] Katherine Pena, OTR/L [LM] Genet Hicks ARMSTRONG/L    Mobility Assessment/Training    Extremity Weight-Bearing Status left lower extremity  - left lower extremity  -     Left Lower Extremity Weight-Bearing touch down weight-bearing  - toe touch weight-bearing  -     Recorded by [] Darci Harris PTA [] Katherine Pena OTR/L     Bed Mobility, Assessment/Treatment    Bed Mob, Sit to Supine, [place] moderate assist (50% patient effort)  - moderate assist (50% patient effort)  -     Bed Mobility, Comment  Pt sit to sup with bed flat with max assist with LB then min assist with trunk, pt used BUE to pull on handrails to reposition her hips.   -     Recorded by [JA] Darci Harris PTA [] Katherine Pena OTR/L     Transfer Assessment/Treatment    Transfers, Bed-Chair [place] moderate assist (50% patient effort)  -      Transfers, Chair-Bed [place]  moderate assist (50% patient effort);verbal cues required  -     Transfers, Bed-Chair-Bed, Assist Device rolling walker  - rolling walker  -     Transfers, Sit-Stand [place] minimum assist (75% patient effort)  - moderate assist (50% patient effort)  -     Transfers, Stand-Sit [place] minimum assist (75% patient effort)  - minimum assist (75% patient effort);moderate assist (50% patient effort)  -     Transfers, Sit-Stand-Sit, Assist Device rolling walker  -      Toilet Transfer, [place]  minimum assist (75% patient  effort);moderate assist (50% patient effort);2 person assist required  -     Toilet Transfer, Assistive Device  --   grab bars, 2 people for safety  -     Bathtub Transfer, [place]   minimum assist (75% patient effort)  -    Transfer, Maintain Weight Bearing Status cues to maintain weight bearing status;assist to maintain weight bearing status;unable to maintain weight bearing status  -JA unable to maintain weight bearing status  -     Transfer, Safety Issues  impulsivity;weight-shifting ability decreased;step length decreased;sequencing ability decreased;balance decreased during turns  -     Transfer, Impairments  ROM decreased;strength decreased;impaired balance;coordination impaired;motor control impaired;pain  -     Transfer, Comment  Pt requested to go to the restroom when OT entered then wanted to get back to bed. Pt required 2 people for toilet t/f for safety, pt struggled with many vc for weight bear status and shifting weight through arms and following to safely get back and forth with toilet. One person assist for bed with one on stand by but struggled with weight bear and safety and arm placement.   -     Recorded by [JA] Darci Harris, PTA [] Katherine Pena OTR/L [LM] PATTI Yeager/L    Functional Mobility    Functional Mobility- Comment  Pt struggled with stand pivot from w/c to toilet or w/c to bed 1 to 2 people required for safety.   -BH     Recorded by  [BH] Katherine Pena OTR/L     Upper Body Bathing Assessment/Training    UB Bathing Assess/Train, [place] Level   supervision required  -LM    Recorded by   [LM] PATTI Yeager/L    Lower Body Bathing Assessment/Training    LB Bathing Assess/Train, [place] Level   moderate assist (50% patient effort)  -LM    Recorded by   [LM] PATTI Yeager/L    Upper Body Dressing Assessment/Training    UB Dressing Assess/Train, [place]   supervision required  -LM    Recorded by   [LM] Genet SCHMIDT  PATTI Hicks/L    Lower Body Dressing Assessment/Training    LB Dressing Assess/Train, [place]   moderate assist (50% patient effort)  -LM    Recorded by   [LM] SAURAV Yeager    Toileting Assessment/Training    Toileting Assess/Train, Comment  Pt required one person to assist with standing and another to assit with pulling pants up and down for toileting (pt dependent, and pt dependent for pericare cleaning. Pt did assist with standing for tasks.   -BH     Recorded by  [BH] ARVIND Garcia/L     Grooming Assessment/Training    Grooming Assess/Train, [place] Level   set up required  -LM    Recorded by   [LM] PATTI Yeager/L    Motor Skills/Interventions    Additional Documentation  Balance Skills Training (Group)  -     Recorded by  [] ARVIND Garcia/L     Balance Skills Training    Standing-Level of Assistance  Minimum assistance;Moderate assistance;x2   1-2 people  -     Recorded by  [] ARVIND Garcia/L     Therapy Exercises    Right Lower Extremity AROM:;20 reps;supine;heel slides;hip abduction/adduction  -      Left Lower Extremity AAROM:;20 reps;supine;heel slides;hip abduction/adduction  -JA      Bilateral Lower Extremities AROM:;20 reps;supine;ankle pumps/circles;glut sets;quad sets  -      Bilateral Upper Extremity   AROM:  -LM    Recorded by [JA] Darci Harris PTA  [] PATTI Yeager/L    Positioning and Restraints    Pre-Treatment Position sitting in chair/recliner  - other (comment)   in w/c  - sitting in chair/recliner  -    Post Treatment Position bed  - bed  - wheelchair  -    In Bed heels elevated  - supine;notified nsg;call light within reach;encouraged to call for assist;with family/caregiver  -     In Wheelchair   with family/caregiver  -LM    Recorded by [JA] Darci Harris PTA [] ARVIND Garcia/PIPER [LM] PATTI Yeager/L      05/30/17 0915 05/30/17 0758 05/29/17 1258    Rehab  Assessment/Intervention    Discipline physical therapy assistant  -JA speech language pathologist  -CK physical therapy assistant  -CA    Document Type therapy note (daily note)  -JA therapy note (daily note)  -CK therapy note (daily note)  -CA    Subjective Information agree to therapy  -JA agree to therapy  -CK agree to therapy  -CA    Patient Effort, Rehab Treatment good  -JA good  -CK good  -CA    Precautions/Limitations non-weight bearing status;fall precautions;oxygen therapy device and L/min  -JA  non-weight bearing status  -CA    Recorded by [JA] Darci Harris PTA [CK] Carla Triplett MS CCC-SLP [CA] Ricardo Bro PTA    Vital Signs    Pretreatment Heart Rate (beats/min) 67  -JA      Pre SpO2 (%) 97  -JA      O2 Delivery Pre Treatment supplemental O2  -JA      Recorded by [JA] Darci Harris PTA      Pain Assessment    Pain Assessment No/denies pain  -JA No/denies pain  -CK No/denies pain  -CA    Pain Score  0  -CK     Post Pain Score  0  -CK     Pain Intervention(s) Medication (See MAR);Repositioned  -JA      Recorded by [JA] Darci Harris PTA [CK] Carla Triplett, MS CCC-SLP [CA] Ricardo Bro PTA    Cognitive Assessment/Intervention    Current Cognitive/Communication Assessment   functional  -CA    Orientation Status   oriented to;person;place  -CA    Recorded by   [CA] Ricardo Bro PTA    Communication Treatment Objective and Progress    Cognitive Linguistic Treatment Objectives  Improve orientation;Improve awareness of basic personal information;Improve memory skills  -CK     Recorded by  [CK] Carla Triplett, MS CCC-SLP     Improve memory skills    Improve memory skills through:  listen to a paragraph and answer questions;recall details of the day;80%;with inconsistent cues  -CK     Status: Improve memory skills  Progressing as expected  -CK     Memory Skills Progress  40%;90%;achieved target accuracy on task   recall of recent events goal achieved.  -CK     Recorded by  [CK]  Carla Triplett, MS CCC-SLP     Improve functional problem solving    Improve functional problem solving through:  answer questions about ADL problems;tell similarity between items;sequence steps in a task;80%;with inconsistent cues  -CK     Status: Improve functional problem solving  Progress slower than expected  -CK     Functional Problem Solving Progress  50%  -CK     Recorded by  [CK] Carla Triplett, MS CCC-SLP     Bed Mobility, Assessment/Treatment    Bed Mob, Sit to Supine, [place]   minimum assist (75% patient effort)  -CA    Recorded by   [CA] Ricardo Bro PTA    Transfer Assessment/Treatment    Transfers, Sit-Stand [place]   moderate assist (50% patient effort)  -CA    Transfers, Stand-Sit [place]   moderate assist (50% patient effort)  -CA    Transfers, Sit-Stand-Sit, Assist Device   other (see comments)   Stand pivot  -CA    Recorded by   [CA] Ricardo Bro PTA    Wheelchair Training/Management    Wheelchair, Distance Propelled 80  -JA      Wheelchair Training Comment SBA  -JA      Recorded by [JA] Darci Harris PTA      Therapy Exercises    Bilateral Lower Extremities AROM:;20 reps;sitting;ankle pumps/circles;glut sets;hip abduction/adduction;hip flexion;knee flexion  -JA  AROM:;20 reps;sitting;ankle pumps/circles;hip flexion;LAQ  -CA    Recorded by [JA] Darci Harris PTA  [CA] Ricardo Bro PTA    Positioning and Restraints    Pre-Treatment Position sitting in chair/recliner  -JA  sitting in chair/recliner  -CA    Post Treatment Position wheelchair  -JA  bed  -CA    In Bed   supine;call light within reach;legs elevated;with family/caregiver  -CA    In Wheelchair with family/caregiver  -JA      Recorded by [JA] Darci Harris PTA  [CA] Ricardo Bro PTA      User Key  (r) = Recorded By, (t) = Taken By, (c) = Cosigned By    Initials Name Effective Dates    EC Rema [place], CCC-SLP 12/30/16 -      Katherine Pena, OTR/L 10/17/16 -     CK Carla Triplett, MS  [facility]-SLP 10/17/16 -     JA Darci Harris, PTA 10/17/16 -     CA Ricardo Bro, PTA 10/17/16 -     RW Clay Recio, PTA 10/17/16 -     JH Siomara Bob, ARMSTRONG/L 10/17/16 -     LM Genet Hicks, ARMSTRONG/L 10/17/16 -                 OT Goals       06/01/17 0755 05/31/17 1301 05/30/17 1443    Transfer Training OT LTG    Transfer Training OT LTG, Date Goal Reviewed 06/01/17  -JH 05/31/17  -LM 05/30/17  -LM    Transfer Training OT LTG, Outcome  goal ongoing  -LM goal ongoing  -LM    Strength OT LTG    Strength Goal OT LTG, Date Goal Reviewed 06/01/17  -JH 05/31/17  -LM 05/30/17  -LM    Strength Goal OT LTG, Outcome  goal ongoing  -LM goal ongoing  -LM    Static Standing Balance OT STG    Static Standing Balance OT STG, Date Goal Reviewed 06/01/17  -JH 05/31/17  -LM 05/30/17  -LM    Static Standing Balance OT STG, Outcome  goal ongoing  -LM goal ongoing  -LM    ADL OT LTG    ADL OT LTG, Date Established 06/01/17  -      ADL OT LTG, Date Goal Reviewed  05/31/17  -LM 05/30/17  -LM    ADL OT LTG, Outcome  goal ongoing  -LM goal ongoing  -LM      05/30/17 1333 05/29/17 1050 05/27/17 0611    Transfer Training OT LTG    Transfer Training OT LTG, Date Goal Reviewed 05/30/17  - 05/29/17  - 05/27/17  -TO    Transfer Training OT LTG, Outcome goal ongoing  -BH      Strength OT LTG    Strength Goal OT LTG, Date Goal Reviewed 05/30/17  - 05/29/17  - 05/27/17  -TO    Strength Goal OT LTG, Outcome goal ongoing  -BH      Static Standing Balance OT STG    Static Standing Balance OT STG, Date Goal Reviewed 05/30/17  - 05/29/17  - 05/27/17  -TO    Static Standing Balance OT STG, Outcome goal ongoing  -BH      ADL OT LTG    ADL OT LTG, Date Goal Reviewed 05/30/17  - 05/29/17  - 05/27/17  -TO    ADL OT LTG, Outcome goal ongoing  -        05/26/17 1320 05/25/17 1518 05/25/17 1306    Transfer Training OT LTG    Transfer Training OT LTG, Date Established   05/25/17  -RB    Transfer Training OT LTG, Time to Achieve   by  discharge  -RB    Transfer Training OT LTG, Activity Type   all transfers  -RB    Transfer Training OT LTG, [place] Level   contact guard assist  -RB    Transfer Training OT LTG, Assist Device   walker, rolling;commode, bedside  -RB    Transfer Training OT LTG, Date Goal Reviewed 05/26/17  -LM 05/25/17  -LM     Transfer Training OT LTG, Outcome goal ongoing  -LM goal ongoing  -LM     Strength OT LTG    Strength Goal OT LTG, Date Established   05/25/17  -RB    Strength Goal OT LTG, Time to Achieve   by discharge  -RB    Strength Goal OT LTG, Measure to Achieve   1 set of 10 reps all planes with 1/2 to 1 lb wrist wts or dumbells to increase UE strength.  -RB    Strength Goal OT LTG, Date Goal Reviewed 05/26/17  -LM 05/25/17  -LM     Strength Goal OT LTG, Outcome goal ongoing  -LM goal ongoing  -LM     Static Standing Balance OT STG    Static Standing Balance OT STG, Date Established   05/25/17  -RB    Static Standing Balance OT STG, Time to Achieve   2 wks  -RB    Static Standing Balance OT STG, [place] Level   contact guard assist   5 minutes with functional activity and 0-1 rest break.  -RB    Static Standing Balance OT STG, Assist Device   assistive device   Rolling wakler.  -RB    Static Standing Balance OT STG, Date Goal Reviewed 05/26/17  -LM 05/25/17  -LM     Static Standing Balance OT STG, Outcome goal ongoing  -LM goal ongoing  -LM     ADL OT LTG    ADL OT LTG, Date Established   05/25/17  -RB    ADL OT LTG, Time to Achieve   by discharge  -RB    ADL OT LTG, Activity Type   ADL skills   Sponge bath and dress.  -RB    ADL OT LTG, [place] Level   contact guard;assistive device   Rolling walker and shower chair if needed.  -RB    ADL OT LTG, Date Goal Reviewed 05/26/17  -LM      ADL OT LTG, Outcome goal ongoing  -LM goal ongoing  -LM       05/24/17 1753 05/23/17 1316       Transfer Training OT LTG    Transfer Training OT LTG, Date Established  05/23/17  -RB     Transfer Training OT LTG, Time to  Achieve  by discharge  -RB     Transfer Training OT LTG, Activity Type  all transfers  -RB     Transfer Training OT LTG, [place] Level  supervision required  -RB     Transfer Training OT LTG, Assist Device  walker, rolling  -RB     Transfer Training OT LTG, Date Goal Reviewed 05/24/17  -RM      Transfer Training OT LTG, Outcome goal not met  -RM      Transfer Training OT LTG, Reason Goal Not Met discharged from facility  -RM      Strength OT LTG    Strength Goal OT LTG, Date Established  05/23/17  -RB     Strength Goal OT LTG, Time to Achieve  by discharge  -RB     Strength Goal OT LTG, Measure to Achieve  1 set of 10 reps all planes with 1 lb wrist wts or dumbells for B UE strengthening.  -RB     Strength Goal OT LTG, Date Goal Reviewed 05/24/17  -RM      Strength Goal OT LTG, Outcome goal not met  -RM      Strength Goal OT LTG, Reason Goal Not Met discharged from facility  -RM      Static Standing Balance OT LTG    Static Standing Balance OT LTG, Date Established  05/23/17  -RB     Static Standing Balance OT LTG, Time to Achieve  by discharge  -RB     Static Standing Balance OT LTG, [place] Level  supervision required   5 minutes with functional activity with 0-1 rest break.  -RB     Static Standing Balance OT LTG, Date Goal Reviewed 05/24/17  -RM      Static Standing Balance OT LTG, Outcome goal not met  -RM      Static Standing Balance OT LTG, Reason Goal Not Met discharged from facility  -RM      ADL OT LTG    ADL OT LTG, Date Established  05/23/17  -RB     ADL OT LTG, Time to Achieve  by discharge  -RB     ADL OT LTG, Activity Type  ADL skills   Sponge bath and dress or walk-in shower.  -RB     ADL OT LTG, [place] Level  standby assist;assistive device   Rolling walker and shower chair if needed.  -RB     ADL OT LTG, Date Goal Reviewed 05/24/17  -RM      ADL OT LTG, Outcome goal not met  -RM      ADL OT LTG, Reason Goal Not Met discharged from facility  -        User Key  (r) = Recorded By,  (t) = Taken By, (c) = Cosigned By    Initials Name Provider Type    RB Curly Rich, OT Occupational Therapist     Katherine Pena, OTR/L Occupational Therapist     Siomara Bob, ARMSTRONG/L Occupational Therapy Assistant     Genet Hicks, ARMSTRONG/L Occupational Therapy Assistant    TO Andi Velasquez, ARMSTRONG/L Occupational Therapy Assistant     Paulette Ennis, OT Occupational Therapist          Occupational Therapy Education     Title: PT OT SLP Therapies (Active)     Topic: Occupational Therapy (Active)     Point: ADL training (Active)    Description: Instruct learner(s) on proper safety adaptation and remediation techniques during self care or transfers.   Instruct in proper use of assistive devices.    Learning Progress Summary    Learner Readiness Method Response Comment Documented by Status   Patient Acceptance E NR   06/01/17 1121 Active    Acceptance E VU,NR Educated pt on hand placement and safety with t/f. Educated pt on weight bear status with t/f. Educated pt on safety and call for assist.  05/30/17 1457 Done    Acceptance E VU   05/30/17 1452 Done    Acceptance E NR   05/29/17 1350 Active    Acceptance D NR Pt educated on LH reacher for donning pants with decreased understanding 2* to pt nauseated and fatigued post bathing. TO 05/27/17 1105 Active   Family Acceptance E NR   06/01/17 1121 Active    Acceptance E NR   05/29/17 1350 Active    Acceptance D NR Pt educated on LH reacher for donning pants with decreased understanding 2* to pt nauseated and fatigued post bathing. TO 05/27/17 1105 Active               Point: Home exercise program (Active)    Description: Instruct learner(s) on appropriate technique for monitoring, assisting and/or progressing therapeutic exercises/activities.    Learning Progress Summary    Learner Readiness Method Response Comment Documented by Status   Patient Acceptance E NR   06/01/17 1121 Active    Acceptance E VU   05/30/17 1452 Done    Acceptance E NR    05/29/17 1350 Active   Family Acceptance E NR   06/01/17 1121 Active    Acceptance E NR   05/29/17 1350 Active               Point: Precautions (Active)    Description: Instruct learner(s) on prescribed precautions during self-care and functional transfers.    Learning Progress Summary    Learner Readiness Method Response Comment Documented by Status   Patient Acceptance E NR   06/01/17 1121 Active    Acceptance E VU,NR Educated pt on hand placement and safety with t/f. Educated pt on weight bear status with t/f. Educated pt on safety and call for assist.  05/30/17 1457 Done    Acceptance E VU   05/30/17 1452 Done    Acceptance E NR   05/29/17 1350 Active    Acceptance E NR Edu pt/family on no OOB without assist.  05/25/17 1304 Active   Family Acceptance E NR   06/01/17 1121 Active    Acceptance E NR   05/29/17 1350 Active    Acceptance E NR Edu pt/family on no OOB without assist.  05/25/17 1304 Active               Point: Body mechanics (Active)    Description: Instruct learner(s) on proper positioning and spine alignment during self-care, functional mobility activities and/or exercises.    Learning Progress Summary    Learner Readiness Method Response Comment Documented by Status   Patient Acceptance E NR   06/01/17 1121 Active    Acceptance E VU,NR Educated pt on hand placement and safety with t/f. Educated pt on weight bear status with t/f. Educated pt on safety and call for assist.  05/30/17 1457 Done    Acceptance E VU   05/30/17 1452 Done    Acceptance E NR   05/29/17 1350 Active   Family Acceptance E NR   06/01/17 1121 Active    Acceptance E [facility] 05/29/17 1350 Active                      User Key     Initials Effective Dates Name Provider Type Discipline     06/15/16 -  Curly Rich, OT Occupational Therapist OT     10/17/16 -  ARVIND Garcia/L Occupational Therapist OT     10/17/16 -  PATTI Ludwig/L Occupational Therapy Assistant OT     10/17/16 -   PATTI Yeager/PIPER Occupational Therapy Assistant OT    TO 10/17/16 -  PATTI Jeffery/L Occupational Therapy Assistant OT                  OT Recommendation and Plan  Anticipated Equipment Needs At Discharge: front wheeled walker, tub bench, wheelchair  Anticipated Discharge Disposition: skilled nursing facility, home with 24/7 care  Planned Therapy Interventions: activity intolerance, ADL retraining, balance training, energy conservation, strengthening, transfer training  Therapy Frequency: other (see comments) (3-14x/wk)  Plan of Care Review  Outcome Summary/Follow up Plan: Pt making slow progress toward OT goals, family very supportive and assist pt with all needs         Outcome Measures       06/01/17 1100 06/01/17 0755 05/31/17 0950    How much help from another person do you currently need...    Turning from your back to your side while in flat bed without using bedrails? 2  -RW  2  -RW    Moving from lying on back to sitting on the side of a flat bed without bedrails? 3  -RW  2  -RW    Moving to and from a bed to a chair (including a wheelchair)? 3  -RW  3  -RW    Standing up from a chair using your arms (e.g., wheelchair, bedside chair)? 3  -RW  3  -RW    Climbing 3-5 steps with a railing? 1  -RW  1  -RW    To walk in hospital room? 3  -RW  2  -RW    AM-PAC 6 Clicks Score 15  -RW  13  -RW    How much help from another is currently needed...    Putting on and taking off regular lower body clothing?  2  -JH     Bathing (including washing, rinsing, and drying)  2  -JH     Toileting (which includes using toilet bed pan or urinal)  2  -JH     Putting on and taking off regular upper body clothing  3  -JH     Taking care of personal grooming (such as brushing teeth)  3  -JH     Eating meals  4  -JH     Score  16  -JH       05/30/17 1400 05/30/17 1333 05/30/17 0915    How much help from another person do you currently need...    Turning from your back to your side while in flat bed without using  bedrails?   2  -JA    Moving from lying on back to sitting on the side of a flat bed without bedrails?   2  -JA    Moving to and from a bed to a chair (including a wheelchair)?   2  -JA    Standing up from a chair using your arms (e.g., wheelchair, bedside chair)?   2  -JA    Climbing 3-5 steps with a railing?   1  -JA    To walk in hospital room?   1  -JA    AM-PAC 6 Clicks Score   10  -JA    How much help from another is currently needed...    Putting on and taking off regular lower body clothing? 2  -LM 2  -BH     Bathing (including washing, rinsing, and drying) 2  -LM 2  -BH     Toileting (which includes using toilet bed pan or urinal) 2  -LM 2  -BH     Putting on and taking off regular upper body clothing 3  -LM 3  -BH     Taking care of personal grooming (such as brushing teeth) 4  -LM 4  -BH     Eating meals 4  -LM 4  -BH     Score 17  -LM 17  -BH     Functional Assessment    Outcome Measure Options  AM-PAC 6 Clicks Daily Activity (OT)  -[facility]-PAC 6 Clicks Basic Mobility (PT)  -      05/29/17 1258          How much help from another person do you currently need...    Turning from your back to your side while in flat bed without using bedrails? 2  -CA      Moving from lying on back to sitting on the side of a flat bed without bedrails? 2  -CA      Moving to and from a bed to a chair (including a wheelchair)? 2  -CA      Standing up from a chair using your arms (e.g., wheelchair, bedside chair)? 2  -CA      Climbing 3-5 steps with a railing? 1  -CA      To walk in hospital room? 1  -CA      AM-PAC 6 Clicks Score 10  -CA      Functional Assessment    Outcome Measure Options AM-PAC 6 Clicks Basic Mobility (PT)  -CA        User Key  (r) = Recorded By, (t) = Taken By, (c) = Cosigned By    Initials Name Provider Type     Katherine Pena, OTR/L Occupational Therapist    RUIZ Harris, PTA Physical Therapy Assistant    NILDA Bro PTA Physical Therapy Assistant    MAURICIO Recio PTA Physical  Therapy Assistant     PATTI Ludwig/L Occupational Therapy Assistant    PATTI Etienne/L Occupational Therapy Assistant           Time Calculation:         Time Calculation- OT       06/01/17 1127          Time Calculation-     OT Start Time 0755  -      OT Stop Time 0905  -      OT Time Calculation (min) 70 min  -      Total Timed Code Minutes- OT 70 minute(s)  -      OT Received On 06/01/17  -        User Key  (r) = Recorded By, (t) = Taken By, (c) = Cosigned By    Initials Name Provider Type     SAURAV Ludwig Occupational Therapy Assistant           Therapy Charges for Today     Code Description Service Date Service Provider Modifiers Qty    58113700547 HC OT ASSTV TECHNICAL ASSMT-15 MIN 6/1/2017 SAURAV Ludwig  1    32131908103 HC OT THERAPEUTIC ACT EA 15 MIN 6/1/2017 SAURAV Ludwig GO 2    90033185449 HC OT SELF CARE/MGMT/TRAIN EA 15 MIN 6/1/2017 SAURAV Ludwig GO 2          OT G-codes  OT Professional Judgement Used?: Yes  OT Functional Scales Options: AM-PAC 6 Clicks Daily Activity (OT)  Score: 14  Functional Limitation: Self care  Self Care Current Status (): At least 40 percent but less than 60 percent impaired, limited or restricted  Self Care Goal Status (): At least 20 percent but less than 40 percent impaired, limited or restricted    SAURAV Ludwig  6/1/2017

## 2017-06-01 NOTE — THERAPY DISCHARGE NOTE
Inpatient Rehabilitation - Speech Language Pathology /Discharge  [facility]     Patient Name: Mariela Woodson  : 1925  MRN: [id number]  Today's Date: 2017  Referring Physician: Markus      Admit Date: 2017  pt is much more alert from inital eval. pt has met all goals and returned to baseline cognition. pt continues to need assist for physical needs. d/c skilled ST this date  Cog Goals:  1. Patient will complete paragraph IR tasks with 80% accuracy with min cues: 70% with verbal (fiction) 5 sentence paragraph recall and 80% with 10 sentence recall from personal accounts.  GOAL MET  2. Patient will recall recent events and therapy precautions with 80% accuracy independently: Goal Met previously  3. Patient will complete compare/contrast activity with 90% accuracy with min cues:100% accuracy. GOAL MET  4. Patient will complete sequencing/organizational task with 90% accuracy with min cues: GOAL MET PREVIOUSLY  5. Pt will complete questions related to basic ADL situations w/80% acc:  90% accuracy. GOAL MET  Rema Grande CCC-SLP 2017 10:21 AM  .  Visit Dx:     ICD-10-CM ICD-9-CM   1. Impaired mobility and activities of daily living Z74.09 799.89   2. Abnormality of gait and mobility R26.9 781.2   3. Muscle weakness (generalized) M62.81 728.87     Patient Active Problem List   Diagnosis   • Iron deficiency anemia due to chronic blood loss   • Post-polio syndrome   • Simple chronic bronchitis   • Seizure disorder   • Closed left hip fracture   • Iron deficiency anemia   • Post-polio syndrome   • Seizure disorder   • Metabolic encephalopathy   • COPD (chronic obstructive pulmonary disease)   • Encounter for rehabilitation              Adult Rehabilitation Note       17 0905 17 1445 17 1340    Rehab Assessment/Intervention    Discipline speech language pathologist  -EC physical therapy assistant  -RW occupational therapy assistant  -LM    Document Type therapy note (daily  note);discharge summary  -EC therapy note (daily note)  -RW therapy note (daily note)  -LM    Subjective Information no complaints;agree to therapy  -EC agree to therapy  -RW agree to therapy  -LM    Patient Effort, Rehab Treatment excellent  -EC good  -RW     Recorded by [EC] Rema Grande CCC-GEN [RW] Clay Recio, PTA [LM] Genet Hicks, ARMSTRONG/L    Pain Assessment    Pain Assessment No/denies pain  -EC No/denies pain  -RW     Pain Score   0  -LM    Recorded by [EC] REJI Gutierrez [RW] Clay Recio, RHODA [LM] Genet Hicks, ARMSTRONG/L    Cognitive Assessment/Intervention    Current Cognitive/Communication Assessment  functional  -RW     Orientation Status  oriented x 4  -RW     Follows Commands/Answers Questions  100% of the time  -RW     Personal Safety Interventions  gait belt;nonskid shoes/slippers when out of bed  -RW     Recorded by  [RW] Clay Recio PTA     Communication Treatment Objective and Progress    Cognitive Linguistic Treatment Objectives Improve orientation;Improve awareness of basic personal information;Improve memory skills  -EC      Recorded by [EC] Rema Grande CCC-SLP      Improve memory skills    Improve memory skills through: listen to a paragraph and answer questions;80%;without cues  -EC      Status: Improve memory skills Achieved  -EC      Memory Skills Progress 80%  -EC      Recorded by [EC] REJI Gutierrez      Improve functional problem solving    Improve functional problem solving through: tell similarity between items;sequence steps in a task;complete organization/home management task;80%;with inconsistent cues  -EC      Status: Improve functional problem solving Achieved  -EC      Functional Problem Solving Progress 90%;with inconsistent cues  -EC      Recorded by [EC] REJI Gutierrez      Mobility Assessment/Training    Left Lower Extremity Weight-Bearing  touch down weight-bearing  -RW     Recorded by  [RW] Clay RICE  RHODA Recio     Bed Mobility, Assessment/Treatment    Bed Mobility, Assistive Device  head of bed elevated  -RW     Bed Mob, Sit to Supine, [place]  minimum assist (75% patient effort)  -RW     Recorded by  [RW] Clay Recio PTA     Transfer Assessment/Treatment    Transfers, Chair-Bed [place]  minimum assist (75% patient effort)  -RW     Transfers, Bed-Chair-Bed, Assist Device  rolling walker  -RW     Transfers, Sit-Stand [place]  minimum assist (75% patient effort)  -RW     Transfers, Stand-Sit [place]  minimum assist (75% patient effort)  -RW     Transfers, Sit-Stand-Sit, Assist Device  rolling walker  -RW     Recorded by  [RW] Clay Recio PTA     Wheelchair Training/Management    Wheelchair, Distance Propelled   --   50  -LM    Recorded by   [LM] SAURAV Yeager    Therapy Exercises    Bilateral Lower Extremities  AROM:;ankle pumps/circles;AAROM:;20 reps;supine;heel slides;quad sets   2 sets  -RW     Bilateral Upper Extremity   AROM:;20 reps   level 1 tband all diana planes   -LM    BUE Resistance   manual resistance- minimal   uee bike x 15 min   -LM    Recorded by  [RW] Clay Recio PTA [LM] PATTI Yeager/L    Positioning and Restraints    Pre-Treatment Position  sitting in chair/recliner  -RW sitting in chair/recliner  -LM    Post Treatment Position  bed  -RW chair  -LM    In Bed  side rails up x2;legs elevated;heels elevated;with family/caregiver  -RW     Recorded by  [RW] Clay Recio PTA [LM] SAURAV Yeager      05/31/17 1130 05/31/17 1050 05/31/17 0950    Rehab Assessment/Intervention    Discipline occupational therapy assistant  -LM speech language pathologist  -EC physical therapy assistant  -RW    Document Type therapy note (daily note)  -LM therapy note (daily note)  -EC therapy note (daily note)  -RW    Subjective Information agree to therapy  -LM agree to therapy  -EC agree to therapy  -RW    Recorded by [LM] PATTI Yeager/PIPER  [EC] GWEN GutierrezSLP [RW] Clay Recio PTA    Pain Assessment    Pain Assessment  No/denies pain  -EC No/denies pain  -RW    Pain Score 0  -LM      Post Pain Score 0  -LM      Recorded by [LM] PATTI Yeager/PIPER [EC] REJI Gutierrez [RW] Clay Recio PTA    Communication Treatment Objective and Progress    Cognitive Linguistic Treatment Objectives  Improve orientation;Improve awareness of basic personal information;Improve memory skills  -EC     Recorded by  [EC] REJI Gutierrez     Improve memory skills    Improve memory skills through:  listen to a paragraph and answer questions;80%;without cues  -EC     Status: Improve memory skills  Progressing as expected  -EC     Memory Skills Progress  continue to address  -EC     Recorded by  [EC] REJI Gutierrez     Improve functional problem solving    Improve functional problem solving through:  tell similarity between items;sequence steps in a task;complete organization/home management task;80%;with inconsistent cues  -EC     Status: Improve functional problem solving  Progressing as expected  -EC     Functional Problem Solving Progress  90%;with inconsistent cues  -EC     Recorded by  [EC] REJI Gutierrez     Mobility Assessment/Training    Left Lower Extremity Weight-Bearing   touch down weight-bearing  -RW    Recorded by   [RW] Clay Recio PTA    Transfer Assessment/Treatment    Transfers, Bed-Chair [place]   minimum assist (75% patient effort)  -RW    Transfers, Chair-Bed [place]   minimum assist (75% patient effort)  -RW    Transfers, Bed-Chair-Bed, Assist Device   rolling walker  -RW    Transfers, Sit-Stand [place]   minimum assist (75% patient effort)  -RW    Transfers, Stand-Sit [place]   minimum assist (75% patient effort)  -RW    Transfers, Sit-Stand-Sit, Assist Device   rolling walker  -RW    Recorded by   [RW] Clay Recio PTA    Gait Assessment/Treatment     Gait, [place] Level   minimum assist (75% patient effort)  -RW    Gait, Assistive Device   rolling walker  -RW    Gait, Distance (Feet)   3  -RW    Recorded by   [RW] Clay Recio PTA    Wheelchair Training/Management    Wheelchair, Distance Propelled 20-50  -LM  100  -RW    Wheelchair Training Comment   sba  -RW    Recorded by [LM] PATTI Yeager/L  [RW] Clay Recio PTA    Therapy Exercises    Bilateral Lower Extremities   AROM:;sitting;ankle pumps/circles;glut sets;hip abduction/adduction;knee flexion;LAQ  -RW    Bilateral Upper Extremity elbow flexion/extension;shoulder abduction/adduction;shoulder extension/flexion;shoulder horizontal abd/add;shoulder rolls/shrugs   UEE bike x 10 min also 2 lb wt   -LM      BUE Resistance manual resistance- minimal  -LM      Recorded by [LM] PATTI Yeager/L  [RW] Clay Recio PTA    Positioning and Restraints    Pre-Treatment Position in bed  -LM  sitting in chair/recliner  -RW    Post Treatment Position wheelchair  -LM  wheelchair  -RW    In Wheelchair   with SLP  -RW    Recorded by [LM] PATTI Yeager/L  [RW] Clay Recio PTA      05/31/17 0924 05/30/17 1525 05/30/17 1333    Rehab Assessment/Intervention    Discipline --  -RW physical therapy assistant  -JA occupational therapist  -    Document Type --  -RW therapy note (daily note)  -JA therapy note (daily note)  -    Subjective Information --  -RW agree to therapy;complains of  -JA agree to therapy;complains of;pain  -    Patient Effort, Rehab Treatment  good  - good  -    Precautions/Limitations  fall precautions;oxygen therapy device and L/min;non-weight bearing status  - fall precautions;oxygen therapy device and L/min;non-weight bearing status   toe touch weight LLE, no BP on L  -BH    Recorded by [RW] Clay Recio PTA [JA] Darci Harris PTA [] Katherine Pena, OTR/L    Vital Signs    Pretreatment Heart Rate (beats/min)   68  -BH    Posttreatment Heart  Rate (beats/min)   74  -BH    Pre SpO2 (%)   95  -BH    O2 Delivery Pre Treatment   supplemental O2  -    Post SpO2 (%)   99  -    O2 Delivery Post Treatment   supplemental O2  -    Pre Patient Position   Sitting  -    Post Patient Position   Supine  -    Recorded by   [] Katherine Pena OTR/L    Pain Assessment    Pain Assessment  No/denies pain  -     Pain Score   6  -    Post Pain Score   5  -    Pain Location   Hip  -    Pain Intervention(s)  Medication (See MAR);Repositioned  - Medication (See MAR);Repositioned;Ambulation/increased activity   RN aware, applied pain patch  -    Recorded by  [JA] Darci Harris PTA [] Katherine Pena OTR/L    Cognitive Assessment/Intervention    Current Cognitive/Communication Assessment   functional  -    Orientation Status   oriented x 4  -    Follows Commands/Answers Questions   75% of the time;able to follow single-step instructions;needs cueing;needs increased time;needs repetition  -    Personal Safety   mild impairment  -    Personal Safety Interventions   fall prevention program maintained;gait belt;muscle strengthening facilitated;nonskid shoes/slippers when out of bed;supervised activity  -    Recorded by   [] Katherine Pena OTR/L    Mobility Assessment/Training    Extremity Weight-Bearing Status  left lower extremity  - left lower extremity  -    Left Lower Extremity Weight-Bearing --  -RW touch down weight-bearing  -RUIZ toe touch weight-bearing  -    Recorded by [RW] Clay Recio PTA [JA] Darci Harris PTA [] Katherine Pena OTR/L    Bed Mobility, Assessment/Treatment    Bed Mob, Sit to Supine, [place] --  -MAURICIO moderate assist (50% patient effort)  -RUIZ moderate assist (50% patient effort)  -    Bed Mobility, Comment   Pt sit to sup with bed flat with max assist with LB then min assist with trunk, pt used BUE to pull on handrails to reposition her hips.   -    Recorded by [RW] Clay Recio PTA [JA]  Darci Harris PTA [] Katherine Pena, OTR/L    Transfer Assessment/Treatment    Transfers, Bed-Chair [place] --  -RW moderate assist (50% patient effort)  -     Transfers, Chair-Bed [place]   moderate assist (50% patient effort);verbal cues required  -    Transfers, Bed-Chair-Bed, Assist Device --  -RW rolling walker  -JA rolling walker  -    Transfers, Sit-Stand [place] --  -RW minimum assist (75% patient effort)  - moderate assist (50% patient effort)  -    Transfers, Stand-Sit [place] --  -RW minimum assist (75% patient effort)  - minimum assist (75% patient effort);moderate assist (50% patient effort)  -    Transfers, Sit-Stand-Sit, Assist Device --  - rolling walker  -     Toilet Transfer, [place]   minimum assist (75% patient effort);moderate assist (50% patient effort);2 person assist required  -    Toilet Transfer, Assistive Device   --   grab bars, 2 people for safety  -    Transfer, Maintain Weight Bearing Status  cues to maintain weight bearing status;assist to maintain weight bearing status;unable to maintain weight bearing status  - unable to maintain weight bearing status  -    Transfer, Safety Issues   impulsivity;weight-shifting ability decreased;step length decreased;sequencing ability decreased;balance decreased during turns  -    Transfer, Impairments   ROM decreased;strength decreased;impaired balance;coordination impaired;motor control impaired;pain  -    Transfer, Comment   Pt requested to go to the restroom when OT entered then wanted to get back to bed. Pt required 2 people for toilet t/f for safety, pt struggled with many vc for weight bear status and shifting weight through arms and following to safely get back and forth with toilet. One person assist for bed with one on stand by but struggled with weight bear and safety and arm placement.   -    Recorded by [RW] Clay Recio PTA [] Darci Harris PTA [] Katherine SCHMIDT  ARVIND Pena/L    Functional Mobility    Functional Mobility- Comment   Pt struggled with stand pivot from w/c to toilet or w/c to bed 1 to 2 people required for safety.   -    Recorded by   [] ARVIND Garcia/L    Toileting Assessment/Training    Toileting Assess/Train, Comment   Pt required one person to assist with standing and another to assit with pulling pants up and down for toileting (pt dependent, and pt dependent for pericare cleaning. Pt did assist with standing for tasks.   -BH    Recorded by   [BH] ARVIND Garcia/L    Motor Skills/Interventions    Additional Documentation   Balance Skills Training (Group)  -    Recorded by   [] ARVIND Garcia/L    Balance Skills Training    Standing-Level of Assistance   Minimum assistance;Moderate assistance;x2   1-2 people  -BH    Recorded by   [BH] ARVIND Garcia/L    Therapy Exercises    Right Lower Extremity AROM:;20 reps;supine;heel slides;hip abduction/adduction  -RW AROM:;20 reps;supine;heel slides;hip abduction/adduction  -JA     Left Lower Extremity AAROM:;20 reps;supine;heel slides;hip abduction/adduction  -RW AAROM:;20 reps;supine;heel slides;hip abduction/adduction  -JA     Bilateral Lower Extremities AROM:;20 reps;supine;ankle pumps/circles;glut sets;quad sets  -RW AROM:;20 reps;supine;ankle pumps/circles;glut sets;quad sets  -JA     Recorded by [RW] Clay Recio, PTA [JA] Darci Harris PTA     Positioning and Restraints    Pre-Treatment Position  sitting in chair/recliner  -JA other (comment)   in w/c  -    Post Treatment Position  bed  -JA bed  -BH    In Bed  heels elevated  -JA supine;notified nsg;call light within reach;encouraged to call for assist;with family/caregiver  -    Recorded by  [JA] Darci Harris PTA [] ARVIND Garcia/PIPER      05/30/17 1045 05/30/17 0915 05/30/17 0758    Rehab Assessment/Intervention    Discipline occupational therapy assistant  -KENY physical therapy assistant  -RUIZ speech  language pathologist  -CK    Document Type therapy note (daily note)  -LM therapy note (daily note)  -JA therapy note (daily note)  -CK    Subjective Information agree to therapy  -LM agree to therapy  -JA agree to therapy  -CK    Patient Effort, Rehab Treatment good  -LM good  -JA good  -CK    Precautions/Limitations fall precautions;oxygen therapy device and L/min;other (see comments)   ttwb  -LM non-weight bearing status;fall precautions;oxygen therapy device and L/min  -JA     Recorded by [LM] PATTI Yeager/PIPER [JA] Darci Harris PTA [CK] Carla Triplett, MS CCC-SLP    Vital Signs    Pretreatment Heart Rate (beats/min)  67  -JA     Pre SpO2 (%)  97  -JA     O2 Delivery Pre Treatment  supplemental O2  -JA     Recorded by  [JA] Darci Harris PTA     Pain Assessment    Pain Assessment --  -LM No/denies pain  -JA No/denies pain  -CK    Pain Score 4  -LM  0  -CK    Post Pain Score 4  -LM  0  -CK    Pain Type Surgical pain  -LM      Pain Location Hip  -LM      Pain Intervention(s) Medication (See MAR)  -LM Medication (See MAR);Repositioned  -JA     Recorded by [LM] PATTI Yeager/PIPER [JA] Darci Harris PTA [CK] Carla Triplett, MS CCC-SLP    Cognitive Assessment/Intervention    Current Cognitive/Communication Assessment functional  -LM      Orientation Status oriented x 4  -LM      Follows Commands/Answers Questions able to follow multi-step instructions  -LM      Personal Safety WNL/WFL  -LM      Recorded by [LM] PATTI Yeager/L      Communication Treatment Objective and Progress    Cognitive Linguistic Treatment Objectives   Improve orientation;Improve awareness of basic personal information;Improve memory skills  -CK    Recorded by   [CK] Carla Triplett MS CCC-SLP    Improve memory skills    Improve memory skills through:   listen to a paragraph and answer questions;recall details of the day;80%;with inconsistent cues  -CK    Status: Improve memory skills    Progressing as expected  -CK    Memory Skills Progress   40%;90%;achieved target accuracy on task   recall of recent events goal achieved.  -CK    Recorded by   [CK] Carla Triplett MS CCC-SLP    Improve functional problem solving    Improve functional problem solving through:   answer questions about ADL problems;tell similarity between items;sequence steps in a task;80%;with inconsistent cues  -CK    Status: Improve functional problem solving   Progress slower than expected  -CK    Functional Problem Solving Progress   50%  -CK    Recorded by   [CK] Carla Triplett MS CCC-SLP    Transfer Assessment/Treatment    Bathtub Transfer, [place] minimum assist (75% patient effort)  -LM      Recorded by [LM] FARHAN YeagerA/L      Wheelchair Training/Management    Wheelchair, Distance Propelled  80  -JA     Wheelchair Training Comment  SBA  -JA     Recorded by  [JA] Darci Harris, RHODA     Upper Body Bathing Assessment/Training    UB Bathing Assess/Train, [place] Level supervision required  -LM      Recorded by [LM] FARHAN YeagerA/L      Lower Body Bathing Assessment/Training    LB Bathing Assess/Train, [place] Level moderate assist (50% patient effort)  -LM      Recorded by [LM] FARHAN YeagerA/L      Upper Body Dressing Assessment/Training    UB Dressing Assess/Train, [place] supervision required  -LM      Recorded by [LM] FARHAN YeagerA/L      Lower Body Dressing Assessment/Training    LB Dressing Assess/Train, [place] moderate assist (50% patient effort)  -LM      Recorded by [LM] FARHAN YeagerA/L      Grooming Assessment/Training    Grooming Assess/Train, [place] Level set up required  -LM      Recorded by [LM] FARHAN YeagerA/L      Therapy Exercises    Bilateral Lower Extremities  AROM:;20 reps;sitting;ankle pumps/circles;glut sets;hip abduction/adduction;hip flexion;knee flexion  -JA     Bilateral Upper Extremity AROM:  -LM       Recorded by [LM] PATTI Yeager/PIPER [JA] Darci Harris PTA     Positioning and Restraints    Pre-Treatment Position sitting in chair/recliner  -KENY sitting in chair/recliner  -JA     Post Treatment Position wheelchair  -LM wheelchair  -JA     In Wheelchair with family/caregiver  -LM with family/caregiver  -JA     Recorded by [LM] FARHAN YeagerA/PIPER [JA] Darci Harris PTA       05/29/17 1258 05/29/17 1050       Rehab Assessment/Intervention    Discipline physical therapy assistant  -CA occupational therapy assistant  -     Document Type therapy note (daily note)  -CA therapy note (daily note)  -     Subjective Information agree to therapy  -CA agree to therapy  -     Patient Effort, Rehab Treatment good  -CA good  -     Precautions/Limitations non-weight bearing status  -CA      Equipment Issued to Patient  dressing equipment;gait belt;raised toilet seat;front wheeled walker  -     Recorded by [CA] Ricardo Bro PTA [JH] Siomara Bob, ARMSTRONG/L     Pain Assessment    Pain Assessment No/denies pain  -CA No/denies pain  -JH     Recorded by [CA] Ricardo Bro PTA [JH] FARHAN LudwigA/L     Cognitive Assessment/Intervention    Current Cognitive/Communication Assessment functional  -CA functional  -     Orientation Status oriented to;person;place  -CA oriented x 4  -     Follows Commands/Answers Questions  75% of the time  -     Personal Safety  mild impairment  -JH     Recorded by [CA] Ricardo Bro PTA [JH] Siomara Bob, ARMSTRONG/L     Bed Mobility, Assessment/Treatment    Bed Mob, Sit to Supine, [place] minimum assist (75% patient effort)  -CA      Recorded by [CA] Ricardo Bro PTA      Transfer Assessment/Treatment    Transfers, Sit-Stand [place] moderate assist (50% patient effort)  -CA      Transfers, Stand-Sit [place] moderate assist (50% patient effort)  -CA      Transfers, Sit-Stand-Sit, Assist Device other (see comments)   Stand pivot  -CA      Recorded  by [CA] Ricardo Bro PTA      Wheelchair Training/Management    Wheelchair Propulsion Training  forward propulsion;backward propulsion;moving through doorways;turning wheelchair;carpet  -     Recorded by  [] PATTI Ludwig/L     ADL Assessment/Intervention    Additional Documentation  IADL Assess/Train, Comment (Row)  -     Recorded by  [] FARHAN LudwigA/L     Lower Body Dressing Assessment/Training    LB Dressing Assess/Train, Assist Device  dressing stick;long-handled shoe horn;reacher;sock-aid  -     LB Dressing Assess/Train, Position  sitting;standing  -     LB Dressing Assess/Train, [place]  verbal cues required;contact guard assist;minimum assist (75% patient effort)  -     LB Dressing Assess/Train, Comment  Pt practiced utilizing AE for increased independence with LB dressing    -     Recorded by  [] PATTI Ludwig/L     Therapy Exercises    Bilateral Lower Extremities AROM:;20 reps;sitting;ankle pumps/circles;hip flexion;LAQ  -CA      Recorded by [CA] Ricardo Bro PTA      Positioning and Restraints    Pre-Treatment Position sitting in chair/recliner  -CA sitting in chair/recliner  -     Post Treatment Position bed  -CA      In Bed supine;call light within reach;legs elevated;with family/caregiver  -CA      In Wheelchair  call light within reach;encouraged to call for assist;with family/caregiver  -     Recorded by [CA] Ricardo Bro PTA [] FARHAN LudwigA/L       User Key  (r) = Recorded By, (t) = Taken By, (c) = Cosigned By    Initials Name Effective Dates    EC Rema Grande, CCC-SLP 12/30/16 -      Katherine Pena, OTR/L 10/17/16 -     CK Carla Triplett, MS CCC-SLP 10/17/16 -     JA Darci Harris, PTA 10/17/16 -     CA Ricardo Bro, RHODA 10/17/16 -     RW Clay Recio, [facility] 10/17/16 -      Siomara Bob, ARMSTRONG/L 10/17/16 -     LM Genet Hicks, ARMSTRONG/L 10/17/16 -               IP SLP Goals       06/01/17 1015 05/31/17 1241 05/30/17  1102    Cognitive Linguistic- Improve Safety and Awareness    Cognitive Linguistic Improve Safety and Awareness- SLP, Date Goal Reviewed 06/01/17  -EC 05/31/17  -EC 05/30/17  -CK    Cognitive Linguistic Improve Safety and Awareness- SLP, Outcome goal met  -EC goal partially met  -EC goal partially met  -CK      05/29/17 0903 05/27/17 1125 05/26/17 1218    Safely Consume Diet    Safely Consume Diet- SLP, Date Established  05/27/17  -CK     Safely Consume Diet- SLP, Time to Achieve  by discharge  -CK     Safely Consume Diet- SLP, Additional Goal  Pt will safely tolerate recommended diet w/no overt s/s of aspiration  -CK     Safely Consume Diet- SLP, Date Goal Reviewed 05/29/17  -CK 05/27/17  -CK     Safely Consume Diet- SLP, Outcome goal met  -CK      Cognitive Linguistic- Improve Safety and Awareness    Cognitive Linguistic Improve Safety and Awareness- SLP, Activity Level   Patient will improve awareness of basic personal information for increased safety in environment;Patient will improve orientation for increased safety in environment;Patient will improve memory skills for increased safety in environment;Patient will improve functional problem solving skills for increased safety in environment  -HR    Cognitive Linguistic Improve Safety and Awareness- SLP, Date Goal Reviewed 05/29/17  -CK 05/27/17  -CK 05/26/17  -HR    Cognitive Linguistic Improve Safety and Awareness- SLP, Outcome goal partially met  -CK goal partially met  -CK goal ongoing  -HR      05/25/17 1542          Cognitive Linguistic- Improve Safety and Awareness    Cognitive Linguistic Improve Safety and Awareness- SLP, Date Established 05/25/17  -HR      Cognitive Linguistic Improve Safety and Awareness- SLP, Time to Achieve by discharge  -HR      Cognitive Linguistic Improve Safety and Awareness- SLP, Activity Level Patient will improve awareness of basic personal information for increased safety in environment;Patient will improve orientation for  increased safety in environment  -HR      Cognitive Linguistic Improve Safety and Awareness- SLP, Date Goal Reviewed 05/25/17  -HR        User Key  (r) = Recorded By, (t) = Taken By, (c) = Cosigned By    Initials Name Provider Type    PAUL Grande CCC-SLP Speech and Language Pathologist    HR Kaley Curry, MS CCC-SLP Speech and Language Pathologist    CK Carla Triplett, MS CCC-SLP Speech and Language Pathologist          EDUCATION  The patient has been educated in the following areas:   Cognitive Impairment.    SLP Recommendation and Plan              Anticipated Discharge Disposition: home                Plan of Care Review  Plan Of Care Reviewed With: patient  Progress: progress toward functional goals as expected  Outcome Summary/Follow up Plan: pt is much more alert from inital eval.  pt has met all goals and returned to baseline cognition.  pt continues to need assist for physical needs.  d/c skilled ST this date.              Time Calculation:         Time Calculation- SLP       06/01/17 1016          Time Calculation- SLP    SLP Start Time 0905  -EC      SLP Stop Time 1000  -EC      SLP Time Calculation (min) 55 min  -EC      Total Timed Code Minutes- SLP 55 minute(s)  -EC      SLP Received On 06/01/17  -EC        User Key  (r) = Recorded By, (t) = Taken By, (c) = Cosigned By    Initials Name Provider Type    PAUL Grande CCC-SLP Speech and Language Pathologist          Therapy Charges for Today     Code Description Service Date Service Provider Modifiers Qty    39802378183 HC ST TREATMENT SPEECH 3 5/31/2017 GWEN GutierrezSLP GN 1    [id number] HC ST TREATMENT SPEECH 4 6/1/2017 REJI Gutierrez GN 1               SLP Discharge Summary  Anticipated Discharge Disposition: home  Reason for Discharge: At baseline function  Outcomes Achieved: Able to achieve all goals within established timeline  Discharge Destination: other (comment)    Rema Grande  CCC-SLP  6/1/2017

## 2017-06-01 NOTE — THERAPY TREATMENT NOTE
Inpatient Rehabilitation - Physical Therapy Treatment Note  [facility]     Patient Name: Mariela Woodson  : 1925  MRN: [id number]  Today's Date: 2017  Onset of Illness/Injury or Date of Surgery Date: 17  Date of Referral to PT: 17  Referring Physician: Dr. Aparicio    Admit Date: 2017    Visit Dx:    ICD-10-CM ICD-9-CM   1. Impaired mobility and activities of daily living Z74.09 799.89   2. Abnormality of gait and mobility R26.9 781.2   3. Muscle weakness (generalized) M62.81 728.87     Patient Active Problem List   Diagnosis   • Iron deficiency anemia due to chronic blood loss   • Post-polio syndrome   • Simple chronic bronchitis   • Seizure disorder   • Closed left hip fracture   • Iron deficiency anemia   • Post-polio syndrome   • Seizure disorder   • Metabolic encephalopathy   • COPD (chronic obstructive pulmonary disease)   • Encounter for rehabilitation               Adult Rehabilitation Note       17 1435 17 1005 17 0905    Rehab Assessment/Intervention    Discipline physical therapy assistant  -RW physical therapy assistant  -RW speech language pathologist  -EC    Document Type therapy note (daily note)  -RW therapy note (daily note)  -RW therapy note (daily note);discharge summary  -EC    Subjective Information agree to therapy  -RW agree to therapy  -RW no complaints;agree to therapy  -EC    Patient Effort, Rehab Treatment good  -RW good  -RW excellent  -EC    Recorded by [RW] Clay Recio PTA [RW] Clay Recio, PTA [EC] Rema Grande CCC-SLP    Pain Assessment    Pain Assessment No/denies pain  -RW No/denies pain  -RW No/denies pain  -EC    Recorded by [RW] Clay Recio PTA [RW] Clay Recio PTA [EC] Rema Grande CCC-SLP    Cognitive Assessment/Intervention    Current Cognitive/Communication Assessment functional  -RW functional  -RW     Orientation Status oriented x 4  -RW oriented x 4  -RW     Follows Commands/Answers Questions  100% of the time  -% of the time  -RW     Personal Safety Interventions gait belt;nonskid shoes/slippers when out of bed  -RW gait belt;nonskid shoes/slippers when out of bed  -RW     Recorded by [RW] Clay Recio PTA [RW] Clay Recio PTA     Communication Treatment Objective and Progress    Cognitive Linguistic Treatment Objectives   Improve orientation;Improve awareness of basic personal information;Improve memory skills  -EC    Recorded by   [EC] REJI Gutierrez    Improve memory skills    Improve memory skills through:   listen to a paragraph and answer questions;80%;without cues  -EC    Status: Improve memory skills   Achieved  -EC    Memory Skills Progress   80%  -EC    Recorded by   [EC] REJI Gutierrez    Improve functional problem solving    Improve functional problem solving through:   tell similarity between items;sequence steps in a task;complete organization/home management task;80%;with inconsistent cues  -EC    Status: Improve functional problem solving   Achieved  -EC    Functional Problem Solving Progress   90%;with inconsistent cues  -EC    Recorded by   [EC] REJI Gutierrez    Mobility Assessment/Training    Left Lower Extremity Weight-Bearing touch down weight-bearing  -RW touch down weight-bearing  -RW     Recorded by [RW] Clay Recio PTA [RW] Clay Recio PTA     Bed Mobility, Assessment/Treatment    Bed Mobility, Assistive Device bed rails;head of bed elevated  -RW --  -RW     Bed Mob, Supine to Sit, [place] supervision required;contact guard assist  -RW      Bed Mob, Sit to Supine, [place] minimum assist (75% patient effort)  -RW --  -RW     Recorded by [RW] Clay Recio PTA [RW] Clay Recio PTA     Transfer Assessment/Treatment    Transfers, Bed-Chair [place] minimum assist (75% patient effort)  -RW      Transfers, Chair-Bed [place] minimum assist (75% patient effort)  -RW --  -RW     Transfers, Bed-Chair-Bed,  Assist Device rolling walker  -RW --  -RW     Transfers, Sit-Stand [place] minimum assist (75% patient effort)  -RW minimum assist (75% patient effort)  -RW     Transfers, Stand-Sit [place] minimum assist (75% patient effort)  -RW minimum assist (75% patient effort)  -RW     Transfers, Sit-Stand-Sit, Assist Device rolling walker  -RW rolling walker  -RW     Recorded by [RW] Clay Recio PTA [RW] Clay Recio PTA     Gait Assessment/Treatment    Gait, [place] Level minimum assist (75% patient effort)  -RW minimum assist (75% patient effort)  -RW     Gait, Assistive Device rolling walker  -RW rolling walker  -RW     Gait, Distance (Feet) 4  -RW 6  -RW     Gait, Maintain Weight Bearing Status assist to maintain weight bearing status;cues to maintain weight bearing status  -RW assist to maintain weight bearing status;cues to maintain weight bearing status  -RW     Recorded by [RW] Clay Recio PTA [RW] Clay Recio PTA     Therapy Exercises    Left Lower Extremity --  -RW AAROM:;10 reps;standing;hip flexion  -RW     Bilateral Lower Extremities AROM:;ankle pumps/circles;20 reps;glut sets;LAQ;knee flexion;supine;heel slides   2 sets  -RW AROM:;ankle pumps/circles;20 reps;sidelying;glut sets;hip abduction/adduction;LAQ;knee flexion   2 sets  -RW     Recorded by [RW] Clay Recio PTA [RW] Clay Recio PTA     Positioning and Restraints    Pre-Treatment Position sitting in chair/recliner  -RW sitting in chair/recliner  -RW     Post Treatment Position bed  -RW wheelchair  -RW     In Bed call light within reach;with family/caregiver;legs elevated  -RW      In Wheelchair  with family/caregiver  -RW     Recorded by [RW] Clay Recio PTA [RW] Clay Recio PTA       06/01/17 0755 05/31/17 1445 05/31/17 1340    Rehab Assessment/Intervention    Discipline occupational therapy assistant  -ANDRÉS physical therapy assistant  -RW occupational therapy assistant  -LM    Document Type therapy note (daily  note)  -JH therapy note (daily note)  -RW therapy note (daily note)  -LM    Subjective Information agree to therapy;complains of;pain;swelling  -JH agree to therapy  -RW agree to therapy  -LM    Patient Effort, Rehab Treatment adequate  -JH good  -RW     Recorded by [] PATTI Ludwig/PIPER [RW] Clay Recio PTA [LM] FARHAN YeagerA/L    Vital Signs    Pre Systolic BP Rehab 144  -JH      Pre Treatment Diastolic BP 66  -JH      Pretreatment Heart Rate (beats/min) 66  -JH      Pre SpO2 (%) 92  -JH      O2 Delivery Pre Treatment room air  -      Intra SpO2 (%) 90  -JH      O2 Delivery Intra Treatment room air  -      Post SpO2 (%) 93  -JH      O2 Delivery Post Treatment room air  -      Recorded by [] PATTI Ludwig/L      Pain Assessment    Pain Assessment 0-10  - No/denies pain  -RW     Pain Score 6  -JH  0  -LM    Post Pain Score 6  -JH      Pain Type Surgical pain  -      Pain Location Hip  -      Pain Orientation Left  -      Pain Intervention(s) Medication (See MAR);Repositioned  -JH      Recorded by [] PATTI Ludwig/PIPER [RW] Clay Recio PTA [LM] FARHAN YeagerA/L    Cognitive Assessment/Intervention    Current Cognitive/Communication Assessment functional  - functional  -RW     Orientation Status oriented to;person;place;required verbal cueing (specifiy in comments)   weight bearing, and safety  - oriented x 4  -RW     Follows Commands/Answers Questions 75% of the time  - 100% of the time  -RW     Personal Safety Interventions  gait belt;nonskid shoes/slippers when out of bed  -RW     Recorded by [] PATTI Ludwig/PIPER [RW] Clay Recio PTA     Mobility Assessment/Training    Left Lower Extremity Weight-Bearing  touch down weight-bearing  -RW     Recorded by  [RW] Clay Recio PTA     Bed Mobility, Assessment/Treatment    Bed Mobility, Assistive Device  head of bed elevated  -RW     Bed Mob, Sit to Supine, [place]  minimum assist (75%  patient effort)  -     Recorded by  [RW] Clay Rceio PTA     Transfer Assessment/Treatment    Transfers, Bed-Chair [place] moderate assist (50% patient effort);1 person + 1 person to manage equipment  -      Transfers, Chair-Bed [place]  minimum assist (75% patient effort)  -     Transfers, Bed-Chair-Bed, Assist Device  rolling walker  -     Transfers, Sit-Stand [place] minimum assist (75% patient effort);moderate assist (50% patient effort);1 person + 1 person to manage equipment  - minimum assist (75% patient effort)  -     Transfers, Stand-Sit [place] verbal cues required;minimum assist (75% patient effort)  - minimum assist (75% patient effort)  -     Transfers, Sit-Stand-Sit, Assist Device elevated surface;rolling walker  - rolling walker  -RW     Recorded by [] PATTI Ludwig/PIPER [RW] Clay Recio PTA     Functional Mobility    Functional Mobility- [place] Level minimum assist (75% patient effort);moderate assist (50% patient effort);1 person + 1 person to manage equipment  -      Functional Mobility- Device rolling walker  -      Functional Mobility-Maintain WBing unable to maintain weight bearing status;cues to maintain weight bearing status  -      Recorded by [] PATTI Ludwig/L      Wheelchair Training/Management    Wheelchair Propulsion Training forward propulsion;backward propulsion;moving through doorways;carpet  -      Wheelchair, Distance Propelled 75  -JH  --   50  -LM    Recorded by [] SAURAV Ludwig  [LM] PATTI Yeager/L    Upper Body Bathing Assessment/Training    UB Bathing Assess/Train, Comment Family/pt state they have taken care of her bath   -      Recorded by [] PATTI Ludwig/L      Lower Body Dressing Assessment/Training    LB Dressing Assess/Train, Clothing Type doffing:;donning:;shoes;socks  -      LB Dressing Assess/Train, [place] dependent (less than 25% patient effort)  -       Recorded by [] SAURAV Ludwig      Balance Skills Training    Standing-Level of Assistance Close supervision;Contact guard  -      Static Standing Balance Support assistive device  -      Standing Balance # of Minutes 3min, VC's for WB into arms and RLE  -      Recorded by [] SAURAV Ludwig      Therapy Exercises    Bilateral Lower Extremities  AROM:;ankle pumps/circles;AAROM:;20 reps;supine;heel slides;quad sets   2 sets  -     Bilateral Upper Extremity   AROM:;20 reps   level 1 tband all diana planes   -    BUE Resistance   manual resistance- minimal   uee bike x 15 min   -LM    Recorded by  [RW] Clay Recio PTA [LM] SAURAV Yeager    Positioning and Restraints    Pre-Treatment Position in bed  - sitting in chair/recliner  - sitting in chair/recliner  -    Post Treatment Position wheelchair  - bed  -RW chair  -LM    In Bed  side rails up x2;legs elevated;heels elevated;with family/caregiver  -RW     In Wheelchair with SLP  -      Recorded by [] SAURAV Ludwig [RW] Clay Recio PTA [LM] SAURAV Yeager      05/31/17 1130 05/31/17 1050 05/31/17 0950    Rehab Assessment/Intervention    Discipline occupational therapy assistant  -LM speech language pathologist  -EC physical therapy assistant  -RW    Document Type therapy note (daily note)  -LM therapy note (daily note)  -EC therapy note (daily note)  -RW    Subjective Information agree to therapy  -LM agree to therapy  -EC agree to therapy  -RW    Recorded by [LM] SAURAV Yeager [EC] Rema Grande CCC-SLP [RW] Clay Recio PTA    Pain Assessment    Pain Assessment  No/denies pain  -EC No/denies pain  -RW    Pain Score 0  -LM      Post Pain Score 0  -LM      Recorded by [LM] SAURAV Yeager [EC] Rema Grande CCC-SLP [RW] Clay Recio PTA    Communication Treatment Objective and Progress    Cognitive Linguistic Treatment Objectives  Improve  orientation;Improve awareness of basic personal information;Improve memory skills  -EC     Recorded by  [EC] GWEN GutierrezSLP     Improve memory skills    Improve memory skills through:  listen to a paragraph and answer questions;80%;without cues  -EC     Status: Improve memory skills  Progressing as expected  -EC     Memory Skills Progress  continue to address  -EC     Recorded by  [EC] REJI Gutierrez     Improve functional problem solving    Improve functional problem solving through:  tell similarity between items;sequence steps in a task;complete organization/home management task;80%;with inconsistent cues  -EC     Status: Improve functional problem solving  Progressing as expected  -EC     Functional Problem Solving Progress  90%;with inconsistent cues  -EC     Recorded by  [EC] REJI Gutierrez     Mobility Assessment/Training    Left Lower Extremity Weight-Bearing   touch down weight-bearing  -RW    Recorded by   [RW] Clay Recio PTA    Transfer Assessment/Treatment    Transfers, Bed-Chair [place]   minimum assist (75% patient effort)  -RW    Transfers, Chair-Bed [place]   minimum assist (75% patient effort)  -RW    Transfers, Bed-Chair-Bed, Assist Device   rolling walker  -RW    Transfers, Sit-Stand [place]   minimum assist (75% patient effort)  -RW    Transfers, Stand-Sit [place]   minimum assist (75% patient effort)  -RW    Transfers, Sit-Stand-Sit, Assist Device   rolling walker  -RW    Recorded by   [RW] Clay Recio PTA    Gait Assessment/Treatment    Gait, [place] Level   minimum assist (75% patient effort)  -RW    Gait, Assistive Device   rolling walker  -RW    Gait, Distance (Feet)   3  -RW    Recorded by   [RW] Clay Recio PTA    Wheelchair Training/Management    Wheelchair, Distance Propelled 20-50  -LM  100  -RW    Wheelchair Training Comment   sba  -RW    Recorded by [LM] PATTI Yeager/L  [RW] Clay Recio PTA     Therapy Exercises    Bilateral Lower Extremities   AROM:;sitting;ankle pumps/circles;glut sets;hip abduction/adduction;knee flexion;LAQ  -RW    Bilateral Upper Extremity elbow flexion/extension;shoulder abduction/adduction;shoulder extension/flexion;shoulder horizontal abd/add;shoulder rolls/shrugs   UEE bike x 10 min also 2 lb wt   -LM      BUE Resistance manual resistance- minimal  -LM      Recorded by [LM] PATTI Yeager/L  [RW] Clay Recio PTA    Positioning and Restraints    Pre-Treatment Position in bed  -LM  sitting in chair/recliner  -RW    Post Treatment Position wheelchair  -LM  wheelchair  -RW    In Wheelchair   with SLP  -RW    Recorded by [LM] PATTI Yeager/L  [RW] Clay Recio PTA      05/31/17 0924 05/30/17 1525 05/30/17 1333    Rehab Assessment/Intervention    Discipline --  -RW physical therapy assistant  -JA occupational therapist  -    Document Type --  -RW therapy note (daily note)  -JA therapy note (daily note)  -    Subjective Information --  -RW agree to therapy;complains of  -JA agree to therapy;complains of;pain  -    Patient Effort, Rehab Treatment  good  - good  -    Precautions/Limitations  fall precautions;oxygen therapy device and L/min;non-weight bearing status  - fall precautions;oxygen therapy device and L/min;non-weight bearing status   toe touch weight LLE, no BP on L  -BH    Recorded by [RW] Clay Recio PTA [JA] Darci Harris PTA [] Katherine Pena OTR/L    Vital Signs    Pretreatment Heart Rate (beats/min)   68  -BH    Posttreatment Heart Rate (beats/min)   74  -BH    Pre SpO2 (%)   95  -BH    O2 Delivery Pre Treatment   supplemental O2  -BH    Post SpO2 (%)   99  -BH    O2 Delivery Post Treatment   supplemental O2  -BH    Pre Patient Position   Sitting  -BH    Post Patient Position   Supine  -    Recorded by   [] Katherine Pena OTR/L    Pain Assessment    Pain Assessment  No/denies pain  -     Pain Score   6  -BH    Post  Pain Score   5  -    Pain Location   Hip  -    Pain Intervention(s)  Medication (See MAR);Repositioned  - Medication (See MAR);Repositioned;Ambulation/increased activity   RN aware, applied pain patch  -    Recorded by  [] Darci Harris PTA [] Katherine Pena OTR/L    Cognitive Assessment/Intervention    Current Cognitive/Communication Assessment   functional  -    Orientation Status   oriented x 4  -    Follows Commands/Answers Questions   75% of the time;able to follow single-step instructions;needs cueing;needs increased time;needs repetition  -    Personal Safety   mild impairment  -    Personal Safety Interventions   fall prevention program maintained;gait belt;muscle strengthening facilitated;nonskid shoes/slippers when out of bed;supervised activity  -    Recorded by   [] Katherine Pena OTR/L    Mobility Assessment/Training    Extremity Weight-Bearing Status  left lower extremity  - left lower extremity  -    Left Lower Extremity Weight-Bearing --  -MAURICIO touch down weight-bearing  -RUIZ toe touch weight-bearing  -    Recorded by [] Clay Recio PTA [] Darci Harris PTA [] Katherine Pena OTR/L    Bed Mobility, Assessment/Treatment    Bed Mob, Sit to Supine, [place] --  -RW moderate assist (50% patient effort)  - moderate assist (50% patient effort)  -    Bed Mobility, Comment   Pt sit to sup with bed flat with max assist with LB then min assist with trunk, pt used BUE to pull on handrails to reposition her hips.   -    Recorded by [] Clay Recio PTA [] Darci Harris PTA [] Katherine Pena OTR/L    Transfer Assessment/Treatment    Transfers, Bed-Chair [place] --  -RW moderate assist (50% patient effort)  -RUIZ     Transfers, Chair-Bed [place]   moderate assist (50% patient effort);verbal cues required  -    Transfers, Bed-Chair-Bed, Assist Device --  -MAURICIO rolling walker  -RUIZ rolling walker  -    Transfers, Sit-Stand  [place] --  - minimum assist (75% patient effort)  - moderate assist (50% patient effort)  -    Transfers, Stand-Sit [place] --  - minimum assist (75% patient effort)  - minimum assist (75% patient effort);moderate assist (50% patient effort)  -    Transfers, Sit-Stand-Sit, Assist Device --  - rolling walker  -     Toilet Transfer, [place]   minimum assist (75% patient effort);moderate assist (50% patient effort);2 person assist required  -    Toilet Transfer, Assistive Device   --   grab bars, 2 people for safety  -    Transfer, Maintain Weight Bearing Status  cues to maintain weight bearing status;assist to maintain weight bearing status;unable to maintain weight bearing status  - unable to maintain weight bearing status  -    Transfer, Safety Issues   impulsivity;weight-shifting ability decreased;step length decreased;sequencing ability decreased;balance decreased during turns  -    Transfer, Impairments   ROM decreased;strength decreased;impaired balance;coordination impaired;motor control impaired;pain  -    Transfer, Comment   Pt requested to go to the restroom when OT entered then wanted to get back to bed. Pt required 2 people for toilet t/f for safety, pt struggled with many vc for weight bear status and shifting weight through arms and following to safely get back and forth with toilet. One person assist for bed with one on stand by but struggled with weight bear and safety and arm placement.   -BH    Recorded by [RW] Clay Recio, PTA [] Darci Harris PTA [] Katherine Pena, OTR/L    Functional Mobility    Functional Mobility- Comment   Pt struggled with stand pivot from w/c to toilet or w/c to bed 1 to 2 people required for safety.   -BH    Recorded by   [] Katherine Pena, OTR/L    Toileting Assessment/Training    Toileting Assess/Train, Comment   Pt required one person to assist with standing and another to assit with pulling pants up and down for  toileting (pt dependent, and pt dependent for pericare cleaning. Pt did assist with standing for tasks.   -    Recorded by   [] ARVIND Garcia/L    Motor Skills/Interventions    Additional Documentation   Balance Skills Training (Group)  -    Recorded by   [BH] ARVIND Garcia/L    Balance Skills Training    Standing-Level of Assistance   Minimum assistance;Moderate assistance;x2   1-2 people  -    Recorded by   [BH] ARVIND Garcia/L    Therapy Exercises    Right Lower Extremity AROM:;20 reps;supine;heel slides;hip abduction/adduction  -RW AROM:;20 reps;supine;heel slides;hip abduction/adduction  -JA     Left Lower Extremity AAROM:;20 reps;supine;heel slides;hip abduction/adduction  -RW AAROM:;20 reps;supine;heel slides;hip abduction/adduction  -JA     Bilateral Lower Extremities AROM:;20 reps;supine;ankle pumps/circles;glut sets;quad sets  -RW AROM:;20 reps;supine;ankle pumps/circles;glut sets;quad sets  -     Recorded by [RW] Clay Recio PTA [JA] Darci Harris PTA     Positioning and Restraints    Pre-Treatment Position  sitting in chair/recliner  -JA other (comment)   in w/c  -    Post Treatment Position  bed  - bed  -    In Bed  heels elevated  - supine;notified nsg;call light within reach;encouraged to call for assist;with family/caregiver  -    Recorded by  [JA] Darci Harris PTA [] ARVIND Garcia/PIPER      05/30/17 1045 05/30/17 0915 05/30/17 0758    Rehab Assessment/Intervention    Discipline occupational therapy assistant  -LM physical therapy assistant  -JA speech language pathologist  -CK    Document Type therapy note (daily note)  -LM therapy note (daily note)  -JA therapy note (daily note)  -CK    Subjective Information agree to therapy  -LM agree to therapy  -JA agree to therapy  -CK    Patient Effort, Rehab Treatment good  -LM good  -JA good  -CK    Precautions/Limitations fall precautions;oxygen therapy device and L/min;other (see comments)   ttwb   -LM non-weight bearing status;fall precautions;oxygen therapy device and L/min  -JA     Recorded by [LM] PATTI Yeager/PIPER [JA] Darci Harris PTA [CK] Carla Triplett, MS CCC-SLP    Vital Signs    Pretreatment Heart Rate (beats/min)  67  -JA     Pre SpO2 (%)  97  -JA     O2 Delivery Pre Treatment  supplemental O2  -JA     Recorded by  [JA] Darci Harris PTA     Pain Assessment    Pain Assessment --  -LM No/denies pain  -JA No/denies pain  -CK    Pain Score 4  -LM  0  -CK    Post Pain Score 4  -LM  0  -CK    Pain Type Surgical pain  -LM      Pain Location Hip  -LM      Pain Intervention(s) Medication (See MAR)  -LM Medication (See MAR);Repositioned  -JA     Recorded by [LM] PATTI Yeager/PIPER [JA] Darci Harris PTA [CK] Carla Triplett, MS CCC-SLP    Cognitive Assessment/Intervention    Current Cognitive/Communication Assessment functional  -LM      Orientation Status oriented x 4  -LM      Follows Commands/Answers Questions able to follow multi-step instructions  -LM      Personal Safety WNL/WFL  -LM      Recorded by [LM] PATTI Yeager/L      Communication Treatment Objective and Progress    Cognitive Linguistic Treatment Objectives   Improve orientation;Improve awareness of basic personal information;Improve memory skills  -CK    Recorded by   [CK] Carla Triplett MS CCC-SLP    Improve memory skills    Improve memory skills through:   listen to a paragraph and answer questions;recall details of the day;80%;with inconsistent cues  -CK    Status: Improve memory skills   Progressing as expected  -CK    Memory Skills Progress   40%;90%;achieved target accuracy on task   recall of recent events goal achieved.  -CK    Recorded by   [CK] Carla Triplett MS KWASI-SLP    Improve functional problem solving    Improve functional problem solving through:   answer questions about ADL problems;tell similarity between items;sequence steps in a task;80%;with inconsistent cues   -CK    Status: Improve functional problem solving   Progress slower than expected  -CK    Functional Problem Solving Progress   50%  -CK    Recorded by   [CK] Carla Triplett MS CCC-SLP    Transfer Assessment/Treatment    Bathtub Transfer, [place] minimum assist (75% patient effort)  -LM      Recorded by [LM] FARHAN YeagerA/L      Wheelchair Training/Management    Wheelchair, Distance Propelled  80  -JA     Wheelchair Training Comment  SBA  -JA     Recorded by  [JA] Darci Harris PTA     Upper Body Bathing Assessment/Training    UB Bathing Assess/Train, [place] Level supervision required  -LM      Recorded by [LM] FARHAN YeagerA/L      Lower Body Bathing Assessment/Training    LB Bathing Assess/Train, [place] Level moderate assist (50% patient effort)  -LM      Recorded by [LM] FARHAN YeagerA/L      Upper Body Dressing Assessment/Training    UB Dressing Assess/Train, [place] supervision required  -LM      Recorded by [LM] FARHAN YeagerA/L      Lower Body Dressing Assessment/Training    LB Dressing Assess/Train, [place] moderate assist (50% patient effort)  -LM      Recorded by [LM] FARHAN YeagerA/L      Grooming Assessment/Training    Grooming Assess/Train, [place] Level set up required  -LM      Recorded by [LM] FARHAN YeagerA/L      Therapy Exercises    Bilateral Lower Extremities  AROM:;20 reps;sitting;ankle pumps/circles;glut sets;hip abduction/adduction;hip flexion;knee flexion  -JA     Bilateral Upper Extremity AROM:  -LM      Recorded by [LM] PATTI Yeager/PIPER [JA] Darci Harris PTA     Positioning and Restraints    Pre-Treatment Position sitting in chair/recliner  -LM sitting in chair/recliner  -JA     Post Treatment Position wheelchair  -LM wheelchair  -JA     In Wheelchair with family/caregiver  -LM with family/caregiver  -JA     Recorded by [LM] PATTI Yeager/PIPER [JA] Darci Harris PTA        User Key  (r) = Recorded By, (t) = Taken By, (c) = Cosigned By    Initials Name Effective Dates    EC Rema RALEIGH Grande, CCC-SLP 12/30/16 -     BH Katherine Pena, OTR/L 10/17/16 -     CK Carla Triplett, MS CCC-SLP 10/17/16 -     JA Darci Harris, PTA 10/17/16 -     RW Clay Recio, PTA 10/17/16 -     JH Siomara Bob, ARMSTRONG/L 10/17/16 -     LM Genet Hicks, ARMSTRONG/L 10/17/16 -                 IP PT Goals       06/01/17 1612 05/31/17 1554 05/30/17 1525    Bed Mobility PT LTG    Bed Mobility PT LTG, Date Goal Reviewed 06/01/17  -RW 05/31/17  -RW 05/30/17  -JA    Bed Mobility PT LTG, Outcome goal ongoing  -RW goal ongoing  -RW goal ongoing  -JA    Transfer Training PT STG    Transfer Training PT STG, Date Goal Reviewed   05/30/17  -JA    Transfer Training PT STG, Outcome   goal met  -JA    Transfer Training PT LTG    Transfer Training PT  LTG, Date Goal Reviewed 06/01/17  -RW 05/31/17  -RW 05/30/17  -JA    Transfer Training PT LTG, Outcome goal met  -RW  goal ongoing  -JA    Wheelchair Propulsion PT LTG    Wheelchair Propulsion Goal PT LTG, Date Goal Reviewed   05/30/17  -JA    Wheelchair Propulsion Goal PT LTG, Outcome   goal met  -JA      05/29/17 1337 05/27/17 1544 05/26/17 1300    Bed Mobility PT LTG    Bed Mobility PT LTG, Date Goal Reviewed 05/29/17  -CA 05/27/17  -RW 05/26/17  -GIOVANA    Bed Mobility PT LTG, Outcome goal ongoing  -CA goal ongoing  -RW goal ongoing  -GIOVANA    Transfer Training PT STG    Transfer Training PT STG, Date Goal Reviewed 05/29/17  -CA 05/27/17  -RW 05/26/17  -GIOVANA    Transfer Training PT STG, Outcome goal ongoing  -CA goal ongoing  -RW goal ongoing  -GIOVANA    Transfer Training PT LTG    Transfer Training PT  LTG, Date Goal Reviewed 05/29/17  -CA 05/27/17  -RW 05/26/17  -GIOVANA    Transfer Training PT LTG, Outcome goal ongoing  -CA goal ongoing  -RW goal ongoing  -GIOVANA    Wheelchair Propulsion PT LTG    Wheelchair Propulsion Goal PT LTG, Date Goal Reviewed 05/29/17  -CA 05/27/17  -RW  05/26/17  -GIOVANA    Wheelchair Propulsion Goal PT LTG, Outcome goal ongoing  -CA goal ongoing  -RW goal ongoing  -GIOVANA      05/26/17 1033 05/25/17 1518 05/25/17 1033    Bed Mobility PT LTG    Bed Mobility PT LTG, Date Established   05/25/17  -LM    Bed Mobility PT LTG, Time to Achieve   by discharge  -LM    Bed Mobility PT LTG, Activity Type   supine to sit/sit to supine  -LM    Bed Mobility PT LTG, [place] Level   minimum assist (75% patient effort)  -LM    Bed Mobility PT Goal  LTG, Assist Device   bed rails  -LM    Bed Mobility PT LTG, Date Goal Reviewed 05/26/17  -GIOVANA (P)  05/25/17  -RW     Bed Mobility PT LTG, Outcome goal ongoing  -GIOVANA (P)  goal ongoing  -RW goal ongoing  -LM    Transfer Training PT STG    Transfer Training PT STG, Date Established   05/25/17  -LM    Transfer Training PT STG, Time to Achieve   5 days  -LM    Transfer Training PT STG, Activity Type   sit to stand/stand to sit  -LM    Transfer Training PT STG, [place] Level   moderate assist (50% patient effort)  -LM    Transfer Training PT STG, Assist Device   --   AAD  -LM    Transfer Training PT STG, Additional Goal   Maintaining TTWB LLE  -LM    Transfer Training PT STG, Date Goal Reviewed 05/26/17  -GIOVANA (P)  05/25/17  -RW     Transfer Training PT STG, Outcome goal ongoing  -GIOVANA (P)  goal ongoing  -RW goal ongoing  -LM    Transfer Training PT LTG    Transfer Training PT LTG, Date Established   05/25/17  -LM    Transfer Training PT LTG, Time to Achieve   by discharge  -LM    Transfer Training PT LTG, Activity Type   bed to chair /chair to bed  -LM    Transfer Training PT LTG, [place] Level   minimum assist (75% patient effort)  -LM    Transfer Training PT LTG, Assist Device   --   AAD  -LM    Transfer Training PT LTG, Additional Goal   Maintaining TTWB LLE  -LM    Transfer Training PT  LTG, Date Goal Reviewed 05/26/17  -GIOVANA (P)  05/25/17  -RW     Transfer Training PT LTG, Outcome goal ongoing  -GIOVANA (P)  goal ongoing  -RW goal ongoing  -LM     Wheelchair Propulsion PT LTG    Wheelchair Propulsion Goal PT LTG, Date Established   05/25/17  -LM    Wheelchair Propulsion Goal PT LTG, Time to Achieve   by discharge  -LM    Wheelchair Propulsion Goal PT LTG, [place] Level   contact guard assist  -LM    Wheelchair Propulsion Goal PT LTG, Distance to Achieve   50 feet  -LM    Wheelchair Propulsion Goal PT LTG, Date Goal Reviewed 05/26/17  -GIOVANA      Wheelchair Propulsion Goal PT LTG, Outcome goal ongoing  -GIOVANA (P)  goal ongoing  -RW goal ongoing  -LM      05/23/17 1338 05/23/17 0922       Bed Mobility PT STG    Bed Mobility PT STG, Date Established  05/23/17  -CB     Bed Mobility PT STG, Time to Achieve  by discharge  -CB     Bed Mobility PT STG, Activity Type  supine to sit/sit to supine  -CB     Bed Mobility PT STG, [place] Level  supervision required  -CB     Bed Mobility PT STG, Additional Goal  HOB down and no rails  -CB     Bed Mobility PT STG, Date Goal Reviewed 05/23/17  -CATALINO      Bed Mobility PT STG, Outcome goal ongoing  -CATALINO      Transfer Training PT STG    Transfer Training PT STG, Date Established  05/23/17  -CB     Transfer Training PT STG, Time to Achieve  2 - 3 days  -CB     Transfer Training PT STG, Activity Type  bed to chair /chair to bed;sit to stand/stand to sit  -CB     Transfer Training PT STG, [place] Level  contact guard assist  -CB     Transfer Training PT STG, Assist Device  walker, rolling  -CB     Transfer Training PT STG, Date Goal Reviewed 05/23/17  -CATALINO      Transfer Training PT STG, Outcome goal ongoing  -CATALINO      Gait Training PT LTG    Gait Training Goal PT LTG, Date Established  05/23/17  -CB     Gait Training Goal PT LTG, Time to Achieve  by discharge  -CB     Gait Training Goal PT LTG, [place] Level  contact guard assist  -CB     Gait Training Goal PT LTG, Assist Device  walker, rolling  -CB     Gait Training Goal PT LTG, Distance to Achieve  50 feet  -CB     Gait Training Goal PT LTG, Date Goal Reviewed  05/23/17  -      Gait Training Goal PT LTG, Outcome goal ongoing  -CATALINO      Stair Training PT LTG    Stair Training Goal PT LTG, Date Established  05/23/17  -CB     Stair Training Goal PT LTG, Time to Achieve  by discharge  -CB     Stair Training Goal PT LTG, Number of Steps  1  -CB     Stair Training Goal PT LTG, [place] Level  contact guard assist  -CB     Stair Training Goal PT LTG, Date Goal Reviewed 05/23/17  -CATALINO      Stair Training Goal PT LTG, Outcome goal ongoing  -CATALINO      Strength Goal PT LTG    Strength Goal PT LTG, Date Established  05/23/17  -CB     Strength Goal PT LTG, Time to Achieve  by discharge  -CB     Strength Goal PT LTG, Measure to Achieve  15 reps all ex BLE independently  -CB     Strength Goal PT LTG, Functional Goal  able to get legs back up onto bed independently  -CB     Strength Goal PT LTG, Date Goal Reviewed 05/23/17  -        User Key  (r) = Recorded By, (t) = Taken By, (c) = Cosigned By    Initials Name Provider Type    LM Kelly Mahmood, PT Physical Therapist    CB Meg Tomlin, PT Physical Therapist    JA Darci Harris, PTA Physical Therapy Assistant    NILDA Bro, PTA Physical Therapy Assistant    CATALINO Wei, PTA Physical Therapy Assistant    GIOVANA Desouza, PTA Physical Therapy Assistant    RW Clay Recio, PTA Physical Therapy Assistant          Physical Therapy Education     Title: PT OT SLP Therapies (Active)     Topic: Physical Therapy (Active)     Point: Mobility training (Done)    Learning Progress Summary    Learner Readiness Method Response Comment Documented by Status   Patient Acceptance E NR,VU Discussed with family regarding definition TTWB at L with mobility & transfers.  05/30/17 1036 Done    Acceptance E,TB,D NR Rienforced TTWB & benefits of mobility  05/29/17 1018 Active   Family Acceptance E NR,VU Discussed with family regarding definition TTWB at L with mobility & transfers.  05/30/17 1036 Done               Point:  Precautions (Done)    Learning Progress Summary    Learner Readiness Method Response Comment Documented by Status   Patient Acceptance E VU reviewed ttwb  06/01/17 1112 Done    Acceptance E VU reviewed ttwb with gt and transfers. talked with lita about her moms ability to care for pt at home.  05/31/17 1253 Done    Acceptance E NR,VU Discussed with family regarding definition TTWB at L with mobility & transfers.  05/30/17 1036 Done    Acceptance E,TB,D NR Rienforced TTWB & benefits of mobility  05/29/17 1018 Active    Acceptance E NR reviewed ttwb but pt unable to maintain if asked to mobilize.  05/27/17 1152 Active    Acceptance E NR pt edu on TTWB and how to maintain TTWB.  05/26/17 1245 Active    Acceptance E NR Educated patient's family on precautions re: TTWB LLE  05/25/17 1446 Active   Family Acceptance E VU reviewed ttwb with gt and transfers. talked with lita about her moms ability to care for pt at home.  05/31/17 1253 Done    Acceptance E NR,VU Discussed with family regarding definition TTWB at L with mobility & transfers.  05/30/17 1036 Done    Acceptance E NR Educated patient's family on precautions re: TTWB LLE  05/25/17 1446 Active                      User Key     Initials Effective Dates Name Provider Type Discipline     06/15/16 -  Kelly Mahmood, PT Physical Therapist PT     10/17/16 -  Darci Harris, PTA Physical Therapy Assistant PT     10/17/16 -  Juan Desouza, PTA Physical Therapy Assistant PT     10/17/16 -  Clay Recio, PTA Physical Therapy Assistant PT                    PT Recommendation and Plan  Anticipated Discharge Disposition: skilled nursing facility, home with assist, home with home health (SNF vs. home with 24/7 if available and HH)  Planned Therapy Interventions: balance training, bed mobility training, gait training, home exercise program, motor coordination training, neuromuscular re-education, patient/family education,  postural re-education, ROM (Range of Motion), stair training, strengthening, stretching, transfer training, wheelchair management/propulsion training  PT Frequency: other (see comments) (5-14 times/wk)  Plan of Care Review  Plan Of Care Reviewed With: patient, daughter, caregiver  Progress: improving  Outcome Summary/Follow up Plan: family in room and pt demonstrated her functional mobility that has improved to min assist . pt pleased with her success. cont wiht mobility          Outcome Measures       06/01/17 1100 06/01/17 0755 05/31/17 0950    How much help from another person do you currently need...    Turning from your back to your side while in flat bed without using bedrails? 2  -RW  2  -RW    Moving from lying on back to sitting on the side of a flat bed without bedrails? 3  -RW  2  -RW    Moving to and from a bed to a chair (including a wheelchair)? 3  -RW  3  -RW    Standing up from a chair using your arms (e.g., wheelchair, bedside chair)? 3  -RW  3  -RW    Climbing 3-5 steps with a railing? 1  -RW  1  -RW    To walk in hospital room? 3  -RW  2  -RW    AM-PAC 6 Clicks Score 15  -RW  13  -RW    How much help from another is currently needed...    Putting on and taking off regular lower body clothing?  2  -JH     Bathing (including washing, rinsing, and drying)  2  -JH     Toileting (which includes using toilet bed pan or urinal)  2  -JH     Putting on and taking off regular upper body clothing  3  -JH     Taking care of personal grooming (such as brushing teeth)  3  -JH     Eating meals  4  -JH     Score  16  -JH       05/30/17 1400 05/30/17 1333 05/30/17 0915    How much help from another person do you currently need...    Turning from your back to your side while in flat bed without using bedrails?   2  -JA    Moving from lying on back to sitting on the side of a flat bed without bedrails?   2  -JA    Moving to and from a bed to a chair (including a wheelchair)?   2  -JA    Standing up from a chair  using your arms (e.g., wheelchair, bedside chair)?   2  -JA    Climbing 3-5 steps with a railing?   1  -JA    To walk in hospital room?   1  -JA    AM-PAC 6 Clicks Score   10  -JA    How much help from another is currently needed...    Putting on and taking off regular lower body clothing? 2  -LM 2  -BH     Bathing (including washing, rinsing, and drying) 2  -LM 2  -BH     Toileting (which includes using toilet bed pan or urinal) 2  -LM 2  -BH     Putting on and taking off regular upper body clothing 3  -LM 3  -BH     Taking care of personal grooming (such as brushing teeth) 4  -LM 4  -BH     Eating meals 4  -LM 4  -BH     Score 17  -LM 17  -BH     Functional Assessment    Outcome Measure Options  AM-PAC 6 Clicks Daily Activity (OT)  - AM-PAC 6 Clicks Basic Mobility (PT)  -      User Key  (r) = Recorded By, (t) = Taken By, (c) = Cosigned By    Initials Name Provider Type     Katherine Pena, OTR/L Occupational Therapist    RUIZ Harris PTA Physical Therapy Assistant    MAURICIO Recio PTA Physical Therapy Assistant    ANDRÉS Bob ARMSTRONG/L Occupational Therapy Assistant    KENY Hicks, ARMSTRONG/L Occupational Therapy Assistant           Time Calculation:         PT Charges       06/01/17 1613 06/01/17 1112       Time Calculation    Start Time 1435  -RW 1005  -RW     Stop Time 1515  -RW 1105  -RW     Time Calculation (min) 40 min  -RW 60 min  -RW     Time Calculation- PT    Total Timed Code Minutes- PT 40 minute(s)  -RW 60 minute(s)  -RW       User Key  (r) = Recorded By, (t) = Taken By, (c) = Cosigned By    Initials Name Provider Type     Clay Recio PTA Physical Therapy Assistant          Therapy Charges for Today     Code Description Service Date Service Provider Modifiers Qty    80788583769 HC PT THER PROC EA 15 MIN 5/31/2017 Clay Recio PTA GP 2    20217513323 HC PT THERAPEUTIC ACT EA 15 MIN 5/31/2017 Clay Recio PTA GP 2    94632610076 HC PT THER PROC EA 15 MIN 5/31/2017  Clay Recio, PTA GP 2    21357630641 HC PT THERAPEUTIC ACT EA 15 MIN 5/31/2017 Clay Recio, PTA GP 1    03782876375 HC GAIT TRAINING EA 15 MIN 6/1/2017 Clay Recio, PTA GP 1    21858301070 HC PT THER PROC EA 15 MIN 6/1/2017 Clay Recio, PTA GP 2    34263753458 HC PT THERAPEUTIC ACT EA 15 MIN 6/1/2017 Caly Recio, PTA GP 1    06002063220 HC GAIT TRAINING EA 15 MIN 6/1/2017 Clay Recio, PTA GP 1    25509116125 HC PT THER PROC EA 15 MIN 6/1/2017 Clay Recio, PTA GP 1    04191026454 HC PT THERAPEUTIC ACT EA 15 MIN 6/1/2017 Clay RICE Hemal, PTA GP 1          PT G-Codes  PT Professional Judgement Used?: Yes  Outcome Measure Options: AM-PAC 6 Clicks Daily Activity (OT)  Score: 8  Functional Limitation: Mobility: Walking and moving around  Mobility: Walking and Moving Around Current Status (): At least 80 percent but less than 100 percent impaired, limited or restricted  Mobility: Walking and Moving Around Goal Status (): At least 40 percent but less than 60 percent impaired, limited or restricted    Clay M RHODA Recio  6/1/2017

## 2017-06-01 NOTE — PLAN OF CARE
Problem: Patient Care Overview (Adult)  Goal: Plan of Care Review  Outcome: Ongoing (interventions implemented as appropriate)  Goal: Adult Individualization and Mutuality  Outcome: Ongoing (interventions implemented as appropriate)  Goal: Discharge Needs Assessment  Outcome: Ongoing (interventions implemented as appropriate)    Problem: Fractured Hip (Adult)  Goal: Signs and Symptoms of Listed Potential Problems Will be Absent or Manageable (Fractured Hip)  Outcome: Ongoing (interventions implemented as appropriate)    Problem: Fall Risk (Adult)  Goal: Absence of Falls  Outcome: Ongoing (interventions implemented as appropriate)    Problem: Functional Deficit (Adult,Obstetrics,Pediatric)  Goal: Signs and Symptoms of Listed Potential Problems Will be Absent or Manageable (Functional Deficit)  Outcome: Ongoing (interventions implemented as appropriate)    06/01/17 0531   Functional Deficit   Problems Assessed (Functional Deficit) all   Problems Present (Functional Deficit) functional activity tolerance impairment;mobility impairment;muscle strength impairment;muscle tone impairment

## 2017-06-01 NOTE — PROGRESS NOTES
Nutrition Services    Patient Name:  Mariela Woodson  YOB: 1925  MRN: [id number]  Admit Date:  5/24/2017        Family said the patient is doing well with her meals. She doesn't like the ensure clear in apple flavor, but will drink it as her apple juice at breakfast. Pt was concerned about a cramp in her leg now and is waiting for her lunch. Drink milk with meals ok per family. Will continue 2% milk with meals and offer ensure clear apple flavor at breakfast only. RDN staff to continue to monitor.      Electronically signed by:  Kelly Song RD  06/01/17 1:02 PM

## 2017-06-02 NOTE — PLAN OF CARE
Problem: Patient Care Overview (Adult)  Goal: Plan of Care Review  Outcome: Ongoing (interventions implemented as appropriate)    06/02/17 1252   Coping/Psychosocial Response Interventions   Plan Of Care Reviewed With patient   Patient Care Overview   Progress improving       Goal: Discharge Needs Assessment  Outcome: Ongoing (interventions implemented as appropriate)    Problem: Fractured Hip (Adult)  Goal: Signs and Symptoms of Listed Potential Problems Will be Absent or Manageable (Fractured Hip)  Outcome: Ongoing (interventions implemented as appropriate)    Problem: Fall Risk (Adult)  Goal: Absence of Falls  Outcome: Ongoing (interventions implemented as appropriate)    Problem: Functional Deficit (Adult,Obstetrics,Pediatric)  Goal: Signs and Symptoms of Listed Potential Problems Will be Absent or Manageable (Functional Deficit)  Outcome: Ongoing (interventions implemented as appropriate)

## 2017-06-02 NOTE — PLAN OF CARE
Problem: Patient Care Overview (Adult)  Goal: Plan of Care Review  Outcome: Ongoing (interventions implemented as appropriate)    06/02/17 1444   Coping/Psychosocial Response Interventions   Plan Of Care Reviewed With patient;caregiver;daughter   Patient Care Overview   Progress improving   Outcome Evaluation   Outcome Summary/Follow up Plan pt making excellent progress with mobility. continue with strengthening. needs new goals.         Problem: Inpatient Physical Therapy  Goal: Bed Mobility Goal LTG- PT  Outcome: Outcome(s) achieved Date Met:  06/02/17 05/25/17 1033 06/02/17 1444   Bed Mobility PT LTG   Bed Mobility PT LTG, Date Established 05/25/17 --    Bed Mobility PT LTG, Time to Achieve by discharge --    Bed Mobility PT LTG, Activity Type supine to sit/sit to supine --    Bed Mobility PT LTG, [place] Level minimum assist (75% patient effort) --    Bed Mobility PT Goal LTG, Assist Device bed rails --    Bed Mobility PT LTG, Date Goal Reviewed --  06/02/17   Bed Mobility PT LTG, Outcome --  goal met

## 2017-06-02 NOTE — PLAN OF CARE
Problem: Patient Care Overview (Adult)  Goal: Plan of Care Review  Outcome: Ongoing (interventions implemented as appropriate)  Goal: Adult Individualization and Mutuality  Outcome: Ongoing (interventions implemented as appropriate)  Goal: Discharge Needs Assessment  Outcome: Ongoing (interventions implemented as appropriate)    Problem: Fractured Hip (Adult)  Goal: Signs and Symptoms of Listed Potential Problems Will be Absent or Manageable (Fractured Hip)  Outcome: Ongoing (interventions implemented as appropriate)    Problem: Fall Risk (Adult)  Goal: Absence of Falls  Outcome: Ongoing (interventions implemented as appropriate)    Problem: Functional Deficit (Adult,Obstetrics,Pediatric)  Goal: Signs and Symptoms of Listed Potential Problems Will be Absent or Manageable (Functional Deficit)  Outcome: Ongoing (interventions implemented as appropriate)    06/02/17 0409   Functional Deficit   Problems Assessed (Functional Deficit) all   Problems Present (Functional Deficit) functional activity tolerance impairment;mobility impairment;muscle tone impairment;muscle strength impairment

## 2017-06-02 NOTE — THERAPY TREATMENT NOTE
Inpatient Rehabilitation - Occupational Therapy Treatment Note  [facility]     Patient Name: Mariela Woodson  : 1925  MRN: [id number]  Today's Date: 2017  Onset of Illness/Injury or Date of Surgery Date: 17  Date of Referral to OT: 17  Referring Physician: Dr. Aparicio      Admit Date: 2017    Visit Dx:     ICD-10-CM ICD-9-CM   1. Impaired mobility and activities of daily living Z74.09 799.89   2. Abnormality of gait and mobility R26.9 781.2   3. Muscle weakness (generalized) M62.81 728.87     Patient Active Problem List   Diagnosis   • Iron deficiency anemia due to chronic blood loss   • Post-polio syndrome   • Simple chronic bronchitis   • Seizure disorder   • Closed left hip fracture   • Iron deficiency anemia   • Post-polio syndrome   • Seizure disorder   • Metabolic encephalopathy   • COPD (chronic obstructive pulmonary disease)   • Encounter for rehabilitation             Adult Rehabilitation Note       17 1041 17 0900 17 1435    Rehab Assessment/Intervention    Discipline physical therapy assistant  -RW occupational therapy assistant  - physical therapy assistant  -    Document Type therapy note (daily note)  -RW therapy note (daily note)  - therapy note (daily note)  -RW    Subjective Information agree to therapy  -RW agree to therapy  - agree to therapy  -RW    Patient Effort, Rehab Treatment good  -RW  good  -RW    Recorded by [RW] Clay Recio PTA [] PATTI Ludwig/PIPER [RW] Clay Recio PTA    Vital Signs    Pretreatment Heart Rate (beats/min) 61  -RW      Pre SpO2 (%) 98  -RW 99  -     O2 Delivery Pre Treatment  room air  -     Recorded by [RW] Clay Recio PTA [] Siomara Bob ARMSTRONG/L     Pain Assessment    Pain Assessment No/denies pain  - 0-10  - No/denies pain  -RW    Pain Score  4  -     Post Pain Score  6  -JH     Pain Type  Surgical pain  -     Pain Location  Hip  -     Pain Orientation  Left  -     Pain  Intervention(s)  Medication (See MAR);Repositioned  -JH     Recorded by [RW] Clay Recio PTA [JH] FARHAN LudwigA/L [RW] Clay Recio PTA    Cognitive Assessment/Intervention    Current Cognitive/Communication Assessment functional  -RW functional  -JH functional  -RW    Orientation Status oriented x 4  -RW oriented x 4  -JH oriented x 4  -RW    Follows Commands/Answers Questions 100% of the time  -% of the time  -% of the time  -RW    Personal Safety Interventions gait belt;nonskid shoes/slippers when out of bed  -RW gait belt  -JH gait belt;nonskid shoes/slippers when out of bed  -RW    Recorded by [RW] Clay Recio PTA [JH] PATTI Ludwig/PIPER [RW] Clay Recio PTA    Mobility Assessment/Training    Left Lower Extremity Weight-Bearing touch down weight-bearing  -RW  touch down weight-bearing  -RW    Recorded by [RW] Clay Recio PTA  [RW] Clay Recio PTA    Bed Mobility, Assessment/Treatment    Bed Mobility, Assistive Device --  -RW  bed rails;head of bed elevated  -RW    Bed Mob, Supine to Sit, [place] --  -RW  supervision required;contact guard assist  -RW    Bed Mob, Sit to Supine, [place] --  -RW  minimum assist (75% patient effort)  -RW    Recorded by [RW] Clay Recio PTA  [RW] Clay Recio PTA    Transfer Assessment/Treatment    Transfers, Bed-Chair [place] --  -RW  minimum assist (75% patient effort)  -RW    Transfers, Chair-Bed [place] --  -RW  minimum assist (75% patient effort)  -RW    Transfers, Bed-Chair-Bed, Assist Device --  -RW  rolling walker  -RW    Transfers, Sit-Stand [place] minimum assist (75% patient effort)  -RW  minimum assist (75% patient effort)  -RW    Transfers, Stand-Sit [place] minimum assist (75% patient effort)  -RW  minimum assist (75% patient effort)  -RW    Transfers, Sit-Stand-Sit, Assist Device rolling walker  -RW  rolling walker  -RW    Walk-In Shower Transfer, [place]  moderate assist (50%  patient effort)  -     Walk-In Shower Transfer, Assist Device  rolling walker  -JH     Recorded by [RW] Clay Recio PTA [] SAURAV Ludwig [RW] Clay Recio PTA    Gait Assessment/Treatment    Gait, [place] Level minimum assist (75% patient effort)  -  minimum assist (75% patient effort)  -RW    Gait, Assistive Device rolling walker  -RW  rolling walker  -RW    Gait, Distance (Feet) 4  -RW  4  -RW    Gait, Maintain Weight Bearing Status   assist to maintain weight bearing status;cues to maintain weight bearing status  -RW    Recorded by [RW] Clay Recio PTA  [RW] Clay Recio PTA    Wheelchair Training/Management    Wheelchair Propulsion Training steering wheelchair;turning wheelchair  -      Wheelchair, Distance Propelled 170  -      Wheelchair Training Comment sba  -RW      Recorded by [RW] Clay Recio PTA      Upper Body Bathing Assessment/Training    UB Bathing Assess/Train Assistive Device  bath mitt;hand-held shower head;long-handled sponge  -     UB Bathing Assess/Train, Position  sitting  -     UB Bathing Assess/Train, [place] Level  conditional independence  -JH     Recorded by  [] PATTI Ludwig/L     Lower Body Bathing Assessment/Training    LB Bathing Assess/Train Assistive Device  hand-held shower head;long-handled sponge  -JH     LB Bathing Assess/Train, Position  sitting;standing  -     LB Bathing Assess/Train, [place] Level  moderate assist (50% patient effort)  -     LB Bathing Assess/Train, Impairments  decreased flexibility;ROM decreased;strength decreased  -     Recorded by  [] PATTI Ludwig/L     Upper Body Dressing Assessment/Training    UB Dressing Assess/Train, Clothing Type  doffing:;donning:;pull over  -     UB Dressing Assess/Train, Position  sitting  -     UB Dressing Assess/Train, [place]  conditional independence  -     Recorded by  [] PATTI Ludwig/L     Lower Body Dressing  Assessment/Training    LB Dressing Assess/Train, Clothing Type  doffing:;donning:;pants;shorts;socks  -     LB Dressing Assess/Train, Assist Device  long-handled shoe horn;reacher;sock-aid  -     LB Dressing Assess/Train, Position  sitting;standing  -     LB Dressing Assess/Train, [place]  moderate assist (50% patient effort)  -     LB Dressing Assess/Train, Impairments  decreased flexibility;ROM decreased;strength decreased  -     Recorded by  [] SAURAV Ludwig     Toileting Assessment/Training    Toileting Assess/Train, Assistive Device  grab bars;raised toilet seat  -     Toileting Assess/Train, Position  sitting  -     Toileting Assess/Train, [place] Level  minimum assist (75% patient effort);moderate assist (50% patient effort)  -     Recorded by  [] SAURAV Ludwig     Grooming Assessment/Training    Grooming Assess/Train, Position  sitting  -     Grooming Assess/Train, [place] Level  contact guard assist  -     Grooming Assess/Train, Impairments  decreased flexibility;ROM decreased;strength decreased  -     Recorded by  [] SAURAV Ludwig     Balance Skills Training    Sitting-Level of Assistance  Distant supervision  -     Standing-Level of Assistance  Close supervision  -     Recorded by  [] SAURAV Ludwig     Therapy Exercises    Left Lower Extremity   --  -RW    Bilateral Lower Extremities AROM:;ankle pumps/circles;20 reps;glut sets;LAQ;knee flexion;sitting;hip abduction/adduction   2 sets  -RW  AROM:;ankle pumps/circles;20 reps;glut sets;LAQ;knee flexion;supine;heel slides   2 sets  -    Recorded by [RW] Clay Recio PTA  [RW] Clay Recio PTA    Positioning and Restraints    Pre-Treatment Position sitting in chair/recliner  - sitting in chair/recliner  - sitting in chair/recliner  -    Post Treatment Position wheelchair  - wheelchair  - bed  -    In Bed   call light within reach;with family/caregiver;legs elevated   -RW    In Wheelchair call light within reach;with family/caregiver  - call light within reach;encouraged to call for assist;with family/caregiver  -     Recorded by [] Clay Recio PTA [] SAURAV Ludwig [] Clay Recio PTA      06/01/17 1005 06/01/17 0905 06/01/17 0755    Rehab Assessment/Intervention    Discipline physical therapy assistant  -RW speech language pathologist  -EC occupational therapy assistant  -    Document Type therapy note (daily note)  -RW therapy note (daily note);discharge summary  - therapy note (daily note)  -    Subjective Information agree to therapy  - no complaints;agree to therapy  -EC agree to therapy;complains of;pain;swelling  -    Patient Effort, Rehab Treatment good  -RW excellent  -EC adequate  -JH    Recorded by [RW] Clay Recio PTA [] Rema [place] CCC-SLP [] PATTI Ludwig/L    Vital Signs    Pre Systolic BP Rehab   144  -JH    Pre Treatment Diastolic BP   66  -JH    Pretreatment Heart Rate (beats/min)   66  -JH    Pre SpO2 (%)   92  -JH    O2 Delivery Pre Treatment   room air  -    Intra SpO2 (%)   90  -JH    O2 Delivery Intra Treatment   room air  -    Post SpO2 (%)   93  -JH    O2 Delivery Post Treatment   room air  -    Recorded by   [] PATTI Ludwig/L    Pain Assessment    Pain Assessment No/denies pain  - No/denies pain  - 0-10  -JH    Pain Score   6  -JH    Post Pain Score   6  -JH    Pain Type   Surgical pain  -    Pain Location   Hip  -    Pain Orientation   Left  -    Pain Intervention(s)   Medication (See MAR);Repositioned  -    Recorded by [] Clay Recio PTA [EC] Rema Grande CCC-SLP [] PATTI Ludwig/L    Cognitive Assessment/Intervention    Current Cognitive/Communication Assessment functional  -  functional  -    Orientation Status oriented x 4  -  oriented to;person;place;required verbal cueing (specifiy in comments)   weight bearing, and safety  -     Follows Commands/Answers Questions 100% of the time  -RW  75% of the time  -    Personal Safety Interventions gait belt;nonskid shoes/slippers when out of bed  -RW      Recorded by [RW] Clay Recio PTA  [] PATTI Ludwig/L    Communication Treatment Objective and Progress    Cognitive Linguistic Treatment Objectives  Improve orientation;Improve awareness of basic personal information;Improve memory skills  -EC     Recorded by  [EC] GWEN GutierrezSLP     Improve memory skills    Improve memory skills through:  listen to a paragraph and answer questions;80%;without cues  -EC     Status: Improve memory skills  Achieved  -EC     Memory Skills Progress  80%  -EC     Recorded by  [EC] REJI Gutierrez     Improve functional problem solving    Improve functional problem solving through:  tell similarity between items;sequence steps in a task;complete organization/home management task;80%;with inconsistent cues  -EC     Status: Improve functional problem solving  Achieved  -EC     Functional Problem Solving Progress  90%;with inconsistent cues  -EC     Recorded by  [EC] GWEN GutierrezSLP     Mobility Assessment/Training    Left Lower Extremity Weight-Bearing touch down weight-bearing  -RW      Recorded by [RW] Clay Recio PTA      Bed Mobility, Assessment/Treatment    Bed Mobility, Assistive Device --  -RW      Bed Mob, Sit to Supine, [place] --  -RW      Recorded by [RW] Clay Recio PTA      Transfer Assessment/Treatment    Transfers, Bed-Chair [place]   moderate assist (50% patient effort);1 person + 1 person to manage equipment  -    Transfers, Chair-Bed [place] --  -RW      Transfers, Bed-Chair-Bed, Assist Device --  -RW      Transfers, Sit-Stand [place] minimum assist (75% patient effort)  -RW  minimum assist (75% patient effort);moderate assist (50% patient effort);1 person + 1 person to manage equipment  -    Transfers, Stand-Sit  [place] minimum assist (75% patient effort)  -  verbal cues required;minimum assist (75% patient effort)  -    Transfers, Sit-Stand-Sit, Assist Device rolling walker  -  elevated surface;rolling walker  -    Recorded by [RW] Clay Recio PTA  [] SAURAV Ludwig    Gait Assessment/Treatment    Gait, [place] Level minimum assist (75% patient effort)  -RW      Gait, Assistive Device rolling walker  -      Gait, Distance (Feet) 6  -RW      Gait, Maintain Weight Bearing Status assist to maintain weight bearing status;cues to maintain weight bearing status  -RW      Recorded by [RW] Clay Recio PTA      Functional Mobility    Functional Mobility- Ind. Level   minimum assist (75% patient effort);moderate assist (50% patient effort);1 person + 1 person to manage equipment  -    Functional Mobility- Device   rolling walker  -    Functional Mobility-Maintain WBing   unable to maintain weight bearing status;cues to maintain weight bearing status  -    Recorded by   [] SAURAV Ludwig    Wheelchair Training/Management    Wheelchair Propulsion Training   forward propulsion;backward propulsion;moving through doorways;carpet  -    Wheelchair, Distance Propelled   75  -JH    Recorded by   [] SAURAV Ludwig    Upper Body Bathing Assessment/Training    UB Bathing Assess/Train, Comment   Family/pt state they have taken care of her bath   -    Recorded by   [] SAURAV Ludwig    Lower Body Dressing Assessment/Training    LB Dressing Assess/Train, Clothing Type   doffing:;donning:;shoes;socks  -    LB Dressing Assess/Train, [place]   dependent (less than 25% patient effort)  -    Recorded by   [] SAURAV Ludwig    Balance Skills Training    Standing-Level of Assistance   Close supervision;Contact guard  -    Static Standing Balance Support   assistive device  -    Standing Balance # of Minutes   3min, VC's for WB into arms and RLE  -     Recorded by   [JH] FARHAN LudwigA/L    Therapy Exercises    Left Lower Extremity AAROM:;10 reps;standing;hip flexion  -RW      Bilateral Lower Extremities AROM:;ankle pumps/circles;20 reps;sidelying;glut sets;hip abduction/adduction;LAQ;knee flexion   2 sets  -RW      Recorded by [RW] Clay Recio PTA      Positioning and Restraints    Pre-Treatment Position sitting in chair/recliner  -RW  in bed  -    Post Treatment Position wheelchair  -RW  wheelchair  -JH    In Wheelchair with family/caregiver  -RW  with SLP  -JH    Recorded by [RW] Clay Recio PTA  [JH] FARHAN LudwigA/PIPER      05/31/17 1445 05/31/17 1340 05/31/17 1130    Rehab Assessment/Intervention    Discipline physical therapy assistant  -RW occupational therapy assistant  -LM occupational therapy assistant  -LM    Document Type therapy note (daily note)  -RW therapy note (daily note)  -LM therapy note (daily note)  -LM    Subjective Information agree to therapy  -RW agree to therapy  -LM agree to therapy  -LM    Patient Effort, Rehab Treatment good  -RW      Recorded by [RW] Clay Recio PTA [LM] Genet Hicks ARMSTRONG/L [LM] Genet Hicks, ARMSTRONG/L    Pain Assessment    Pain Assessment No/denies pain  -RW      Pain Score  0  -LM 0  -LM    Post Pain Score   0  -LM    Recorded by [RW] Clay Recio PTA [LM] Genet Hicks ARMSTRONG/L [LM] Genet Hicks, ARMSTRONG/L    Cognitive Assessment/Intervention    Current Cognitive/Communication Assessment functional  -RW      Orientation Status oriented x 4  -RW      Follows Commands/Answers Questions 100% of the time  -RW      Personal Safety Interventions gait belt;nonskid shoes/slippers when out of bed  -RW      Recorded by [RW] Clay Recio PTA      Mobility Assessment/Training    Left Lower Extremity Weight-Bearing touch down weight-bearing  -RW      Recorded by [RW] Clay Recio PTA      Bed Mobility, Assessment/Treatment    Bed Mobility, Assistive Device head of bed elevated  -RW       Bed Mob, Sit to Supine, [place] minimum assist (75% patient effort)  -RW      Recorded by [RW] Clay Rceio PTA      Transfer Assessment/Treatment    Transfers, Chair-Bed [place] minimum assist (75% patient effort)  -RW      Transfers, Bed-Chair-Bed, Assist Device rolling walker  -RW      Transfers, Sit-Stand [place] minimum assist (75% patient effort)  -RW      Transfers, Stand-Sit [place] minimum assist (75% patient effort)  -RW      Transfers, Sit-Stand-Sit, Assist Device rolling walker  -RW      Recorded by [RW] Clay Recio PTA      Wheelchair Training/Management    Wheelchair, Distance Propelled  --   50  -LM 20-50  -LM    Recorded by  [LM] PATTI Yeager/L [LM] SAURAV Yeager    Therapy Exercises    Bilateral Lower Extremities AROM:;ankle pumps/circles;AAROM:;20 reps;supine;heel slides;quad sets   2 sets  -RW      Bilateral Upper Extremity  AROM:;20 reps   level 1 tband all diana planes   -LM elbow flexion/extension;shoulder abduction/adduction;shoulder extension/flexion;shoulder horizontal abd/add;shoulder rolls/shrugs   UEE bike x 10 min also 2 lb wt   -LM    BUE Resistance  manual resistance- minimal   uee bike x 15 min   -LM manual resistance- minimal  -LM    Recorded by [RW] Clay Recio PTA [LM] PATTI Yeager/L [LM] PATTI Yeager/L    Positioning and Restraints    Pre-Treatment Position sitting in chair/recliner  -RW sitting in chair/recliner  -LM in bed  -LM    Post Treatment Position bed  -RW chair  -LM wheelchair  -LM    In Bed side rails up x2;legs elevated;heels elevated;with family/caregiver  -RW      Recorded by [RW] Clay Recio PTA [LM] PATTI Yeager/L [LM] SAURAV Yeager      05/31/17 1050 05/31/17 0950 05/31/17 0924    Rehab Assessment/Intervention    Discipline speech language pathologist  -EC physical therapy assistant  -RW --  -RW    Document Type therapy note (daily note)  -EC therapy note  (daily note)  -RW --  -RW    Subjective Information agree to therapy  -EC agree to therapy  -RW --  -RW    Recorded by [EC] Rema Grande CCC-SLP [RW] Clay Recio PTA [RW] Clay Recio PTA    Pain Assessment    Pain Assessment No/denies pain  -EC No/denies pain  -RW     Recorded by [EC] REJI Gutierrez [RW] Clay Recio PTA     Communication Treatment Objective and Progress    Cognitive Linguistic Treatment Objectives Improve orientation;Improve awareness of basic personal information;Improve memory skills  -EC      Recorded by [EC] REJI Gutierrez      Improve memory skills    Improve memory skills through: listen to a paragraph and answer questions;80%;without cues  -EC      Status: Improve memory skills Progressing as expected  -EC      Memory Skills Progress continue to address  -EC      Recorded by [EC] REJI Gutierrez      Improve functional problem solving    Improve functional problem solving through: tell similarity between items;sequence steps in a task;complete organization/home management task;80%;with inconsistent cues  -EC      Status: Improve functional problem solving Progressing as expected  -EC      Functional Problem Solving Progress 90%;with inconsistent cues  -EC      Recorded by [EC] REJI Gutierrez      Mobility Assessment/Training    Left Lower Extremity Weight-Bearing  touch down weight-bearing  -RW --  -RW    Recorded by  [RW] Clay Recio PTA [RW] Clay Recio PTA    Bed Mobility, Assessment/Treatment    Bed Mob, Sit to Supine, [place]   --  -RW    Recorded by   [RW] Clay Recio PTA    Transfer Assessment/Treatment    Transfers, Bed-Chair [place]  minimum assist (75% patient effort)  -RW --  -RW    Transfers, Chair-Bed [place]  minimum assist (75% patient effort)  -RW     Transfers, Bed-Chair-Bed, Assist Device  rolling walker  -RW --  -RW    Transfers, Sit-Stand [place]  minimum assist (75%  patient effort)  -RW --  -RW    Transfers, Stand-Sit [place]  minimum assist (75% patient effort)  -RW --  -RW    Transfers, Sit-Stand-Sit, Assist Device  rolling walker  -RW --  -RW    Recorded by  [RW] Clay Recio PTA [RW] Clay Recio PTA    Gait Assessment/Treatment    Gait, [place] Level  minimum assist (75% patient effort)  -RW     Gait, Assistive Device  rolling walker  -RW     Gait, Distance (Feet)  3  -RW     Recorded by  [RW] Clay Recio PTA     Wheelchair Training/Management    Wheelchair, Distance Propelled  100  -RW     Wheelchair Training Comment  sba  -RW     Recorded by  [RW] Clay Recio PTA     Therapy Exercises    Right Lower Extremity   AROM:;20 reps;supine;heel slides;hip abduction/adduction  -RW    Left Lower Extremity   AAROM:;20 reps;supine;heel slides;hip abduction/adduction  -RW    Bilateral Lower Extremities  AROM:;sitting;ankle pumps/circles;glut sets;hip abduction/adduction;knee flexion;LAQ  -RW AROM:;20 reps;supine;ankle pumps/circles;glut sets;quad sets  -RW    Recorded by  [RW] Clay Recio PTA [RW] Clay Recio PTA    Positioning and Restraints    Pre-Treatment Position  sitting in chair/recliner  -RW     Post Treatment Position  wheelchair  -RW     In Wheelchair  with SLP  -RW     Recorded by  [RW] Clay Recio PTA       05/30/17 1525 05/30/17 1333       Rehab Assessment/Intervention    Discipline physical therapy assistant  -RUIZ occupational therapist  -     Document Type therapy note (daily note)  -RUIZ therapy note (daily note)  -     Subjective Information agree to therapy;complains of  -RUIZ agree to therapy;complains of;pain  -     Patient Effort, Rehab Treatment good  - good  -     Precautions/Limitations fall precautions;oxygen therapy device and L/min;non-weight bearing status  - fall precautions;oxygen therapy device and L/min;non-weight bearing status   toe touch weight LLE, no BP on L  -BH     Recorded by [RUIZ] Darci Harris,  PTA [] ARVIND Garcia/L     Vital Signs    Pretreatment Heart Rate (beats/min)  68  -     Posttreatment Heart Rate (beats/min)  74  -     Pre SpO2 (%)  95  -     O2 Delivery Pre Treatment  supplemental O2  -     Post SpO2 (%)  99  -     O2 Delivery Post Treatment  supplemental O2  -     Pre Patient Position  Sitting  -     Post Patient Position  Supine  -     Recorded by  [] ARVIND Garcia/L     Pain Assessment    Pain Assessment No/denies pain  -      Pain Score  6  -     Post Pain Score  5  -     Pain Location  Hip  -     Pain Intervention(s) Medication (See MAR);Repositioned  - Medication (See MAR);Repositioned;Ambulation/increased activity   RN aware, applied pain patch  -     Recorded by [RUIZ] Darci Harris PTA [] ARVIND Garcia/L     Cognitive Assessment/Intervention    Current Cognitive/Communication Assessment  functional  -     Orientation Status  oriented x 4  -     Follows Commands/Answers Questions  75% of the time;able to follow single-step instructions;needs cueing;needs increased time;needs repetition  -     Personal Safety  mild impairment  -     Personal Safety Interventions  fall prevention program maintained;gait belt;muscle strengthening facilitated;nonskid shoes/slippers when out of bed;supervised activity  -     Recorded by  [] ARVIND Garcia/L     Mobility Assessment/Training    Extremity Weight-Bearing Status left lower extremity  - left lower extremity  -     Left Lower Extremity Weight-Bearing touch down weight-bearing  - toe touch weight-bearing  -     Recorded by [RUIZ] Darci Harris PTA [] ARVIND Garcia/L     Bed Mobility, Assessment/Treatment    Bed Mob, Sit to Supine, [place] moderate assist (50% patient effort)  - moderate assist (50% patient effort)  -     Bed Mobility, Comment  Pt sit to sup with bed flat with max assist with LB then min assist with trunk, pt used BUE to pull on  handrails to reposition her hips.   -     Recorded by [JA] Darci Harris PTA [] Katherine Pena, OTR/L     Transfer Assessment/Treatment    Transfers, Bed-Chair [place] moderate assist (50% patient effort)  -      Transfers, Chair-Bed [place]  moderate assist (50% patient effort);verbal cues required  -     Transfers, Bed-Chair-Bed, Assist Device rolling walker  - rolling walker  -     Transfers, Sit-Stand [place] minimum assist (75% patient effort)  - moderate assist (50% patient effort)  -     Transfers, Stand-Sit [place] minimum assist (75% patient effort)  - minimum assist (75% patient effort);moderate assist (50% patient effort)  -     Transfers, Sit-Stand-Sit, Assist Device rolling walker  -      Toilet Transfer, [place]  minimum assist (75% patient effort);moderate assist (50% patient effort);2 person assist required  -     Toilet Transfer, Assistive Device  --   grab bars, 2 people for safety  -     Transfer, Maintain Weight Bearing Status cues to maintain weight bearing status;assist to maintain weight bearing status;unable to maintain weight bearing status  - unable to maintain weight bearing status  -     Transfer, Safety Issues  impulsivity;weight-shifting ability decreased;step length decreased;sequencing ability decreased;balance decreased during turns  -     Transfer, Impairments  ROM decreased;strength decreased;impaired balance;coordination impaired;motor control impaired;pain  -     Transfer, Comment  Pt requested to go to the restroom when OT entered then wanted to get back to bed. Pt required 2 people for toilet t/f for safety, pt struggled with many vc for weight bear status and shifting weight through arms and following to safely get back and forth with toilet. One person assist for bed with one on stand by but struggled with weight bear and safety and arm placement.   -     Recorded by [JA] Darci Harris PTA [] Katherine SCHMIDT  ARVIND Pena/L     Functional Mobility    Functional Mobility- Comment  Pt struggled with stand pivot from w/c to toilet or w/c to bed 1 to 2 people required for safety.   -     Recorded by  [] ARVIND Garcia/L     Toileting Assessment/Training    Toileting Assess/Train, Comment  Pt required one person to assist with standing and another to assit with pulling pants up and down for toileting (pt dependent, and pt dependent for pericare cleaning. Pt did assist with standing for tasks.   -     Recorded by  [] ARVIND Garcia/L     Motor Skills/Interventions    Additional Documentation  Balance Skills Training (Group)  -     Recorded by  [] ARVIND Garcia/L     Balance Skills Training    Standing-Level of Assistance  Minimum assistance;Moderate assistance;x2   1-2 people  -     Recorded by  [] ARVIND Garcia/L     Therapy Exercises    Right Lower Extremity AROM:;20 reps;supine;heel slides;hip abduction/adduction  -JA      Left Lower Extremity AAROM:;20 reps;supine;heel slides;hip abduction/adduction  -JA      Bilateral Lower Extremities AROM:;20 reps;supine;ankle pumps/circles;glut sets;quad sets  -      Recorded by [JA] Darci Harris PTA      Positioning and Restraints    Pre-Treatment Position sitting in chair/recliner  -JA other (comment)   in w/c  -     Post Treatment Position bed  -JA bed  -     In Bed heels elevated  - supine;notified nsg;call light within reach;encouraged to call for assist;with family/caregiver  -     Recorded by [RUIZ] Darci Harris PTA [] ARVIND Garcia/L       User Key  (r) = Recorded By, (t) = Taken By, (c) = Cosigned By    Initials Name Effective Dates    [facility] [place] CCC-SLP 12/30/16 -      ARVIND Garcia/L 10/17/16 -     RUIZ Harris PTA 10/17/16 -     MAURICIO Recio, PTA 10/17/16 -     ANDRÉS Bob ARMSTRONG/L 10/17/16 -     KENY Hicks, ARMSTRONG/L 10/17/16 -                 OT Goals        06/02/17 0900 06/01/17 0755 05/31/17 1301    Transfer Training OT LTG    Transfer Training OT LTG, Date Goal Reviewed 06/02/17  -JH 06/01/17  - 05/31/17  -LM    Transfer Training OT LTG, Outcome   goal ongoing  -LM    Strength OT LTG    Strength Goal OT LTG, Date Goal Reviewed 06/02/17  -JH 06/01/17  - 05/31/17  -LM    Strength Goal OT LTG, Outcome   goal ongoing  -LM    Static Standing Balance OT STG    Static Standing Balance OT STG, Date Goal Reviewed 06/02/17  -JH 06/01/17  - 05/31/17  -LM    Static Standing Balance OT STG, Outcome   goal ongoing  -LM    ADL OT LTG    ADL OT LTG, Date Established  06/01/17  -     ADL OT LTG, Date Goal Reviewed 06/02/17  -  05/31/17  -LM    ADL OT LTG, Outcome   goal ongoing  -LM      05/30/17 1443 05/30/17 1333 05/29/17 1050    Transfer Training OT LTG    Transfer Training OT LTG, Date Goal Reviewed 05/30/17  -LM 05/30/17  - 05/29/17  -JH    Transfer Training OT LTG, Outcome goal ongoing  -LM goal ongoing  -BH     Strength OT LTG    Strength Goal OT LTG, Date Goal Reviewed 05/30/17  -LM 05/30/17  - 05/29/17  -JH    Strength Goal OT LTG, Outcome goal ongoing  -LM goal ongoing  -BH     Static Standing Balance OT STG    Static Standing Balance OT STG, Date Goal Reviewed 05/30/17  -LM 05/30/17  - 05/29/17  -    Static Standing Balance OT STG, Outcome goal ongoing  -LM goal ongoing  -BH     ADL OT LTG    ADL OT LTG, Date Goal Reviewed 05/30/17  -LM 05/30/17  -BH 05/29/17  -    ADL OT LTG, Outcome goal ongoing  -LM goal ongoing  -BH       05/27/17 0611 05/26/17 1320 05/25/17 1518    Transfer Training OT LTG    Transfer Training OT LTG, Date Goal Reviewed 05/27/17  -TO 05/26/17  -LM 05/25/17  -LM    Transfer Training OT LTG, Outcome  goal ongoing  -LM goal ongoing  -LM    Strength OT LTG    Strength Goal OT LTG, Date Goal Reviewed 05/27/17  -TO 05/26/17  -LM 05/25/17  -LM    Strength Goal OT LTG, Outcome  goal ongoing  -LM goal ongoing  -LM    Static Standing  Balance OT STG    Static Standing Balance OT STG, Date Goal Reviewed 05/27/17  -TO 05/26/17  -LM 05/25/17  -LM    Static Standing Balance OT STG, Outcome  goal ongoing  -LM goal ongoing  -LM    ADL OT LTG    ADL OT LTG, Date Goal Reviewed 05/27/17  -TO 05/26/17  -LM     ADL OT LTG, Outcome  goal ongoing  -LM goal ongoing  -LM      05/25/17 1306 05/24/17 1753 05/23/17 1316    Transfer Training OT LTG    Transfer Training OT LTG, Date Established 05/25/17  -RB  05/23/17  -RB    Transfer Training OT LTG, Time to Achieve by discharge  -RB  by discharge  -RB    Transfer Training OT LTG, Activity Type all transfers  -RB  all transfers  -RB    Transfer Training OT LTG, [place] Level contact guard assist  -RB  supervision required  -RB    Transfer Training OT LTG, Assist Device walker, rolling;commode, bedside  -RB  walker, rolling  -RB    Transfer Training OT LTG, Date Goal Reviewed  05/24/17  -RM     Transfer Training OT LTG, Outcome  goal not met  -RM     Transfer Training OT LTG, Reason Goal Not Met  discharged from facility  -RM     Strength OT LTG    Strength Goal OT LTG, Date Established 05/25/17  -RB  05/23/17  -RB    Strength Goal OT LTG, Time to Achieve by discharge  -RB  by discharge  -RB    Strength Goal OT LTG, Measure to Achieve 1 set of 10 reps all planes with 1/2 to 1 lb wrist wts or dumbells to increase UE strength.  -RB  1 set of 10 reps all planes with 1 lb wrist wts or dumbells for B UE strengthening.  -RB    Strength Goal OT LTG, Date Goal Reviewed  05/24/17  -RM     Strength Goal OT LTG, Outcome  goal not met  -RM     Strength Goal OT LTG, Reason Goal Not Met  discharged from facility  -RM     Static Standing Balance OT STG    Static Standing Balance OT STG, Date Established 05/25/17  -RB      Static Standing Balance OT STG, Time to Achieve 2 wks  -RB      Static Standing Balance OT STG, [place] Level contact guard assist   5 minutes with functional activity and 0-1 rest break.  -RB       Static Standing Balance OT STG, Assist Device assistive device   Rolling wakler.  -RB      Static Standing Balance OT LTG    Static Standing Balance OT LTG, Date Established   05/23/17  -RB    Static Standing Balance OT LTG, Time to Achieve   by discharge  -RB    Static Standing Balance OT LTG, [place] Level   supervision required   5 minutes with functional activity with 0-1 rest break.  -RB    Static Standing Balance OT LTG, Date Goal Reviewed  05/24/17  -RM     Static Standing Balance OT LTG, Outcome  goal not met  -RM     Static Standing Balance OT LTG, Reason Goal Not Met  discharged from facility  -RM     ADL OT LTG    ADL OT LTG, Date Established 05/25/17  -RB  05/23/17  -RB    ADL OT LTG, Time to Achieve by discharge  -RB  by discharge  -RB    ADL OT LTG, Activity Type ADL skills   Sponge bath and dress.  -RB  ADL skills   Sponge bath and dress or walk-in shower.  -RB    ADL OT LTG, [place] Level contact guard;assistive device   Rolling walker and shower chair if needed.  -RB  standby assist;assistive device   Rolling walker and shower chair if needed.  -RB    ADL OT LTG, Date Goal Reviewed  05/24/17  -RM     ADL OT LTG, Outcome  goal not met  -RM     ADL OT LTG, Reason Goal Not Met  discharged from facility  -       User Key  (r) = Recorded By, (t) = Taken By, (c) = Cosigned By    Initials Name Provider Type    CRISTIANA Rich, OT Occupational Therapist     Katherine Pena OTR/L Occupational Therapist    ANDRÉS Bob, ARMSTRONG/L Occupational Therapy Assistant    KENY Hicks ARMSTRONG/L Occupational Therapy Assistant    TO Andi Velasquez ARMSTRONG/L Occupational Therapy Assistant     Paulette Ennis OT Occupational Therapist          Occupational Therapy Education     Title: PT OT SLP Therapies (Active)     Topic: Occupational Therapy (Active)     Point: ADL training (Active)    Description: Instruct learner(s) on proper safety adaptation and remediation techniques during self care or  transfers.   Instruct in proper use of assistive devices.    Learning Progress Summary    Learner Readiness Method Response Comment Documented by Status   Patient Acceptance E NR   06/02/17 1318 Active    Acceptance E NR   06/01/17 1121 Active    Acceptance E VU,NR Educated pt on hand placement and safety with t/f. Educated pt on weight bear status with t/f. Educated pt on safety and call for assist.  05/30/17 1457 Done    Acceptance E VU   05/30/17 1452 Done    Acceptance E NR   05/29/17 1350 Active    Acceptance D NR Pt educated on LH reacher for donning pants with decreased understanding 2* to pt nauseated and fatigued post bathing. TO 05/27/17 1105 Active   Family Acceptance E NR   06/02/17 1318 Active    Acceptance E NR   06/01/17 1121 Active    Acceptance E NR   05/29/17 1350 Active    Acceptance D NR Pt educated on LH reacher for donning pants with decreased understanding 2* to pt nauseated and fatigued post bathing. TO 05/27/17 1105 Active               Point: Home exercise program (Active)    Description: Instruct learner(s) on appropriate technique for monitoring, assisting and/or progressing therapeutic exercises/activities.    Learning Progress Summary    Learner Readiness Method Response Comment Documented by Status   Patient Acceptance E NR   06/02/17 1318 Active    Acceptance E NR   06/01/17 1121 Active    Acceptance E VU   05/30/17 1452 Done    Acceptance E NR   05/29/17 1350 Active   Family Acceptance E NR   06/02/17 1318 Active    Acceptance E NR   06/01/17 1121 Active    Acceptance E NR   05/29/17 1350 Active               Point: Precautions (Active)    Description: Instruct learner(s) on prescribed precautions during self-care and functional transfers.    Learning Progress Summary    Learner Readiness Method Response Comment Documented by Status   Patient Acceptance E NR   06/02/17 1318 Active    Acceptance E NR   06/01/17 1121 Active    Acceptance E VU,NR  Educated pt on hand placement and safety with t/f. Educated pt on weight bear status with t/f. Educated pt on safety and call for assist.  05/30/17 1457 Done    Acceptance E VU   05/30/17 1452 Done    Acceptance E NR   05/29/17 1350 Active    Acceptance E NR Edu pt/family on no OOB without assist.  05/25/17 1304 Active   Family Acceptance E NR   06/02/17 1318 Active    Acceptance E NR   06/01/17 1121 Active    Acceptance E NR   05/29/17 1350 Active    Acceptance E NR Edu pt/family on no OOB without assist.  05/25/17 1304 Active               Point: Body mechanics (Active)    Description: Instruct learner(s) on proper positioning and spine alignment during self-care, functional mobility activities and/or exercises.    Learning Progress Summary    Learner Readiness Method Response Comment Documented by Status   Patient Acceptance E NR   06/02/17 1318 Active    Acceptance E NR   06/01/17 1121 Active    Acceptance E VU,NR Educated pt on hand placement and safety with t/f. Educated pt on weight bear status with t/f. Educated pt on safety and call for assist.  05/30/17 1457 Done    Acceptance E VU   05/30/17 1452 Done    Acceptance E NR   05/29/17 1350 Active   Family Acceptance E NR   06/02/17 1318 Active    Acceptance E NR   06/01/17 1121 Active    Acceptance E NR   05/29/17 1350 Active                      User Key     Initials Effective Dates Name Provider Type Discipline     06/15/16 -  Curly Rich OT Occupational Therapist OT     10/17/16 -  ARVIND Garcia/L Occupational Therapist OT     10/17/16 -  PATTI Ludwig/L Occupational Therapy Assistant OT     10/17/16 -  PATTI Yeager/L Occupational Therapy Assistant OT     10/17/16 -  PATTI Jeffery/L Occupational Therapy Assistant OT                  OT Recommendation and Plan  Anticipated Equipment Needs At Discharge: front wheeled walker, tub bench, wheelchair  Anticipated Discharge Disposition:  skilled nursing facility, home with 24/7 care  Planned Therapy Interventions: activity intolerance, ADL retraining, balance training, energy conservation, strengthening, transfer training  Therapy Frequency: other (see comments) (3-14x/wk)  Plan of Care Review  Outcome Summary/Follow up Plan: Pt performed very well this am with ADL's, pt is eager to meet goals and is progressing toward OT gaols as expected         Outcome Measures       06/02/17 1100 06/02/17 0900 06/01/17 1100    How much help from another person do you currently need...    Turning from your back to your side while in flat bed without using bedrails? 3  -RW  2  -RW    Moving from lying on back to sitting on the side of a flat bed without bedrails? 3  -RW  3  -RW    Moving to and from a bed to a chair (including a wheelchair)? 3  -RW  3  -RW    Standing up from a chair using your arms (e.g., wheelchair, bedside chair)? 3  -RW  3  -RW    Climbing 3-5 steps with a railing? 1  -RW  1  -RW    To walk in hospital room? 3  -RW  3  -RW    AM-PAC 6 Clicks Score 16  -RW  15  -RW    How much help from another is currently needed...    Putting on and taking off regular lower body clothing?  2  -JH     Bathing (including washing, rinsing, and drying)  2  -JH     Toileting (which includes using toilet bed pan or urinal)  2  -JH     Putting on and taking off regular upper body clothing  3  -JH     Taking care of personal grooming (such as brushing teeth)  3  -JH     Eating meals  4  -JH     Score  16  -JH       06/01/17 0755 05/31/17 0950 05/30/17 1400    How much help from another person do you currently need...    Turning from your back to your side while in flat bed without using bedrails?  2  -RW     Moving from lying on back to sitting on the side of a flat bed without bedrails?  2  -RW     Moving to and from a bed to a chair (including a wheelchair)?  3  -RW     Standing up from a chair using your arms (e.g., wheelchair, bedside chair)?  3  -RW     Climbing  3-5 steps with a railing?  1  -RW     To walk in hospital room?  2  -RW     AM-PAC 6 Clicks Score  13  -RW     How much help from another is currently needed...    Putting on and taking off regular lower body clothing? 2  -JH  2  -LM    Bathing (including washing, rinsing, and drying) 2  -  2  -LM    Toileting (which includes using toilet bed pan or urinal) 2  -  2  -LM    Putting on and taking off regular upper body clothing 3  -  3  -LM    Taking care of personal grooming (such as brushing teeth) 3  -  4  -LM    Eating meals 4  -  4  -LM    Score 16  -  17  -LM      05/30/17 1333          How much help from another is currently needed...    Putting on and taking off regular lower body clothing? 2  -BH      Bathing (including washing, rinsing, and drying) 2  -BH      Toileting (which includes using toilet bed pan or urinal) 2  -      Putting on and taking off regular upper body clothing 3  -      Taking care of personal grooming (such as brushing teeth) 4  -      Eating meals 4  -      Score 17  -      Functional Assessment    Outcome Measure Options AM-PAC 6 Clicks Daily Activity (OT)  -        User Key  (r) = Recorded By, (t) = Taken By, (c) = Cosigned By    Initials Name Provider Type     Katherine Pena, OTR/L Occupational Therapist    RW Clay Recio, RHODA Physical Therapy Assistant     PATTI Ludwig/L Occupational Therapy Assistant     PATTI Yeager/L Occupational Therapy Assistant           Time Calculation:         Time Calculation- OT       06/02/17 1324          Time Calculation- OT    OT Start Time 0900  -      OT Stop Time 1030  -      OT Time Calculation (min) 90 min  -      Total Timed Code Minutes- OT 90 minute(s)  -      OT Received On 06/02/17  -        User Key  (r) = Recorded By, (t) = Taken By, (c) = Cosigned By    Initials Name Provider Type     PATTI Ludwig/L Occupational Therapy Assistant           Therapy Charges for Today      Code Description Service Date Service Provider Modifiers Qty    32439261770 HC OT ASSTV TECHNICAL ASSMT-15 MIN 6/1/2017 FARHAN LudwigA/L  1    12096369070 HC OT THERAPEUTIC ACT EA 15 MIN 6/1/2017 FARHAN LudwigA/L GO 2    39657839075 HC OT SELF CARE/MGMT/TRAIN EA 15 MIN 6/1/2017 FARHAN LudwigA/L GO 2    47743963964 HC OT ASSTV TECHNICAL ASSMT-15 MIN 6/2/2017 FARHAN LudwigA/L  1    80935755538 HC OT SELF CARE/MGMT/TRAIN EA 15 MIN 6/2/2017 FARHAN LudwigA/L GO 4    81151136860 HC OT THERAPEUTIC ACT EA 15 MIN 6/2/2017 FARHAN LudwigA/L GO 1          OT G-codes  OT Professional Judgement Used?: Yes  OT Functional Scales Options: AM-PAC 6 Clicks Daily Activity (OT)  Score: 14  Functional Limitation: Self care  Self Care Current Status (): At least 40 percent but less than 60 percent impaired, limited or restricted  Self Care Goal Status (): At least 20 percent but less than 40 percent impaired, limited or restricted    PATTI Ludwig/L  6/2/2017

## 2017-06-02 NOTE — PROGRESS NOTES
LOS: 9 days   Patient Care Team:  Benito Kaur MD as PCP - General  Blake Oseguera MD as Consulting Physician (Hematology and Oncology)    Chief Complaint:  Left hip fracture postoperative day 11  Surgery  per Dr. Adrian Handley  Egg shell weightbearing        Interval History:     SUBJECTIVE:  She slept well last night and she feels better today  No pain she was anxious last night said she was short of breath but better today  Discussed with her daughter in the room  History taken from: patient family RN    Objective     Vital Signs  Temp:  [96.3 °F (35.7 °C)-96.4 °F (35.8 °C)] 96.4 °F (35.8 °C)  Heart Rate:  [65-70] 65  Resp:  [18] 18  BP: (134-150)/(58-60) 134/58  Last 3 weights    05/28/17  0648 05/30/17  0517 05/31/17  0455   Weight: 121 lb 4 oz (55 kg) 121 lb 7 oz (55.1 kg) 124 lb (56.2 kg)         Physical Exam:     General Appearance:    Alert, cooperative, in no acute distress   Head:    Normocephalic, without obvious abnormality, atraumatic   Eyes:            Lids and lashes normal, conjunctivae and sclerae normal, no   icterus, no pallor, corneas clear, PERRLA   Throat:   No oral lesions, no thrush, oral mucosa moist   Neck:   No adenopathy, supple, trachea midline, no thyromegaly, no   carotid bruit, no JVD       Lungs:     Clear to auscultation,respirations regular, even and                  Unlabored Good bilateral breath movement and I hear no rales rhonchi or wheezes     Heart:    Regular rhythm and normal rate, normal S1 and S2, no            murmur, no gallop, no rub, no click No ectopy resting heart rate approximately 76    Chest Wall:    No abnormalities observed   Abdomen:     Normal bowel sounds, no masses, no organomegaly, soft        non-tender, non-distended, no guarding, no rebound                Tenderness    Extremities:   Moves all extremities well, no edema, no cyanosis, no             Redness Incisions are healing well no drainage no redness        Skin:   No bleeding, bruising or  rash   Lymph nodes:   No palpable adenopathy   Neurologic:   Cranial nerves 2 - 12 grossly intact, sensation intact, DTR       present and equal bilaterally        RESULTS REVIEW:     Lab Results (last 24 hours)     Procedure Component Value Units Date/Time    CBC & Differential [[id number]] Collected:  06/02/17 0500    Specimen:  Blood Updated:  06/02/17 0605    Narrative:       The following orders were created for panel order CBC & Differential.  Procedure                               Abnormality         Status                     ---------                               -----------         ------                     CBC Auto Differential[[id number]]        Abnormal            Final result                 Please view results for these tests on the individual orders.    CBC Auto Differential [[id number]]  (Abnormal) Collected:  06/02/17 0500    Specimen:  Blood Updated:  06/02/17 0605     WBC 9.18 10*3/mm3      RBC 2.66 (L) 10*6/mm3      Hemoglobin 8.4 (L) g/dL      Hematocrit 27.1 (L) %      .9 (H) fL      MCH 31.6 pg      MCHC 31.0 (L) g/dL      RDW 21.7 (H) %      RDW-SD 72.3 (H) fl      MPV 9.7 fL      Platelets 340 10*3/mm3      Neutrophil % 71.8 %      Lymphocyte % 13.0 %      Monocyte % 9.9 %      Eosinophil % 4.4 %      Basophil % 0.4 %      Immature Grans % 0.5 %      Neutrophils, Absolute 6.59 10*3/mm3      Lymphocytes, Absolute 1.19 10*3/mm3      Monocytes, Absolute 0.91 (H) 10*3/mm3      Eosinophils, Absolute 0.40 10*3/mm3      Basophils, Absolute 0.04 10*3/mm3      Immature Grans, Absolute 0.05 (H) 10*3/mm3     Renal Function Panel [[id number]]  (Abnormal) Collected:  06/02/17 0500    Specimen:  Blood Updated:  06/02/17 0621     Glucose 92 mg/dL      BUN 17 mg/dL      Creatinine 0.64 mg/dL      Sodium 136 (L) mmol/L      Potassium 4.0 mmol/L      Chloride 100 mmol/L      CO2 31.0 mmol/L      Calcium 9.1 mg/dL      Albumin 2.80 (L) g/dL      Phosphorus 3.6 mg/dL      Anion Gap 5.0 mmol/L       BUN/Creatinine Ratio 26.6 (H)     eGFR Non African Amer 87 mL/min/1.73     Narrative:       The MDRD GFR formula is only valid for adults with stable renal function between ages 18 and 70.        Imaging Results (last 72 hours)     ** No results found for the last 72 hours. **          Assessment/Plan     Principal Problem:    Closed left hip fracture  Active Problems:    Iron deficiency anemia    Post-polio syndrome    Seizure disorder    Metabolic encephalopathy    COPD (chronic obstructive pulmonary disease)    Encounter for rehabilitation        She is making good progress toward her discharge goals she is not ambulating because of the weightbearing status but she is getting stronger doing well  We'll discuss discharge planning with family first of the week for now continue intensive therapy      Tariq Aparicio MD  06/02/17  10:59 AM

## 2017-06-02 NOTE — PLAN OF CARE
Problem: Patient Care Overview (Adult)  Goal: Plan of Care Review  Outcome: Ongoing (interventions implemented as appropriate)    06/02/17 0900 06/02/17 1252   Coping/Psychosocial Response Interventions   Plan Of Care Reviewed With --  patient   Patient Care Overview   Progress --  improving   Outcome Evaluation   Outcome Summary/Follow up Plan Pt performed very well this am with ADL's, pt is eager to meet goals and is progressing toward OT gaols as expected  --        Goal: Discharge Needs Assessment  Outcome: Ongoing (interventions implemented as appropriate)    05/24/17 1624 05/25/17 1033 05/25/17 1306   Discharge Needs Assessment   Concerns To Be Addressed --  --  --    Equipment Needed After Discharge --  wheelchair;walker, rolling;commode;shower chair;hospital bed --    Discharge Facility/Level Of Care Needs --  --  nursing facility, skilled   Current Discharge Risk --  --  dependent with mobility/activities of daily living   Current Health   Outpatient/Agency/Support Group Needs homecare agency (specify level of care) --  --    Self-Care   Equipment Currently Used at Home --  cane, straight --    Living Environment   Transportation Available --  family or friend will provide --      06/02/17 1252   Discharge Needs Assessment   Concerns To Be Addressed no discharge needs identified   Equipment Needed After Discharge --    Discharge Facility/Level Of Care Needs --    Current Discharge Risk --    Current Health   Outpatient/Agency/Support Group Needs --    Self-Care   Equipment Currently Used at Home --    Living Environment   Transportation Available --          Problem: Inpatient Occupational Therapy  Goal: Transfer Training Goal 1 LTG- OT  Outcome: Ongoing (interventions implemented as appropriate)    05/25/17 1306 05/31/17 1301 06/02/17 0900   Transfer Training OT LTG   Transfer Training OT LTG, Date Established 05/25/17 --  --    Transfer Training OT LTG, Time to Achieve by discharge --  --    Transfer  Training OT LTG, Activity Type all transfers --  --    Transfer Training OT LTG, Hillister Level contact guard assist --  --    Transfer Training OT LTG, Assist Device walker, rolling;commode, bedside --  --    Transfer Training OT LTG, Date Goal Reviewed --  --  06/02/17   Transfer Training OT LTG, Outcome --  goal ongoing --        Goal: Strength Goal LTG- OT  Outcome: Ongoing (interventions implemented as appropriate)    05/25/17 1306 05/31/17 1301 06/02/17 0900   Strength OT LTG   Strength Goal OT LTG, Date Established 05/25/17 --  --    Strength Goal OT LTG, Time to Achieve by discharge --  --    Strength Goal OT LTG, Measure to Achieve 1 set of 10 reps all planes with 1/2 to 1 lb wrist wts or dumbells to increase UE strength. --  --    Strength Goal OT LTG, Date Goal Reviewed --  --  06/02/17   Strength Goal OT LTG, Outcome --  goal ongoing --        Goal: Static Standing Balance Goal OT- STG  Outcome: Ongoing (interventions implemented as appropriate)    05/25/17 1306 05/31/17 1301 06/02/17 0900   Static Standing Balance OT STG   Static Standing Balance OT STG, Date Established 05/25/17 --  --    Static Standing Balance OT STG, Time to Achieve 2 wks --  --    Static Standing Balance OT STG, Hillister Level contact guard assist  (5 minutes with functional activity and 0-1 rest break.) --  --    Static Standing Balance OT STG, Assist Device assistive device  (Rolling wakler.) --  --    Static Standing Balance OT STG, Date Goal Reviewed --  --  06/02/17   Static Standing Balance OT STG, Outcome --  goal ongoing --        Goal: ADL Goal LTG- OT  Outcome: Ongoing (interventions implemented as appropriate)    05/25/17 1306 05/31/17 1301 06/01/17 0755   ADL OT LTG   ADL OT LTG, Date Established --  --  06/01/17   ADL OT LTG, Time to Achieve by discharge --  --    ADL OT LTG, Activity Type ADL skills  (Sponge bath and dress.) --  --    ADL OT LTG, Hillister Level contact guard;assistive device  (Rolling  walker and shower chair if needed.) --  --    ADL OT LTG, Date Goal Reviewed --  --  --    ADL OT LTG, Outcome --  goal ongoing --      06/02/17 0900   ADL OT LTG   ADL OT LTG, Date Established --    ADL OT LTG, Time to Achieve --    ADL OT LTG, Activity Type --    ADL OT LTG, Caroline Level --    ADL OT LTG, Date Goal Reviewed 06/02/17   ADL OT LTG, Outcome --

## 2017-06-02 NOTE — THERAPY TREATMENT NOTE
Inpatient Rehabilitation - Physical Therapy Treatment Note  [facility]     Patient Name: Mariela Woodson  : 1925  MRN: [id number]  Today's Date: 2017  Onset of Illness/Injury or Date of Surgery Date: 17  Date of Referral to PT: 17  Referring Physician: Dr. Aparicio    Admit Date: 2017    Visit Dx:    ICD-10-CM ICD-9-CM   1. Impaired mobility and activities of daily living Z74.09 799.89   2. Abnormality of gait and mobility R26.9 781.2   3. Muscle weakness (generalized) M62.81 728.87     Patient Active Problem List   Diagnosis   • Iron deficiency anemia due to chronic blood loss   • Post-polio syndrome   • Simple chronic bronchitis   • Seizure disorder   • Closed left hip fracture   • Iron deficiency anemia   • Post-polio syndrome   • Seizure disorder   • Metabolic encephalopathy   • COPD (chronic obstructive pulmonary disease)   • Encounter for rehabilitation               Adult Rehabilitation Note       17 1349 17 1041 17 0900    Rehab Assessment/Intervention    Discipline physical therapy assistant  -RW physical therapy assistant  -RW occupational therapy assistant  -    Document Type therapy note (daily note)  - therapy note (daily note)  - therapy note (daily note)  -    Subjective Information agree to therapy  -RW agree to therapy  - agree to therapy  -    Patient Effort, Rehab Treatment good  -RW good  -RW     Recorded by [RW] Clay Recio PTA [RW] Clay Recio PTA [] Siomara Bob, ARMSTRONG/L    Vital Signs    Pretreatment Heart Rate (beats/min)  61  -RW     Pre SpO2 (%)  98  -RW 99  -    O2 Delivery Pre Treatment   room air  -    Recorded by  [RW] Clay Recio PTA [] Siomara Bob, ARMSTRONG/L    Pain Assessment    Pain Assessment No/denies pain  -RW No/denies pain  -RW 0-10  -    Pain Score   4  -JH    Post Pain Score   6  -    Pain Type   Surgical pain  -    Pain Location   Hip  -    Pain Orientation   Left  -    Pain  Intervention(s)   Medication (See MAR);Repositioned  -JH    Recorded by [RW] Clay Recio PTA [RW] Clay Recio PTA [] Siomara Bob, ARMSTRONG/L    Cognitive Assessment/Intervention    Current Cognitive/Communication Assessment functional  -RW functional  -RW functional  -JH    Orientation Status oriented x 4  -RW oriented x 4  -RW oriented x 4  -JH    Follows Commands/Answers Questions 100% of the time  -% of the time  -% of the time  -JH    Personal Safety Interventions gait belt;nonskid shoes/slippers when out of bed  -RW gait belt;nonskid shoes/slippers when out of bed  -RW gait belt  -JH    Recorded by [RW] Clay Recio PTA [RW] Clay Recio PTA [] Siomara Bob, ARMSTRONG/L    Mobility Assessment/Training    Left Lower Extremity Weight-Bearing touch down weight-bearing  -RW touch down weight-bearing  -RW     Recorded by [RW] Clay Recio PTA [RW] Clay Recio PTA     Bed Mobility, Assessment/Treatment    Bed Mobility, Assistive Device bed rails  -RW --  -RW     Bed Mobility, Roll Left, [place] conditional independence  -RW      Bed Mobility, Roll Right, [place] conditional independence  -RW      Bed Mob, Supine to Sit, [place] supervision required  -RW --  -RW     Bed Mob, Sit to Supine, [place] contact guard assist  -RW --  -RW     Bed Mobility, Comment practiced multiple times  -RW      Recorded by [RW] Clay Recio PTA [RW] Clay Recio PTA     Transfer Assessment/Treatment    Transfers, Bed-Chair [place] contact guard assist;minimum assist (75% patient effort)  -RW --  -RW     Transfers, Chair-Bed [place] contact guard assist;minimum assist (75% patient effort)  -RW --  -RW     Transfers, Bed-Chair-Bed, Assist Device rolling walker  -RW --  -RW     Transfers, Sit-Stand [place] minimum assist (75% patient effort)  -RW minimum assist (75% patient effort)  -RW     Transfers, Stand-Sit [place] minimum assist (75% patient effort)  -RW  minimum assist (75% patient effort)  -     Transfers, Sit-Stand-Sit, Assist Device rolling walker  - rolling walker  -     Walk-In Shower Transfer, [place]   moderate assist (50% patient effort)  -    Walk-In Shower Transfer, Assist Device   rolling walker  -    Transfer, Comment practiced several times  -RW      Recorded by [RW] Clay Recio PTA [RW] Clay Recio PTA [] PATTI Ludwig/L    Gait Assessment/Treatment    Gait, [place] Level --  -RW minimum assist (75% patient effort)  -RW     Gait, Assistive Device --  -RW rolling walker  -RW     Gait, Distance (Feet)  4  -RW     Recorded by [RW] Clay Recio PTA [RW] Clay Recio PTA     Wheelchair Training/Management    Wheelchair Propulsion Training steering wheelchair;turning wheelchair  - steering wheelchair;turning wheelchair  -     Wheelchair, Distance Propelled  170  -     Wheelchair Training Comment sba in room  - sba  -RW     Recorded by [RW] Clay Recio PTA [RW] Clay Recio PTA     Upper Body Bathing Assessment/Training    UB Bathing Assess/Train Assistive Device   bath mitt;hand-held shower head;long-handled sponge  -    UB Bathing Assess/Train, Position   sitting  -    UB Bathing Assess/Train, [place] Level   conditional independence  -JH    Recorded by   [] PATTI Ludwig/L    Lower Body Bathing Assessment/Training    LB Bathing Assess/Train Assistive Device   hand-held shower head;long-handled sponge  -    LB Bathing Assess/Train, Position   sitting;standing  -    LB Bathing Assess/Train, [place] Level   moderate assist (50% patient effort)  -    LB Bathing Assess/Train, Impairments   decreased flexibility;ROM decreased;strength decreased  -    Recorded by   [] PATTI Ludwig/L    Upper Body Dressing Assessment/Training    UB Dressing Assess/Train, Clothing Type   doffing:;donning:;pull over  -    UB Dressing Assess/Train, Position   sitting  -    UB  Dressing Assess/Train, [place]   conditional independence  -    Recorded by   [] SAURAV Ludwig    Lower Body Dressing Assessment/Training     Dressing Assess/Train, Clothing Type   doffing:;donning:;pants;shorts;socks  -[facility] Dressing Assess/Train, Assist Device   long-handled shoe horn;reacher;sock-aid  -[facility] Dressing Assess/Train, Position   sitting;standing  -    LB Dressing Assess/Train, [place]   moderate assist (50% patient effort)  -    LB Dressing Assess/Train, Impairments   decreased flexibility;ROM decreased;strength decreased  -    Recorded by   [] SAURAV Ludwig    Toileting Assessment/Training    Toileting Assess/Train, Assistive Device   grab bars;raised toilet seat  -    Toileting Assess/Train, Position   sitting  -    Toileting Assess/Train, [place] Level   minimum assist (75% patient effort);moderate assist (50% patient effort)  -    Recorded by   [] SAURAV Ludwig    Grooming Assessment/Training    Grooming Assess/Train, Position   sitting  -    Grooming Assess/Train, [place] Level   contact guard assist  -    Grooming Assess/Train, Impairments   decreased flexibility;ROM decreased;strength decreased  -    Recorded by   [] SAURAV Ludwig    Balance Skills Training    Sitting-Level of Assistance   Distant supervision  -    Standing-Level of Assistance   Close supervision  -    Recorded by   [] SAURAV Ludwig    Therapy Exercises    Bilateral Lower Extremities --   2 sets  -RW AROM:;ankle pumps/circles;20 reps;glut sets;LAQ;knee flexion;sitting;hip abduction/adduction   2 sets  -RW     Recorded by [RW] Clay Recio PTA [RW] Clay Recio, PTA     Positioning and Restraints    Pre-Treatment Position sitting in chair/recliner  - sitting in chair/recliner  - sitting in chair/recliner  -    Post Treatment Position bed  -RW wheelchair  - wheelchair  -    In Bed call light within reach;with  family/caregiver  -RW      In Wheelchair  call light within reach;with family/caregiver  -RW call light within reach;encouraged to call for assist;with family/caregiver  -JH    Recorded by [RW] Clay Recio PTA [RW] Clay Recio PTA [] SAURAV Ludwig      06/01/17 1435 06/01/17 1005 06/01/17 0905    Rehab Assessment/Intervention    Discipline physical therapy assistant  -RW physical therapy assistant  -RW speech language pathologist  -EC    Document Type therapy note (daily note)  -RW therapy note (daily note)  -RW therapy note (daily note);discharge summary  -EC    Subjective Information agree to therapy  -RW agree to therapy  -RW no complaints;agree to therapy  -EC    Patient Effort, Rehab Treatment good  -RW good  -RW excellent  -EC    Recorded by [RW] Clay Recio PTA [RW] Clay Recio PTA [EC] Rema Grande CCC-SLP    Pain Assessment    Pain Assessment No/denies pain  -RW No/denies pain  -RW No/denies pain  -EC    Recorded by [RW] Clay Recio PTA [RW] Clay Recio PTA [EC] Rema Grande CCC-SLP    Cognitive Assessment/Intervention    Current Cognitive/Communication Assessment functional  -RW functional  -RW     Orientation Status oriented x 4  -RW oriented x 4  -RW     Follows Commands/Answers Questions 100% of the time  -% of the time  -RW     Personal Safety Interventions gait belt;nonskid shoes/slippers when out of bed  -RW gait belt;nonskid shoes/slippers when out of bed  -RW     Recorded by [RW] Clay Recio PTA [RW] Clay Recio PTA     Communication Treatment Objective and Progress    Cognitive Linguistic Treatment Objectives   Improve orientation;Improve awareness of basic personal information;Improve memory skills  -EC    Recorded by   [EC] Rema Grande CCC-SLP    Improve memory skills    Improve memory skills through:   listen to a paragraph and answer questions;80%;without cues  -EC    Status: Improve memory skills   Achieved  -EC    Memory  Skills Progress   80%  -EC    Recorded by   [EC] Rema Grande CCC-SLP    Improve functional problem solving    Improve functional problem solving through:   tell similarity between items;sequence steps in a task;complete organization/home management task;80%;with inconsistent cues  -EC    Status: Improve functional problem solving   Achieved  -EC    Functional Problem Solving Progress   90%;with inconsistent cues  -EC    Recorded by   [EC] Rema Grande CCC-SLP    Mobility Assessment/Training    Left Lower Extremity Weight-Bearing touch down weight-bearing  -RW touch down weight-bearing  -RW     Recorded by [RW] Clay Recio PTA [RW] Clay Recio PTA     Bed Mobility, Assessment/Treatment    Bed Mobility, Assistive Device bed rails;head of bed elevated  -RW --  -RW     Bed Mob, Supine to Sit, [place] supervision required;contact guard assist  -RW      Bed Mob, Sit to Supine, [place] minimum assist (75% patient effort)  -RW --  -RW     Recorded by [RW] Clay Recio PTA [RW] Clay Recio PTA     Transfer Assessment/Treatment    Transfers, Bed-Chair [place] minimum assist (75% patient effort)  -RW      Transfers, Chair-Bed [place] minimum assist (75% patient effort)  -RW --  -RW     Transfers, Bed-Chair-Bed, Assist Device rolling walker  -RW --  -RW     Transfers, Sit-Stand [place] minimum assist (75% patient effort)  -RW minimum assist (75% patient effort)  -RW     Transfers, Stand-Sit [place] minimum assist (75% patient effort)  -RW minimum assist (75% patient effort)  -RW     Transfers, Sit-Stand-Sit, Assist Device rolling walker  -RW rolling walker  -RW     Recorded by [RW] Clay Recio PTA [RW] Clay Recio PTA     Gait Assessment/Treatment    Gait, [place] Level minimum assist (75% patient effort)  -RW minimum assist (75% patient effort)  -RW     Gait, Assistive Device rolling walker  -RW rolling walker  -RW     Gait, Distance (Feet) 4  -RW 6  -RW      Gait, Maintain Weight Bearing Status assist to maintain weight bearing status;cues to maintain weight bearing status  -RW assist to maintain weight bearing status;cues to maintain weight bearing status  -RW     Recorded by [RW] Clay Recio PTA [RW] Clay Recio PTA     Therapy Exercises    Left Lower Extremity --  -RW AAROM:;10 reps;standing;hip flexion  -RW     Bilateral Lower Extremities AROM:;ankle pumps/circles;20 reps;glut sets;LAQ;knee flexion;supine;heel slides   2 sets  -RW AROM:;ankle pumps/circles;20 reps;sidelying;glut sets;hip abduction/adduction;LAQ;knee flexion   2 sets  -RW     Recorded by [RW] Clay Recio PTA [RW] Clay Recio PTA     Positioning and Restraints    Pre-Treatment Position sitting in chair/recliner  -RW sitting in chair/recliner  -RW     Post Treatment Position bed  -RW wheelchair  -RW     In Bed call light within reach;with family/caregiver;legs elevated  -RW      In Wheelchair  with family/caregiver  -RW     Recorded by [RW] Clay Recio PTA [RW] Clay Recio PTA       06/01/17 0755 05/31/17 1445 05/31/17 1340    Rehab Assessment/Intervention    Discipline occupational therapy assistant  - physical therapy assistant  -RW occupational therapy assistant  -LM    Document Type therapy note (daily note)  - therapy note (daily note)  -RW therapy note (daily note)  -LM    Subjective Information agree to therapy;complains of;pain;swelling  - agree to therapy  -RW agree to therapy  -LM    Patient Effort, Rehab Treatment adequate  -JH good  -RW     Recorded by [] PATTI Ludwig/PIPER [RW] Clay Recio PTA [LM] PATTI Yeager/L    Vital Signs    Pre Systolic BP Rehab 144  -JH      Pre Treatment Diastolic BP 66  -JH      Pretreatment Heart Rate (beats/min) 66  -JH      Pre SpO2 (%) 92  -JH      O2 Delivery Pre Treatment room air  -JH      Intra SpO2 (%) 90  -JH      O2 Delivery Intra Treatment room air  -      Post SpO2 (%) 93  -JH      O2 Delivery  Post Treatment room air  -JH      Recorded by [JH] FARHAN LudwigA/L      Pain Assessment    Pain Assessment 0-10  -JH No/denies pain  -RW     Pain Score 6  -JH  0  -LM    Post Pain Score 6  -JH      Pain Type Surgical pain  -JH      Pain Location Hip  -JH      Pain Orientation Left  -JH      Pain Intervention(s) Medication (See MAR);Repositioned  -JH      Recorded by [JH] FARHAN LudwigA/L [RW] Clay Recio PTA [LM] FARHAN YeagerA/L    Cognitive Assessment/Intervention    Current Cognitive/Communication Assessment functional  -JH functional  -RW     Orientation Status oriented to;person;place;required verbal cueing (specifiy in comments)   weight bearing, and safety  -JH oriented x 4  -RW     Follows Commands/Answers Questions 75% of the time  -% of the time  -RW     Personal Safety Interventions  gait belt;nonskid shoes/slippers when out of bed  -RW     Recorded by [JH] PATTI Ludwig/L [RW] Clay Recio PTA     Mobility Assessment/Training    Left Lower Extremity Weight-Bearing  touch down weight-bearing  -RW     Recorded by  [RW] Clay Recio PTA     Bed Mobility, Assessment/Treatment    Bed Mobility, Assistive Device  head of bed elevated  -RW     Bed Mob, Sit to Supine, [place]  minimum assist (75% patient effort)  -RW     Recorded by  [RW] Clay Recio PTA     Transfer Assessment/Treatment    Transfers, Bed-Chair [place] moderate assist (50% patient effort);1 person + 1 person to manage equipment  -      Transfers, Chair-Bed [place]  minimum assist (75% patient effort)  -RW     Transfers, Bed-Chair-Bed, Assist Device  rolling walker  -RW     Transfers, Sit-Stand [place] minimum assist (75% patient effort);moderate assist (50% patient effort);1 person + 1 person to manage equipment  - minimum assist (75% patient effort)  -RW     Transfers, Stand-Sit [place] verbal cues required;minimum assist (75% patient effort)  - minimum assist (75%  patient effort)  -     Transfers, Sit-Stand-Sit, Assist Device elevated surface;rolling walker  - rolling walker  -     Recorded by [] SAURAV Ludwig [RW] Clay Recio PTA     Functional Mobility    Functional Mobility- [place] Level minimum assist (75% patient effort);moderate assist (50% patient effort);1 person + 1 person to manage equipment  -      Functional Mobility- Device rolling walker  -      Functional Mobility-Maintain WBing unable to maintain weight bearing status;cues to maintain weight bearing status  -      Recorded by [] SAURAV Ludwig      Wheelchair Training/Management    Wheelchair Propulsion Training forward propulsion;backward propulsion;moving through doorways;carpet  -      Wheelchair, Distance Propelled 75  -JH  --   50  -LM    Recorded by [] SAURAV Ludwig  [] SAURAV Yeager    Upper Body Bathing Assessment/Training    UB Bathing Assess/Train, Comment Family/pt state they have taken care of her bath   -      Recorded by [] SAURAV Ludwig      Lower Body Dressing Assessment/Training    LB Dressing Assess/Train, Clothing Type doffing:;donning:;shoes;socks  -      LB Dressing Assess/Train, [place] dependent (less than 25% patient effort)  -      Recorded by [] SAURAV Ludwig      Balance Skills Training    Standing-Level of Assistance Close supervision;Contact guard  -      Static Standing Balance Support assistive device  -      Standing Balance # of Minutes 3min, VC's for WB into arms and RLE  -      Recorded by [] SAURAV Ludwig      Therapy Exercises    Bilateral Lower Extremities  AROM:;ankle pumps/circles;AAROM:;20 reps;supine;heel slides;quad sets   2 sets  -     Bilateral Upper Extremity   AROM:;20 reps   level 1 tband all diana planes   -LM    BUE Resistance   manual resistance- minimal   uee bike x 15 min   -LM    Recorded by  [RW] Clay Recio PTA [LM] SAURAV Yeager     Positioning and Restraints    Pre-Treatment Position in bed  - sitting in chair/recliner  -RW sitting in chair/recliner  -    Post Treatment Position wheelchair  - bed  -RW chair  -LM    In Bed  side rails up x2;legs elevated;heels elevated;with family/caregiver  -RW     In Wheelchair with SLP  -      Recorded by [JH] FARHAN LudwigA/L [RW] Clay Recio PTA [LM] FARHAN YeagerA/PIPER      05/31/17 1130 05/31/17 1050 05/31/17 0950    Rehab Assessment/Intervention    Discipline occupational therapy assistant  -LM speech language pathologist  -EC physical therapy assistant  -RW    Document Type therapy note (daily note)  -LM therapy note (daily note)  -EC therapy note (daily note)  -RW    Subjective Information agree to therapy  -LM agree to therapy  -EC agree to therapy  -RW    Recorded by [LM] FARHAN YeagerA/L [EC] Rema Grande CCC-SLP [RW] Clay Recio PTA    Pain Assessment    Pain Assessment  No/denies pain  -EC No/denies pain  -RW    Pain Score 0  -LM      Post Pain Score 0  -LM      Recorded by [LM] FARHAN YeagerA/PIPER [EC] Rema Grande CCC-SLP [RW] Clay Recio PTA    Communication Treatment Objective and Progress    Cognitive Linguistic Treatment Objectives  Improve orientation;Improve awareness of basic personal information;Improve memory skills  -EC     Recorded by  [EC] Rema Grande CCC-SLP     Improve memory skills    Improve memory skills through:  listen to a paragraph and answer questions;80%;without cues  -EC     Status: Improve memory skills  Progressing as expected  -EC     Memory Skills Progress  continue to address  -EC     Recorded by  [EC] GWEN GutierrezSLP     Improve functional problem solving    Improve functional problem solving through:  tell similarity between items;sequence steps in a task;complete organization/home management task;80%;with inconsistent cues  -EC     Status: Improve functional problem solving   Progressing as expected  -EC     Functional Problem Solving Progress  90%;with inconsistent cues  -EC     Recorded by  [EC] Rema Grande, CCC-SLP     Mobility Assessment/Training    Left Lower Extremity Weight-Bearing   touch down weight-bearing  -RW    Recorded by   [RW] Clay Recio PTA    Transfer Assessment/Treatment    Transfers, Bed-Chair [place]   minimum assist (75% patient effort)  -RW    Transfers, Chair-Bed [place]   minimum assist (75% patient effort)  -RW    Transfers, Bed-Chair-Bed, Assist Device   rolling walker  -RW    Transfers, Sit-Stand [place]   minimum assist (75% patient effort)  -RW    Transfers, Stand-Sit [place]   minimum assist (75% patient effort)  -RW    Transfers, Sit-Stand-Sit, Assist Device   rolling walker  -RW    Recorded by   [RW] Clay Recio PTA    Gait Assessment/Treatment    Gait, [place] Level   minimum assist (75% patient effort)  -RW    Gait, Assistive Device   rolling walker  -RW    Gait, Distance (Feet)   3  -RW    Recorded by   [RW] Clay Recio PTA    Wheelchair Training/Management    Wheelchair, Distance Propelled 20-50  -LM  100  -RW    Wheelchair Training Comment   sba  -RW    Recorded by [LM] PATTI Yeager/L  [RW] Clay Recio PTA    Therapy Exercises    Bilateral Lower Extremities   AROM:;sitting;ankle pumps/circles;glut sets;hip abduction/adduction;knee flexion;LAQ  -RW    Bilateral Upper Extremity elbow flexion/extension;shoulder abduction/adduction;shoulder extension/flexion;shoulder horizontal abd/add;shoulder rolls/shrugs   UEE bike x 10 min also 2 lb wt   -LM      BUE Resistance manual resistance- minimal  -LM      Recorded by [LM] PATTI Yeager/L  [RW] Clay Recio PTA    Positioning and Restraints    Pre-Treatment Position in bed  -LM  sitting in chair/recliner  -RW    Post Treatment Position wheelchair  -LM  wheelchair  -RW    In Wheelchair   with SLP  -RW    Recorded by [LM] Genet SCHMIDT  SAURAV Hicks  [RW] Clay Recio PTA      05/31/17 0924 05/30/17 1525       Rehab Assessment/Intervention    Discipline --  -RW physical therapy assistant  -RUIZ     Document Type --  -RW therapy note (daily note)  -JA     Subjective Information --  -RW agree to therapy;complains of  -JA     Patient Effort, Rehab Treatment  good  -JA     Precautions/Limitations  fall precautions;oxygen therapy device and L/min;non-weight bearing status  -JA     Recorded by [RW] Clay Recio PTA [JA] Darci Harris PTA     Pain Assessment    Pain Assessment  No/denies pain  -JA     Pain Intervention(s)  Medication (See MAR);Repositioned  -JA     Recorded by  [JA] Darci Harris PTA     Mobility Assessment/Training    Extremity Weight-Bearing Status  left lower extremity  -JA     Left Lower Extremity Weight-Bearing --  -RW touch down weight-bearing  -JA     Recorded by [RW] Clay Recio PTA [JA] Darci Harris PTA     Bed Mobility, Assessment/Treatment    Bed Mob, Sit to Supine, [place] --  -RW moderate assist (50% patient effort)  -JA     Recorded by [RW] Clay Recio PTA [JA] Darci Harris PTA     Transfer Assessment/Treatment    Transfers, Bed-Chair [place] --  -RW moderate assist (50% patient effort)  -RUIZ     Transfers, Bed-Chair-Bed, Assist Device --  - rolling walker  -RUIZ     Transfers, Sit-Stand [place] --  -RW minimum assist (75% patient effort)  -RUIZ     Transfers, Stand-Sit [place] --  -RW minimum assist (75% patient effort)  -RUIZ     Transfers, Sit-Stand-Sit, Assist Device --  - rolling walker  -RUIZ     Transfer, Maintain Weight Bearing Status  cues to maintain weight bearing status;assist to maintain weight bearing status;unable to maintain weight bearing status  -JA     Recorded by [RW] Clay Recio PTA [JA] Darci Harris PTA     Therapy Exercises    Right Lower Extremity AROM:;20 reps;supine;heel slides;hip abduction/adduction  -RW AROM:;20 reps;supine;heel  slides;hip abduction/adduction  -JA     Left Lower Extremity AAROM:;20 reps;supine;heel slides;hip abduction/adduction  -RW AAROM:;20 reps;supine;heel slides;hip abduction/adduction  -JA     Bilateral Lower Extremities AROM:;20 reps;supine;ankle pumps/circles;glut sets;quad sets  -RW AROM:;20 reps;supine;ankle pumps/circles;glut sets;quad sets  -JA     Recorded by [RW] Clay Recio, PTA [JA] Darci Harris PTA     Positioning and Restraints    Pre-Treatment Position  sitting in chair/recliner  -JA     Post Treatment Position  bed  -JA     In Bed  heels elevated  -JA     Recorded by  [JA] Darci Harris PTA       User Key  (r) = Recorded By, (t) = Taken By, (c) = Cosigned By    Initials Name Effective Dates    EC Rema Grande, CCC-SLP 12/30/16 -     JA Darci Harris, PTA 10/17/16 -     RW Clay Recio, PTA 10/17/16 -      Siomara Bob, ARMSTRONG/L 10/17/16 -     LM Genet Hicks, ARMSTRONG/L 10/17/16 -                 IP PT Goals       06/02/17 1444 06/01/17 1612 05/31/17 1554    Bed Mobility PT LTG    Bed Mobility PT LTG, Date Goal Reviewed 06/02/17  -RW 06/01/17  -RW 05/31/17  -RW    Bed Mobility PT LTG, Outcome goal met  -RW goal ongoing  -RW goal ongoing  -RW    Transfer Training PT LTG    Transfer Training PT  LTG, Date Goal Reviewed  06/01/17  -RW 05/31/17  -RW    Transfer Training PT LTG, Outcome  goal met  -RW       05/30/17 1525 05/29/17 1337 05/27/17 1544    Bed Mobility PT LTG    Bed Mobility PT LTG, Date Goal Reviewed 05/30/17  -JA 05/29/17  -CA 05/27/17  -RW    Bed Mobility PT LTG, Outcome goal ongoing  -JA goal ongoing  -CA goal ongoing  -RW    Transfer Training PT STG    Transfer Training PT STG, Date Goal Reviewed 05/30/17  -JA 05/29/17  -CA 05/27/17  -RW    Transfer Training PT STG, Outcome goal met  -JA goal ongoing  -CA goal ongoing  -RW    Transfer Training PT LTG    Transfer Training PT  LTG, Date Goal Reviewed 05/30/17  -JA 05/29/17  -CA 05/27/17  -RW    Transfer Training  PT LTG, Outcome goal ongoing  -JA goal ongoing  -CA goal ongoing  -RW    Wheelchair Propulsion PT LTG    Wheelchair Propulsion Goal PT LTG, Date Goal Reviewed 05/30/17  -JA 05/29/17  -CA 05/27/17  -RW    Wheelchair Propulsion Goal PT LTG, Outcome goal met  -JA goal ongoing  -CA goal ongoing  -RW      05/26/17 1300 05/26/17 1033 05/25/17 1518    Bed Mobility PT LTG    Bed Mobility PT LTG, Date Goal Reviewed 05/26/17  -GIOVANA 05/26/17  -GIOVANA (P)  05/25/17  -RW    Bed Mobility PT LTG, Outcome goal ongoing  -GIOVANA goal ongoing  -GIOVANA (P)  goal ongoing  -RW    Transfer Training PT STG    Transfer Training PT STG, Date Goal Reviewed 05/26/17  -GIOVANA 05/26/17  -GIOVANA (P)  05/25/17  -RW    Transfer Training PT STG, Outcome goal ongoing  -GIOVANA goal ongoing  -GIOVANA (P)  goal ongoing  -RW    Transfer Training PT LTG    Transfer Training PT  LTG, Date Goal Reviewed 05/26/17  -GIOVANA 05/26/17  -GIOVANA (P)  05/25/17  -RW    Transfer Training PT LTG, Outcome goal ongoing  -GIOVANA goal ongoing  -GIOVANA (P)  goal ongoing  -RW    Wheelchair Propulsion PT LTG    Wheelchair Propulsion Goal PT LTG, Date Goal Reviewed 05/26/17  -GIOVANA 05/26/17  -GIOVANA     Wheelchair Propulsion Goal PT LTG, Outcome goal ongoing  -GIOVANA goal ongoing  -GIOVANA (P)  goal ongoing  -RW      05/25/17 1033 05/23/17 1338 05/23/17 0922    Bed Mobility PT STG    Bed Mobility PT STG, Date Established   05/23/17  -CB    Bed Mobility PT STG, Time to Achieve   by discharge  -CB    Bed Mobility PT STG, Activity Type   supine to sit/sit to supine  -CB    Bed Mobility PT STG, [place] Level   supervision required  -CB    Bed Mobility PT STG, Additional Goal   HOB down and no rails  -CB    Bed Mobility PT STG, Date Goal Reviewed  05/23/17  -CATALINO     Bed Mobility PT STG, Outcome  goal ongoing  -CATALINO     Bed Mobility PT LTG    Bed Mobility PT LTG, Date Established 05/25/17  -LM      Bed Mobility PT LTG, Time to Achieve by discharge  -LM      Bed Mobility PT LTG, Activity Type supine to sit/sit to supine  -LM      Bed Mobility  PT LTG, [place] Level minimum assist (75% patient effort)  -LM      Bed Mobility PT Goal  LTG, Assist Device bed rails  -LM      Bed Mobility PT LTG, Outcome goal ongoing  -LM      Transfer Training PT STG    Transfer Training PT STG, Date Established 05/25/17  -LM  05/23/17  -CB    Transfer Training PT STG, Time to Achieve 5 days  -LM  2 - 3 days  -CB    Transfer Training PT STG, Activity Type sit to stand/stand to sit  -LM  bed to chair /chair to bed;sit to stand/stand to sit  -CB    Transfer Training PT STG, [place] Level moderate assist (50% patient effort)  -LM  contact guard assist  -CB    Transfer Training PT STG, Assist Device --   AAD  -LM  walker, rolling  -CB    Transfer Training PT STG, Additional Goal Maintaining TTWB LLE  -LM      Transfer Training PT STG, Date Goal Reviewed  05/23/17  -CATALINO     Transfer Training PT STG, Outcome goal ongoing  -LM goal ongoing  -CATALINO     Transfer Training PT LTG    Transfer Training PT LTG, Date Established 05/25/17  -LM      Transfer Training PT LTG, Time to Achieve by discharge  -LM      Transfer Training PT LTG, Activity Type bed to chair /chair to bed  -LM      Transfer Training PT LTG, [place] Level minimum assist (75% patient effort)  -LM      Transfer Training PT LTG, Assist Device --   AAD  -LM      Transfer Training PT LTG, Additional Goal Maintaining TTWB LLE  -LM      Transfer Training PT LTG, Outcome goal ongoing  -LM      Gait Training PT LTG    Gait Training Goal PT LTG, Date Established   05/23/17  -CB    Gait Training Goal PT LTG, Time to Achieve   by discharge  -CB    Gait Training Goal PT LTG, [place] Level   contact guard assist  -CB    Gait Training Goal PT LTG, Assist Device   walker, rolling  -CB    Gait Training Goal PT LTG, Distance to Achieve   50 feet  -CB    Gait Training Goal PT LTG, Date Goal Reviewed  05/23/17  -CATALINO     Gait Training Goal PT LTG, Outcome  goal ongoing  -CATALINO     Stair Training PT LTG    Stair Training Goal PT  LTG, Date Established   05/23/17  -CB    Stair Training Goal PT LTG, Time to Achieve   by discharge  -CB    Stair Training Goal PT LTG, Number of Steps   1  -CB    Stair Training Goal PT LTG, [place] Level   contact guard assist  -CB    Stair Training Goal PT LTG, Date Goal Reviewed  05/23/17  -CATALINO     Stair Training Goal PT LTG, Outcome  goal ongoing  -CATALINO     Strength Goal PT LTG    Strength Goal PT LTG, Date Established   05/23/17  -CB    Strength Goal PT LTG, Time to Achieve   by discharge  -CB    Strength Goal PT LTG, Measure to Achieve   15 reps all ex BLE independently  -CB    Strength Goal PT LTG, Functional Goal   able to get legs back up onto bed independently  -CB    Strength Goal PT LTG, Date Goal Reviewed  05/23/17  -CATALINO     Wheelchair Propulsion PT LTG    Wheelchair Propulsion Goal PT LTG, Date Established 05/25/17  -LM      Wheelchair Propulsion Goal PT LTG, Time to Achieve by discharge  -LM      Wheelchair Propulsion Goal PT LTG, [place] Level contact guard assist  -LM      Wheelchair Propulsion Goal PT LTG, Distance to Achieve 50 feet  -LM      Wheelchair Propulsion Goal PT LTG, Outcome goal ongoing  -LM        User Key  (r) = Recorded By, (t) = Taken By, (c) = Cosigned By    Initials Name Provider Type    LM Kelly Mahmood, PT Physical Therapist    CB Meg Tomlin, PT Physical Therapist    RUIZ Harris, PTA Physical Therapy Assistant    NILDA Bro, PTA Physical Therapy Assistant    CATALINO Wei, PTA Physical Therapy Assistant    GIOVANA Desouza, PTA Physical Therapy Assistant    RW Clay Recio, PTA Physical Therapy Assistant          Physical Therapy Education     Title: PT OT SLP Therapies (Active)     Topic: Physical Therapy (Active)     Point: Mobility training (Done)    Learning Progress Summary    Learner Readiness Method Response Comment Documented by Status   Patient Acceptance E NR,VU Discussed with family regarding definition TTWB at L with mobility &  transfers.  05/30/17 1036 Done    Acceptance E,TB,D NR Rienforced TTWB & benefits of mobility  05/29/17 1018 Active   Family Acceptance E NR,VU Discussed with family regarding definition TTWB at L with mobility & transfers.  05/30/17 1036 Done               Point: Precautions (Done)    Learning Progress Summary    Learner Readiness Method Response Comment Documented by Status   Patient Acceptance E VU reviewed ttwb  06/01/17 1112 Done    Acceptance E VU reviewed ttwb with gt and transfers. talked with lita about her moms ability to care for pt at home.  05/31/17 1253 Done    Acceptance E NR,VU Discussed with family regarding definition TTWB at L with mobility & transfers.  05/30/17 1036 Done    Acceptance E,TB,D NR Rienforced TTWB & benefits of mobility  05/29/17 1018 Active    Acceptance E NR reviewed ttwb but pt unable to maintain if asked to mobilize.  05/27/17 1152 Active    Acceptance E NR pt edu on TTWB and how to maintain TTWB.  05/26/17 1245 Active    Acceptance E NR Educated patient's family on precautions re: TTWB LLE  05/25/17 1446 Active   Family Acceptance E VU reviewed ttwb with gt and transfers. talked with lita about her moms ability to care for pt at home.  05/31/17 1253 Done    Acceptance E NR,VU Discussed with family regarding definition TTWB at L with mobility & transfers.  05/30/17 1036 Done    Acceptance E NR Educated patient's family on precautions re: TTWB LLE  05/25/17 1446 Active                      User Key     Initials Effective Dates Name Provider Type Discipline     06/15/16 -  Kelly Mahmood, PT Physical Therapist PT     10/17/16 -  Darci Harris, PTA Physical Therapy Assistant PT     10/17/16 -  Juan Desouza, PTA Physical Therapy Assistant PT     10/17/16 -  Clay Recio, PTA Physical Therapy Assistant PT                    PT Recommendation and Plan  Anticipated Discharge Disposition: skilled nursing facility, home with  assist, home with home health (SNF vs. home with 24/7 if available and HH)  Planned Therapy Interventions: balance training, bed mobility training, gait training, home exercise program, motor coordination training, neuromuscular re-education, patient/family education, postural re-education, ROM (Range of Motion), stair training, strengthening, stretching, transfer training, wheelchair management/propulsion training  PT Frequency: other (see comments) (5-14 times/wk)  Plan of Care Review  Plan Of Care Reviewed With: patient, caregiver, daughter  Progress: improving  Outcome Summary/Follow up Plan: pt making excellent progress with mobility. continue with strengthening. needs new goals.          Outcome Measures       06/02/17 1100 06/02/17 0900 06/01/17 1100    How much help from another person do you currently need...    Turning from your back to your side while in flat bed without using bedrails? 3  -RW  2  -RW    Moving from lying on back to sitting on the side of a flat bed without bedrails? 3  -RW  3  -RW    Moving to and from a bed to a chair (including a wheelchair)? 3  -RW  3  -RW    Standing up from a chair using your arms (e.g., wheelchair, bedside chair)? 3  -RW  3  -RW    Climbing 3-5 steps with a railing? 1  -RW  1  -RW    To walk in hospital room? 3  -RW  3  -RW    AM-PAC 6 Clicks Score 16  -RW  15  -RW    How much help from another is currently needed...    Putting on and taking off regular lower body clothing?  2  -JH     Bathing (including washing, rinsing, and drying)  2  -JH     Toileting (which includes using toilet bed pan or urinal)  2  -JH     Putting on and taking off regular upper body clothing  3  -JH     Taking care of personal grooming (such as brushing teeth)  3  -JH     Eating meals  4  -JH     Score  16  -JH       06/01/17 0755 05/31/17 0950       How much help from another person do you currently need...    Turning from your back to your side while in flat bed without using bedrails?   2  -RW     Moving from lying on back to sitting on the side of a flat bed without bedrails?  2  -RW     Moving to and from a bed to a chair (including a wheelchair)?  3  -RW     Standing up from a chair using your arms (e.g., wheelchair, bedside chair)?  3  -RW     Climbing 3-5 steps with a railing?  1  -RW     To walk in hospital room?  2  -RW     AM-PAC 6 Clicks Score  13  -RW     How much help from another is currently needed...    Putting on and taking off regular lower body clothing? 2  -JH      Bathing (including washing, rinsing, and drying) 2  -JH      Toileting (which includes using toilet bed pan or urinal) 2  -JH      Putting on and taking off regular upper body clothing 3  -JH      Taking care of personal grooming (such as brushing teeth) 3  -JH      Eating meals 4  -JH      Score 16  -JH        User Key  (r) = Recorded By, (t) = Taken By, (c) = Cosigned By    Initials Name Provider Type    MAURICIO Recio PTA Physical Therapy Assistant     SAURAV Ludwig Occupational Therapy Assistant           Time Calculation:         PT Charges       06/02/17 1446 06/02/17 1301       Time Calculation    Start Time 1349  -RW 1040  -RW     Stop Time 1435  -RW 1155  -RW     Time Calculation (min) 46 min  -RW 75 min  -RW     Time Calculation- PT    Total Timed Code Minutes- PT 46 minute(s)  -RW 75 minute(s)  -RW       User Key  (r) = Recorded By, (t) = Taken By, (c) = Cosigned By    Initials Name Provider Type    MAURICIO Recio PTA Physical Therapy Assistant          Therapy Charges for Today     Code Description Service Date Service Provider Modifiers Qty    38588458435 HC GAIT TRAINING EA 15 MIN 6/1/2017 Clay Recio PTA GP 1    95583916625 HC PT THER PROC EA 15 MIN 6/1/2017 Clay Recio PTA GP 2    55993134927 HC PT THERAPEUTIC ACT EA 15 MIN 6/1/2017 Clay Recio PTA GP 1    39108759773 HC GAIT TRAINING EA 15 MIN 6/1/2017 Clay Recio PTA GP 1    72886983261 HC PT THER PROC EA 15 MIN  6/1/2017 Clay Recio, PTA GP 1    15852712861 HC PT THERAPEUTIC ACT EA 15 MIN 6/1/2017 Clay Recio, PTA GP 1    27365409602 HC GAIT TRAINING EA 15 MIN 6/2/2017 Clay Recio, PTA GP 1    96670699968 HC PT THER PROC EA 15 MIN 6/2/2017 Clay Recio, PTA GP 2    06664985890 HC PT THERAPEUTIC ACT EA 15 MIN 6/2/2017 Clay Recio, PTA GP 2    32156735994 HC PT THERAPEUTIC ACT EA 15 MIN 6/2/2017 Clay Recio, PTA GP 3          PT G-Codes  PT Professional Judgement Used?: Yes  Outcome Measure Options: AM-PAC 6 Clicks Daily Activity (OT)  Score: 8  Functional Limitation: Mobility: Walking and moving around  Mobility: Walking and Moving Around Current Status (): At least 80 percent but less than 100 percent impaired, limited or restricted  Mobility: Walking and Moving Around Goal Status (): At least 40 percent but less than 60 percent impaired, limited or restricted    Clay Recio PTA  6/2/2017

## 2017-06-03 NOTE — PROGRESS NOTES
LOS: 10 days   Patient Care Team:  Benito Kaur MD as PCP - General  Blake Oseguera MD as Consulting Physician (Hematology and Oncology)    Chief Complaint:  Left hip fracture postoperative day 11  Surgery  per Dr. Adrian Handley  Egg shell weightbearing    6/3/2017: Hospitalist follow-up, patient states she feels well.  States that she does have some burning in her mouth, possibly from the breathing treatments.  She is using breathing treatments in the past, but no issues.  Patient denies easy bleeding, denies gum pain.  Denies throat pain or difficulty swallowing.  States that this is possibly secondary to using a different kind of denture cream.  No nausea or vomiting, having regular bowel movements and urinating well.  Eating well with no acute complaints.      Interval History:     SUBJECTIVE:  She slept well last night and she feels better today  No pain she was anxious last night said she was short of breath but better today  Discussed with her daughter in the room  History taken from: patient family RN    Objective     Vital Signs  Temp:  [97.7 °F (36.5 °C)] 97.7 °F (36.5 °C)  Heart Rate:  [63-68] 63  Resp:  [18-22] 18  BP: (124-144)/(58-65) 137/65  Last 3 weights    05/30/17  0517 05/31/17  0455 06/03/17  0533   Weight: 121 lb 7 oz (55.1 kg) 124 lb (56.2 kg) 124 lb 14.4 oz (56.7 kg)         Physical Exam:     General Appearance:    Alert, cooperative, in no acute distress   Head:    Normocephalic, without obvious abnormality, atraumatic   Eyes:            Lids and lashes normal, conjunctivae and sclerae normal, no   icterus, no pallor, corneas clear, PERRLA   Throat:   No oral lesions, no thrush, oral mucosa moist           Lungs:     Clear to auscultation,respirations regular, even and                  Unlabored Good bilateral breath movement and I hear no rales rhonchi or wheezes     Heart:    Regular rhythm and normal rate, normal S1 and S2, no            murmur, no gallop, no rub, no click No ectopy  resting heart rate approximately 76    Chest Wall:    No abnormalities observed   Abdomen:     Normal bowel sounds, no masses, no organomegaly, soft        non-tender, non-distended, no guarding, no rebound                Tenderness    Extremities:   Moves all extremities well, no edema, no cyanosis, no             Redness Incisions are healing well no drainage no redness        Skin:   No bleeding, bruising or rash             Assessment/Plan     Principal Problem:    Closed left hip fracture  Active Problems:    Iron deficiency anemia    Post-polio syndrome    Seizure disorder    Metabolic encephalopathy    COPD (chronic obstructive pulmonary disease)    Encounter for rehabilitation    Plan:  Magic mouthwash, continue PT/OT.    She is making good progress toward her discharge goals she is not ambulating because of the weightbearing status but she is getting stronger doing well  We'll discuss discharge planning with family first of the week for now continue intensive therapy      BARRY Blair  06/03/17  2:56 PM

## 2017-06-03 NOTE — PLAN OF CARE
Problem: Patient Care Overview (Adult)  Goal: Plan of Care Review  Outcome: Ongoing (interventions implemented as appropriate)    06/03/17 1234   Coping/Psychosocial Response Interventions   Plan Of Care Reviewed With patient   Patient Care Overview   Progress improving       Goal: Adult Individualization and Mutuality  Outcome: Ongoing (interventions implemented as appropriate)  Goal: Discharge Needs Assessment  Outcome: Ongoing (interventions implemented as appropriate)    Problem: Fractured Hip (Adult)  Goal: Signs and Symptoms of Listed Potential Problems Will be Absent or Manageable (Fractured Hip)  Outcome: Ongoing (interventions implemented as appropriate)    Problem: Fall Risk (Adult)  Goal: Absence of Falls  Outcome: Ongoing (interventions implemented as appropriate)    Problem: Functional Deficit (Adult,Obstetrics,Pediatric)  Goal: Signs and Symptoms of Listed Potential Problems Will be Absent or Manageable (Functional Deficit)  Outcome: Ongoing (interventions implemented as appropriate)

## 2017-06-03 NOTE — PLAN OF CARE
Problem: Patient Care Overview (Adult)  Goal: Plan of Care Review  Outcome: Ongoing (interventions implemented as appropriate)    06/03/17 0114   Coping/Psychosocial Response Interventions   Plan Of Care Reviewed With patient   Patient Care Overview   Progress improving   Outcome Evaluation   Outcome Summary/Follow up Plan Patient has been improving with mobility. Pain controlled with medication.          Problem: Fractured Hip (Adult)  Goal: Signs and Symptoms of Listed Potential Problems Will be Absent or Manageable (Fractured Hip)  Outcome: Ongoing (interventions implemented as appropriate)    Problem: Fall Risk (Adult)  Goal: Absence of Falls  Outcome: Ongoing (interventions implemented as appropriate)    Problem: Functional Deficit (Adult,Obstetrics,Pediatric)  Goal: Signs and Symptoms of Listed Potential Problems Will be Absent or Manageable (Functional Deficit)  Outcome: Ongoing (interventions implemented as appropriate)

## 2017-06-04 NOTE — PLAN OF CARE
Problem: Patient Care Overview (Adult)  Goal: Plan of Care Review  Outcome: Ongoing (interventions implemented as appropriate)    06/03/17 0114 06/04/17 0408   Coping/Psychosocial Response Interventions   Plan Of Care Reviewed With --  patient   Patient Care Overview   Progress --  improving   Outcome Evaluation   Outcome Summary/Follow up Plan Patient has been improving with mobility. Pain controlled with medication.  --          Problem: Fractured Hip (Adult)  Goal: Signs and Symptoms of Listed Potential Problems Will be Absent or Manageable (Fractured Hip)  Outcome: Ongoing (interventions implemented as appropriate)    Problem: Fall Risk (Adult)  Goal: Absence of Falls  Outcome: Ongoing (interventions implemented as appropriate)    Problem: Functional Deficit (Adult,Obstetrics,Pediatric)  Goal: Signs and Symptoms of Listed Potential Problems Will be Absent or Manageable (Functional Deficit)  Outcome: Ongoing (interventions implemented as appropriate)

## 2017-06-04 NOTE — PROGRESS NOTES
LOS: 11 days   Patient Care Team:  Benito Kaur MD as PCP - General  Blake Oseguera MD as Consulting Physician (Hematology and Oncology)    Chief Complaint:  Left hip fracture postoperative day 11  Surgery  per Dr. Adrian Handley  Egg shell weightbearing    6/4/2017: Patient states her mouth is no longer hurting and the Magic mouthwash is working well.  She has no complaints today other than she feels like her staples are starting to pull at her skin.  Tomorrow is status post day 14, will defer to primary team for possible staple removal and reevaluation.    6/3/2017: Hospitalist follow-up, patient states she feels well.  States that she does have some burning in her mouth, possibly from the breathing treatments.  She is using breathing treatments in the past, but no issues.  Patient denies easy bleeding, denies gum pain.  Denies throat pain or difficulty swallowing.  States that this is possibly secondary to using a different kind of denture cream.  No nausea or vomiting, having regular bowel movements and urinating well.  Eating well with no acute complaints.      Interval History:     SUBJECTIVE:  She slept well last night and she feels better today  No pain she was anxious last night said she was short of breath but better today  Discussed with her daughter in the room  History taken from: patient family RN    Objective     Vital Signs  Temp:  [96.7 °F (35.9 °C)-97.2 °F (36.2 °C)] 97.2 °F (36.2 °C)  Heart Rate:  [63-76] 76  Resp:  [16-18] 16  BP: (137-142)/(64-67) 142/67  Last 3 weights    05/30/17  0517 05/31/17  0455 06/03/17  0533   Weight: 121 lb 7 oz (55.1 kg) 124 lb (56.2 kg) 124 lb 14.4 oz (56.7 kg)         Physical Exam:     General Appearance:    Alert, cooperative, in no acute distress   Head:    Normocephalic, without obvious abnormality, atraumatic   Eyes:            Lids and lashes normal, conjunctivae and sclerae normal, no   icterus, no pallor, corneas clear, PERRLA   Throat:   No oral lesions,  no thrush, oral mucosa moist           Lungs:     Clear to auscultation,respirations regular, even and                  Unlabored Good bilateral breath movement and I hear no rales rhonchi or wheezes     Heart:    Regular rhythm and normal rate, normal S1 and S2, no            murmur, no gallop, no rub, no click No ectopy resting heart rate approximately 76    Chest Wall:    No abnormalities observed   Abdomen:     Normal bowel sounds, no masses, no organomegaly, soft        non-tender, non-distended, no guarding, no rebound                Tenderness    Extremities:   Moves all extremities well, no edema, no cyanosis, no             Redness Incisions are healing well no drainage no redness        Skin:   No bleeding, bruising or rash             Assessment/Plan     Principal Problem:    Closed left hip fracture  Active Problems:    Iron deficiency anemia    Post-polio syndrome    Seizure disorder    Metabolic encephalopathy    COPD (chronic obstructive pulmonary disease)    Encounter for rehabilitation    Plan:  Continue PT/OT, re-evaluate staples tomorrow at 14 days, per ortho recommendation.    She is making good progress toward her discharge goals she is not ambulating because of the weightbearing status but she is getting stronger doing well  We'll discuss discharge planning with family first of the week for now continue intensive therapy      BARRY Blair  06/04/17  1:12 PM

## 2017-06-04 NOTE — PLAN OF CARE
Problem: Patient Care Overview (Adult)  Goal: Plan of Care Review  Outcome: Ongoing (interventions implemented as appropriate)    06/03/17 0114 06/04/17 1302   Coping/Psychosocial Response Interventions   Plan Of Care Reviewed With --  patient   Patient Care Overview   Progress --  improving   Outcome Evaluation   Outcome Summary/Follow up Plan Patient has been improving with mobility. Pain controlled with medication.  --        Goal: Adult Individualization and Mutuality  Outcome: Ongoing (interventions implemented as appropriate)  Goal: Discharge Needs Assessment  Outcome: Ongoing (interventions implemented as appropriate)    Problem: Fractured Hip (Adult)  Goal: Signs and Symptoms of Listed Potential Problems Will be Absent or Manageable (Fractured Hip)  Outcome: Ongoing (interventions implemented as appropriate)    Problem: Fall Risk (Adult)  Goal: Absence of Falls  Outcome: Ongoing (interventions implemented as appropriate)    Problem: Functional Deficit (Adult,Obstetrics,Pediatric)  Goal: Signs and Symptoms of Listed Potential Problems Will be Absent or Manageable (Functional Deficit)  Outcome: Ongoing (interventions implemented as appropriate)

## 2017-06-05 NOTE — PLAN OF CARE
Problem: Patient Care Overview (Adult)  Goal: Plan of Care Review  Outcome: Ongoing (interventions implemented as appropriate)    06/05/17 0838   Coping/Psychosocial Response Interventions   Plan Of Care Reviewed With patient   Outcome Evaluation   Outcome Summary/Follow up Plan Progress note completed 2* to pt has met initial PT goals. Goals updated/modified. Pt has shown good progression since initial eval and will continue to benefit from skilled PT services to improve mobility and safety prior to d/c.         Problem: Inpatient Physical Therapy  Goal: Bed Mobility Goal LTG- PT  Outcome: Revised    06/05/17 0838   Bed Mobility PT LTG   Bed Mobility PT LTG, Date Established 06/05/17   Bed Mobility PT LTG, Time to Achieve by discharge   Bed Mobility PT LTG, Activity Type supine to sit/sit to supine   Bed Mobility PT LTG, [place] Level supervision required   Bed Mobility PT Goal LTG, Assist Device bed rails   Bed Mobility PT LTG, Outcome goal ongoing       Goal: Transfer Training Goal 1 LTG- PT  Outcome: Revised    06/05/17 0838   Transfer Training PT LTG   Transfer Training PT LTG, Date Established 06/05/17   Transfer Training PT LTG, Time to Achieve by discharge   Transfer Training PT LTG, Activity Type bed to chair /chair to bed   Transfer Training PT LTG, [place] Level supervision required   Transfer Training PT LTG, Assist Device (AAD)   Transfer Training PT LTG, Additional Goal While maintaining TTWB LLE   Transfer Training PT LTG, Outcome goal ongoing       Goal: Gait Training Goal LTG- PT  Outcome: Revised    06/05/17 0838   Gait Training PT LTG   Gait Training Goal PT LTG, Date Established 06/05/17   Gait Training Goal PT LTG, Time to Achieve by discharge   Gait Training Goal PT LTG, [place] Level contact guard assist   Gait Training Goal PT LTG, Assist Device (AAD)   Gait Training Goal PT LTG, Distance to Achieve 25 feet   Gait Training Goal PT LTG, Additional Goal While maintaining TTWB LLE    Gait Training Goal PT LTG, Outcome goal ongoing

## 2017-06-05 NOTE — PLAN OF CARE
Problem: Patient Care Overview (Adult)  Goal: Plan of Care Review  Outcome: Ongoing (interventions implemented as appropriate)    06/05/17 1500   Coping/Psychosocial Response Interventions   Plan Of Care Reviewed With patient   Outcome Evaluation   Outcome Summary/Follow up Plan Pt requiring less assist with standing/transfers. Able to maintain TTWB LLE with a couple of steps, but begins to have difficulty once fatigued. Pt progressing well towards goals.         Problem: Inpatient Physical Therapy  Goal: Bed Mobility Goal LTG- PT  Outcome: Ongoing (interventions implemented as appropriate)    06/05/17 0838 06/05/17 1617   Bed Mobility PT LTG   Bed Mobility PT LTG, Date Established 06/05/17 --    Bed Mobility PT LTG, Time to Achieve by discharge --    Bed Mobility PT LTG, Activity Type supine to sit/sit to supine --    Bed Mobility PT LTG, [place] Level supervision required --    Bed Mobility PT Goal LTG, Assist Device bed rails --    Bed Mobility PT LTG, Date Goal Reviewed --  06/05/17   Bed Mobility PT LTG, Outcome --  goal ongoing       Goal: Transfer Training Goal 1 LTG- PT  Outcome: Ongoing (interventions implemented as appropriate)    06/05/17 0838 06/05/17 1617   Transfer Training PT LTG   Transfer Training PT LTG, Date Established 06/05/17 --    Transfer Training PT LTG, Time to Achieve by discharge --    Transfer Training PT LTG, Activity Type bed to chair /chair to bed --    Transfer Training PT LTG, [place] Level supervision required --    Transfer Training PT LTG, Assist Device (AAD) --    Transfer Training PT LTG, Additional Goal While maintaining TTWB LLE --    Transfer Training PT LTG, Date Goal Reviewed --  06/05/17   Transfer Training PT LTG, Outcome --  goal ongoing       Goal: Gait Training Goal LTG- PT  Outcome: Ongoing (interventions implemented as appropriate)    06/05/17 0838 06/05/17 1500   Gait Training PT LTG   Gait Training Goal PT LTG, Date Established 06/05/17 --    Gait  Training Goal PT LTG, Time to Achieve by discharge --    Gait Training Goal PT LTG, [place] Level contact guard assist --    Gait Training Goal PT LTG, Assist Device (AAD) --    Gait Training Goal PT LTG, Distance to Achieve 25 feet --    Gait Training Goal PT LTG, Additional Goal While maintaining TTWB LLE --    Gait Training Goal PT LTG, Date Goal Reviewed --  06/05/17   Gait Training Goal PT LTG, Outcome --  goal ongoing

## 2017-06-05 NOTE — DISCHARGE PLACEMENT REQUEST
"Mariela Anaya (89 y.o. Female)     Date of Birth Social Security Number Address Home Phone MRN    01/31/1925  [address] [id number] [phone] [id number]    Anabaptism Marital Status          Mandaen        Admission Date Admission Type Admitting Provider Attending Provider Department, Room/Bed    5/24/17 Elective Tariq Aparicio MD [place], Kenneth R, MD [facility] ACUTE RE[facility], 526/1    Discharge Date Discharge Disposition Discharge Destination                      Attending Provider: Tariq Aparicio MD     Allergies:  Valium [Diazepam]    Isolation:  None   Infection:  None   Code Status:  Conditional    Ht:  62\" (157.5 cm)   Wt:  131 lb (59.4 kg)    Admission Cmt:  None   Principal Problem:  Closed left hip fracture [S72.002A]                 Active Insurance as of 5/24/2017     Primary Coverage     Payor Plan Insurance Group Employer/Plan Group    MEDICARE MEDICARE A & B      Payor Plan Address Payor Plan Phone Number Effective From Effective To    [address] [phone] 1/1[birth year]1990     [place] [address]       Subscriber Name Subscriber Birth Date Member ID       MARIELA ANAYA 1/31/1925 [id number]           Secondary Coverage     Payor Plan Insurance Group Employer/Plan Group    [employer] [place] [employer] [place] [id number]     Payor Plan Address Payor Plan Phone Number Effective From Effective To    [employer] [place] [employer] [phone] 11/1/2007     [place] [id number]       Subscriber Name Subscriber Birth Date Member ID       MARIELA ANAYA 1/31/1925 [id number]                 Emergency Contacts      (Rel.) Home Phone Work Phone Mobile Phone    Zarina Ya (Daughter) [phone] -- [phone]            Emergency Contact Information     Name Relation Home Work Mobile    Zarina Ya Daughter [phone]          Insurance Information                MEDICARE/MEDICARE A & B Phone: [phone]    " Subscriber: Mariela Woodson Subscriber#: [id number]    Group#:  Precert#:         [employer] [place] [place] Phone: [phone]    Subscriber: Mariela Woodson Subscriber#: [id number]    Group#: [id number] Precert#:

## 2017-06-05 NOTE — THERAPY PROGRESS REPORT/RE-CERT
Inpatient Rehabilitation - Physical Therapy Progress Note  [facility]     Patient Name: Mariela Woodson  : 1925  MRN: [id number]  Today's Date: 2017   Onset of Illness/Injury or Date of Surgery Date: 17  Date of Referral to PT: 17  Referring Physician: Dr. Aparicio      Admit Date: 2017     Visit Dx:    ICD-10-CM ICD-9-CM   1. Impaired mobility and activities of daily living Z74.09 799.89   2. Abnormality of gait and mobility R26.9 781.2   3. Muscle weakness (generalized) M62.81 728.87     Patient Active Problem List   Diagnosis   • Iron deficiency anemia due to chronic blood loss   • Post-polio syndrome   • Simple chronic bronchitis   • Seizure disorder   • Closed left hip fracture   • Iron deficiency anemia   • Post-polio syndrome   • Seizure disorder   • Metabolic encephalopathy   • COPD (chronic obstructive pulmonary disease)   • Encounter for rehabilitation     Past Medical History:   Diagnosis Date   • CHF (congestive heart failure)    • COPD (chronic obstructive pulmonary disease)    • Hypothyroidism    • Iron deficiency anemia    • Polio    • Seizures      Past Surgical History:   Procedure Laterality Date   • CATARACT EXTRACTION     • HIP INTERTROCHANTERIC NAILING Left 2017    Procedure: HIP INTERTROCHANTERIC NAILING - left - short;  Surgeon: Ramos Handley MD;  Location: [facility];  Service:           PT ASSESSMENT (last 72 hours)      PT Evaluation       17 0838 17 0734    Rehab Evaluation    Document Type progress note  -LM     Subjective Information agree to therapy;no complaints  -LM     Patient Effort, Rehab Treatment good  -LM     Symptoms Noted During/After Treatment none  -LM     General Information    Patient Profile Review yes  -LM     General Observations Pt has currently met all goals.  Progress note completed to modify/update goals.    -LM     Plans/Goals Discussed With patient and family;agreed upon  -LM     Risks Reviewed patient  and family:;increased discomfort  -LM     Benefits Reviewed patient and family:;improve function;increase independence;increase strength;increase balance;decrease pain  -LM     Vital Signs    Pre Systolic BP Rehab 141  -LM     Pre Treatment Diastolic BP 65  -LM     Pretreatment Heart Rate (beats/min) 67  -LM     Posttreatment Heart Rate (beats/min) 63  -LM     Pre SpO2 (%) 93   88 at first - increased to 93 with PLB  -LM     O2 Delivery Pre Treatment room air  -LM     Intra SpO2 (%) 84   Post 120 ft of w/c propulsion  -LM     O2 Delivery Intra Treatment room air  -LM     Post SpO2 (%) 93  -LM     O2 Delivery Post Treatment supplemental O2   2L  -LM     Pre Patient Position Sitting  -LM     Post Patient Position Supine  -LM     Pain Assessment    Pain Assessment 0-10  -LM     Pain Score 0  -LM     Post Pain Score 0  -LM     Cognitive Assessment/Intervention    Current Cognitive/Communication Assessment functional  -LM     Orientation Status oriented x 4  -LM     Follows Commands/Answers Questions 100% of the time;able to follow single-step instructions;needs cueing  -LM     Personal Safety WNL/WFL  -LM     Personal Safety Interventions fall prevention program maintained;gait belt;muscle strengthening facilitated;nonskid shoes/slippers when out of bed  -LM     ROM (Range of Motion)    General ROM Detail RLE AROM WFL; LLE - Decreased active hip flex - AAROM WFL; Remainder of LLE AROM WFL  -LM     MMT (Manual Muscle Testing)    General MMT Assessment Detail RLE - Hip flex - 4-/5; Knee flex/ext - 4/5; Ankle DF - 4/5; LLE - Hip flex - 2+/5; Knee flex/ext - at least 3/5 (no resistance given); Ankle DF - 3/5 (no resistance given)  -LM     Mobility Assessment/Training    Extremity Weight-Bearing Status left lower extremity  -LM     Left Lower Extremity Weight-Bearing toe touch weight-bearing  -LM     Bed Mobility, Assessment/Treatment    Bed Mob, Supine to Sit, [place] not tested  -LM     Bed Mob, Sit to Supine,  [place] minimum assist (75% patient effort)  -LM     Transfer Assessment/Treatment    Transfers, Bed-Chair [place] not tested  -LM     Transfers, Chair-Bed [place] contact guard assist  -LM     Transfers, Bed-Chair-Bed, Assist Device rolling walker  -LM     Transfers, Sit-Stand [place] contact guard assist  -LM     Transfers, Stand-Sit [place] contact guard assist;verbal cues required  -LM     Transfers, Sit-Stand-Sit, Assist Device rolling walker  -LM     Transfer, Comment Also transferred from w/c-->mat table with CGA.  Pt stood from bed while PT assisted with doffing pants.  Once in supine - RN going to remove angel.  -LM     Gait Assessment/Treatment    Gait, [place] Level contact guard assist  -LM     Gait, Assistive Device rolling walker  -LM     Gait, Distance (Feet) 6   x 2  -LM     Gait, Maintain Weight Bearing Status unable to maintain weight bearing status  -LM     Gait, Comment Pt places more than toe-touch when taking steps.  Worked on technique and using UE's to offload LLE.  -LM     Stairs Assessment/Treatment    Stairs, [place] Level not tested  -LM     Wheelchair Training/Management    Wheelchair, Distance Propelled 120 feet x 2 with SBA  -LM     Wheelchair Training Comment Slow with propulsion  -LM     Therapy Exercises    Bilateral Lower Extremities AROM:;sitting;20 reps;LAQ;heel slides;ankle pumps/circles  -LM     Sensory Assessment/Intervention    Sensory Impairment --   Reports spasms in LLE  -LM     Light Touch LLE;RLE  -LM LUE;RUE;LLE;RLE  -MC    LUE Light Touch  WNL  -    RUE Light Touch  WNL  -    LLE Light Touch mild impairment  -LM mild impairment  -MC    RLE Light Touch WNL  - WNL  -    Edema Management    Edema Amount left:;moderate  -LM     Positioning and Restraints    Pre-Treatment Position sitting in chair/recliner  -LM     Post Treatment Position bed  -LM     In Bed supine;call light within reach;with family/caregiver;with nsg  -LM        06/04/17 1903 06/04/17 0708    Sensory Assessment/Intervention    Light Touch LUE;RUE;LLE;RLE  -PC LUE;RUE;LLE;RLE  -MK    LUE Light Touch WNL  -PC WNL  -MK    RUE Light Touch WNL  -PC WNL  -MK    LLE Light Touch mild impairment  -PC mild impairment  -MK    RLE Light Touch WNL  -PC WNL  -MK      06/03/17 2015 06/03/17 0705    Sensory Assessment/Intervention    Light Touch LUE;RUE;LLE;RLE  -PC LUE;RUE;LLE;RLE  -MK    LUE Light Touch WNL  -PC WNL  -MK    RUE Light Touch WNL  -PC WNL  -MK    LLE Light Touch mild impairment  -PC mild impairment  -MK    RLE Light Touch WNL  -PC WNL  -MK      06/02/17 1931 06/02/17 1349    Rehab Evaluation    Document Type  therapy note (daily note)  -RW    Subjective Information  agree to therapy  -RW    Patient Effort, Rehab Treatment  good  -RW    Pain Assessment    Pain Assessment  No/denies pain  -RW    Cognitive Assessment/Intervention    Current Cognitive/Communication Assessment  functional  -RW    Orientation Status  oriented x 4  -RW    Follows Commands/Answers Questions  100% of the time  -RW    Personal Safety Interventions  gait belt;nonskid shoes/slippers when out of bed  -RW    Mobility Assessment/Training    Left Lower Extremity Weight-Bearing  touch down weight-bearing  -RW    Bed Mobility, Assessment/Treatment    Bed Mobility, Assistive Device  bed rails  -RW    Bed Mobility, Roll Left, [place]  conditional independence  -RW    Bed Mobility, Roll Right, [place]  conditional independence  -RW    Bed Mob, Supine to Sit, [place]  supervision required  -RW    Bed Mob, Sit to Supine, [place]  contact guard assist  -RW    Bed Mobility, Comment  practiced multiple times  -RW    Transfer Assessment/Treatment    Transfers, Bed-Chair [place]  contact guard assist;minimum assist (75% patient effort)  -RW    Transfers, Chair-Bed [place]  contact guard assist;minimum assist (75% patient effort)  -RW    Transfers, Bed-Chair-Bed, Assist Device  rolling  walker  -RW    Transfers, Sit-Stand [place]  minimum assist (75% patient effort)  -RW    Transfers, Stand-Sit [place]  minimum assist (75% patient effort)  -RW    Transfers, Sit-Stand-Sit, Assist Device  rolling walker  -RW    Transfer, Comment  practiced several times  -RW    Gait Assessment/Treatment    Gait, [place] Level  --  -RW    Gait, Assistive Device  --  -RW    Wheelchair Training/Management    Wheelchair Propulsion Training  steering wheelchair;turning wheelchair  -RW    Wheelchair Training Comment  sba in room  -RW    Therapy Exercises    Bilateral Lower Extremities  --   2 sets  -RW    Sensory Assessment/Intervention    Light Touch LUE;RUE;LLE;RLE  -PC     LUE Light Touch WNL  -PC     RUE Light Touch WNL  -PC     LLE Light Touch mild impairment  -PC     RLE Light Touch WNL  -PC     Positioning and Restraints    Pre-Treatment Position  sitting in chair/recliner  -RW    Post Treatment Position  bed  -RW    In Bed  call light within reach;with family/caregiver  -RW      User Key  (r) = Recorded By, (t) = Taken By, (c) = Cosigned By    Initials Name Provider Type    LM Kelly Mahmood, PT Physical Therapist    LUIS MANUEL Lees, RN Registered Nurse    PC Princess BREE Strauss, RN Registered Nurse    ARACELI Mar, RN Registered Nurse    RW Clay Recio PTA Physical Therapy Assistant          Physical Therapy Education     Title: PT OT SLP Therapies (Active)     Topic: Physical Therapy (Active)     Point: Mobility training (Done)    Learning Progress Summary    Learner Readiness Method Response Comment Documented by Status   Patient Acceptance E NR,VU Discussed with family regarding definition TTWB at L with mobility & transfers.  05/30/17 1036 Done    Acceptance E,TB,D NR [place] TTWB & benefits of mobility  05/29/17 1018 Active   Family Acceptance E NR,VU Discussed with family regarding definition TTWB at L with mobility & transfers.  05/30/17 1036 Done               Point:  Precautions (Done)    Learning Progress Summary    Learner Readiness Method Response Comment Documented by Status   Patient Acceptance E VU reviewed ttwb  06/01/17 1112 Done    Acceptance E VU reviewed ttwb with gt and transfers. talked with lita about her moms ability to care for pt at home.  05/31/17 1253 Done    Acceptance E NR,VU Discussed with family regarding definition TTWB at L with mobility & transfers.  05/30/17 1036 Done    Acceptance E,TB,D NR [place] TTWB & benefits of mobility  05/29/17 1018 Active    Acceptance E NR reviewed ttwb but pt unable to maintain if asked to mobilize.  05/27/17 1152 Active    Acceptance E NR pt edu on TTWB and how to maintain TTWB.  05/26/17 1245 Active    Acceptance E NR Educated patient's family on precautions re: TTWB LLE  05/25/17 1446 Active   Family Acceptance E VU reviewed ttwb with gt and transfers. talked with lita about her moms ability to care for pt at home.  05/31/17 1253 Done    Acceptance E NR,VU Discussed with family regarding definition TTWB at L with mobility & transfers.  05/30/17 1036 Done    Acceptance E NR Educated patient's family on precautions re: TTWB LLE  05/25/17 1446 Active                      User Key     Initials Effective Dates Name Provider Type Discipline     06/15/16 -  Kelly Mahmood, PT Physical Therapist PT     10/17/16 -  Darci Harris, PTA Physical Therapy Assistant PT     10/17/16 -  Juan Desouza, PTA Physical Therapy Assistant PT     10/17/16 -  Clay Recio, PTA Physical Therapy Assistant PT                PT Recommendation and Plan  Anticipated Discharge Disposition: skilled nursing facility, home with assist, home with home health (SNF vs. home with 24/7 if available and HH)  Planned Therapy Interventions: balance training, bed mobility training, gait training, home exercise program, motor coordination training, neuromuscular re-education, patient/family education, postural  re-education, ROM (Range of Motion), stair training, strengthening, stretching, transfer training, wheelchair management/propulsion training  PT Frequency: other (see comments) (5-14 times/wk)  Plan of Care Review  Plan Of Care Reviewed With: patient  Outcome Summary/Follow up Plan: Progress note completed 2* to pt has met initial PT goals.  Goals updated/modified.  Pt has shown good progression since initial eval and will continue to benefit from skilled PT services to improve mobility and safety prior to d/c.          IP PT Goals       06/05/17 1150 06/05/17 0838 06/02/17 1444    Bed Mobility PT LTG    Bed Mobility PT LTG, Date Established  06/05/17  -LM     Bed Mobility PT LTG, Time to Achieve  by discharge  -LM     Bed Mobility PT LTG, Activity Type  supine to sit/sit to supine  -LM     Bed Mobility PT LTG, [place] Level  supervision required  -LM     Bed Mobility PT Goal  LTG, Assist Device  bed rails  -LM     Bed Mobility PT LTG, Date Goal Reviewed   06/02/17  -RW    Bed Mobility PT LTG, Outcome  goal ongoing  -LM goal met  -RW    Transfer Training PT STG    Transfer Training PT STG, Date Goal Reviewed 05/30/17  -LM      Transfer Training PT STG, Outcome goal met  -LM      Transfer Training PT LTG    Transfer Training PT LTG, Date Established  06/05/17  -LM     Transfer Training PT LTG, Time to Achieve  by discharge  -LM     Transfer Training PT LTG, Activity Type  bed to chair /chair to bed  -LM     Transfer Training PT LTG, [place] Level  supervision required  -LM     Transfer Training PT LTG, Assist Device  --   AAD  -LM     Transfer Training PT LTG, Additional Goal  While maintaining TTWB LLE  -LM     Transfer Training PT LTG, Outcome  goal ongoing  -LM     Gait Training PT LTG    Gait Training Goal PT LTG, Date Established  06/05/17  -LM     Gait Training Goal PT LTG, Time to Achieve  by discharge  -LM     Gait Training Goal PT LTG, [place] Level  contact guard assist  -LM     Gait Training  Goal PT LTG, Assist Device  --   AAD  -LM     Gait Training Goal PT LTG, Distance to Achieve  25 feet  -LM     Gait Training Goal PT LTG, Additional Goal  While maintaining TTWB LLE  -LM     Gait Training Goal PT LTG, Outcome  goal ongoing  -LM       06/01/17 1612 05/31/17 1554 05/30/17 1525    Bed Mobility PT LTG    Bed Mobility PT LTG, Date Goal Reviewed 06/01/17  -RW 05/31/17  -RW 05/30/17  -JA    Bed Mobility PT LTG, Outcome goal ongoing  -RW goal ongoing  -RW goal ongoing  -JA    Transfer Training PT STG    Transfer Training PT STG, Date Goal Reviewed   05/30/17  -JA    Transfer Training PT STG, Outcome   goal met  -JA    Transfer Training PT LTG    Transfer Training PT  LTG, Date Goal Reviewed 06/01/17  -RW 05/31/17  -RW 05/30/17  -JA    Transfer Training PT LTG, Outcome goal met  -RW  goal ongoing  -JA    Wheelchair Propulsion PT LTG    Wheelchair Propulsion Goal PT LTG, Date Goal Reviewed   05/30/17  -JA    Wheelchair Propulsion Goal PT LTG, Outcome   goal met  -JA      05/29/17 1337 05/27/17 1544 05/26/17 1300    Bed Mobility PT LTG    Bed Mobility PT LTG, Date Goal Reviewed 05/29/17  -CA 05/27/17  -RW 05/26/17  -GIOVANA    Bed Mobility PT LTG, Outcome goal ongoing  -CA goal ongoing  -RW goal ongoing  -GIOVANA    Transfer Training PT STG    Transfer Training PT STG, Date Goal Reviewed 05/29/17  -CA 05/27/17  -RW 05/26/17  -GIOVANA    Transfer Training PT STG, Outcome goal ongoing  -CA goal ongoing  -RW goal ongoing  -GIOVANA    Transfer Training PT LTG    Transfer Training PT  LTG, Date Goal Reviewed 05/29/17  -CA 05/27/17  -RW 05/26/17  -GIOVANA    Transfer Training PT LTG, Outcome goal ongoing  -CA goal ongoing  -RW goal ongoing  -GIOVANA    Wheelchair Propulsion PT LTG    Wheelchair Propulsion Goal PT LTG, Date Goal Reviewed 05/29/17  -CA 05/27/17  -RW 05/26/17  -GIOVANA    Wheelchair Propulsion Goal PT LTG, Outcome goal ongoing  -CA goal ongoing  -RW goal ongoing  -GIOVANA      05/26/17 1033 05/25/17 1518 05/25/17 1033    Bed Mobility PT LTG     Bed Mobility PT LTG, Date Established   05/25/17  -LM    Bed Mobility PT LTG, Time to Achieve   by discharge  -LM    Bed Mobility PT LTG, Activity Type   supine to sit/sit to supine  -LM    Bed Mobility PT LTG, [place] Level   minimum assist (75% patient effort)  -LM    Bed Mobility PT Goal  LTG, Assist Device   bed rails  -LM    Bed Mobility PT LTG, Date Goal Reviewed 05/26/17  -GIOVANA (P)  05/25/17  -RW     Bed Mobility PT LTG, Outcome goal ongoing  -GIOVANA (P)  goal ongoing  -RW goal ongoing  -LM    Transfer Training PT STG    Transfer Training PT STG, Date Established   05/25/17  -LM    Transfer Training PT STG, Time to Achieve   5 days  -LM    Transfer Training PT STG, Activity Type   sit to stand/stand to sit  -LM    Transfer Training PT STG, [place] Level   moderate assist (50% patient effort)  -LM    Transfer Training PT STG, Assist Device   --   AAD  -LM    Transfer Training PT STG, Additional Goal   Maintaining TTWB LLE  -LM    Transfer Training PT STG, Date Goal Reviewed 05/26/17  -GIOVANA (P)  05/25/17  -RW     Transfer Training PT STG, Outcome goal ongoing  -GIOVANA (P)  goal ongoing  -RW goal ongoing  -LM    Transfer Training PT LTG    Transfer Training PT LTG, Date Established   05/25/17  -LM    Transfer Training PT LTG, Time to Achieve   by discharge  -LM    Transfer Training PT LTG, Activity Type   bed to chair /chair to bed  -LM    Transfer Training PT LTG, [place] Level   minimum assist (75% patient effort)  -LM    Transfer Training PT LTG, Assist Device   --   AAD  -LM    Transfer Training PT LTG, Additional Goal   Maintaining TTWB LLE  -LM    Transfer Training PT  LTG, Date Goal Reviewed 05/26/17  -GIOVANA (P)  05/25/17  -RW     Transfer Training PT LTG, Outcome goal ongoing  -GIOVANA (P)  goal ongoing  -RW goal ongoing  -LM    Wheelchair Propulsion PT LTG    Wheelchair Propulsion Goal PT LTG, Date Established   05/25/17  -LM    Wheelchair Propulsion Goal PT LTG, Time to Achieve   by discharge  -LM     Wheelchair Propulsion Goal PT LTG, [place] Level   contact guard assist  -LM    Wheelchair Propulsion Goal PT LTG, Distance to Achieve   50 feet  -LM    Wheelchair Propulsion Goal PT LTG, Date Goal Reviewed 05/26/17  -GIOVANA      Wheelchair Propulsion Goal PT LTG, Outcome goal ongoing  -GIOVANA (P)  goal ongoing  -RW goal ongoing  -LM      05/23/17 1338 05/23/17 0922       Bed Mobility PT STG    Bed Mobility PT STG, Date Established  05/23/17  -CB     Bed Mobility PT STG, Time to Achieve  by discharge  -CB     Bed Mobility PT STG, Activity Type  supine to sit/sit to supine  -CB     Bed Mobility PT STG, [place] Level  supervision required  -CB     Bed Mobility PT STG, Additional Goal  HOB down and no rails  -CB     Bed Mobility PT STG, Date Goal Reviewed 05/23/17  -      Bed Mobility PT STG, Outcome goal ongoing  -CATALINO      Transfer Training PT STG    Transfer Training PT STG, Date Established  05/23/17  -CB     Transfer Training PT STG, Time to Achieve  2 - 3 days  -CB     Transfer Training PT STG, Activity Type  bed to chair /chair to bed;sit to stand/stand to sit  -CB     Transfer Training PT STG, [place] Level  contact guard assist  -CB     Transfer Training PT STG, Assist Device  walker, rolling  -CB     Transfer Training PT STG, Date Goal Reviewed 05/23/17  -CATALINO      Transfer Training PT STG, Outcome goal ongoing  -CATALINO      Gait Training PT LTG    Gait Training Goal PT LTG, Date Established  05/23/17  -CB     Gait Training Goal PT LTG, Time to Achieve  by discharge  -CB     Gait Training Goal PT LTG, [place] Level  contact guard assist  -CB     Gait Training Goal PT LTG, Assist Device  walker, rolling  -CB     Gait Training Goal PT LTG, Distance to Achieve  50 feet  -CB     Gait Training Goal PT LTG, Date Goal Reviewed 05/23/17  -CATALINO      Gait Training Goal PT LTG, Outcome goal ongoing  -CATALINO      Stair Training PT LTG    Stair Training Goal PT LTG, Date Established  05/23/17  -     Stair Training Goal  PT LTG, Time to Achieve  by discharge  -CB     Stair Training Goal PT LTG, Number of Steps  1  -CB     Stair Training Goal PT LTG, [place] Level  contact guard assist  -CB     Stair Training Goal PT LTG, Date Goal Reviewed 05/23/17  -CATALINO      Stair Training Goal PT LTG, Outcome goal ongoing  -CATALINO      Strength Goal PT LTG    Strength Goal PT LTG, Date Established  05/23/17  -CB     Strength Goal PT LTG, Time to Achieve  by discharge  -CB     Strength Goal PT LTG, Measure to Achieve  15 reps all ex BLE independently  -CB     Strength Goal PT LTG, Functional Goal  able to get legs back up onto bed independently  -CB     Strength Goal PT LTG, Date Goal Reviewed 05/23/17  -CATALINO        User Key  (r) = Recorded By, (t) = Taken By, (c) = Cosigned By    Initials Name Provider Type    KENY Mahmood, PT Physical Therapist    CB Meg Tomlin, PT Physical Therapist    JA Darci Harris, PTA Physical Therapy Assistant    CA Ricardo Bro, PTA Physical Therapy Assistant    CATALINO Lizeth Wei, PTA Physical Therapy Assistant    GIOVANA Desouza, PTA Physical Therapy Assistant    RW Clay Recio, [facility] [place] Physical Therapy Assistant                Outcome Measures       06/05/17 0838          How much help from another person do you currently need...    Turning from your back to your side while in flat bed without using bedrails? 3  -LM      Moving from lying on back to sitting on the side of a flat bed without bedrails? 3  -LM      Moving to and from a bed to a chair (including a wheelchair)? 3  -LM      Standing up from a chair using your arms (e.g., wheelchair, bedside chair)? 3  -LM      Climbing 3-5 steps with a railing? 1  -LM      To walk in hospital room? 3  -LM      AM-PAC 6 Clicks Score 16  -LM      Functional Assessment    Outcome Measure Options AM-PAC 6 Clicks Basic Mobility (PT)  -LM        User Key  (r) = Recorded By, (t) = Taken By, (c) = Cosigned By    Initials Name Provider Type    KENY Mahmood, PT  Physical Therapist           Time Calculation:         PT Charges       06/05/17 0838          Time Calculation    Start Time 0838  -LM      Stop Time 0936  -LM      Time Calculation (min) 58 min  -LM      PT Received On 06/05/17  -LM      PT Goal Re-Cert Due Date 06/18/17  -LM      Time Calculation- PT    Total Timed Code Minutes- PT 58 minute(s)  -LM        User Key  (r) = Recorded By, (t) = Taken By, (c) = Cosigned By    Initials Name Provider Type    LM Kelly Mahmood PT Physical Therapist          Therapy Charges for Today     Code Description Service Date Service Provider Modifiers Qty    57465444552 HC GAIT TRAINING EA 15 MIN 6/5/2017 Kelly Mahmood, PT GP 1    46108504309 HC PT THERAPEUTIC ACT EA 15 MIN 6/5/2017 Kelly Mahmood PT GP 1    39245393147 HC PT WHEELCHAIR MGMT EA 15 MIN 6/5/2017 Kelly Mahmood PT GP 1    23882967095 HC PT THER PROC EA 15 MIN 6/5/2017 Kelly Mahmood PT GP 1          PT G-Codes  PT Professional Judgement Used?: Yes  Outcome Measure Options: AM-PAC 6 Clicks Basic Mobility (PT)  Score: 8  Functional Limitation: Mobility: Walking and moving around  Mobility: Walking and Moving Around Current Status (): At least 80 percent but less than 100 percent impaired, limited or restricted  Mobility: Walking and Moving Around Goal Status (): At least 40 percent but less than 60 percent impaired, limited or restricted      Kelly Mahmood PT  6/5/2017

## 2017-06-05 NOTE — THERAPY TREATMENT NOTE
Inpatient Rehabilitation - Occupational Therapy Treatment Note  [facility]     Patient Name: Mariela Woodson  : 1925  MRN: [id number]  Today's Date: 2017  Onset of Illness/Injury or Date of Surgery Date: 17  Date of Referral to OT: 17  Referring Physician: Dr. Aparicio      Admit Date: 2017    Visit Dx:     ICD-10-CM ICD-9-CM   1. Impaired mobility and activities of daily living Z74.09 799.89   2. Abnormality of gait and mobility R26.9 781.2   3. Muscle weakness (generalized) M62.81 728.87     Patient Active Problem List   Diagnosis   • Iron deficiency anemia due to chronic blood loss   • Post-polio syndrome   • Simple chronic bronchitis   • Seizure disorder   • Closed left hip fracture   • Iron deficiency anemia   • Post-polio syndrome   • Seizure disorder   • Metabolic encephalopathy   • COPD (chronic obstructive pulmonary disease)   • Encounter for rehabilitation             Adult Rehabilitation Note       17 1345 17 1300 17 1110    Rehab Assessment/Intervention    Discipline occupational therapy assistant  -LM  occupational therapy assistant  -LM    Document Type therapy note (daily note)  -LM  therapy note (daily note)  -LM    Subjective Information agree to therapy  -LM  agree to therapy  -LM    Patient Effort, Rehab Treatment   good  -LM    Precautions/Limitations   fall precautions;other (see comments)   ttwb  -LM    Recorded by [LM] FARHAN YeagerA/L  [LM] Genet Hicks ARMSTRONG/L    Pain Assessment    Pain Assessment No/denies pain   pt states just soreness   -LM No/denies pain  -LM No/denies pain  -LM    Pain Intervention(s) Medication (See MAR)  -LM      Recorded by [LM] FARHAN YeagerA/L [LM] PATTI Yeager/L [LM] FARHAN YeagerA/L    Cognitive Assessment/Intervention    Current Cognitive/Communication Assessment   functional  -LM    Recorded by   [LM] FARHAN YeagerA/L    Bed Mobility, Assessment/Treatment     Bed Mob, Supine to Sit, [place] contact guard assist  -LM      Recorded by [LM] PATTI Yeager/L      Transfer Assessment/Treatment    Transfers, Bed-Chair [place] contact guard assist  -LM      Transfers, Sit-Stand [place]   --   wheelchair push ups x 10 reps   -LM    Transfers, Stand-Sit [place] contact guard assist  -LM      Recorded by [LM] PATTI Yeager/L  [LM] PATTI Yeager/L    Wheelchair Training/Management    Wheelchair, Distance Propelled 50  -LM  120 sba  -LM    Recorded by [LM] PATTI Yeager/L  [LM] PATTI Yeager/L    Therapy Exercises    Bilateral Upper Extremity AROM:;10 reps;elbow flexion/extension;shoulder rolls/shrugs;shoulder protraction/retraction;shoulder extension/flexion   2 lb bicep curls and 1 shld flex and should abd add  -LM  --   UEE bike x 15 min with 1 rb  -LM    BUE Resistance manual resistance- minimal   and perf yellow tband across chest 2x10 with A + over pulley  -LM      Recorded by [LM] PATTI Yeager/L  [LM] PATTI Yeager/L    Positioning and Restraints    Pre-Treatment Position in bed  -LM  sitting in chair/recliner  -LM    Post Treatment Position wheelchair  -LM  wheelchair  -LM    Recorded by [LM] PATTI Yeager/L  [LM] PATTI Yeager/L      User Key  (r) = Recorded By, (t) = Taken By, (c) = Cosigned By    Initials Name Effective Dates    LM SAURAV Yeager 10/17/16 -                 OT Goals       06/05/17 1456 06/05/17 1338 06/02/17 0900    Transfer Training OT LTG    Transfer Training OT LTG, Date Goal Reviewed  06/05/17  -LM 06/02/17  -    Transfer Training OT LTG, Outcome  goal ongoing  -LM     Strength OT LTG    Strength Goal OT LTG, Date Goal Reviewed  06/05/17  -LM 06/02/17  -    Strength Goal OT LTG, Outcome  goal ongoing  -LM     Static Standing Balance OT STG    Static Standing Balance OT STG, Date Goal Reviewed  06/05/17  -LM 06/02/17  -     Static Standing Balance OT STG, Outcome  goal ongoing  -LM     ADL OT LTG    ADL OT LTG, Date Goal Reviewed (P)  06/05/17  -LM 06/05/17  -LM 06/02/17  -    ADL OT LTG, Outcome  goal ongoing  -LM       06/01/17 0755 05/31/17 1301 05/30/17 1443    Transfer Training OT LTG    Transfer Training OT LTG, Date Goal Reviewed 06/01/17  -JH 05/31/17  -LM 05/30/17  -LM    Transfer Training OT LTG, Outcome  goal ongoing  -LM goal ongoing  -LM    Strength OT LTG    Strength Goal OT LTG, Date Goal Reviewed 06/01/17  -JH 05/31/17  -LM 05/30/17  -LM    Strength Goal OT LTG, Outcome  goal ongoing  -LM goal ongoing  -LM    Static Standing Balance OT STG    Static Standing Balance OT STG, Date Goal Reviewed 06/01/17  -JH 05/31/17  -LM 05/30/17  -LM    Static Standing Balance OT STG, Outcome  goal ongoing  -LM goal ongoing  -LM    ADL OT LTG    ADL OT LTG, Date Established 06/01/17  -      ADL OT LTG, Date Goal Reviewed  05/31/17  -LM 05/30/17  -LM    ADL OT LTG, Outcome  goal ongoing  -LM goal ongoing  -LM      05/30/17 1333 05/29/17 1050 05/27/17 0611    Transfer Training OT LTG    Transfer Training OT LTG, Date Goal Reviewed 05/30/17  - 05/29/17  - 05/27/17  -TO    Transfer Training OT LTG, Outcome goal ongoing  -BH      Strength OT LTG    Strength Goal OT LTG, Date Goal Reviewed 05/30/17  - 05/29/17  - 05/27/17  -TO    Strength Goal OT LTG, Outcome goal ongoing  -BH      Static Standing Balance OT STG    Static Standing Balance OT STG, Date Goal Reviewed 05/30/17  - 05/29/17  - 05/27/17  -TO    Static Standing Balance OT STG, Outcome goal ongoing  -BH      ADL OT LTG    ADL OT LTG, Date Goal Reviewed 05/30/17  - 05/29/17  - 05/27/17  -TO    ADL OT LTG, Outcome goal ongoing  -BH        05/26/17 1320 05/25/17 1518 05/25/17 1306    Transfer Training OT LTG    Transfer Training OT LTG, Date Established   05/25/17  -RB    Transfer Training OT LTG, Time to Achieve   by discharge  -RB    Transfer Training OT LTG,  Activity Type   all transfers  -RB    Transfer Training OT LTG, [place] Level   contact guard assist  -RB    Transfer Training OT LTG, Assist Device   walker, rolling;commode, bedside  -RB    Transfer Training OT LTG, Date Goal Reviewed 05/26/17  -LM 05/25/17  -LM     Transfer Training OT LTG, Outcome goal ongoing  -LM goal ongoing  -LM     Strength OT LTG    Strength Goal OT LTG, Date Established   05/25/17  -RB    Strength Goal OT LTG, Time to Achieve   by discharge  -RB    Strength Goal OT LTG, Measure to Achieve   1 set of 10 reps all planes with 1/2 to 1 lb wrist wts or dumbells to increase UE strength.  -RB    Strength Goal OT LTG, Date Goal Reviewed 05/26/17  -LM 05/25/17  -LM     Strength Goal OT LTG, Outcome goal ongoing  -LM goal ongoing  -LM     Static Standing Balance OT STG    Static Standing Balance OT STG, Date Established   05/25/17  -RB    Static Standing Balance OT STG, Time to Achieve   2 wks  -RB    Static Standing Balance OT STG, [place] Level   contact guard assist   5 minutes with functional activity and 0-1 rest break.  -RB    Static Standing Balance OT STG, Assist Device   assistive device   Rolling wakler.  -RB    Static Standing Balance OT STG, Date Goal Reviewed 05/26/17  -LM 05/25/17  -LM     Static Standing Balance OT STG, Outcome goal ongoing  -LM goal ongoing  -LM     ADL OT LTG    ADL OT LTG, Date Established   05/25/17  -RB    ADL OT LTG, Time to Achieve   by discharge  -RB    ADL OT LTG, Activity Type   ADL skills   Sponge bath and dress.  -RB    ADL OT LTG, [place] Level   contact guard;assistive device   Rolling walker and shower chair if needed.  -RB    ADL OT LTG, Date Goal Reviewed 05/26/17  -LM      ADL OT LTG, Outcome goal ongoing  -LM goal ongoing  -LM       05/24/17 1753 05/23/17 1316       Transfer Training OT LTG    Transfer Training OT LTG, Date Established  05/23/17  -RB     Transfer Training OT LTG, Time to Achieve  by discharge  -RB     Transfer  Training OT LTG, Activity Type  all transfers  -RB     Transfer Training OT LTG, [place] Level  supervision required  -RB     Transfer Training OT LTG, Assist Device  walker, rolling  -RB     Transfer Training OT LTG, Date Goal Reviewed 05/24/17  -RM      Transfer Training OT LTG, Outcome goal not met  -RM      Transfer Training OT LTG, Reason Goal Not Met discharged from facility  -RM      Strength OT LTG    Strength Goal OT LTG, Date Established  05/23/17  -RB     Strength Goal OT LTG, Time to Achieve  by discharge  -RB     Strength Goal OT LTG, Measure to Achieve  1 set of 10 reps all planes with 1 lb wrist wts or dumbells for B UE strengthening.  -RB     Strength Goal OT LTG, Date Goal Reviewed 05/24/17  -RM      Strength Goal OT LTG, Outcome goal not met  -RM      Strength Goal OT LTG, Reason Goal Not Met discharged from facility  -RM      Static Standing Balance OT LTG    Static Standing Balance OT LTG, Date Established  05/23/17  -RB     Static Standing Balance OT LTG, Time to Achieve  by discharge  -RB     Static Standing Balance OT LTG, [place] Level  supervision required   5 minutes with functional activity with 0-1 rest break.  -RB     Static Standing Balance OT LTG, Date Goal Reviewed 05/24/17  -RM      Static Standing Balance OT LTG, Outcome goal not met  -RM      Static Standing Balance OT LTG, Reason Goal Not Met discharged from facility  -RM      ADL OT LTG    ADL OT LTG, Date Established  05/23/17  -RB     ADL OT LTG, Time to Achieve  by discharge  -RB     ADL OT LTG, Activity Type  ADL skills   Sponge bath and dress or walk-in shower.  -RB     ADL OT LTG, [place] Level  standby assist;assistive device   Rolling walker and shower chair if needed.  -RB     ADL OT LTG, Date Goal Reviewed 05/24/17  -RM      ADL OT LTG, Outcome goal not met  -RM      ADL OT LTG, Reason Goal Not Met discharged from facility  -RM        User Key  (r) = Recorded By, (t) = Taken By, (c) = Cosigned By     Initials Name Provider Type    RB Curly Rich, OT Occupational Therapist     Katherine Pena, OTR/L Occupational Therapist     Siomara Bob, ARMSTRONG/L Occupational Therapy Assistant     Genet Hicks, ARMSTRONG/L Occupational Therapy Assistant    TO Andi Velasquez, ARMSTRONG/L Occupational Therapy Assistant     Paulette Ennis, OT Occupational Therapist          Occupational Therapy Education     Title: PT OT SLP Therapies (Active)     Topic: Occupational Therapy (Active)     Point: ADL training (Active)    Description: Instruct learner(s) on proper safety adaptation and remediation techniques during self care or transfers.   Instruct in proper use of assistive devices.    Learning Progress Summary    Learner Readiness Method Response Comment Documented by Status   Patient Acceptance E NR   06/05/17 1340 Active    Acceptance E NR   06/02/17 1318 Active    Acceptance E NR   06/01/17 1121 Active    Acceptance E VU,NR Educated pt on hand placement and safety with t/f. Educated pt on weight bear status with t/f. Educated pt on safety and call for assist.  05/30/17 1457 Done    Acceptance E VU   05/30/17 1452 Done    Acceptance E NR   05/29/17 1350 Active    Acceptance D NR Pt educated on LH reacher for donning pants with decreased understanding 2* to pt nauseated and fatigued post bathing. TO 05/27/17 1105 Active   Family Acceptance E NR   06/02/17 1318 Active    Acceptance E NR   06/01/17 1121 Active    Acceptance E NR   05/29/17 1350 Active    Acceptance D NR Pt educated on LH reacher for donning pants with decreased understanding 2* to pt nauseated and fatigued post bathing. TO 05/27/17 1105 Active               Point: Home exercise program (Active)    Description: Instruct learner(s) on appropriate technique for monitoring, assisting and/or progressing therapeutic exercises/activities.    Learning Progress Summary    Learner Readiness Method Response Comment Documented by Status   Patient Acceptance  E NR   06/05/17 1340 Active    Acceptance E NR   06/02/17 1318 Active    Acceptance E NR   06/01/17 1121 Active    Acceptance E VU   05/30/17 1452 Done    Acceptance E NR   05/29/17 1350 Active   Family Acceptance E NR   06/02/17 1318 Active    Acceptance E NR   06/01/17 1121 Active    Acceptance E NR   05/29/17 1350 Active               Point: Precautions (Active)    Description: Instruct learner(s) on prescribed precautions during self-care and functional transfers.    Learning Progress Summary    Learner Readiness Method Response Comment Documented by Status   Patient Acceptance E NR   06/05/17 1340 Active    Acceptance E NR   06/02/17 1318 Active    Acceptance E NR   06/01/17 1121 Active    Acceptance E VU,NR Educated pt on hand placement and safety with t/f. Educated pt on weight bear status with t/f. Educated pt on safety and call for assist.  05/30/17 1457 Done    Acceptance E VU   05/30/17 1452 Done    Acceptance E NR   05/29/17 1350 Active    Acceptance E NR Edu pt/family on no OOB without assist.  05/25/17 1304 Active   Family Acceptance E NR   06/02/17 1318 Active    Acceptance E NR   06/01/17 1121 Active    Acceptance E NR   05/29/17 1350 Active    Acceptance E NR Edu pt/family on no OOB without assist.  05/25/17 1304 Active               Point: Body mechanics (Active)    Description: Instruct learner(s) on proper positioning and spine alignment during self-care, functional mobility activities and/or exercises.    Learning Progress Summary    Learner Readiness Method Response Comment Documented by Status   Patient Acceptance E NR   06/05/17 1340 Active    Acceptance E NR   06/02/17 1318 Active    Acceptance E NR   06/01/17 1121 Active    Acceptance E VU,NR Educated pt on hand placement and safety with t/f. Educated pt on weight bear status with t/f. Educated pt on safety and call for assist.  05/30/17 1457 Done    Acceptance E VU   05/30/17 1452 Done     Acceptance E NR   05/29/17 1350 Active   Family Acceptance E NR   06/02/17 1318 Active    Acceptance E NR   06/01/17 1121 Active    Acceptance E NR   05/29/17 1350 Active                      User Key     Initials Effective Dates Name Provider Type Discipline     06/15/16 -  Curly Rich, OT Occupational Therapist OT     10/17/16 -  Katherine Pena, OTR/L Occupational Therapist OT     10/17/16 -  Siomara Bob, ARMSTRONG/L Occupational Therapy Assistant OT     10/17/16 -  Genet Hicks, ARMSTRONG/L Occupational Therapy Assistant OT    TO 10/17/16 -  Andi Velasquez, ARMSTRONG/L Occupational Therapy Assistant OT                  OT Recommendation and Plan  Anticipated Equipment Needs At Discharge: front wheeled walker, tub bench, wheelchair  Anticipated Discharge Disposition: skilled nursing facility, home with 24/7 care  Planned Therapy Interventions: activity intolerance, ADL retraining, balance training, energy conservation, strengthening, transfer training  Therapy Frequency: other (see comments) (3-14x/wk)  Plan of Care Review  Plan Of Care Reviewed With: patient  Progress: improving  Outcome Summary/Follow up Plan: making good progress        Outcome Measures       06/05/17 0838          How much help from another person do you currently need...    Turning from your back to your side while in flat bed without using bedrails? 3  -LM      Moving from lying on back to sitting on the side of a flat bed without bedrails? 3  -LM      Moving to and from a bed to a chair (including a wheelchair)? 3  -LM      Standing up from a chair using your arms (e.g., wheelchair, bedside chair)? 3  -LM      Climbing 3-5 steps with a railing? 1  -LM      To walk in hospital room? 3  -LM      AM-PAC 6 Clicks Score 16  -LM      Functional Assessment    Outcome Measure Options AM-PAC 6 Clicks Basic Mobility (PT)  -LM        User Key  (r) = Recorded By, (t) = Taken By, (c) = Cosigned By    Initials Name Provider Type    LM Kelly  Timoteo PT Physical Therapist           Time Calculation:         Time Calculation- OT       06/05/17 1458 06/05/17 1340       Time Calculation- OT    OT Start Time 1345  -LM 1110  -LM     OT Stop Time 1445  -LM 1203  -LM     OT Time Calculation (min) 60 min  -LM 53 min  -LM     Total Timed Code Minutes- OT 60 minute(s)  -LM 53 minute(s)  -LM       User Key  (r) = Recorded By, (t) = Taken By, (c) = Cosigned By    Initials Name Provider Type    LM PATTI Yeager/L Occupational Therapy Assistant           Therapy Charges for Today     Code Description Service Date Service Provider Modifiers Qty    54650863944 HC OT THER PROC EA 15 MIN 6/5/2017 PATTI Yeager/L GO 1    54724976264 HC OT THERAPEUTIC ACT EA 15 MIN 6/5/2017 PATTI Yeager/L GO 3    31465189203 HC OT THER PROC EA 15 MIN 6/5/2017 PATTI Yeager/L GO 2    75316002759 HC OT THERAPEUTIC ACT EA 15 MIN 6/5/2017 PATTI Yeager/L GO 2          OT G-codes  OT Professional Judgement Used?: Yes  OT Functional Scales Options: AM-PAC 6 Clicks Daily Activity (OT)  Score: 14  Functional Limitation: Self care  Self Care Current Status (): At least 40 percent but less than 60 percent impaired, limited or restricted  Self Care Goal Status (): At least 20 percent but less than 40 percent impaired, limited or restricted    SAURAV Yeager  6/5/2017

## 2017-06-05 NOTE — THERAPY TREATMENT NOTE
Inpatient Rehabilitation - Physical Therapy Treatment Note  [facility]     Patient Name: Mariela Woodson  : 1925  MRN: [id number]  Today's Date: 2017  Onset of Illness/Injury or Date of Surgery Date: 17  Date of Referral to PT: 17  Referring Physician: Dr. Aparicio    Admit Date: 2017    Visit Dx:    ICD-10-CM ICD-9-CM   1. Impaired mobility and activities of daily living Z74.09 799.89   2. Abnormality of gait and mobility R26.9 781.2   3. Muscle weakness (generalized) M62.81 728.87     Patient Active Problem List   Diagnosis   • Iron deficiency anemia due to chronic blood loss   • Post-polio syndrome   • Simple chronic bronchitis   • Seizure disorder   • Closed left hip fracture   • Iron deficiency anemia   • Post-polio syndrome   • Seizure disorder   • Metabolic encephalopathy   • COPD (chronic obstructive pulmonary disease)   • Encounter for rehabilitation               Adult Rehabilitation Note       17 1500 17 1345 17 1300    Rehab Assessment/Intervention    Discipline physical therapist  -LM occupational therapy assistant  -LMA     Document Type therapy note (daily note)  -LM therapy note (daily note)  -LMA     Subjective Information agree to therapy  -LM agree to therapy  -LMA     Patient Effort, Rehab Treatment good  -LM      Precautions/Limitations fall precautions;other (see comments)   TTWB LLE  -LM      Recorded by [LM] Kelly Mahmood, PT [LMA] Genet Hicks, ARMSTRONG/L     Vital Signs    Pretreatment Heart Rate (beats/min) 66  -LM      Posttreatment Heart Rate (beats/min) 68  -LM      Pre SpO2 (%) 94  -LM      O2 Delivery Pre Treatment supplemental O2  -LM      Post SpO2 (%) 98  -LM      O2 Delivery Post Treatment supplemental O2  -LM      Pre Patient Position Sitting  -LM      Post Patient Position Sitting  -LM      Recorded by [LM] Kelly Mahmood, PT      Pain Assessment    Pain Assessment No/denies pain  -LM No/denies pain   pt states just soreness    -LMA No/denies pain  -LMA    Pain Intervention(s)  Medication (See MAR)  -LMA     Recorded by [LM] Kelly Mahmood PT [LMA] PATTI Yeager/PIPER [LMA] FARHAN YeagerA/L    Cognitive Assessment/Intervention    Current Cognitive/Communication Assessment functional  -LM      Orientation Status oriented x 4  -LM      Follows Commands/Answers Questions 100% of the time;able to follow single-step instructions  -LM      Personal Safety WNL/WFL  -LM      Personal Safety Interventions fall prevention program maintained;gait belt;muscle strengthening facilitated;nonskid shoes/slippers when out of bed  -LM      Recorded by [LM] Kelly Mahmood PT      Bed Mobility, Assessment/Treatment    Bed Mob, Supine to Sit, [place]  contact guard assist  -LMA     Bed Mobility, Comment Up in chair upon entering room.  -LM      Recorded by [LM] Kelly Mahmood PT [LMA] FARHAN YeagerA/L     Transfer Assessment/Treatment    Transfers, Bed-Chair [place]  contact guard assist  -LMA     Transfers, Sit-Stand [place] contact guard assist  -LM      Transfers, Stand-Sit [place] contact guard assist  -LM contact guard assist  -LMA     Transfers, Sit-Stand-Sit, Assist Device rolling walker  -LM      Recorded by [LM] Kelly Mahmood PT [LMA] FARHAN YeagerA/L     Gait Assessment/Treatment    Gait, [place] Level contact guard assist  -LM      Gait, Assistive Device rolling walker  -LM      Gait, Distance (Feet) 6   x 2  -LM      Gait, Maintain Weight Bearing Status unable to maintain weight bearing status   Able to maintain for 2 steps, but difficulty once fatigued  -LM      Recorded by [LM] Kelly Mahmood PT      Stairs Assessment/Treatment    Stairs, [place] Level not tested  -LM      Recorded by [LM] Kelly Mahmood PT      Wheelchair Training/Management    Wheelchair, Distance Propelled 120 feet with SBA  -LM 50  -LMA     Recorded by [LM] Kelly Mahmood PT [LMA] FARHAN YeagerA/PIPER     Therapy  Exercises    Bilateral Lower Extremities AROM:;20 reps;sitting;hip flexion;hip abduction/adduction;LAQ;glut sets   AAROM on L with hip flex  -LM      Bilateral Upper Extremity  AROM:;10 reps;elbow flexion/extension;shoulder rolls/shrugs;shoulder protraction/retraction;shoulder extension/flexion   2 lb bicep curls and 1 shld flex and should abd add  -LMA     BUE Resistance  manual resistance- minimal   and perf yellow tband across chest 2x10 with A + over pulley  -LMA     Recorded by [LM] Kelly Mahmood, PT [LMA] PATTI Yeager/L     Positioning and Restraints    Pre-Treatment Position sitting in chair/recliner  -LM in bed  -LMA     Post Treatment Position wheelchair  -LM wheelchair  -LMA     In Wheelchair sitting;call light within reach;with family/caregiver  -LM      Recorded by [LM] Kelly Mahmood, PT [LMA] FARHAN YeagerA/PIPER       06/05/17 1110          Rehab Assessment/Intervention    Discipline occupational therapy assistant  -LMA      Document Type therapy note (daily note)  -LMA      Subjective Information agree to therapy  -LMA      Patient Effort, Rehab Treatment good  -LMA      Precautions/Limitations fall precautions;other (see comments)   ttwb  -LMA      Recorded by [LMA] PATTI Yeager/L      Pain Assessment    Pain Assessment No/denies pain  -LMA      Recorded by [LMA] FARHAN YeagerA/L      Cognitive Assessment/Intervention    Current Cognitive/Communication Assessment functional  -LMA      Recorded by [LMA] PATTI Yeager/L      Transfer Assessment/Treatment    Transfers, Sit-Stand [place] --   wheelchair push ups x 10 reps   -LMA      Recorded by [LMA] PATTI Yeager/L      Wheelchair Training/Management    Wheelchair, Distance Propelled 120 sba  -LMA      Recorded by [LMA] PATTI Yeager/L      Therapy Exercises    Bilateral Upper Extremity --   UEE bike x 15 min with 1 rb  -LMA      Recorded by [LMA] PATTI Yeager/PIPER       Positioning and Restraints    Pre-Treatment Position sitting in chair/recliner  -LMA      Post Treatment Position wheelchair  -LMA      Recorded by [LMA] Genet Hicks, ARMSTRONG/L        User Key  (r) = Recorded By, (t) = Taken By, (c) = Cosigned By    Initials Name Effective Dates    LM Kelly Mahmood, PT 06/15/16 -     LMA Genet Hicks, ARMSTRONG/L 10/17/16 -                 IP PT Goals       06/05/17 1617 06/05/17 1500 06/05/17 1150    Bed Mobility PT LTG    Bed Mobility PT LTG, Date Goal Reviewed 06/05/17  -LM      Bed Mobility PT LTG, Outcome goal ongoing  -LM      Transfer Training PT STG    Transfer Training PT STG, Date Goal Reviewed   05/30/17  -LM    Transfer Training PT STG, Outcome   goal met  -LM    Transfer Training PT LTG    Transfer Training PT  LTG, Date Goal Reviewed 06/05/17  -LM      Transfer Training PT LTG, Outcome goal ongoing  -LM      Gait Training PT LTG    Gait Training Goal PT LTG, Date Goal Reviewed  06/05/17  -LM     Gait Training Goal PT LTG, Outcome  goal ongoing  -LM       06/05/17 0838 06/02/17 1444 06/01/17 1612    Bed Mobility PT LTG    Bed Mobility PT LTG, Date Established 06/05/17  -LM      Bed Mobility PT LTG, Time to Achieve by discharge  -LM      Bed Mobility PT LTG, Activity Type supine to sit/sit to supine  -LM      Bed Mobility PT LTG, [place] Level supervision required  -LM      Bed Mobility PT Goal  LTG, Assist Device bed rails  -LM      Bed Mobility PT LTG, Date Goal Reviewed  06/02/17  -RW 06/01/17  -RW    Bed Mobility PT LTG, Outcome goal ongoing  -LM goal met  -RW goal ongoing  -RW    Transfer Training PT LTG    Transfer Training PT LTG, Date Established 06/05/17  -LM      Transfer Training PT LTG, Time to Achieve by discharge  -LM      Transfer Training PT LTG, Activity Type bed to chair /chair to bed  -LM      Transfer Training PT LTG, [place] Level supervision required  -LM      Transfer Training PT LTG, Assist Device --   AAD  -LM      Transfer Training  PT LTG, Additional Goal While maintaining TTWB LLE  -LM      Transfer Training PT  LTG, Date Goal Reviewed   06/01/17  -RW    Transfer Training PT LTG, Outcome goal ongoing  -LM  goal met  -RW    Gait Training PT LTG    Gait Training Goal PT LTG, Date Established 06/05/17  -LM      Gait Training Goal PT LTG, Time to Achieve by discharge  -LM      Gait Training Goal PT LTG, [place] Level contact guard assist  -LM      Gait Training Goal PT LTG, Assist Device --   AAD  -LM      Gait Training Goal PT LTG, Distance to Achieve 25 feet  -LM      Gait Training Goal PT LTG, Additional Goal While maintaining TTWB LLE  -LM      Gait Training Goal PT LTG, Outcome goal ongoing  -LM        05/31/17 1554 05/30/17 1525 05/29/17 1337    Bed Mobility PT LTG    Bed Mobility PT LTG, Date Goal Reviewed 05/31/17  -RW 05/30/17  -JA 05/29/17  -CA    Bed Mobility PT LTG, Outcome goal ongoing  -RW goal ongoing  -JA goal ongoing  -CA    Transfer Training PT STG    Transfer Training PT STG, Date Goal Reviewed  05/30/17  -JA 05/29/17  -CA    Transfer Training PT STG, Outcome  goal met  -JA goal ongoing  -CA    Transfer Training PT LTG    Transfer Training PT  LTG, Date Goal Reviewed 05/31/17  -RW 05/30/17  -JA 05/29/17  -CA    Transfer Training PT LTG, Outcome  goal ongoing  -JA goal ongoing  -CA    Wheelchair Propulsion PT LTG    Wheelchair Propulsion Goal PT LTG, Date Goal Reviewed  05/30/17  -JA 05/29/17  -CA    Wheelchair Propulsion Goal PT LTG, Outcome  goal met  -JA goal ongoing  -CA      05/27/17 1544 05/26/17 1300 05/26/17 1033    Bed Mobility PT LTG    Bed Mobility PT LTG, Date Goal Reviewed 05/27/17  -RW 05/26/17  -GIOVANA 05/26/17  -GIOVANA    Bed Mobility PT LTG, Outcome goal ongoing  -RW goal ongoing  -GIOVANA goal ongoing  -GIOVANA    Transfer Training PT STG    Transfer Training PT STG, Date Goal Reviewed 05/27/17  -RW 05/26/17  -GIOVANA 05/26/17  -GIOVANA    Transfer Training PT STG, Outcome goal ongoing  -RW goal ongoing  -GIOVANA goal ongoing  -GIOVANA     Transfer Training PT LTG    Transfer Training PT  LTG, Date Goal Reviewed 05/27/17  -RW 05/26/17  -GIOVANA 05/26/17  -GIOVANA    Transfer Training PT LTG, Outcome goal ongoing  -RW goal ongoing  -GIOVANA goal ongoing  -GIOVANA    Wheelchair Propulsion PT LTG    Wheelchair Propulsion Goal PT LTG, Date Goal Reviewed 05/27/17  -RW 05/26/17  -GIOVANA 05/26/17  -GIOVANA    Wheelchair Propulsion Goal PT LTG, Outcome goal ongoing  -RW goal ongoing  -GIOVANA goal ongoing  -GIOVANA      05/25/17 1518 05/25/17 1033 05/23/17 1338    Bed Mobility PT STG    Bed Mobility PT STG, Date Goal Reviewed   05/23/17  -CATALINO    Bed Mobility PT STG, Outcome   goal ongoing  -CATALINO    Bed Mobility PT LTG    Bed Mobility PT LTG, Date Established  05/25/17  -LM     Bed Mobility PT LTG, Time to Achieve  by discharge  -LM     Bed Mobility PT LTG, Activity Type  supine to sit/sit to supine  -LM     Bed Mobility PT LTG, [place] Level  minimum assist (75% patient effort)  -LM     Bed Mobility PT Goal  LTG, Assist Device  bed rails  -LM     Bed Mobility PT LTG, Date Goal Reviewed (P)  05/25/17  -RW      Bed Mobility PT LTG, Outcome (P)  goal ongoing  -RW goal ongoing  -LM     Transfer Training PT STG    Transfer Training PT STG, Date Established  05/25/17  -LM     Transfer Training PT STG, Time to Achieve  5 days  -LM     Transfer Training PT STG, Activity Type  sit to stand/stand to sit  -LM     Transfer Training PT STG, [place] Level  moderate assist (50% patient effort)  -LM     Transfer Training PT STG, Assist Device  --   AAD  -LM     Transfer Training PT STG, Additional Goal  Maintaining TTWB LLE  -LM     Transfer Training PT STG, Date Goal Reviewed (P)  05/25/17  -RW  05/23/17  -CATALINO    Transfer Training PT STG, Outcome (P)  goal ongoing  -RW goal ongoing  -LM goal ongoing  -CATALINO    Transfer Training PT LTG    Transfer Training PT LTG, Date Established  05/25/17  -LM     Transfer Training PT LTG, Time to Achieve  by discharge  -LM     Transfer Training PT LTG, Activity Type  bed to  chair /chair to bed  -LM     Transfer Training PT LTG, [place] Level  minimum assist (75% patient effort)  -LM     Transfer Training PT LTG, Assist Device  --   AAD  -LM     Transfer Training PT LTG, Additional Goal  Maintaining TTWB LLE  -LM     Transfer Training PT  LTG, Date Goal Reviewed (P)  05/25/17  -RW      Transfer Training PT LTG, Outcome (P)  goal ongoing  -RW goal ongoing  -LM     Gait Training PT LTG    Gait Training Goal PT LTG, Date Goal Reviewed   05/23/17  -CATALINO    Gait Training Goal PT LTG, Outcome   goal ongoing  -CATALINO    Stair Training PT LTG    Stair Training Goal PT LTG, Date Goal Reviewed   05/23/17  -CATALINO    Stair Training Goal PT LTG, Outcome   goal ongoing  -CATALINO    Strength Goal PT LTG    Strength Goal PT LTG, Date Goal Reviewed   05/23/17  -CATALINO    Wheelchair Propulsion PT LTG    Wheelchair Propulsion Goal PT LTG, Date Established  05/25/17  -LM     Wheelchair Propulsion Goal PT LTG, Time to Achieve  by discharge  -LM     Wheelchair Propulsion Goal PT LTG, [place] Level  contact guard assist  -LM     Wheelchair Propulsion Goal PT LTG, Distance to Achieve  50 feet  -LM     Wheelchair Propulsion Goal PT LTG, Outcome (P)  goal ongoing  -RW goal ongoing  -LM       05/23/17 0922          Bed Mobility PT STG    Bed Mobility PT STG, Date Established 05/23/17  -CB      Bed Mobility PT STG, Time to Achieve by discharge  -CB      Bed Mobility PT STG, Activity Type supine to sit/sit to supine  -CB      Bed Mobility PT STG, [place] Level supervision required  -CB      Bed Mobility PT STG, Additional Goal HOB down and no rails  -CB      Transfer Training PT STG    Transfer Training PT STG, Date Established 05/23/17  -CB      Transfer Training PT STG, Time to Achieve 2 - 3 days  -CB      Transfer Training PT STG, Activity Type bed to chair /chair to bed;sit to stand/stand to sit  -CB      Transfer Training PT STG, [place] Level contact guard assist  -CB      Transfer Training PT STG, Assist  Device walker, rolling  -CB      Gait Training PT LTG    Gait Training Goal PT LTG, Date Established 05/23/17  -CB      Gait Training Goal PT LTG, Time to Achieve by discharge  -CB      Gait Training Goal PT LTG, [place] Level contact guard assist  -CB      Gait Training Goal PT LTG, Assist Device walker, rolling  -CB      Gait Training Goal PT LTG, Distance to Achieve 50 feet  -CB      Stair Training PT LTG    Stair Training Goal PT LTG, Date Established 05/23/17  -CB      Stair Training Goal PT LTG, Time to Achieve by discharge  -CB      Stair Training Goal PT LTG, Number of Steps 1  -CB      Stair Training Goal PT LTG, [place] Level contact guard assist  -CB      Strength Goal PT LTG    Strength Goal PT LTG, Date Established 05/23/17  -CB      Strength Goal PT LTG, Time to Achieve by discharge  -CB      Strength Goal PT LTG, Measure to Achieve 15 reps all ex BLE independently  -CB      Strength Goal PT LTG, Functional Goal able to get legs back up onto bed independently  -CB        User Key  (r) = Recorded By, (t) = Taken By, (c) = Cosigned By    Initials Name Provider Type    LM Kelly Mahmood, PT Physical Therapist    CB Meg Tomlin, PT Physical Therapist    JA Darci Harris, PTA Physical Therapy Assistant    CA Ricardo Bro, PTA Physical Therapy Assistant    CATALINO Wei, PTA Physical Therapy Assistant    GIOVANA Desouza, PTA Physical Therapy Assistant    RW Clay Recio, PTA Physical Therapy Assistant          Physical Therapy Education     Title: PT OT SLP Therapies (Active)     Topic: Physical Therapy (Active)     Point: Mobility training (Done)    Learning Progress Summary    Learner Readiness Method Response Comment Documented by Status   Patient Acceptance E NR,VU Discussed with family regarding definition TTWB at L with mobility & transfers.  05/30/17 1036 Done    Acceptance E,TB,D NR Rienforced TTWB & benefits of mobility  05/29/17 1018 Active   Family Acceptance E NR,VU  Discussed with family regarding definition TTWB at L with mobility & transfers.  05/30/17 1036 Done               Point: Precautions (Done)    Learning Progress Summary    Learner Readiness Method Response Comment Documented by Status   Patient Acceptance E VU reviewed ttwb  06/01/17 1112 Done    Acceptance E VU reviewed ttwb with gt and transfers. talked with lita about her moms ability to care for pt at home.  05/31/17 1253 Done    Acceptance E NR,MARCIN Discussed with family regarding definition TTWB at L with mobility & transfers.  05/30/17 1036 Done    Acceptance E,TB,D NR Rienforced TTWB & benefits of mobility  05/29/17 1018 Active    Acceptance E NR reviewed ttwb but pt unable to maintain if asked to mobilize.  05/27/17 1152 Active    Acceptance E NR pt edu on TTWB and how to maintain TTWB.  05/26/17 1245 Active    Acceptance E NR Educated patient's family on precautions re: TTWB LLE  05/25/17 1446 Active   Family Acceptance E VU reviewed ttwb with gt and transfers. talked with lita about her moms ability to care for pt at home.  05/31/17 1253 Done    Acceptance E NR,MARCIN Discussed with family regarding definition TTWB at L with mobility & transfers.  05/30/17 1036 Done    Acceptance E NR Educated patient's family on precautions re: TTWB LLE  05/25/17 1446 Active                      User Key     Initials Effective Dates Name Provider Type Discipline     06/15/16 -  Kelly Mahmood, PT Physical Therapist PT     10/17/16 -  Darci Harris PTA Physical Therapy Assistant PT     10/17/16 -  Juan Desouza PTA Physical Therapy Assistant PT     10/17/16 -  Clay Recio, PTA Physical Therapy Assistant PT                    PT Recommendation and Plan  Anticipated Discharge Disposition: skilled nursing facility, home with assist, home with home health (SNF vs. home with 24/7 if available and HH)  Planned Therapy Interventions: balance training, bed mobility training, gait  training, home exercise program, motor coordination training, neuromuscular re-education, patient/family education, postural re-education, ROM (Range of Motion), stair training, strengthening, stretching, transfer training, wheelchair management/propulsion training  PT Frequency: other (see comments) (5-14 times/wk)  Plan of Care Review  Plan Of Care Reviewed With: patient  Outcome Summary/Follow up Plan: Pt requiring less assist with standing/transfers.  Able to maintain TTWB LLE with a couple of steps, but begins to have difficulty once fatigued.  Pt progressing well towards goals.          Outcome Measures       06/05/17 0838          How much help from another person do you currently need...    Turning from your back to your side while in flat bed without using bedrails? 3  -LM      Moving from lying on back to sitting on the side of a flat bed without bedrails? 3  -LM      Moving to and from a bed to a chair (including a wheelchair)? 3  -LM      Standing up from a chair using your arms (e.g., wheelchair, bedside chair)? 3  -LM      Climbing 3-5 steps with a railing? 1  -LM      To walk in hospital room? 3  -LM      AM-PAC 6 Clicks Score 16  -LM      Functional Assessment    Outcome Measure Options AM-PAC 6 Clicks Basic Mobility (PT)  -LM        User Key  (r) = Recorded By, (t) = Taken By, (c) = Cosigned By    Initials Name Provider Type    KENY Mahmood PT Physical Therapist           Time Calculation:         PT Charges       06/05/17 1500 06/05/17 0838       Time Calculation    Start Time 1500  -LM 0838  -LM     Stop Time 1532  -LM 0936  -LM     Time Calculation (min) 32 min  -LM 58 min  -LM     PT Received On 06/05/17  -LM 06/05/17  -LM     PT Goal Re-Cert Due Date  06/18/17  -LM     Time Calculation- PT    Total Timed Code Minutes- PT 32 minute(s)  -LM 58 minute(s)  -LM       User Key  (r) = Recorded By, (t) = Taken By, (c) = Cosigned By    Initials Name Provider Type    KENY Mahmood PT Physical  Therapist          Therapy Charges for Today     Code Description Service Date Service Provider Modifiers Qty    15404040041 HC GAIT TRAINING EA 15 MIN 6/5/2017 Kelly Mahmood, PT GP 1    44163321649 HC PT THERAPEUTIC ACT EA 15 MIN 6/5/2017 Kelly Mahmood, PT GP 1    74236030841 HC PT WHEELCHAIR MGMT EA 15 MIN 6/5/2017 Kelly Mahmood, PT GP 1    24245725477 HC PT THER PROC EA 15 MIN 6/5/2017 Kelly Mahmood PT GP 1    74828406314 HC GAIT TRAINING EA 15 MIN 6/5/2017 Kelly Mahmood, PT GP 1    67526586604 HC PT THER PROC EA 15 MIN 6/5/2017 Kelly Mahmood PT GP 1          PT G-Codes  PT Professional Judgement Used?: Yes  Outcome Measure Options: AM-PAC 6 Clicks Basic Mobility (PT)  Score: 8  Functional Limitation: Mobility: Walking and moving around  Mobility: Walking and Moving Around Current Status (): At least 80 percent but less than 100 percent impaired, limited or restricted  Mobility: Walking and Moving Around Goal Status (): At least 40 percent but less than 60 percent impaired, limited or restricted    Kelly Mahmood PT  6/5/2017

## 2017-06-05 NOTE — PLAN OF CARE
Problem: Patient Care Overview (Adult)  Goal: Plan of Care Review  Outcome: Ongoing (interventions implemented as appropriate)  Goal: Adult Individualization and Mutuality  Outcome: Ongoing (interventions implemented as appropriate)  Goal: Discharge Needs Assessment  Outcome: Ongoing (interventions implemented as appropriate)    Problem: Fractured Hip (Adult)  Goal: Signs and Symptoms of Listed Potential Problems Will be Absent or Manageable (Fractured Hip)  Outcome: Ongoing (interventions implemented as appropriate)    Problem: Fall Risk (Adult)  Goal: Absence of Falls  Outcome: Ongoing (interventions implemented as appropriate)    Problem: Functional Deficit (Adult,Obstetrics,Pediatric)  Goal: Signs and Symptoms of Listed Potential Problems Will be Absent or Manageable (Functional Deficit)  Outcome: Ongoing (interventions implemented as appropriate)

## 2017-06-05 NOTE — PROGRESS NOTES
LOS: 12 days   Patient Care Team:  Benito Kaur MD as PCP - General  Blake Oseguera MD as Consulting Physician (Hematology and Oncology)    Chief Complaint:  Left hip fracture        Interval History:     SUBJECTIVE:  Felt down over the weekend.  She and daughter have discussed the discharge.  Because of weightbearing status it is felt by both that she needs short-term nursing home and she is agreeable this was discussed with the patient and her daughter  Hip pain is feeling better  She has a cough and her daughter feels that she is retaining some fluid.    History taken from: patient chart family RN    Objective     Vital Signs  Temp:  [96.7 °F (35.9 °C)-97.6 °F (36.4 °C)] 97.6 °F (36.4 °C)  Heart Rate:  [63-67] 64  Resp:  [18] 18  BP: (141-169)/(65-74) 141/65  Last 3 weights    06/03/17  0533 06/04/17  0409 06/05/17  0535   Weight: 129 lb 14.4 oz (58.9 kg) 130 lb 6.4 oz (59.1 kg) 131 lb (59.4 kg)         Physical Exam:     General Appearance:    Alert, cooperative, in no acute distress   Head:    Normocephalic, without obvious abnormality, atraumatic   Eyes:            Lids and lashes normal, conjunctivae and sclerae normal, no   icterus, no pallor, corneas clear, PERRLA   Throat:   No oral lesions, no thrush, oral mucosa moist   Neck:   No adenopathy, supple, trachea midline, no thyromegaly, no   carotid bruit, no JVD       Lungs:     Clear to auscultation,respirations regular, even and                  Unlabored Good bilateral air movement     Heart:    Regular rhythm and normal rate, normal S1 and S2, no            murmur, no gallop, no rub, no click    Chest Wall:    No abnormalities observed   Abdomen:     Normal bowel sounds, no masses, no organomegaly, soft        non-tender, non-distended, no guarding, no rebound                Tenderness    Extremities: Incisions healing wellGood range of motion .  She does have trace to 1+ edema left leg        Skin:   No bleeding, bruising or rash   Lymph nodes:    No palpable adenopathy   Neurologic:   Cranial nerves 2 - 12 grossly intact, sensation intact, DTR       present and equal bilaterally          RESULTS REVIEW:     Lab Results (last 24 hours)     ** No results found for the last 24 hours. **        Imaging Results (last 72 hours)     ** No results found for the last 72 hours. **          Assessment/Plan     Principal Problem:    Closed left hip fracture  Active Problems:    Iron deficiency anemia    Post-polio syndrome    Seizure disorder    Metabolic encephalopathy    COPD (chronic obstructive pulmonary disease)    Encounter for rehabilitation    she is participating in therapy making good progress she is nonweightbearing and is agreeable to short-term nursing home and I think this would be appropriate until she is able to bear weight.  I do think he'll be short-term and she'll be return home  at home when she gained weight she doubled her Lasix for one or 2 days and we will do that today  We'll recheck her lab work tomorrow    Consult case management for nursing home placement    Tariq Aparicio MD  06/05/17  8:39 AM

## 2017-06-05 NOTE — PLAN OF CARE
Problem: Patient Care Overview (Adult)  Goal: Plan of Care Review  Outcome: Ongoing (interventions implemented as appropriate)    06/03/17 0114 06/05/17 0327   Coping/Psychosocial Response Interventions   Plan Of Care Reviewed With --  patient   Patient Care Overview   Progress --  improving   Outcome Evaluation   Outcome Summary/Follow up Plan Patient has been improving with mobility. Pain controlled with medication.  --          Problem: Fractured Hip (Adult)  Goal: Signs and Symptoms of Listed Potential Problems Will be Absent or Manageable (Fractured Hip)  Outcome: Ongoing (interventions implemented as appropriate)    Problem: Fall Risk (Adult)  Goal: Absence of Falls  Outcome: Ongoing (interventions implemented as appropriate)    Problem: Functional Deficit (Adult,Obstetrics,Pediatric)  Goal: Signs and Symptoms of Listed Potential Problems Will be Absent or Manageable (Functional Deficit)  Outcome: Ongoing (interventions implemented as appropriate)

## 2017-06-05 NOTE — PLAN OF CARE
Problem: Inpatient Physical Therapy  Goal: Transfer Training Goal 1 STG- PT  Outcome: Outcome(s) achieved Date Met:  06/05/17

## 2017-06-05 NOTE — PLAN OF CARE
Problem: Patient Care Overview (Adult)  Goal: Plan of Care Review  Outcome: Ongoing (interventions implemented as appropriate)    06/05/17 1338   Coping/Psychosocial Response Interventions   Plan Of Care Reviewed With patient   Patient Care Overview   Progress improving   Outcome Evaluation   Outcome Summary/Follow up Plan making good progress         Problem: Inpatient Occupational Therapy  Goal: Transfer Training Goal 1 LTG- OT  Outcome: Ongoing (interventions implemented as appropriate)    06/05/17 1338   Transfer Training OT LTG   Transfer Training OT LTG, Date Goal Reviewed 06/05/17   Transfer Training OT LTG, Outcome goal ongoing       Goal: Strength Goal LTG- OT  Outcome: Ongoing (interventions implemented as appropriate)    06/05/17 1338   Strength OT LTG   Strength Goal OT LTG, Date Goal Reviewed 06/05/17   Strength Goal OT LTG, Outcome goal ongoing       Goal: Static Standing Balance Goal OT- STG  Outcome: Ongoing (interventions implemented as appropriate)    06/05/17 1338   Static Standing Balance OT STG   Static Standing Balance OT STG, Date Goal Reviewed 06/05/17   Static Standing Balance OT STG, Outcome goal ongoing       Goal: ADL Goal LTG- OT  Outcome: Ongoing (interventions implemented as appropriate)    06/05/17 1338   ADL OT LTG   ADL OT LTG, Date Goal Reviewed 06/05/17   ADL OT LTG, Outcome goal ongoing

## 2017-06-05 NOTE — THERAPY TREATMENT NOTE
Inpatient Rehabilitation - Occupational Therapy Treatment Note  [facility]     Patient Name: Mariela Woodson  : 1925  MRN: [id number]  Today's Date: 2017  Onset of Illness/Injury or Date of Surgery Date: 17  Date of Referral to OT: 17  Referring Physician: Dr. Aparicio      Admit Date: 2017    Visit Dx:     ICD-10-CM ICD-9-CM   1. Impaired mobility and activities of daily living Z74.09 799.89   2. Abnormality of gait and mobility R26.9 781.2   3. Muscle weakness (generalized) M62.81 728.87     Patient Active Problem List   Diagnosis   • Iron deficiency anemia due to chronic blood loss   • Post-polio syndrome   • Simple chronic bronchitis   • Seizure disorder   • Closed left hip fracture   • Iron deficiency anemia   • Post-polio syndrome   • Seizure disorder   • Metabolic encephalopathy   • COPD (chronic obstructive pulmonary disease)   • Encounter for rehabilitation             Adult Rehabilitation Note       17 1300 17 1110 17 1349    Rehab Assessment/Intervention    Discipline  occupational therapy assistant  -LM physical therapy assistant  -RW    Document Type  therapy note (daily note)  -LM therapy note (daily note)  -RW    Subjective Information  agree to therapy  -LM agree to therapy  -RW    Patient Effort, Rehab Treatment  good  -LM good  -RW    Precautions/Limitations  fall precautions;other (see comments)   ttwb  -LM     Recorded by  [LM] FARHAN YeagerA/L [RW] Clay Recio, RHODA    Pain Assessment    Pain Assessment No/denies pain  -LM No/denies pain  -LM No/denies pain  -RW    Recorded by [LM] PATTI Yeager/L [LM] PATTI Yeager/L [RW] Clay Recio PTA    Cognitive Assessment/Intervention    Current Cognitive/Communication Assessment  functional  -LM functional  -RW    Orientation Status   oriented x 4  -RW    Follows Commands/Answers Questions   100% of the time  -RW    Personal Safety Interventions   gait belt;nonskid  shoes/slippers when out of bed  -RW    Recorded by  [LM] FARHAN YeagerA/L [RW] Clay Recio PTA    Mobility Assessment/Training    Left Lower Extremity Weight-Bearing   touch down weight-bearing  -RW    Recorded by   [RW] Clay Recio PTA    Bed Mobility, Assessment/Treatment    Bed Mobility, Assistive Device   bed rails  -RW    Bed Mobility, Roll Left, [place]   conditional independence  -RW    Bed Mobility, Roll Right, [place]   conditional independence  -RW    Bed Mob, Supine to Sit, [place]   supervision required  -RW    Bed Mob, Sit to Supine, [place]   contact guard assist  -RW    Bed Mobility, Comment   practiced multiple times  -RW    Recorded by   [RW] Clay Recio PTA    Transfer Assessment/Treatment    Transfers, Bed-Chair [place]   contact guard assist;minimum assist (75% patient effort)  -RW    Transfers, Chair-Bed [place]   contact guard assist;minimum assist (75% patient effort)  -RW    Transfers, Bed-Chair-Bed, Assist Device   rolling walker  -RW    Transfers, Sit-Stand [place]  --   wheelchair push ups x 10 reps   - minimum assist (75% patient effort)  -RW    Transfers, Stand-Sit [place]   minimum assist (75% patient effort)  -RW    Transfers, Sit-Stand-Sit, Assist Device   rolling walker  -RW    Transfer, Comment   practiced several times  -RW    Recorded by  [LM] FARHAN YeagerA/L [RW] Clay Recio PTA    Gait Assessment/Treatment    Gait, [place] Level   --  -RW    Gait, Assistive Device   --  -RW    Recorded by   [RW] Clay Recio PTA    Wheelchair Training/Management    Wheelchair Propulsion Training   steering wheelchair;turning wheelchair  -RW    Wheelchair, Distance Propelled  120 sba  -     Wheelchair Training Comment   sba in room  -RW    Recorded by  [LM] FARHAN YeagerA/L [RW] Clay Recio PTA    Therapy Exercises    Bilateral Lower Extremities   --   2 sets  -RW    Bilateral Upper Extremity  --    UEE bike x 15 min with 1 rb  -LM     Recorded by  [LM] Genet Hicks, ARMSTRONG/L [RW] Clay Recio PTA    Positioning and Restraints    Pre-Treatment Position  sitting in chair/recliner  -LM sitting in chair/recliner  -RW    Post Treatment Position  wheelchair  -LM bed  -RW    In Bed   call light within reach;with family/caregiver  -RW    Recorded by  [LM] Genet Hicks, ARMSTRONG/L [RW] Clay Recio PTA      User Key  (r) = Recorded By, (t) = Taken By, (c) = Cosigned By    Initials Name Effective Dates    RW Clay Recio, RHODA 10/17/16 -     LM FARHAN YeagerA/L 10/17/16 -                 OT Goals       06/05/17 1338 06/02/17 0900 06/01/17 0755    Transfer Training OT LTG    Transfer Training OT LTG, Date Goal Reviewed 06/05/17  -LM 06/02/17  - 06/01/17  -    Transfer Training OT LTG, Outcome goal ongoing  -LM      Strength OT LTG    Strength Goal OT LTG, Date Goal Reviewed 06/05/17  -LM 06/02/17  - 06/01/17  -    Strength Goal OT LTG, Outcome goal ongoing  -LM      Static Standing Balance OT STG    Static Standing Balance OT STG, Date Goal Reviewed 06/05/17  -LM 06/02/17  - 06/01/17  -    Static Standing Balance OT STG, Outcome goal ongoing  -LM      ADL OT LTG    ADL OT LTG, Date Established   06/01/17  -    ADL OT LTG, Date Goal Reviewed 06/05/17  -LM 06/02/17  -     ADL OT LTG, Outcome goal ongoing  -LM        05/31/17 1301 05/30/17 1443 05/30/17 1333    Transfer Training OT LTG    Transfer Training OT LTG, Date Goal Reviewed 05/31/17  -LM 05/30/17  -LM 05/30/17  -    Transfer Training OT LTG, Outcome goal ongoing  -LM goal ongoing  -LM goal ongoing  -BH    Strength OT LTG    Strength Goal OT LTG, Date Goal Reviewed 05/31/17  -LM 05/30/17  -LM 05/30/17  -    Strength Goal OT LTG, Outcome goal ongoing  -LM goal ongoing  -LM goal ongoing  -BH    Static Standing Balance OT STG    Static Standing Balance OT STG, Date Goal Reviewed 05/31/17  -LM 05/30/17  -LM 05/30/17  -     Static Standing Balance OT STG, Outcome goal ongoing  -LM goal ongoing  -LM goal ongoing  -BH    ADL OT LTG    ADL OT LTG, Date Goal Reviewed 05/31/17  -LM 05/30/17  -LM 05/30/17  -BH    ADL OT LTG, Outcome goal ongoing  -LM goal ongoing  -LM goal ongoing  -BH      05/29/17 1050 05/27/17 0611 05/26/17 1320    Transfer Training OT LTG    Transfer Training OT LTG, Date Goal Reviewed 05/29/17  -JH 05/27/17  -TO 05/26/17  -LM    Transfer Training OT LTG, Outcome   goal ongoing  -LM    Strength OT LTG    Strength Goal OT LTG, Date Goal Reviewed 05/29/17  -JH 05/27/17  -TO 05/26/17  -LM    Strength Goal OT LTG, Outcome   goal ongoing  -LM    Static Standing Balance OT STG    Static Standing Balance OT STG, Date Goal Reviewed 05/29/17  -JH 05/27/17  -TO 05/26/17  -LM    Static Standing Balance OT STG, Outcome   goal ongoing  -LM    ADL OT LTG    ADL OT LTG, Date Goal Reviewed 05/29/17  -JH 05/27/17  -TO 05/26/17  -LM    ADL OT LTG, Outcome   goal ongoing  -LM      05/25/17 1518 05/25/17 1306 05/24/17 1753    Transfer Training OT LTG    Transfer Training OT LTG, Date Established  05/25/17  -RB     Transfer Training OT LTG, Time to Achieve  by discharge  -RB     Transfer Training OT LTG, Activity Type  all transfers  -RB     Transfer Training OT LTG, [place] Level  contact guard assist  -RB     Transfer Training OT LTG, Assist Device  walker, rolling;commode, bedside  -RB     Transfer Training OT LTG, Date Goal Reviewed 05/25/17  -LM  05/24/17  -RM    Transfer Training OT LTG, Outcome goal ongoing  -LM  goal not met  -RM    Transfer Training OT LTG, Reason Goal Not Met   discharged from facility  -RM    Strength OT LTG    Strength Goal OT LTG, Date Established  05/25/17  -RB     Strength Goal OT LTG, Time to Achieve  by discharge  -RB     Strength Goal OT LTG, Measure to Achieve  1 set of 10 reps all planes with 1/2 to 1 lb wrist wts or dumbells to increase UE strength.  -RB     Strength Goal OT LTG, Date Goal Reviewed  05/25/17  -LM  05/24/17  -RM    Strength Goal OT LTG, Outcome goal ongoing  -LM  goal not met  -RM    Strength Goal OT LTG, Reason Goal Not Met   discharged from facility  -RM    Static Standing Balance OT STG    Static Standing Balance OT STG, Date Established  05/25/17  -RB     Static Standing Balance OT STG, Time to Achieve  2 wks  -RB     Static Standing Balance OT STG, [place] Level  contact guard assist   5 minutes with functional activity and 0-1 rest break.  -RB     Static Standing Balance OT STG, Assist Device  assistive device   Rolling wakler.  -RB     Static Standing Balance OT STG, Date Goal Reviewed 05/25/17  -LM      Static Standing Balance OT STG, Outcome goal ongoing  -LM      Static Standing Balance OT LTG    Static Standing Balance OT LTG, Date Goal Reviewed   05/24/17  -RM    Static Standing Balance OT LTG, Outcome   goal not met  -RM    Static Standing Balance OT LTG, Reason Goal Not Met   discharged from facility  -RM    ADL OT LTG    ADL OT LTG, Date Established  05/25/17  -RB     ADL OT LTG, Time to Achieve  by discharge  -RB     ADL OT LTG, Activity Type  ADL skills   Sponge bath and dress.  -RB     ADL OT LTG, [place] Level  contact guard;assistive device   Rolling walker and shower chair if needed.  -RB     ADL OT LTG, Date Goal Reviewed   05/24/17  -RM    ADL OT LTG, Outcome goal ongoing  -LM  goal not met  -RM    ADL OT LTG, Reason Goal Not Met   discharged from facility  -RM      05/23/17 1316          Transfer Training OT LTG    Transfer Training OT LTG, Date Established 05/23/17  -RB      Transfer Training OT LTG, Time to Achieve by discharge  -RB      Transfer Training OT LTG, Activity Type all transfers  -RB      Transfer Training OT LTG, [place] Level supervision required  -RB      Transfer Training OT LTG, Assist Device walker, rolling  -RB      Strength OT LTG    Strength Goal OT LTG, Date Established 05/23/17  -RB      Strength Goal OT LTG, Time to Achieve by  discharge  -RB      Strength Goal OT LTG, Measure to Achieve 1 set of 10 reps all planes with 1 lb wrist wts or dumbells for B UE strengthening.  -RB      Static Standing Balance OT LTG    Static Standing Balance OT LTG, Date Established 05/23/17  -RB      Static Standing Balance OT LTG, Time to Achieve by discharge  -RB      Static Standing Balance OT LTG, [place] Level supervision required   5 minutes with functional activity with 0-1 rest break.  -RB      ADL OT LTG    ADL OT LTG, Date Established 05/23/17  -RB      ADL OT LTG, Time to Achieve by discharge  -RB      ADL OT LTG, Activity Type ADL skills   Sponge bath and dress or walk-in shower.  -RB      ADL OT LTG, [place] Level standby assist;assistive device   Rolling walker and shower chair if needed.  -RB        User Key  (r) = Recorded By, (t) = Taken By, (c) = Cosigned By    Initials Name Provider Type    RB Curly Rich, OT Occupational Therapist     Katherine Pena, OTR/L Occupational Therapist     Siomara Bob, ARMSTRONG/L Occupational Therapy Assistant     Genet Hicks ARMSTRONG/L Occupational Therapy Assistant    TO Andi Velasquez ARMSTRONG/L Occupational Therapy Assistant     Paulette Ennis, OT Occupational Therapist          Occupational Therapy Education     Title: PT OT SLP Therapies (Active)     Topic: Occupational Therapy (Active)     Point: ADL training (Active)    Description: Instruct learner(s) on proper safety adaptation and remediation techniques during self care or transfers.   Instruct in proper use of assistive devices.    Learning Progress Summary    Learner Readiness Method Response Comment Documented by Status   Patient Acceptance E NR   06/05/17 1340 Active    Acceptance E NR   06/02/17 1318 Active    Acceptance E NR   06/01/17 1121 Active    Acceptance E VU,NR Educated pt on hand placement and safety with t/f. Educated pt on weight bear status with t/f. Educated pt on safety and call for assist.  05/30/17 5264  Done    Acceptance E VU   05/30/17 1452 Done    Acceptance E NR   05/29/17 1350 Active    Acceptance D NR Pt educated on LH reacher for donning pants with decreased understanding 2* to pt nauseated and fatigued post bathing. TO 05/27/17 1105 Active   Family Acceptance E NR   06/02/17 1318 Active    Acceptance E NR   06/01/17 1121 Active    Acceptance E NR   05/29/17 1350 Active    Acceptance D NR Pt educated on LH reacher for donning pants with decreased understanding 2* to pt nauseated and fatigued post bathing. TO 05/27/17 1105 Active               Point: Home exercise program (Active)    Description: Instruct learner(s) on appropriate technique for monitoring, assisting and/or progressing therapeutic exercises/activities.    Learning Progress Summary    Learner Readiness Method Response Comment Documented by Status   Patient Acceptance E NR   06/05/17 1340 Active    Acceptance E NR   06/02/17 1318 Active    Acceptance E NR   06/01/17 1121 Active    Acceptance E VU   05/30/17 1452 Done    Acceptance E NR   05/29/17 1350 Active   Family Acceptance E NR   06/02/17 1318 Active    Acceptance E NR   06/01/17 1121 Active    Acceptance E NR   05/29/17 1350 Active               Point: Precautions (Active)    Description: Instruct learner(s) on prescribed precautions during self-care and functional transfers.    Learning Progress Summary    Learner Readiness Method Response Comment Documented by Status   Patient Acceptance E NR   06/05/17 1340 Active    Acceptance E NR   06/02/17 1318 Active    Acceptance E NR   06/01/17 1121 Active    Acceptance E VU,NR Educated pt on hand placement and safety with t/f. Educated pt on weight bear status with t/f. Educated pt on safety and call for assist.  05/30/17 1457 Done    Acceptance E VU   05/30/17 1452 Done    Acceptance E NR   05/29/17 1350 Active    Acceptance E NR Edu pt/family on no OOB without assist. RB 05/25/17 1304 Active   Family  Acceptance E NR   06/02/17 1318 Active    Acceptance E NR   06/01/17 1121 Active    Acceptance E NR   05/29/17 1350 Active    Acceptance E NR Edu pt/family on no OOB without assist.  05/25/17 1304 Active               Point: Body mechanics (Active)    Description: Instruct learner(s) on proper positioning and spine alignment during self-care, functional mobility activities and/or exercises.    Learning Progress Summary    Learner Readiness Method Response Comment Documented by Status   Patient Acceptance E NR   06/05/17 1340 Active    Acceptance E NR   06/02/17 1318 Active    Acceptance E NR   06/01/17 1121 Active    Acceptance E VU,NR Educated pt on hand placement and safety with t/f. Educated pt on weight bear status with t/f. Educated pt on safety and call for assist.  05/30/17 1457 Done    Acceptance E VU   05/30/17 1452 Done    Acceptance E NR   05/29/17 1350 Active   Family Acceptance E NR   06/02/17 1318 Active    Acceptance E NR   06/01/17 1121 Active    Acceptance E NR   05/29/17 1350 Active                      User Key     Initials Effective Dates Name Provider Type Discipline     06/15/16 -  Curly Rich, OT Occupational Therapist OT     10/17/16 -  Katherine Pena OTR/L Occupational Therapist OT     10/17/16 -  Siomara Bob ARMSTRONG/L Occupational Therapy Assistant OT     10/17/16 -  Genet Hicks ARMSTRONG/L Occupational Therapy Assistant OT     10/17/16 -  Andi Velasquez, ARMSTRONG/L Occupational Therapy Assistant OT                  OT Recommendation and Plan  Anticipated Equipment Needs At Discharge: front wheeled walker, tub bench, wheelchair  Anticipated Discharge Disposition: skilled nursing facility, home with 24/7 care  Planned Therapy Interventions: activity intolerance, ADL retraining, balance training, energy conservation, strengthening, transfer training  Therapy Frequency: other (see comments) (3-14x/wk)  Plan of Care Review  Plan Of Care Reviewed With:  patient  Progress: improving  Outcome Summary/Follow up Plan: making good progress        Outcome Measures       06/05/17 0838          How much help from another person do you currently need...    Turning from your back to your side while in flat bed without using bedrails? 3  -LM      Moving from lying on back to sitting on the side of a flat bed without bedrails? 3  -LM      Moving to and from a bed to a chair (including a wheelchair)? 3  -LM      Standing up from a chair using your arms (e.g., wheelchair, bedside chair)? 3  -LM      Climbing 3-5 steps with a railing? 1  -LM      To walk in hospital room? 3  -LM      AM-PAC 6 Clicks Score 16  -LM      Functional Assessment    Outcome Measure Options AM-PAC 6 Clicks Basic Mobility (PT)  -LM        User Key  (r) = Recorded By, (t) = Taken By, (c) = Cosigned By    Initials Name Provider Type    LM Kelly Mahmood, PT Physical Therapist           Time Calculation:         Time Calculation- OT       06/05/17 1340          Time Calculation- OT    OT Start Time 1110  -LM      OT Stop Time 1203  -LM      OT Time Calculation (min) 53 min  -LM      Total Timed Code Minutes- OT 53 minute(s)  -LM        User Key  (r) = Recorded By, (t) = Taken By, (c) = Cosigned By    Initials Name Provider Type    APTTI Etienne/L Occupational Therapy Assistant           Therapy Charges for Today     Code Description Service Date Service Provider Modifiers Qty    [id number]  OT THER PROC EA 15 MIN 6/5/2017 PATTI Yeager/L GO 1    23888575998 HC OT THERAPEUTIC ACT EA 15 MIN 6/5/2017 SAURAV Yeager GO 3          OT G-codes  OT Professional Judgement Used?: Yes  OT Functional Scales Options: AM-PAC 6 Clicks Daily Activity (OT)  Score: 14  Functional Limitation: Self care  Self Care Current Status (): At least 40 percent but less than 60 percent impaired, limited or restricted  Self Care Goal Status (): At least 20 percent but less than 40 percent  impaired, limited or restricted    Genet Hicks, ARMSTRONG/L  6/5/2017

## 2017-06-06 NOTE — THERAPY TREATMENT NOTE
Inpatient Rehabilitation - Occupational Therapy Treatment Note  [facility]     Patient Name: Mariela Woodson  : 1925  MRN: [id number]  Today's Date: 2017  Onset of Illness/Injury or Date of Surgery Date: 17  Date of Referral to OT: 17  Referring Physician: Dr. Aparicio      Admit Date: 2017    Visit Dx:     ICD-10-CM ICD-9-CM   1. Impaired mobility and activities of daily living Z74.09 799.89   2. Abnormality of gait and mobility R26.9 781.2   3. Muscle weakness (generalized) M62.81 728.87     Patient Active Problem List   Diagnosis   • Iron deficiency anemia due to chronic blood loss   • Post-polio syndrome   • Simple chronic bronchitis   • Seizure disorder   • Closed left hip fracture   • Iron deficiency anemia   • Post-polio syndrome   • Seizure disorder   • Metabolic encephalopathy   • COPD (chronic obstructive pulmonary disease)   • Encounter for rehabilitation             Adult Rehabilitation Note       17 1328 17 1040 17 0825    Rehab Assessment/Intervention    Discipline occupational therapy assistant  -RC physical therapy assistant  -RW occupational therapy assistant  -RC    Document Type therapy note (daily note)  -RC therapy note (daily note)  -RW therapy note (daily note)  -RC    Subjective Information agree to therapy  -RC agree to therapy  -RW agree to therapy  -RC    Patient Effort, Rehab Treatment good  -RC good  -RW good  -RC    Precautions/Limitations non-weight bearing status;fall precautions   ttwb on l le  -RC fall precautions   ttwb  -RW fall precautions;non-weight bearing status   ttwb l le  -RC    Recorded by [RC] PATTI Rosenbaum/L [RW] Clay Recio PTA [RC] PATTI Rosenbaum/L    Vital Signs    Pre SpO2 (%) 97  -RC  93  -RC    O2 Delivery Pre Treatment supplemental O2  -RC  supplemental O2  -RC    Intra SpO2 (%) 86  -RC      O2 Delivery Intra Treatment room air  -RC      Post SpO2 (%) 98  -RC  98  -RC    O2 Delivery Post  Treatment supplemental O2  -RC  supplemental O2  -RC    Pre Patient Position Sitting  -RC  Sitting  -RC    Intra Patient Position Standing  -RC  Standing  -RC    Post Patient Position Supine  -RC  Sitting  -RC    Recorded by [RC] SAURAV Rosenbaum  [RC] PATTI Rosenbaum/PIPER    Pain Assessment    Pain Assessment No/denies pain  -RC No/denies pain  -RW No/denies pain  -RC    Recorded by [RC] PATTI Rosenbaum/L [RW] Clay Recio PTA [RC] PATTI Rosenbaum/L    Cognitive Assessment/Intervention    Current Cognitive/Communication Assessment functional  -RC functional  -RW functional  -RC    Orientation Status oriented x 4  -RC oriented x 4  -RW oriented x 4  -RC    Follows Commands/Answers Questions 100% of the time  -% of the time  -% of the time  -RC    Personal Safety Interventions gait belt;fall prevention program maintained;nonskid shoes/slippers when out of bed  -RC gait belt;nonskid shoes/slippers when out of bed  -RW     Recorded by [RC] PATTI Rosenbaum/L [RW] Clay Recio PTA [RC] PATTI Rosenbaum/L    Bed Mobility, Assessment/Treatment    Bed Mob, Sit to Supine, [place] minimum assist (75% patient effort)  -RC      Recorded by [RC] PATTI Rosenbaum/L      Transfer Assessment/Treatment    Transfers, Chair-Bed [place] contact guard assist  -RC      Transfers, Bed-Chair-Bed, Assist Device rolling walker  -RC      Transfers, Sit-Stand [place] contact guard assist  -RC contact guard assist;stand by assist  -RW contact guard assist  -RC    Transfers, Stand-Sit [place] contact guard assist  -RC contact guard assist;stand by assist  -RW contact guard assist  -RC    Transfers, Sit-Stand-Sit, Assist Device rolling walker  -RC rolling walker  -RW     Toilet Transfer, [place] contact guard assist  -RC      Toilet Transfer, Assistive Device rolling walker  -RC      Bathtub Transfer, [place]   contact guard assist  -RC    Bathtub Transfer,  Assistive Device   rolling walker  -RC    Recorded by [RC] PATTI Rosenbaum/L [RW] lCay Recio PTA [RC] FARHAN RosenbaumA/L    Gait Assessment/Treatment    Gait, [place] Level  contact guard assist  -RW     Gait, Assistive Device  rolling walker  -RW     Gait, Distance (Feet)  6    x 2  -RW     Gait, Gait Deviations  antalgic;left:;step length decreased;stride length decreased  -RW     Recorded by  [RW] Clay Recio PTA     Stairs Assessment/Treatment    Stairs, [place] Level  --  -RW     Recorded by  [RW] Clay Recio PTA     Wheelchair Training/Management    Wheelchair, Distance Propelled 50  -  -RW     Wheelchair Training Comment  mod ind  -RW     Recorded by [RC] PATTI Rosenbaum/L [RW] Clay Recio PTA     Upper Body Bathing Assessment/Training    UB Bathing Assess/Train Assistive Device   hand-held shower head;grab bars;shower chair with back;long-handled sponge  -RC    UB Bathing Assess/Train, Position   sitting  -RC    UB Bathing Assess/Train, [place] Level   conditional independence  -RC    Recorded by   [RC] PATTI Rosenbaum/L    Lower Body Bathing Assessment/Training    LB Bathing Assess/Train Assistive Device   hand-held shower head;long-handled sponge;shower chair with back  -RC    LB Bathing Assess/Train, Position   sitting  -RC    LB Bathing Assess/Train, [place] Level   stand by assist  -RC    Recorded by   [RC] FARHAN RosenbaumA/L    Upper Body Dressing Assessment/Training    UB Dressing Assess/Train, Clothing Type   doffing:;donning:;bra;pull over;t-shirt  -RC    UB Dressing Assess/Train, Position   sitting  -RC    UB Dressing Assess/Train, [place]   minimum assist (75% patient effort)   to hook bra  -RC    Recorded by   [RC] FARHAN RosenbaumA/L    Lower Body Dressing Assessment/Training    LB Dressing Assess/Train, Clothing Type   donning:;pants;shoes  -RC    LB Dressing Assess/Train, Assist Device   long-handled shoe  horn;dressing stick;reacher  -RC    LB Dressing Assess/Train, Position   sitting;standing  -RC    LB Dressing Assess/Train, [place]   minimum assist (75% patient effort)  -RC    Recorded by   [RC] SAURAV Rosenbaum    Grooming Assessment/Training    Grooming Assess/Train, Position   sitting  -RC    Grooming Assess/Train, [place] Level   conditional independence  -RC    Recorded by   [RC] SAURAV Rosenbaum    Therapy Exercises    Bilateral Lower Extremities  AROM:;20 reps;sitting;hip abduction/adduction;LAQ;glut sets;knee flexion    x 2 sets  -RW     BLE Resistance  theraband  -RW     Bilateral Upper Extremity --   ue bike 5 min, balloon vollyball 5 min  -RC      Recorded by [RC] SAURAV Rosenbaum [RW] Clay Recio PTA     Positioning and Restraints    Pre-Treatment Position sitting in chair/recliner  -RC sitting in chair/recliner  -RW sitting in chair/recliner  -RC    Post Treatment Position bed  -RC wheelchair  -RW wheelchair  -RC    In Bed encouraged to call for assist;call light within reach;with family/caregiver  -RC  with family/caregiver;encouraged to call for assist  -RC    In Wheelchair  patient within staff view  -RW     Recorded by [RC] SAURAV Rosenbaum [RW] Clay Recio PTA [RC] SAURAV Rosenbaum      06/05/17 1500 06/05/17 1345 06/05/17 1300    Rehab Assessment/Intervention    Discipline physical therapist  -LM occupational therapy assistant  -LMA     Document Type therapy note (daily note)  -LM therapy note (daily note)  -LMA     Subjective Information agree to therapy  -LM agree to therapy  -LMA     Patient Effort, Rehab Treatment good  -LM      Precautions/Limitations fall precautions;other (see comments)   TTWB LLE  -LM      Recorded by [LM] Kelly Mahmood, PT [LMA] PATTI Yeager/L     Vital Signs    Pretreatment Heart Rate (beats/min) 66  -LM      Posttreatment Heart Rate (beats/min) 68  -LM      Pre SpO2 (%) 94  -LM      O2 Delivery Pre Treatment  supplemental O2  -LM      Post SpO2 (%) 98  -LM      O2 Delivery Post Treatment supplemental O2  -LM      Pre Patient Position Sitting  -LM      Post Patient Position Sitting  -LM      Recorded by [LM] Kelly Mahmood PT      Pain Assessment    Pain Assessment No/denies pain  -LM No/denies pain   pt states just soreness   -LMA No/denies pain  -LMA    Pain Intervention(s)  Medication (See MAR)  -LMA     Recorded by [LM] Kelly Mahmood PT [LMA] FARHAN YeagerA/PIEPR [LMA] FARHAN YeagerA/L    Cognitive Assessment/Intervention    Current Cognitive/Communication Assessment functional  -LM      Orientation Status oriented x 4  -LM      Follows Commands/Answers Questions 100% of the time;able to follow single-step instructions  -LM      Personal Safety WNL/WFL  -LM      Personal Safety Interventions fall prevention program maintained;gait belt;muscle strengthening facilitated;nonskid shoes/slippers when out of bed  -LM      Recorded by [LM] Kelly Mahmood PT      Bed Mobility, Assessment/Treatment    Bed Mob, Supine to Sit, [place]  contact guard assist  -LMA     Bed Mobility, Comment Up in chair upon entering room.  -LM      Recorded by [LM] Kelly Mahmood PT [LMA] FARHAN YeagerA/L     Transfer Assessment/Treatment    Transfers, Bed-Chair [place]  contact guard assist  -LMA     Transfers, Sit-Stand [place] contact guard assist  -LM      Transfers, Stand-Sit [place] contact guard assist  -LM contact guard assist  -LMA     Transfers, Sit-Stand-Sit, Assist Device rolling walker  -LM      Recorded by [LM] Kelly Mahmood PT [LMA] FARHAN YeagerA/L     Gait Assessment/Treatment    Gait, [place] Level contact guard assist  -LM      Gait, Assistive Device rolling walker  -LM      Gait, Distance (Feet) 6   x 2  -LM      Gait, Maintain Weight Bearing Status unable to maintain weight bearing status   Able to maintain for 2 steps, but difficulty once fatigued  -LM      Recorded by [LM]  Kelly Mahmood, PT      Stairs Assessment/Treatment    Stairs, [place] Level not tested  -LM      Recorded by [LM] Kelly Mahmood PT      Wheelchair Training/Management    Wheelchair, Distance Propelled 120 feet with SBA  -LM 50  -LMA     Recorded by [LM] Kelly Mahmood PT [LMA] FARHAN YeagerA/L     Therapy Exercises    Bilateral Lower Extremities AROM:;20 reps;sitting;hip flexion;hip abduction/adduction;LAQ;glut sets   AAROM on L with hip flex  -LM      Bilateral Upper Extremity  AROM:;10 reps;elbow flexion/extension;shoulder rolls/shrugs;shoulder protraction/retraction;shoulder extension/flexion   2 lb bicep curls and 1 shld flex and should abd add  -LMA     BUE Resistance  manual resistance- minimal   and perf yellow tband across chest 2x10 with A + over pulley  -LMA     Recorded by [LM] Kelly Mahmood PT [LMA] PATTI Yeager/L     Positioning and Restraints    Pre-Treatment Position sitting in chair/recliner  -LM in bed  -LMA     Post Treatment Position wheelchair  -LM wheelchair  -LMA     In Wheelchair sitting;call light within reach;with family/caregiver  -LM      Recorded by [LM] Kelly Mahmood PT [LMA] PATTI Yeager/PIPER       06/05/17 1110          Rehab Assessment/Intervention    Discipline occupational therapy assistant  -LMA      Document Type therapy note (daily note)  -LMA      Subjective Information agree to therapy  -LMA      Patient Effort, Rehab Treatment good  -LMA      Precautions/Limitations fall precautions;other (see comments)   ttwb  -LMA      Recorded by [LMA] FARHAN YeagerA/L      Pain Assessment    Pain Assessment No/denies pain  -LMA      Recorded by [LMA] FARHAN YeagerA/L      Cognitive Assessment/Intervention    Current Cognitive/Communication Assessment functional  -LMA      Recorded by [LMA] FARHAN YeagerA/L      Transfer Assessment/Treatment    Transfers, Sit-Stand [place] --   wheelchair push ups x 10 reps   -LMA      Recorded  by [LMA] Genet Hicks ARMSTRONG/L      Wheelchair Training/Management    Wheelchair, Distance Propelled 120 sba  -LMA      Recorded by [LMA] FAHRAN YeagerA/L      Therapy Exercises    Bilateral Upper Extremity --   UEE bike x 15 min with 1 rb  -LMA      Recorded by [LMA] Genet Hicks ARMSTRONG/L      Positioning and Restraints    Pre-Treatment Position sitting in chair/recliner  -LMA      Post Treatment Position wheelchair  -LMA      Recorded by [LMA] Genet Hicks ARMSTRONG/L        User Key  (r) = Recorded By, (t) = Taken By, (c) = Cosigned By    Initials Name Effective Dates    LM Kelly Mahmood, PT 06/15/16 -     RW Clay Recio, PTA 10/17/16 -     RC Celia Lema, ARMSTRONG/L 10/17/16 -     LMA Genet Hicks, ARMSTRONG/L 10/17/16 -                 OT Goals       06/06/17 1337 06/06/17 1335 06/06/17 1327    Transfer Training OT LTG    Transfer Training OT LTG, Date Goal Reviewed  06/06/17  -RC     Transfer Training OT LTG, Outcome  goal ongoing  -RC     Strength OT LTG    Strength Goal OT LTG, Date Goal Reviewed  06/06/17  -RC     Strength Goal OT LTG, Outcome  goal ongoing  -RC     Static Standing Balance OT STG    Static Standing Balance OT STG, Date Goal Reviewed (P)  06/06/17  -RC      Static Standing Balance OT STG, Outcome (P)  goal ongoing  -RC      ADL OT LTG    ADL OT LTG, Date Goal Reviewed  06/06/17  -RC (P)  06/06/17  -RC    ADL OT LTG, Outcome  goal ongoing  -RC (P)  goal ongoing  -RC      06/06/17 1325 06/05/17 1456 06/05/17 1338    Transfer Training OT LTG    Transfer Training OT LTG, Date Goal Reviewed   06/05/17  -LM    Transfer Training OT LTG, Outcome   goal ongoing  -LM    Strength OT LTG    Strength Goal OT LTG, Date Goal Reviewed   06/05/17  -LM    Strength Goal OT LTG, Outcome   goal ongoing  -LM    Static Standing Balance OT STG    Static Standing Balance OT STG, Date Goal Reviewed 06/06/17  -RC  06/05/17  -LM    Static Standing Balance OT STG, Outcome goal ongoing  -RC  goal  ongoing  -LM    ADL OT LTG    ADL OT LTG, Date Goal Reviewed  (P)  06/05/17  -LM 06/05/17  -LM    ADL OT LTG, Outcome   goal ongoing  -LM      06/02/17 0900 06/01/17 0755 05/31/17 1301    Transfer Training OT LTG    Transfer Training OT LTG, Date Goal Reviewed 06/02/17  -JH 06/01/17  - 05/31/17  -LM    Transfer Training OT LTG, Outcome   goal ongoing  -LM    Strength OT LTG    Strength Goal OT LTG, Date Goal Reviewed 06/02/17  -JH 06/01/17  - 05/31/17  -LM    Strength Goal OT LTG, Outcome   goal ongoing  -LM    Static Standing Balance OT STG    Static Standing Balance OT STG, Date Goal Reviewed 06/02/17  - 06/01/17  - 05/31/17  -LM    Static Standing Balance OT STG, Outcome   goal ongoing  -LM    ADL OT LTG    ADL OT LTG, Date Established  06/01/17  -     ADL OT LTG, Date Goal Reviewed 06/02/17  -  05/31/17  -LM    ADL OT LTG, Outcome   goal ongoing  -LM      05/30/17 1443 05/30/17 1333 05/29/17 1050    Transfer Training OT LTG    Transfer Training OT LTG, Date Goal Reviewed 05/30/17  -LM 05/30/17  - 05/29/17  -    Transfer Training OT LTG, Outcome goal ongoing  -LM goal ongoing  -BH     Strength OT LTG    Strength Goal OT LTG, Date Goal Reviewed 05/30/17  -LM 05/30/17  - 05/29/17  -    Strength Goal OT LTG, Outcome goal ongoing  -LM goal ongoing  -BH     Static Standing Balance OT STG    Static Standing Balance OT STG, Date Goal Reviewed 05/30/17  -LM 05/30/17  - 05/29/17  -    Static Standing Balance OT STG, Outcome goal ongoing  -LM goal ongoing  -BH     ADL OT LTG    ADL OT LTG, Date Goal Reviewed 05/30/17  -LM 05/30/17  - 05/29/17  -    ADL OT LTG, Outcome goal ongoing  -LM goal ongoing  -BH       05/27/17 0611 05/26/17 1320 05/25/17 1518    Transfer Training OT LTG    Transfer Training OT LTG, Date Goal Reviewed 05/27/17  -TO 05/26/17  -LM 05/25/17  -LM    Transfer Training OT LTG, Outcome  goal ongoing  -LM goal ongoing  -LM    Strength OT LTG    Strength Goal OT LTG, Date Goal  Reviewed 05/27/17  -TO 05/26/17  -LM 05/25/17  -LM    Strength Goal OT LTG, Outcome  goal ongoing  -LM goal ongoing  -LM    Static Standing Balance OT STG    Static Standing Balance OT STG, Date Goal Reviewed 05/27/17  -TO 05/26/17  -LM 05/25/17  -LM    Static Standing Balance OT STG, Outcome  goal ongoing  -LM goal ongoing  -LM    ADL OT LTG    ADL OT LTG, Date Goal Reviewed 05/27/17  -TO 05/26/17  -LM     ADL OT LTG, Outcome  goal ongoing  -LM goal ongoing  -LM      05/25/17 1306 05/24/17 1753       Transfer Training OT LTG    Transfer Training OT LTG, Date Established 05/25/17  -RB      Transfer Training OT LTG, Time to Achieve by discharge  -RB      Transfer Training OT LTG, Activity Type all transfers  -RB      Transfer Training OT LTG, [place] Level contact guard assist  -RB      Transfer Training OT LTG, Assist Device walker, rolling;commode, bedside  -RB      Transfer Training OT LTG, Date Goal Reviewed  05/24/17  -RM     Transfer Training OT LTG, Outcome  goal not met  -RM     Transfer Training OT LTG, Reason Goal Not Met  discharged from facility  -RM     Strength OT LTG    Strength Goal OT LTG, Date Established 05/25/17  -RB      Strength Goal OT LTG, Time to Achieve by discharge  -RB      Strength Goal OT LTG, Measure to Achieve 1 set of 10 reps all planes with 1/2 to 1 lb wrist wts or dumbells to increase UE strength.  -RB      Strength Goal OT LTG, Date Goal Reviewed  05/24/17  -RM     Strength Goal OT LTG, Outcome  goal not met  -RM     Strength Goal OT LTG, Reason Goal Not Met  discharged from facility  -RM     Static Standing Balance OT STG    Static Standing Balance OT STG, Date Established 05/25/17  -RB      Static Standing Balance OT STG, Time to Achieve 2 wks  -RB      Static Standing Balance OT STG, [place] Level contact guard assist   5 minutes with functional activity and 0-1 rest break.  -RB      Static Standing Balance OT STG, Assist Device assistive device   Rolling wakler.  -RB       Static Standing Balance OT LTG    Static Standing Balance OT LTG, Date Goal Reviewed  05/24/17  -     Static Standing Balance OT LTG, Outcome  goal not met  -     Static Standing Balance OT LTG, Reason Goal Not Met  discharged from facility  -RM     ADL OT LTG    ADL OT LTG, Date Established 05/25/17  -RB      ADL OT LTG, Time to Achieve by discharge  -RB      ADL OT LTG, Activity Type ADL skills   Sponge bath and dress.  -RB      ADL OT LTG, [place] Level contact guard;assistive device   Rolling walker and shower chair if needed.  -RB      ADL OT LTG, Date Goal Reviewed  05/24/17  -     ADL OT LTG, Outcome  goal not met  -     ADL OT LTG, Reason Goal Not Met  discharged from facility  -       User Key  (r) = Recorded By, (t) = Taken By, (c) = Cosigned By    Initials Name Provider Type    CRISTIANA Rich, OT Occupational Therapist     Katherine Pena, OTR/L Occupational Therapist     Celia Lema, ARMSTRONG/L Occupational Therapy Assistant     Siomara Bob, ARMSTRONG/L Occupational Therapy Assistant     Genet Hicks, ARMSTRONG/L Occupational Therapy Assistant    TO Andi Velasquez, ARMSTRONG/L Occupational Therapy Assistant     Paulette Ennis, OT Occupational Therapist          Occupational Therapy Education     Title: PT OT SLP Therapies (Active)     Topic: Occupational Therapy (Active)     Point: ADL training (Active)    Description: Instruct learner(s) on proper safety adaptation and remediation techniques during self care or transfers.   Instruct in proper use of assistive devices.    Learning Progress Summary    Learner Readiness Method Response Comment Documented by Status   Patient Acceptance E NR ttwb RC 06/06/17 1049 Active    Acceptance E NR   06/05/17 1340 Active    Acceptance E NR   06/02/17 1318 Active    Acceptance E NR   06/01/17 1121 Active    Acceptance E VU,NR Educated pt on hand placement and safety with t/f. Educated pt on weight bear status with t/f. Educated pt on safety  and call for assist.  05/30/17 1457 Done    Acceptance E VU   05/30/17 1452 Done    Acceptance E NR   05/29/17 1350 Active    Acceptance D NR Pt educated on LH reacher for donning pants with decreased understanding 2* to pt nauseated and fatigued post bathing. TO 05/27/17 1105 Active   Family Acceptance E NR   06/02/17 1318 Active    Acceptance E NR   06/01/17 1121 Active    Acceptance E NR   05/29/17 1350 Active    Acceptance D NR Pt educated on LH reacher for donning pants with decreased understanding 2* to pt nauseated and fatigued post bathing. TO 05/27/17 1105 Active               Point: Home exercise program (Active)    Description: Instruct learner(s) on appropriate technique for monitoring, assisting and/or progressing therapeutic exercises/activities.    Learning Progress Summary    Learner Readiness Method Response Comment Documented by Status   Patient Acceptance E NR   06/05/17 1340 Active    Acceptance E NR   06/02/17 1318 Active    Acceptance E NR   06/01/17 1121 Active    Acceptance E VU   05/30/17 1452 Done    Acceptance E NR   05/29/17 1350 Active   Family Acceptance E NR   06/02/17 1318 Active    Acceptance E NR   06/01/17 1121 Active    Acceptance E NR   05/29/17 1350 Active               Point: Precautions (Active)    Description: Instruct learner(s) on prescribed precautions during self-care and functional transfers.    Learning Progress Summary    Learner Readiness Method Response Comment Documented by Status   Patient Acceptance E NR ttwb  06/06/17 1049 Active    Acceptance E NR   06/05/17 1340 Active    Acceptance E NR   06/02/17 1318 Active    Acceptance E NR   06/01/17 1121 Active    Acceptance E VU,NR Educated pt on hand placement and safety with t/f. Educated pt on weight bear status with t/f. Educated pt on safety and call for assist.  05/30/17 1457 Done    Acceptance E VU   05/30/17 1452 Done    Acceptance E NR   05/29/17 1350 Active     Acceptance E NR Edu pt/family on no OOB without assist.  05/25/17 1304 Active   Family Acceptance E NR   06/02/17 1318 Active    Acceptance E NR   06/01/17 1121 Active    Acceptance E NR   05/29/17 1350 Active    Acceptance E NR Edu pt/family on no OOB without assist.  05/25/17 1304 Active               Point: Body mechanics (Active)    Description: Instruct learner(s) on proper positioning and spine alignment during self-care, functional mobility activities and/or exercises.    Learning Progress Summary    Learner Readiness Method Response Comment Documented by Status   Patient Acceptance E NR   06/05/17 1340 Active    Acceptance E NR   06/02/17 1318 Active    Acceptance E NR   06/01/17 1121 Active    Acceptance E VU,NR Educated pt on hand placement and safety with t/f. Educated pt on weight bear status with t/f. Educated pt on safety and call for assist.  05/30/17 1457 Done    Acceptance E VU   05/30/17 1452 Done    Acceptance E NR   05/29/17 1350 Active   Family Acceptance E NR   06/02/17 1318 Active    Acceptance E NR   06/01/17 1121 Active    Acceptance E NR   05/29/17 1350 Active                      User Key     Initials Effective Dates Name Provider Type Discipline     06/15/16 -  Curly Rich, OT Occupational Therapist OT     10/17/16 -  Katherine Pena, OTR/L Occupational Therapist OT     10/17/16 -  Celia Lema, ARMSTRONG/L Occupational Therapy Assistant OT     10/17/16 -  Siomara Bob, ARMSTRONG/L Occupational Therapy Assistant OT     10/17/16 -  Genet Hicks, ARMSTRONG/L Occupational Therapy Assistant OT     10/17/16 -  Andi Velasquez, ARMSTRONG/L Occupational Therapy Assistant OT                  OT Recommendation and Plan  Anticipated Equipment Needs At Discharge: front wheeled walker, tub bench, wheelchair  Anticipated Discharge Disposition: skilled nursing facility, home with 24/7 care  Planned Therapy Interventions: activity intolerance, ADL retraining, balance  training, energy conservation, strengthening, transfer training  Therapy Frequency: other (see comments) (3-14x/wk)  Plan of Care Review  Plan Of Care Reviewed With: patient  Progress: progress toward functional goals is gradual  Outcome Summary/Follow up Plan: worked well w/ tx today    cont poc        Outcome Measures       06/06/17 1100 06/06/17 0825 06/05/17 0838    How much help from another person do you currently need...    Turning from your back to your side while in flat bed without using bedrails? 3  -RW  3  -LM    Moving from lying on back to sitting on the side of a flat bed without bedrails? 3  -RW  3  -LM    Moving to and from a bed to a chair (including a wheelchair)? 3  -RW  3  -LM    Standing up from a chair using your arms (e.g., wheelchair, bedside chair)? 3  -RW  3  -LM    Climbing 3-5 steps with a railing? 1  -RW  1  -LM    To walk in hospital room? 3  -RW  3  -LM    AM-PAC 6 Clicks Score 16  -RW  16  -LM    How much help from another is currently needed...    Putting on and taking off regular lower body clothing?  3  -RC     Bathing (including washing, rinsing, and drying)  3  -RC     Toileting (which includes using toilet bed pan or urinal)  3  -RC     Putting on and taking off regular upper body clothing  3  -RC     Taking care of personal grooming (such as brushing teeth)  4  -RC     Eating meals  4  -RC     Score  20  -RC     Functional Assessment    Outcome Measure Options   AM-PAC 6 Clicks Basic Mobility (PT)  -LM      User Key  (r) = Recorded By, (t) = Taken By, (c) = Cosigned By    Initials Name Provider Type    KENY Mahmood, PT Physical Therapist    MAURICIO Recio, RHODA Physical Therapy Assistant    FARHAN PorterA/L Occupational Therapy Assistant           Time Calculation:         Time Calculation- OT       06/06/17 1428 06/06/17 1408       Time Calculation- OT    OT Start Time 1328  -RC 0825  -RC     OT Stop Time 1413  -RC 0935  -RC     OT Time Calculation (min) 45 min   -RC 70 min  -RC     Total Timed Code Minutes- OT 45 minute(s)  -RC 70 minute(s)  -RC       User Key  (r) = Recorded By, (t) = Taken By, (c) = Cosigned By    Initials Name Provider Type    RC Celia Lema, ARMSTRONG/L Occupational Therapy Assistant           Therapy Charges for Today     Code Description Service Date Service Provider Modifiers Qty    89613849092 HC OT SELF CARE/MGMT/TRAIN EA 15 MIN 6/6/2017 Celia Lema, ARMSTRONG/L GO 5    89785650009 HC OT THER PROC EA 15 MIN 6/6/2017 Celia BOLTON Torey, ARMSTRONG/L GO 1    73334412401 HC OT THERAPEUTIC ACT EA 15 MIN 6/6/2017 Celia Lema, ARMSTRONG/L GO 2          OT G-codes  OT Professional Judgement Used?: Yes  OT Functional Scales Options: AM-PAC 6 Clicks Daily Activity (OT)  Score: 14  Functional Limitation: Self care  Self Care Current Status (): At least 40 percent but less than 60 percent impaired, limited or restricted  Self Care Goal Status (): At least 20 percent but less than 40 percent impaired, limited or restricted    Celia Lema, ARMSTRONG/L  6/6/2017

## 2017-06-06 NOTE — PLAN OF CARE
Problem: Patient Care Overview (Adult)  Goal: Plan of Care Review  Outcome: Ongoing (interventions implemented as appropriate)    06/06/17 0342   Coping/Psychosocial Response Interventions   Plan Of Care Reviewed With patient   Patient Care Overview   Progress improving   Outcome Evaluation   Outcome Summary/Follow up Plan pt resting well this shift, ambulating and transfering improving          Problem: Fractured Hip (Adult)  Goal: Signs and Symptoms of Listed Potential Problems Will be Absent or Manageable (Fractured Hip)  Outcome: Ongoing (interventions implemented as appropriate)    06/06/17 0342   Fractured Hip   Problems Assessed (Fractured Hip) all   Problems Present (Fractured Hip) pain;functional deficit/self-care deficit         Problem: Fall Risk (Adult)  Goal: Absence of Falls  Outcome: Ongoing (interventions implemented as appropriate)    06/06/17 0342   Fall Risk (Adult)   Absence of Falls achieves outcome         Problem: Functional Deficit (Adult,Obstetrics,Pediatric)  Goal: Signs and Symptoms of Listed Potential Problems Will be Absent or Manageable (Functional Deficit)  Outcome: Ongoing (interventions implemented as appropriate)    06/06/17 0342   Functional Deficit   Problems Assessed (Functional Deficit) all   Problems Present (Functional Deficit) pain;functional activity tolerance impairment;mobility impairment;muscle strength impairment

## 2017-06-06 NOTE — PROGRESS NOTES
LOS: 13 days   Patient Care Team:  Benito Kaur MD as PCP - General  Blake Oseguera MD as Consulting Physician (Hematology and Oncology)    Chief Complaint:  Left hip fracture postoperative day 15        Interval History:     SUBJECTIVE:  Slept well.  Daughter says that the finger with codeine helps her sleep but she does talk after she goes to sleep however they did not want to change anything else because it doesn't work.  She is eating well  Continues to have a morning cough that is chronic in nature and has that at home as well   Pain is well-controlled  She is participating well with therapy but she's not able to ambulate well because of weightbearing restrictions  She will be going to nursing home tomorrow by PACS .  History taken from: patient chart family    Objective     Vital Signs  Temp:  [96.8 °F (36 °C)-97 °F (36.1 °C)] 96.8 °F (36 °C)  Heart Rate:  [58-62] 62  Resp:  [18-20] 18  BP: (135-152)/(55-66) 135/55  Last 3 weights    06/03/17  0533 06/04/17  0409 06/05/17  0535   Weight: 129 lb 14.4 oz (58.9 kg) 130 lb 6.4 oz (59.1 kg) 131 lb (59.4 kg)    Patient is in therapy and has not been weighed today.  She will be weighed when she's completed therapy and I will review that      Physical Exam:     General Appearance:    Alert, cooperative, in no acute distress   Head:    Normocephalic, without obvious abnormality, atraumatic   Eyes:            Lids and lashes normal, conjunctivae and sclerae normal, no   icterus, no pallor, corneas clear, PERRLA   Throat:   No oral lesions, no thrush, oral mucosa moist   Neck:   No adenopathy, supple, trachea midline, no thyromegaly, no   carotid bruit, no JVD       Lungs:     Clear to auscultation,respirations regular, even and                  Unlabored Good bilateral air movement     Heart:    Regular rhythm and normal rate, normal S1 and S2, no            murmur, no gallop, no rub, no click No ectopy    Chest Wall:    No abnormalities observed   Abdomen:      Normal bowel sounds, no masses, no organomegaly, soft        non-tender, non-distended, no guarding, no rebound                Tenderness      Extremities:   Moves all extremities well, no edema, no cyanosis, no             Redness Incision healing well bruising improving edema about trace to 1+ in the left   No tenderness in the calf        Skin:   No bleeding, bruising or rash   Lymph nodes:   No palpable adenopathy   Neurologic:   Cranial nerves 2 - 12 grossly intact, sensation intact, DTR       present and equal bilaterally          RESULTS REVIEW:     Lab Results (last 24 hours)     Procedure Component Value Units Date/Time    CBC & Differential [[id number]] Collected:  06/06/17 0659    Specimen:  Blood Updated:  06/06/17 0734    Narrative:       The following orders were created for panel order CBC & Differential.  Procedure                               Abnormality         Status                     ---------                               -----------         ------                     CBC Auto Differential[[id number]]        Abnormal            Final result                 Please view results for these tests on the individual orders.    CBC Auto Differential [[id number]]  (Abnormal) Collected:  06/06/17 0659    Specimen:  Blood Updated:  06/06/17 0734     WBC 6.02 10*3/mm3      RBC 2.64 (L) 10*6/mm3      Hemoglobin 8.4 (L) g/dL      Hematocrit 27.5 (L) %      .2 (H) fL      MCH 31.8 pg      MCHC 30.5 (L) g/dL      RDW 21.9 (H) %      RDW-SD 80.4 (H) fl      MPV 8.8 fL      Platelets 353 10*3/mm3      Neutrophil % 72.6 %      Lymphocyte % 13.8 %      Monocyte % 8.8 %      Eosinophil % 3.8 %      Basophil % 0.7 %      Immature Grans % 0.3 %      Neutrophils, Absolute 4.37 10*3/mm3      Lymphocytes, Absolute 0.83 10*3/mm3      Monocytes, Absolute 0.53 10*3/mm3      Eosinophils, Absolute 0.23 10*3/mm3      Basophils, Absolute 0.04 10*3/mm3      Immature Grans, Absolute 0.02 10*3/mm3     Comprehensive  Metabolic Panel [[id number]]  (Abnormal) Collected:  06/06/17 0659    Specimen:  Blood Updated:  06/06/17 0739     Glucose 94 mg/dL      BUN 16 mg/dL      Creatinine 0.72 mg/dL      Sodium 138 mmol/L      Potassium 4.1 mmol/L      Chloride 100 mmol/L      CO2 30.0 mmol/L      Calcium 8.8 mg/dL      Total Protein 6.0 (L) g/dL      Albumin 3.10 (L) g/dL      ALT (SGPT) 40 U/L      AST (SGOT) 28 U/L      Alkaline Phosphatase 166 (H) U/L      Total Bilirubin 0.6 mg/dL      eGFR Non African Amer 76 mL/min/1.73      Globulin 2.9 gm/dL      A/G Ratio 1.1 g/dL      BUN/Creatinine Ratio 22.2     Anion Gap 8.0 mmol/L     Narrative:       The MDRD GFR formula is only valid for adults with stable renal function between ages 18 and 70.        Imaging Results (last 72 hours)     ** No results found for the last 72 hours. **          Assessment/Plan     Principal Problem:    Closed left hip fracture  Active Problems:    Iron deficiency anemia    Post-polio syndrome    Seizure disorder    Metabolic encephalopathy    COPD (chronic obstructive pulmonary disease)    Encounter for rehabilitation        She is participating well with therapy but ambulating poorly because of weightbearing restrictions.  She is on Lovenox while she is in the acute rehabilitation unit that will be DC'd and placed back on aspirin at discharge to nursing home.  We'll decrease her Lasix to 20 mg a day which is her baseline dosage  This was discussed with patient and her daughter          Tariq Aparicio MD  06/06/17  10:13 AM

## 2017-06-06 NOTE — PLAN OF CARE
Problem: Patient Care Overview (Adult)  Goal: Plan of Care Review  Outcome: Ongoing (interventions implemented as appropriate)    06/06/17 1623   Coping/Psychosocial Response Interventions   Plan Of Care Reviewed With patient   Patient Care Overview   Progress improving   Outcome Evaluation   Outcome Summary/Follow up Plan overall pt has made good progress but is going to snf tomorrow for [facility] care.          Problem: Inpatient Physical Therapy  Goal: Bed Mobility Goal LTG- PT  Outcome: Ongoing (interventions implemented as appropriate)    06/05/17 0838 06/06/17 1623   Bed Mobility PT LTG   Bed Mobility PT LTG, Date Established 06/05/17 --    Bed Mobility PT LTG, Time to Achieve by discharge --    Bed Mobility PT LTG, Activity Type supine to sit/sit to supine --    Bed Mobility PT LTG, [place] Level supervision required --    Bed Mobility PT Goal LTG, Assist Device bed rails --    Bed Mobility PT LTG, Date Goal Reviewed --  06/06/17   Bed Mobility PT LTG, Outcome --  goal ongoing       Goal: Transfer Training Goal 1 LTG- PT  Outcome: Ongoing (interventions implemented as appropriate)    06/05/17 0838 06/06/17 1623   Transfer Training PT LTG   Transfer Training PT LTG, Date Established 06/05/17 --    Transfer Training PT LTG, Time to Achieve by discharge --    Transfer Training PT LTG, Activity Type bed to chair /chair to bed --    Transfer Training PT LTG, [place] Level supervision required --    Transfer Training PT LTG, Assist Device (AAD) --    Transfer Training PT LTG, Additional Goal While maintaining TTWB LLE --    Transfer Training PT LTG, Date Goal Reviewed --  06/06/17   Transfer Training PT LTG, Outcome --  goal ongoing       Goal: Gait Training Goal LTG- PT  Outcome: Ongoing (interventions implemented as appropriate)    06/05/17 0838 06/06/17 1623   Gait Training PT LTG   Gait Training Goal PT LTG, Date Established 06/05/17 --    Gait Training Goal PT LTG, Time to Achieve by discharge --    Gait  Training Goal PT LTG, [place] Level contact guard assist --    Gait Training Goal PT LTG, Assist Device (AAD) --    Gait Training Goal PT LTG, Distance to Achieve 25 feet --    Gait Training Goal PT LTG, Additional Goal While maintaining TTWB LLE --    Gait Training Goal PT LTG, Date Goal Reviewed --  06/06/17   Gait Training Goal PT LTG, Outcome --  goal ongoing

## 2017-06-06 NOTE — THERAPY TREATMENT NOTE
Inpatient Rehabilitation - Physical Therapy Treatment Note  [facility]     Patient Name: Mariela Woodson  : 1925  MRN: [id number]  Today's Date: 2017  Onset of Illness/Injury or Date of Surgery Date: 17  Date of Referral to PT: 17  Referring Physician: Dr. Aparicio    Admit Date: 2017    Visit Dx:    ICD-10-CM ICD-9-CM   1. Impaired mobility and activities of daily living Z74.09 799.89   2. Abnormality of gait and mobility R26.9 781.2   3. Muscle weakness (generalized) M62.81 728.87     Patient Active Problem List   Diagnosis   • Iron deficiency anemia due to chronic blood loss   • Post-polio syndrome   • Simple chronic bronchitis   • Seizure disorder   • Closed left hip fracture   • Iron deficiency anemia   • Post-polio syndrome   • Seizure disorder   • Metabolic encephalopathy   • COPD (chronic obstructive pulmonary disease)   • Encounter for rehabilitation               Adult Rehabilitation Note       17 1528 17 1328 17 1040    Rehab Assessment/Intervention    Discipline physical therapy assistant  -RW occupational therapy assistant  -RC physical therapy assistant  -RW    Document Type therapy note (daily note)  -RW therapy note (daily note)  -RC therapy note (daily note)  -RW    Subjective Information agree to therapy  -RW agree to therapy  -RC agree to therapy  -RW    Patient Effort, Rehab Treatment good  -RW good  -RC good  -RW    Precautions/Limitations non-weight bearing status  -RW non-weight bearing status;fall precautions   ttwb on l le  -RC fall precautions   ttwb  -RW    Recorded by [RW] Clay Recio PTA [RC] PATTI Rosenbaum/L [RW] Clay Recio PTA    Vital Signs    Pre SpO2 (%)  97  -RC     O2 Delivery Pre Treatment  supplemental O2  -RC     Intra SpO2 (%)  86  -RC     O2 Delivery Intra Treatment  room air  -RC     Post SpO2 (%)  98  -RC     O2 Delivery Post Treatment  supplemental O2  -RC     Pre Patient Position  Sitting  -RC     Intra  Patient Position  Standing  -RC     Post Patient Position  Supine  -RC     Recorded by  [RC] Celia Lema ARMSTRONG/L     Pain Assessment    Pain Assessment No/denies pain  -RW No/denies pain  -RC No/denies pain  -RW    Recorded by [RW] Clay Recio PTA [RC] PATTI Rosenbaum/L [RW] Clay Recio PTA    Cognitive Assessment/Intervention    Current Cognitive/Communication Assessment functional  -RW functional  -RC functional  -RW    Orientation Status oriented x 4  -RW oriented x 4  -RC oriented x 4  -RW    Follows Commands/Answers Questions 100% of the time  -% of the time  -% of the time  -RW    Personal Safety Interventions gait belt;nonskid shoes/slippers when out of bed  -RW gait belt;fall prevention program maintained;nonskid shoes/slippers when out of bed  -RC gait belt;nonskid shoes/slippers when out of bed  -RW    Recorded by [RW] Clay Recio PTA [RC] PATTI Rosenbaum/L [RW] Clay Recio PTA    Bed Mobility, Assessment/Treatment    Bed Mob, Sit to Supine, [place]  minimum assist (75% patient effort)  -RC     Recorded by  [RC] FARHAN RosenbaumA/L     Transfer Assessment/Treatment    Transfers, Chair-Bed [place]  contact guard assist  -RC     Transfers, Bed-Chair-Bed, Assist Device  rolling walker  -RC     Transfers, Sit-Stand [place] stand by assist  -RW contact guard assist  -RC contact guard assist;stand by assist  -RW    Transfers, Stand-Sit [place] stand by assist  -RW contact guard assist  -RC contact guard assist;stand by assist  -RW    Transfers, Sit-Stand-Sit, Assist Device rolling walker  -RW rolling walker  -RC rolling walker  -RW    Toilet Transfer, [place]  contact guard assist  -RC     Toilet Transfer, Assistive Device  rolling walker  -RC     Recorded by [RW] Clay Recio PTA [RC] PATTI Rosenbaum/L [RW] Clay Recio PTA    Gait Assessment/Treatment    Gait, [place] Level contact guard assist  -RW  contact guard assist   -RW    Gait, Assistive Device rolling walker  -RW  rolling walker  -RW    Gait, Distance (Feet) 10   x 2  -RW  6    x 2  -RW    Gait, Gait Deviations   antalgic;left:;step length decreased;stride length decreased  -RW    Recorded by [RW] Clay Recio PTA  [RW] Clay Recio PTA    Stairs Assessment/Treatment    Stairs, [place] Level   --  -RW    Recorded by   [RW] Clay Recio PTA    Wheelchair Training/Management    Wheelchair, Distance Propelled  50  -  -RW    Wheelchair Training Comment   mod ind  -RW    Recorded by  [RC] PATTI Rosenbaum/L [RW] Clay Recio PTA    Therapy Exercises    Bilateral Lower Extremities AROM:;20 reps;sitting;hip abduction/adduction;LAQ;glut sets;knee flexion    x 2 sets  -RW  AROM:;20 reps;sitting;hip abduction/adduction;LAQ;glut sets;knee flexion    x 2 sets  -RW    BLE Resistance theraband  -RW  theraband  -RW    Bilateral Upper Extremity  --   ue bike 5 min, balloon vollyball 5 min  -RC     Recorded by [RW] Clay Recio PTA [RC] PATTI Rosenbaum/L [RW] Clay Recio PTA    Positioning and Restraints    Pre-Treatment Position sitting in chair/recliner  -RW sitting in chair/recliner  -RC sitting in chair/recliner  -RW    Post Treatment Position wheelchair  -RW bed  -RC wheelchair  -RW    In Bed  encouraged to call for assist;call light within reach;with family/caregiver  -RC     In Wheelchair with family/caregiver;call light within reach  -RW  patient within staff view  -RW    Recorded by [RW] Clay Recio PTA [RC] PATTI Rosenbaum/L [RW] Clay Recio PTA      06/06/17 0825 06/05/17 1500 06/05/17 1345    Rehab Assessment/Intervention    Discipline occupational therapy assistant  -RC physical therapist  -LM occupational therapy assistant  -LMA    Document Type therapy note (daily note)  -MELLY therapy note (daily note)  -KENY therapy note (daily note)  -LMA    Subjective Information agree to therapy  -RC agree to therapy  -LM agree to therapy   -LMA    Patient Effort, Rehab Treatment good  -RC good  -LM     Precautions/Limitations fall precautions;non-weight bearing status   ttwb l le  -RC fall precautions;other (see comments)   TTWB LLE  -LM     Recorded by [RC] PATTI Rosenbaum/L [LM] Kelly Mahmood, PT [LMA] Genet Hicks, ARMSTRONG/L    Vital Signs    Pretreatment Heart Rate (beats/min)  66  -LM     Posttreatment Heart Rate (beats/min)  68  -LM     Pre SpO2 (%) 93  -RC 94  -LM     O2 Delivery Pre Treatment supplemental O2  -RC supplemental O2  -LM     Post SpO2 (%) 98  -RC 98  -LM     O2 Delivery Post Treatment supplemental O2  -RC supplemental O2  -LM     Pre Patient Position Sitting  -RC Sitting  -LM     Intra Patient Position Standing  -RC      Post Patient Position Sitting  -RC Sitting  -LM     Recorded by [RC] PATTI Rosenbaum/L [LM] Kelly Mahmood PT     Pain Assessment    Pain Assessment No/denies pain  -RC No/denies pain  -LM No/denies pain   pt states just soreness   -LMA    Pain Intervention(s)   Medication (See MAR)  -LMA    Recorded by [RC] PATTI Rosenbaum/PIPER [LM] Kelly Mahmood, PT [LMA] FARHAN YeagerA/L    Cognitive Assessment/Intervention    Current Cognitive/Communication Assessment functional  -RC functional  -LM     Orientation Status oriented x 4  -RC oriented x 4  -LM     Follows Commands/Answers Questions 100% of the time  -% of the time;able to follow single-step instructions  -LM     Personal Safety  WNL/WFL  -LM     Personal Safety Interventions  fall prevention program maintained;gait belt;muscle strengthening facilitated;nonskid shoes/slippers when out of bed  -LM     Recorded by [RC] PATTI Rosenbaum/PIPER [LM] Kelly Mahmood, PT     Bed Mobility, Assessment/Treatment    Bed Mob, Supine to Sit, [place]   contact guard assist  -LMA    Bed Mobility, Comment  Up in chair upon entering room.  -LM     Recorded by  [LM] Kelly Mahmood, PT [LMA] Genet Hicks ARMSTRONG/L    Transfer Assessment/Treatment     Transfers, Bed-Chair [place]   contact guard assist  -LMA    Transfers, Sit-Stand [place] contact guard assist  -RC contact guard assist  -LM     Transfers, Stand-Sit [place] contact guard assist  -RC contact guard assist  -LM contact guard assist  -LMA    Transfers, Sit-Stand-Sit, Assist Device  rolling walker  -LM     Bathtub Transfer, [place] contact guard assist  -RC      Bathtub Transfer, Assistive Device rolling walker  -RC      Recorded by [RC] Celia Lema, ARMSTRONG/L [LM] Kelly Mahmood, PT [LMA] FARHAN YeagerA/L    Gait Assessment/Treatment    Gait, [place] Level  contact guard assist  -LM     Gait, Assistive Device  rolling walker  -LM     Gait, Distance (Feet)  6   x 2  -LM     Gait, Maintain Weight Bearing Status  unable to maintain weight bearing status   Able to maintain for 2 steps, but difficulty once fatigued  -LM     Recorded by  [LM] Kelly Mahmood, PT     Stairs Assessment/Treatment    Stairs, [place] Level  not tested  -LM     Recorded by  [LM] Kelly Mahmood, PT     Wheelchair Training/Management    Wheelchair, Distance Propelled  120 feet with SBA  -LM 50  -LMA    Recorded by  [LM] Kelly Mahmood, PT [LMA] FARHAN YeagerA/L    Upper Body Bathing Assessment/Training    UB Bathing Assess/Train Assistive Device hand-held shower head;grab bars;shower chair with back;long-handled sponge  -RC      UB Bathing Assess/Train, Position sitting  -RC      UB Bathing Assess/Train, [place] Level conditional independence  -RC      Recorded by [RC] FARHAN RosenbaumA/L      Lower Body Bathing Assessment/Training    LB Bathing Assess/Train Assistive Device hand-held shower head;long-handled sponge;shower chair with back  -RC      LB Bathing Assess/Train, Position sitting  -RC      LB Bathing Assess/Train, [place] Level stand by assist  -RC      Recorded by [RC] FARHAN RosenbaumA/L      Upper Body Dressing Assessment/Training    UB Dressing Assess/Train,  Clothing Type doffing:;donning:;bra;pull over;t-shirt  -RC      UB Dressing Assess/Train, Position sitting  -RC      UB Dressing Assess/Train, [place] minimum assist (75% patient effort)   to hook bra  -RC      Recorded by [RC] PATTI Rosenbaum/L      Lower Body Dressing Assessment/Training    LB Dressing Assess/Train, Clothing Type donning:;pants;shoes  -RC      LB Dressing Assess/Train, Assist Device long-handled shoe horn;dressing stick;reacher  -RC      LB Dressing Assess/Train, Position sitting;standing  -RC      LB Dressing Assess/Train, [place] minimum assist (75% patient effort)  -RC      Recorded by [RC] PATTI Rosenbaum/L      Grooming Assessment/Training    Grooming Assess/Train, Position sitting  -RC      Grooming Assess/Train, [place] Level conditional independence  -RC      Recorded by [RC] PATTI Rosenbaum/L      Therapy Exercises    Bilateral Lower Extremities  AROM:;20 reps;sitting;hip flexion;hip abduction/adduction;LAQ;glut sets   AAROM on L with hip flex  -LM     Bilateral Upper Extremity   AROM:;10 reps;elbow flexion/extension;shoulder rolls/shrugs;shoulder protraction/retraction;shoulder extension/flexion   2 lb bicep curls and 1 shld flex and should abd add  -LMA    BUE Resistance   manual resistance- minimal   and perf yellow tband across chest 2x10 with A + over pulley  -LMA    Recorded by  [LM] Kelly Mahmood, PT [LMA] PATTI Yeager/L    Positioning and Restraints    Pre-Treatment Position sitting in chair/recliner  -RC sitting in chair/recliner  -LM in bed  -LMA    Post Treatment Position wheelchair  -RC wheelchair  -LM wheelchair  -LMA    In Bed with family/caregiver;encouraged to call for assist  -RC      In Wheelchair  sitting;call light within reach;with family/caregiver  -LM     Recorded by [RC] SAURAV Rosenbaum [LM] Kelly Mahmood, PT [LMA] PATTI Yeager/L      06/05/17 1300 06/05/17 1110       Rehab Assessment/Intervention     Discipline  occupational therapy assistant  -LMA     Document Type  therapy note (daily note)  -LMA     Subjective Information  agree to therapy  -LMA     Patient Effort, Rehab Treatment  good  -LMA     Precautions/Limitations  fall precautions;other (see comments)   ttwb  -LMA     Recorded by  [LMA] FARHAN YeagerA/L     Pain Assessment    Pain Assessment No/denies pain  -LMA No/denies pain  -LMA     Recorded by [LMA] FARHAN YeagerA/PIPER [LMA] Genet Hicks ARMSTRONG/L     Cognitive Assessment/Intervention    Current Cognitive/Communication Assessment  functional  -LMA     Recorded by  [LMA] Genet Hicks ARMSTRONG/L     Transfer Assessment/Treatment    Transfers, Sit-Stand [place]  --   wheelchair push ups x 10 reps   -LMA     Recorded by  [LMA] FARHAN YeagerA/L     Wheelchair Training/Management    Wheelchair, Distance Propelled  120 sba  -LMA     Recorded by  [LMA] FARHAN YeagerA/L     Therapy Exercises    Bilateral Upper Extremity  --   UEE bike x 15 min with 1 rb  -LMA     Recorded by  [LMA] FARHAN YeagerA/L     Positioning and Restraints    Pre-Treatment Position  sitting in chair/recliner  -LMA     Post Treatment Position  wheelchair  -LMA     Recorded by  [LMA] FARHAN YeagerA/L       User Key  (r) = Recorded By, (t) = Taken By, (c) = Cosigned By    Initials Name Effective Dates    LM Kelly Mahmood, PT 06/15/16 -     RW Clay Recio, PTA 10/17/16 -     RC Celia Lema, ARMSTRONG/L 10/17/16 -     LMA Genet Hicks ARMSTRONG/L 10/17/16 -                 IP PT Goals       06/06/17 1623 06/05/17 1617 06/05/17 1500    Bed Mobility PT LTG    Bed Mobility PT LTG, Date Goal Reviewed 06/06/17  -RW 06/05/17  -LM     Bed Mobility PT LTG, Outcome goal ongoing  -RW goal ongoing  -LM     Transfer Training PT LTG    Transfer Training PT  LTG, Date Goal Reviewed 06/06/17  -RW 06/05/17  -LM     Transfer Training PT LTG, Outcome goal ongoing  -RW goal ongoing  -LM     Gait  Training PT LTG    Gait Training Goal PT LTG, Date Goal Reviewed 06/06/17  -RW  06/05/17  -LM    Gait Training Goal PT LTG, Outcome goal ongoing  -RW  goal ongoing  -LM      06/05/17 1150 06/05/17 0838 06/02/17 1444    Bed Mobility PT LTG    Bed Mobility PT LTG, Date Established  06/05/17  -LM     Bed Mobility PT LTG, Time to Achieve  by discharge  -LM     Bed Mobility PT LTG, Activity Type  supine to sit/sit to supine  -LM     Bed Mobility PT LTG, [place] Level  supervision required  -LM     Bed Mobility PT Goal  LTG, Assist Device  bed rails  -LM     Bed Mobility PT LTG, Date Goal Reviewed   06/02/17  -RW    Bed Mobility PT LTG, Outcome  goal ongoing  -LM goal met  -RW    Transfer Training PT STG    Transfer Training PT STG, Date Goal Reviewed 05/30/17  -LM      Transfer Training PT STG, Outcome goal met  -LM      Transfer Training PT LTG    Transfer Training PT LTG, Date Established  06/05/17  -LM     Transfer Training PT LTG, Time to Achieve  by discharge  -LM     Transfer Training PT LTG, Activity Type  bed to chair /chair to bed  -LM     Transfer Training PT LTG, [place] Level  supervision required  -LM     Transfer Training PT LTG, Assist Device  --   AAD  -LM     Transfer Training PT LTG, Additional Goal  While maintaining TTWB LLE  -LM     Transfer Training PT LTG, Outcome  goal ongoing  -LM     Gait Training PT LTG    Gait Training Goal PT LTG, Date Established  06/05/17  -LM     Gait Training Goal PT LTG, Time to Achieve  by discharge  -LM     Gait Training Goal PT LTG, [place] Level  contact guard assist  -LM     Gait Training Goal PT LTG, Assist Device  --   AAD  -LM     Gait Training Goal PT LTG, Distance to Achieve  25 feet  -LM     Gait Training Goal PT LTG, Additional Goal  While maintaining TTWB LLE  -LM     Gait Training Goal PT LTG, Outcome  goal ongoing  -LM       06/01/17 1612 05/31/17 1554 05/30/17 1525    Bed Mobility PT LTG    Bed Mobility PT LTG, Date Goal Reviewed 06/01/17   -RW 05/31/17  -RW 05/30/17  -JA    Bed Mobility PT LTG, Outcome goal ongoing  -RW goal ongoing  -RW goal ongoing  -JA    Transfer Training PT STG    Transfer Training PT STG, Date Goal Reviewed   05/30/17  -JA    Transfer Training PT STG, Outcome   goal met  -JA    Transfer Training PT LTG    Transfer Training PT  LTG, Date Goal Reviewed 06/01/17  -RW 05/31/17  -RW 05/30/17  -JA    Transfer Training PT LTG, Outcome goal met  -RW  goal ongoing  -JA    Wheelchair Propulsion PT LTG    Wheelchair Propulsion Goal PT LTG, Date Goal Reviewed   05/30/17  -JA    Wheelchair Propulsion Goal PT LTG, Outcome   goal met  -JA      05/29/17 1337 05/27/17 1544 05/26/17 1300    Bed Mobility PT LTG    Bed Mobility PT LTG, Date Goal Reviewed 05/29/17  -CA 05/27/17  -RW 05/26/17  -GIOVANA    Bed Mobility PT LTG, Outcome goal ongoing  -CA goal ongoing  -RW goal ongoing  -GIOVANA    Transfer Training PT STG    Transfer Training PT STG, Date Goal Reviewed 05/29/17  -CA 05/27/17  -RW 05/26/17  -GIOVANA    Transfer Training PT STG, Outcome goal ongoing  -CA goal ongoing  -RW goal ongoing  -GIOVANA    Transfer Training PT LTG    Transfer Training PT  LTG, Date Goal Reviewed 05/29/17  -CA 05/27/17  -RW 05/26/17  -GIOVANA    Transfer Training PT LTG, Outcome goal ongoing  -CA goal ongoing  -RW goal ongoing  -GIOVANA    Wheelchair Propulsion PT LTG    Wheelchair Propulsion Goal PT LTG, Date Goal Reviewed 05/29/17  -CA 05/27/17  -RW 05/26/17  -GIOVANA    Wheelchair Propulsion Goal PT LTG, Outcome goal ongoing  -CA goal ongoing  -RW goal ongoing  -GIOVANA      05/26/17 1033 05/25/17 1518 05/25/17 1033    Bed Mobility PT LTG    Bed Mobility PT LTG, Date Established   05/25/17  -LM    Bed Mobility PT LTG, Time to Achieve   by discharge  -LM    Bed Mobility PT LTG, Activity Type   supine to sit/sit to supine  -LM    Bed Mobility PT LTG, [place] Level   minimum assist (75% patient effort)  -LM    Bed Mobility PT Goal  LTG, Assist Device   bed rails  -LM    Bed Mobility PT LTG, Date  Goal Reviewed 05/26/17  -GIOVANA (P)  05/25/17  -RW     Bed Mobility PT LTG, Outcome goal ongoing  -GIOVANA (P)  goal ongoing  -RW goal ongoing  -LM    Transfer Training PT STG    Transfer Training PT STG, Date Established   05/25/17  -LM    Transfer Training PT STG, Time to Achieve   5 days  -LM    Transfer Training PT STG, Activity Type   sit to stand/stand to sit  -LM    Transfer Training PT STG, [place] Level   moderate assist (50% patient effort)  -LM    Transfer Training PT STG, Assist Device   --   AAD  -LM    Transfer Training PT STG, Additional Goal   Maintaining TTWB LLE  -LM    Transfer Training PT STG, Date Goal Reviewed 05/26/17  -GIOVANA (P)  05/25/17  -RW     Transfer Training PT STG, Outcome goal ongoing  -GIOVANA (P)  goal ongoing  -RW goal ongoing  -LM    Transfer Training PT LTG    Transfer Training PT LTG, Date Established   05/25/17  -LM    Transfer Training PT LTG, Time to Achieve   by discharge  -LM    Transfer Training PT LTG, Activity Type   bed to chair /chair to bed  -LM    Transfer Training PT LTG, [place] Level   minimum assist (75% patient effort)  -LM    Transfer Training PT LTG, Assist Device   --   AAD  -LM    Transfer Training PT LTG, Additional Goal   Maintaining TTWB LLE  -LM    Transfer Training PT  LTG, Date Goal Reviewed 05/26/17  -GIOVANA (P)  05/25/17  -RW     Transfer Training PT LTG, Outcome goal ongoing  -GIOVANA (P)  goal ongoing  -RW goal ongoing  -LM    Wheelchair Propulsion PT LTG    Wheelchair Propulsion Goal PT LTG, Date Established   05/25/17  -LM    Wheelchair Propulsion Goal PT LTG, Time to Achieve   by discharge  -LM    Wheelchair Propulsion Goal PT LTG, [place] Level   contact guard assist  -LM    Wheelchair Propulsion Goal PT LTG, Distance to Achieve   50 feet  -LM    Wheelchair Propulsion Goal PT LTG, Date Goal Reviewed 05/26/17  -GIOVANA      Wheelchair Propulsion Goal PT LTG, Outcome goal ongoing  -GIOVANA (P)  goal ongoing  -RW goal ongoing  -LM      User Key  (r) = Recorded By,  (t) = Taken By, (c) = Cosigned By    Initials Name Provider Type    LM Kelly Mahmood, PT Physical Therapist    RUIZ Harris, PTA Physical Therapy Assistant    NILDA Bro, PTA Physical Therapy Assistant    GIOVANA Desouza, PTA Physical Therapy Assistant    RW Clay Recio, PTA Physical Therapy Assistant          Physical Therapy Education     Title: PT OT SLP Therapies (Active)     Topic: Physical Therapy (Active)     Point: Mobility training (Done)    Learning Progress Summary    Learner Readiness Method Response Comment Documented by Status   Patient Acceptance E NR,VU Discussed with family regarding definition TTWB at L with mobility & transfers.  05/30/17 1036 Done    Acceptance E,TB,D NR Rienforced TTWB & benefits of mobility  05/29/17 1018 Active   Family Acceptance E NR,VU Discussed with family regarding definition TTWB at L with mobility & transfers.  05/30/17 1036 Done               Point: Precautions (Done)    Learning Progress Summary    Learner Readiness Method Response Comment Documented by Status   Patient Acceptance E VU reviewed ttwb  06/01/17 1112 Done    Acceptance E VU reviewed ttwb with gt and transfers. talked with dustyter about her moms ability to care for pt at home.  05/31/17 1253 Done    Acceptance E NR,VU Discussed with family regarding definition TTWB at L with mobility & transfers.  05/30/17 1036 Done    Acceptance E,TB,D NR [place]forced TTWB & benefits of mobility  05/29/17 1018 Active    Acceptance E NR reviewed ttwb but pt unable to maintain if asked to mobilize.  05/27/17 1152 Active    Acceptance E NR pt edu on TTWB and how to maintain TTWB.  05/26/17 1245 Active    Acceptance E NR Educated patient's family on precautions re: TTWB LLE LM 05/25/17 1446 Active   Family Acceptance E VU reviewed ttwb with gt and transfers. talked with lita about her moms ability to care for pt at home.  05/31/17 1253 Done    Acceptance E NR,VU Discussed  with family regarding definition TTWB at L with mobility & transfers.  05/30/17 1036 Done    Acceptance E NR Educated patient's family on precautions re: TTWB LLE  05/25/17 1446 Active                      User Key     Initials Effective Dates Name Provider Type Discipline    LM 06/15/16 -  Kelly Mahmood, PT Physical Therapist PT    JA 10/17/16 -  Darci Harris, PTA Physical Therapy Assistant PT    GIOVANA 10/17/16 -  Juan Desouza, PTA Physical Therapy Assistant PT    RW 10/17/16 -  Clay Recio, PTA Physical Therapy Assistant PT                    PT Recommendation and Plan  Anticipated Discharge Disposition: skilled nursing facility, home with assist, home with home health (SNF vs. home with 24/7 if available and HH)  Planned Therapy Interventions: balance training, bed mobility training, gait training, home exercise program, motor coordination training, neuromuscular re-education, patient/family education, postural re-education, ROM (Range of Motion), stair training, strengthening, stretching, transfer training, wheelchair management/propulsion training  PT Frequency: other (see comments) (5-14 times/wk)  Plan of Care Review  Plan Of Care Reviewed With: patient  Progress: improving  Outcome Summary/Follow up Plan: overall pt has made good progress but is going to snf tomorrow for cont care.           Outcome Measures       06/06/17 1100 06/06/17 0825 06/05/17 0838    How much help from another person do you currently need...    Turning from your back to your side while in flat bed without using bedrails? 3  -RW  3  -LM    Moving from lying on back to sitting on the side of a flat bed without bedrails? 3  -RW  3  -LM    Moving to and from a bed to a chair (including a wheelchair)? 3  -RW  3  -LM    Standing up from a chair using your arms (e.g., wheelchair, bedside chair)? 3  -RW  3  -LM    Climbing 3-5 steps with a railing? 1  -RW  1  -LM    To walk in hospital room? 3  -RW  3  -LM    AM-PAC 6 Clicks  Score 16  -RW  16  -LM    How much help from another is currently needed...    Putting on and taking off regular lower body clothing?  3  -RC     Bathing (including washing, rinsing, and drying)  3  -RC     Toileting (which includes using toilet bed pan or urinal)  3  -RC     Putting on and taking off regular upper body clothing  3  -RC     Taking care of personal grooming (such as brushing teeth)  4  -RC     Eating meals  4  -RC     Score  20  -RC     Functional Assessment    Outcome Measure Options   AM-PAC 6 Clicks Basic Mobility (PT)  -LM      User Key  (r) = Recorded By, (t) = Taken By, (c) = Cosigned By    Initials Name Provider Type    LM Kelly Mahmood, PT Physical Therapist    MAURICIO Recio PTA Physical Therapy Assistant    SAURAV Porter Occupational Therapy Assistant           Time Calculation:         PT Charges       06/06/17 1626 06/06/17 1146       Time Calculation    Start Time 1528  -RW 1040  -RW     Stop Time 1615  -RW 1125  -RW     Time Calculation (min) 47 min  -RW 45 min  -RW     Time Calculation- PT    Total Timed Code Minutes- PT 47 minute(s)  -RW 45 minute(s)  -RW       User Key  (r) = Recorded By, (t) = Taken By, (c) = Cosigned By    Initials Name Provider Type    MAURICIO Recio PTA Physical Therapy Assistant          Therapy Charges for Today     Code Description Service Date Service Provider Modifiers Qty    90630007410 HC GAIT TRAINING EA 15 MIN 6/6/2017 Clay Recio PTA GP 1    42329409647 HC PT THER PROC EA 15 MIN 6/6/2017 Clay Recio PTA GP 1    78365093011 HC PT THERAPEUTIC ACT EA 15 MIN 6/6/2017 Clay Recio PTA GP 1    59270868839 HC GAIT TRAINING EA 15 MIN 6/6/2017 Clay Recio PTA GP 1    05714769597 HC PT THER PROC EA 15 MIN 6/6/2017 Clay Recio PTA GP 1    68034162843 HC PT THERAPEUTIC ACT EA 15 MIN 6/6/2017 Clay Recio PTA GP 1          PT G-Codes  PT Professional Judgement Used?: Yes  Outcome Measure Options: AM-PAC 6 Clicks Basic  Mobility (PT)  Score: 8  Functional Limitation: Mobility: Walking and moving around  Mobility: Walking and Moving Around Current Status (): At least 80 percent but less than 100 percent impaired, limited or restricted  Mobility: Walking and Moving Around Goal Status (): At least 40 percent but less than 60 percent impaired, limited or restricted    Clay Recio, PTA  6/6/2017

## 2017-06-06 NOTE — PLAN OF CARE
Problem: Patient Care Overview (Adult)  Goal: Plan of Care Review  Outcome: Ongoing (interventions implemented as appropriate)    06/06/17 1335   Coping/Psychosocial Response Interventions   Plan Of Care Reviewed With patient   Patient Care Overview   Progress progress toward functional goals is gradual   Outcome Evaluation   Outcome Summary/Follow up Plan worked well w/ tx today cont poc       Goal: Discharge Needs Assessment  Outcome: Ongoing (interventions implemented as appropriate)    05/24/17 1624 05/25/17 1033 05/25/17 1306   Discharge Needs Assessment   Concerns To Be Addressed --  --  --    Equipment Needed After Discharge --  wheelchair;walker, rolling;commode;shower chair;hospital bed --    Discharge Facility/Level Of Care Needs --  --  nursing facility, skilled   Current Discharge Risk --  --  dependent with mobility/activities of daily living   Current Health   Outpatient/Agency/Support Group Needs homecare agency (specify level of care) --  --    Self-Care   Equipment Currently Used at Home --  cane, straight --    Living Environment   Transportation Available --  family or friend will provide --      06/04/17 1302   Discharge Needs Assessment   Concerns To Be Addressed no discharge needs identified   Equipment Needed After Discharge --    Discharge Facility/Level Of Care Needs --    Current Discharge Risk --    Current Health   Outpatient/Agency/Support Group Needs --    Self-Care   Equipment Currently Used at Home --    Living Environment   Transportation Available --          Problem: Inpatient Occupational Therapy  Goal: Transfer Training Goal 1 LTG- OT  Outcome: Ongoing (interventions implemented as appropriate)    06/06/17 1335   Transfer Training OT LTG   Transfer Training OT LTG, Date Goal Reviewed 06/06/17   Transfer Training OT LTG, Outcome goal ongoing       Goal: Strength Goal LTG- OT  Outcome: Ongoing (interventions implemented as appropriate)    06/06/17 1335   Strength OT LTG   Strength  Goal OT LTG, Date Goal Reviewed 06/06/17   Strength Goal OT LTG, Outcome goal ongoing       Goal: Static Standing Balance Goal OT- STG  Outcome: Ongoing (interventions implemented as appropriate)    06/06/17 1325   Static Standing Balance OT STG   Static Standing Balance OT STG, Date Goal Reviewed 06/06/17   Static Standing Balance OT STG, Outcome goal ongoing       Goal: ADL Goal LTG- OT  Outcome: Ongoing (interventions implemented as appropriate)    06/06/17 1335   ADL OT LTG   ADL OT LTG, Date Goal Reviewed 06/06/17   ADL OT LTG, Outcome goal ongoing

## 2017-06-07 NOTE — PLAN OF CARE
Problem: Patient Care Overview (Adult)  Goal: Plan of Care Review  Outcome: Unable to achieve outcome(s) by discharge Date Met:  06/07/17 06/07/17 0915   Coping/Psychosocial Response Interventions   Plan Of Care Reviewed With patient;family   Patient Care Overview   Progress progress toward functional goals as expected   Outcome Evaluation   Outcome Summary/Follow up Plan Pt. met 2/5 goals prior to D/C to NH this a.m.        Goal: Discharge Needs Assessment  Outcome: Unable to achieve outcome(s) by discharge Date Met:  06/07/17 05/24/17 1624 05/25/17 1033 05/25/17 1306   Discharge Needs Assessment   Concerns To Be Addressed --  --  --    Equipment Needed After Discharge --  wheelchair;walker, rolling;commode;shower chair;hospital bed --    Discharge Facility/Level Of Care Needs --  --  nursing facility, skilled   Current Discharge Risk --  --  dependent with mobility/activities of daily living   Current Health   Outpatient/Agency/Support Group Needs homecare agency (specify level of care) --  --    Self-Care   Equipment Currently Used at Home --  cane, straight --    Living Environment   Transportation Available --  family or friend will provide --      06/07/17 0439   Discharge Needs Assessment   Concerns To Be Addressed no discharge needs identified   Equipment Needed After Discharge --    Discharge Facility/Level Of Care Needs --    Current Discharge Risk --    Current Health   Outpatient/Agency/Support Group Needs --    Self-Care   Equipment Currently Used at Home --    Living Environment   Transportation Available --          Problem: Inpatient Physical Therapy  Goal: Bed Mobility Goal LTG- PT  Outcome: Unable to achieve outcome(s) by discharge Date Met:  06/07/17 06/05/17 0838 06/07/17 0915   Bed Mobility PT LTG   Bed Mobility PT LTG, Date Established 06/05/17 --    Bed Mobility PT LTG, Time to Achieve by discharge --    Bed Mobility PT LTG, Activity Type supine to sit/sit to supine --    Bed Mobility  PT LTG, [place] Level supervision required --    Bed Mobility PT Goal LTG, Assist Device bed rails --    Bed Mobility PT LTG, Date Goal Reviewed --  06/07/17   Bed Mobility PT LTG, Outcome --  goal not met   Bed Mobility PT LTG, Reason Goal Not Met --  discharged from facility       Goal: Transfer Training Goal 1 LTG- PT  Outcome: Unable to achieve outcome(s) by discharge Date Met:  06/07/17 06/05/17 0838 06/07/17 0915   Transfer Training PT LTG   Transfer Training PT LTG, Date Established 06/05/17 --    Transfer Training PT LTG, Time to Achieve by discharge --    Transfer Training PT LTG, Activity Type bed to chair /chair to bed --    Transfer Training PT LTG, [place] Level supervision required --    Transfer Training PT LTG, Assist Device (AAD) --    Transfer Training PT LTG, Additional Goal While maintaining TTWB LLE --    Transfer Training PT LTG, Date Goal Reviewed --  06/07/17   Transfer Training PT LTG, Outcome --  goal not met   Transfer Training PT LTG, Reason Goal Not Met --  discharged from facility       Goal: Gait Training Goal LTG- PT  Outcome: Unable to achieve outcome(s) by discharge Date Met:  06/07/17 06/05/17 0838 06/07/17 0915   Gait Training PT LTG   Gait Training Goal PT LTG, Date Established 06/05/17 --    Gait Training Goal PT LTG, Time to Achieve by discharge --    Gait Training Goal PT LTG, [place] Level contact guard assist --    Gait Training Goal PT LTG, Assist Device (AAD) --    Gait Training Goal PT LTG, Distance to Achieve 25 feet --    Gait Training Goal PT LTG, Additional Goal While maintaining TTWB LLE --    Gait Training Goal PT LTG, Date Goal Reviewed --  06/07/17   Gait Training Goal PT LTG, Outcome --  goal not met   Gait Training Goal PT LTG, Reason Goal Not Met --  discharged from facility

## 2017-06-07 NOTE — DISCHARGE SUMMARY
[address] [address]  T - [phone]     Rehabilitation Discharge Summary       BRIEF OVERVIEW   PATIENT NAME: Mariela Woodson                      PHYSICIAN: Tariq Aparicio MD  : 1925  MRN: [id number]    ADMISSION/DISCHARGE INFO   Admitting Provider: Tariq Aparicio MD  Discharge Provider: No att. providers found  ADMISSION DATE: 2017   DISCHARGE DATE: 2017    ADMISSION DIAGNOSES: Closed left hip fracture [S72.002A]  DISCHARGE DIAGNOSES: Principal Problem:    Closed left hip fracture  Active Problems:    Iron deficiency anemia    Post-polio syndrome    Seizure disorder    Metabolic encephalopathy    COPD (chronic obstructive pulmonary disease)    Encounter for rehabilitation      Primary Care Physician at Discharge: Benito Kaur [phone]      Secondary Discharge Diagnosis  Patient Active Problem List   Diagnosis Code   • Iron deficiency anemia due to chronic blood loss D50.0   • Post-polio syndrome G14   • Simple chronic bronchitis J41.0   • Seizure disorder G40.909   • Closed left hip fracture S72.002A   • Iron deficiency anemia D50.9   • Post-polio syndrome G14   • Seizure disorder G40.909   • Metabolic encephalopathy G93.41   • COPD (chronic obstructive pulmonary disease) J44.9   • Encounter for rehabilitation Z51.89       Discharge Disposition  Skilled Nursing Facility (DC - External)       Code Status at Discharge: Conditional code       CONSULTS   Consult Orders (all)     Start     Ordered    17 0954  Inpatient Consult to Case Management   Once     Comments:  Family request [facility] if possible   Provider:  (Not yet assigned)    17 0953    17 0812  Inpatient consult to Nutrition  Once     Provider:  (Not yet assigned)    17 0812    17 1604  Hospitalist (on-call MD unless specified)  Once,   Status:  Canceled     Specialty:  Hospitalist  Provider:  Pablo Erwin  MD Russ    05/24/17 3935          DETAILS OF HOSPITAL STAY    Functional Status:                ADMIT             Discharge   Eating                                          3                        5   Grooming                                    0                        6  Bathing                                         0                        5  Dressing upper body                    0                        4  Dressing lower body                     0                        4  Toileting                                        2                        2  Bladder Management                   5                        4  Bowel Management                     5                        4  Transfers:bed chair wheelchair    2                        4  Transfers: toilet                            0                        4  Transfers: tub/shower                  0                        4  Locomotion: walking                    0                        1  Locomotion: Wheelchair              0                        6  Locomotion: stairs                       0                         1  Comprehension                           2                         5  Expression                                   3                         6  Social interaction                         2                        6  Problem solving                           2                        5  Memory                                        1                        5                                          Rehabilitation Course  she was admitted to the acute rehabilitation unit and participated well.  She made good progress however because of her nonweightbearing status on her fractured hip she could not ambulate significant distances.  At discharge she is nonweightbearing until she's followed up with Dr. Handley for this reason it was felt that she was not able to return home and was discharged to [facility]      Heart Rate:  61  Resp: 18  BP: 134/61  Temp: 95.6 °F (35.3 °C)   Weight: 133 lb 6.4 oz (60.5 kg)    Pertinent Test Results:    Lab Results (last 72 hours)     Procedure Component Value Units Date/Time    CBC & Differential [[id number]] Collected:  06/06/17 0659    Specimen:  Blood Updated:  06/06/17 0734    Narrative:       The following orders were created for panel order CBC & Differential.  Procedure                               Abnormality         Status                     ---------                               -----------         ------                     CBC Auto Differential[[id number]]        Abnormal            Final result                 Please view results for these tests on the individual orders.    CBC Auto Differential [[id number]]  (Abnormal) Collected:  06/06/17 0659    Specimen:  Blood Updated:  06/06/17 0734     WBC 6.02 10*3/mm3      RBC 2.64 (L) 10*6/mm3      Hemoglobin 8.4 (L) g/dL      Hematocrit 27.5 (L) %      .2 (H) fL      MCH 31.8 pg      MCHC 30.5 (L) g/dL      RDW 21.9 (H) %      RDW-SD 80.4 (H) fl      MPV 8.8 fL      Platelets 353 10*3/mm3      Neutrophil % 72.6 %      Lymphocyte % 13.8 %      Monocyte % 8.8 %      Eosinophil % 3.8 %      Basophil % 0.7 %      Immature Grans % 0.3 %      Neutrophils, Absolute 4.37 10*3/mm3      Lymphocytes, Absolute 0.83 10*3/mm3      Monocytes, Absolute 0.53 10*3/mm3      Eosinophils, Absolute 0.23 10*3/mm3      Basophils, Absolute 0.04 10*3/mm3      Immature Grans, Absolute 0.02 10*3/mm3     Comprehensive Metabolic Panel [[id number]]  (Abnormal) Collected:  06/06/17 0659    Specimen:  Blood Updated:  06/06/17 0739     Glucose 94 mg/dL      BUN 16 mg/dL      Creatinine 0.72 mg/dL      Sodium 138 mmol/L      Potassium 4.1 mmol/L      Chloride 100 mmol/L      CO2 30.0 mmol/L      Calcium 8.8 mg/dL      Total Protein 6.0 (L) g/dL      Albumin 3.10 (L) g/dL      ALT (SGPT) 40 U/L      AST (SGOT) 28 U/L      Alkaline Phosphatase 166 (H) U/L      Total Bilirubin  0.6 mg/dL      eGFR Non African Amer 76 mL/min/1.73      Globulin 2.9 gm/dL      A/G Ratio 1.1 g/dL      BUN/Creatinine Ratio 22.2     Anion Gap 8.0 mmol/L     Narrative:       The MDRD GFR formula is only valid for adults with stable renal function between ages 18 and 70.        Imaging Results (all)     None          Presenting Problem/History of Present Illness   Mariela Woodson is a 89 y.o. female who fell at home and sustained a hip fracture she underwent ORIF of the hip fracture by Dr. Adrian Handley.  Dr. Handley made her nonweightbearing on the left hip for 4-6 weeks.  She has a history of GI bleed.  So was not anticoagulated with Coumadin    At the time of discharge to the acute rehabilitation unit she received IV morphine .  Dr. Rojas was on duty that day and did her admission history and physical.  The next morning she was confused and lethargic it was thought secondary to medicines and analgesics were held and she did do much better.  Mental status was not a problem throughout the remainder of her admission.  Analgesics were limited but she tolerated Celebrex very well with meals and Tylenol when necessary  Throughout her stay she did complain of a morning cough which was, 4 at home she used Phenergan with codeine at bedtime to have cc it worked well for her.  She tolerated her medication      Physical Exam at Discharge      Alert oriented talkative no distress.  ENT all normal  Neck supple lungs clear heart rate regular without murmurs gallops or rubs  Abdomen was soft nontender  Incision well-healed staples were removed before discharge            DISPOSITION   [facility] until she was able to bear weight     DISCHARGE MEDICATIONS        Your medication list       As of 6/7/2017  9:25 AM      START taking these medications       Instructions Last Dose Given Next Dose Due    acetaminophen 500 MG tablet   Commonly known as:  TYLENOL        Take 2 tablets by mouth 4 (Four) Times a Day.          aspirin 81 MG tablet        Take 1 tablet by mouth Daily.         celecoxib 200 MG capsule   Commonly known as:  CeleBREX        Take 1 capsule by mouth Daily.         ferrous sulfate 324 (65 FE) MG tablet delayed-release EC tablet        Take 1 tablet by mouth Daily With Breakfast for 30 days.         folic acid-vit B6-vit B12 2.5-25-1 MG tablet tablet   Commonly known as:  FOLBEE        Take 1 tablet by mouth Daily for 30 days.         lidocaine 5 %   Commonly known as:  LIDODERM        Place 1 patch on the skin Daily. Remove & Discard patch within 12 hours or as directed by MD         promethazine-codeine 6.25-10 MG/5ML syrup   Commonly known as:  PHENERGAN with CODEINE        Take 2.5 mL by mouth Every Night.         sennosides-docusate sodium 8.6-50 MG tablet   Commonly known as:  SENOKOT-S        Take 1 tablet by mouth 2 (Two) Times a Day.           CHANGE how you take these medications       Instructions Last Dose Given Next Dose Due    ALPRAZolam 0.25 MG tablet   Commonly known as:  XANAX   What changed:  Another medication with the same name was removed. Continue taking this medication, and follow the directions you see here.        Take 1 tablet by mouth Daily.         bimatoprost 0.01 % ophthalmic drops   Commonly known as:  LUMIGAN   What changed:  Another medication with the same name was removed. Continue taking this medication, and follow the directions you see here.              furosemide 20 MG tablet   Commonly known as:  LASIX   What changed:  Another medication with the same name was removed. Continue taking this medication, and follow the directions you see here.              lansoprazole 15 MG capsule   Commonly known as:  PREVACID   What changed:  Another medication with the same name was removed. Continue taking this medication, and follow the directions you see here.              PRESERVISION AREDS capsule   What changed:  Another medication with the same name was removed. Continue  taking this medication, and follow the directions you see here.                CONTINUE taking these medications       Instructions Last Dose Given Next Dose Due    albuterol 108 (90 BASE) MCG/ACT inhaler   Commonly known as:  PROVENTIL HFA;VENTOLIN HFA              dorzolamide-timolol 22.3-6.8 MG/ML ophthalmic solution   Commonly known as:  COSOPT              fluticasone 50 MCG/ACT nasal spray   Commonly known as:  FLONASE              levothyroxine 50 MCG tablet   Commonly known as:  SYNTHROID, LEVOTHROID              phenytoin 100 MG ER capsule   Commonly known as:  DILANTIN              propranolol 20 MG tablet   Commonly known as:  INDERAL                STOP taking these medications          ALBUTEROL IN           brimonidine-timolol 0.2-0.5 % ophthalmic solution   Commonly known as:  COMBIGAN           cefprozil 500 MG tablet   Commonly known as:  CEFZIL           fluticasone 27.5 MCG/SPRAY nasal spray   Commonly known as:  VERAMYST           GUAIFENESIN PO           promethazine-phenylephrine 6.25-5 MG/5ML syrup syrup           S.S.S. TONIC PO           triamcinolone 0.5 % ointment   Commonly known as:  KENALOG                Where to Get Your Medications      You can get these medications from any pharmacy     Bring a paper prescription for each of these medications    • ALPRAZolam 0.25 MG tablet   • promethazine-codeine 6.25-10 MG/5ML syrup         Information about where to get these medications is not yet available     ! Ask your nurse or doctor about these medications    • acetaminophen 500 MG tablet   • aspirin 81 MG tablet   • celecoxib 200 MG capsule   • ferrous sulfate 324 (65 FE) MG tablet delayed-release EC tablet   • folic acid-vit B6-vit B12 2.5-25-1 MG tablet tablet   • lidocaine 5 %   • sennosides-docusate sodium 8.6-50 MG tablet             INSTRUCTIONS   Activity: egg shell  Weightbearing left leg    Diet: Regular diet    Special Instructions:   No DME needed she was going to nursing home  for short-term stay    FOLLOW UP   Additional Instructions for the Follow-ups that You Need to Schedule     Ambulatory Referral to Physical Therapy Evaluate and treat    As directed    NON WEIGHT BEARING LEFT LEG UNTIL FOLLOW UP WITH DR LUIS IN 2 WEEKS   Specialty modality needed?:  Evaluate and treat       Discharge Follow-Up With Specified Provider    As directed    To:  LILY LUIS   Follow Up:  2 Weeks       Referral to Occupational Therapy    As directed    [facility], non wt bearing left leg until follow up with dr luis in 2 weeks             Follow-up Information     Follow up with [facility] .    Specialties:  Skilled Nursing Facility, Intermediate Care Facility    Contact information:    [address] [place] [id number] [phone]        Follow up with Benito Kaur MD .    Specialty:  Family Medicine    Contact information:    [address] [facility] [address] [phone]          Future Appointments  Date Time Provider Department Center   6/22/2017 9:30 AM MD PALAK Mann OSCR MAD None   7/17/2017 3:30 PM  SINAN OP INFU PROCEDURE CHAIR  MAD OPI MAD   7/21/2017 11:15 AM MD PALAK Chin ONC MAD MAD                  Tariq Aparicio MD  06/07/17  1:32 PM

## 2017-06-07 NOTE — DISCHARGE INSTR - APPOINTMENTS
June 14,2017 11:00 AM  Follow Up with Dr. Natasha DOMÍNGUEZ [facility]                                                                                                                                                                               [address] [id number] [phone]    June 22,2017 9:30 AM                                                                                                                                        Follow Up with Dr. Handley                                                                                                                              [address] [place], [place] [id number] [phone]

## 2017-06-07 NOTE — PLAN OF CARE
Problem: Inpatient Occupational Therapy  Goal: Transfer Training Goal 1 LTG- OT  Outcome: Unable to achieve outcome(s) by discharge Date Met:  06/07/17 05/25/17 1306 06/07/17 1135   Transfer Training OT LTG   Transfer Training OT LTG, Date Established 05/25/17 --    Transfer Training OT LTG, Time to Achieve by discharge --    Transfer Training OT LTG, Activity Type all transfers --    Transfer Training OT LTG, [place] Level contact guard assist --    Transfer Training OT LTG, Assist Device walker, rolling;commode, bedside --    Transfer Training OT LTG, Date Goal Reviewed --  06/07/17   Transfer Training OT LTG, Outcome --  goal not met   Transfer Training OT LTG, Reason Goal Not Met --  discharged from facility       Goal: Strength Goal LTG- OT  Outcome: Unable to achieve outcome(s) by discharge Date Met:  06/07/17 05/25/17 1306 06/07/17 1135   Strength OT LTG   Strength Goal OT LTG, Date Established 05/25/17 --    Strength Goal OT LTG, Time to Achieve by discharge --    Strength Goal OT LTG, Measure to Achieve 1 set of 10 reps all planes with 1/2 to 1 lb wrist wts or dumbells to increase UE strength. --    Strength Goal OT LTG, Date Goal Reviewed --  06/07/17   Strength Goal OT LTG, Outcome --  goal not met   Strength Goal OT LTG, Reason Goal Not Met --  discharged from facility       Goal: Static Standing Balance Goal OT- STG  Outcome: Unable to achieve outcome(s) by discharge Date Met:  06/07/17 05/25/17 1306 06/07/17 1135   Static Standing Balance OT STG   Static Standing Balance OT STG, Date Established 05/25/17 --    Static Standing Balance OT STG, Time to Achieve 2 wks --    Static Standing Balance OT STG, Hall Level contact guard assist  (5 minutes with functional activity and 0-1 rest break.) --    Static Standing Balance OT STG, Assist Device assistive device  (Rolling wakler.) --    Static Standing Balance OT STG, Date Goal Reviewed --  06/07/17   Static Standing Balance OT STG,  Outcome --  goal not met   Static Standing Balance OT STG, Reason Goal Not Met --  discharged from facility       Goal: ADL Goal LTG- OT  Outcome: Unable to achieve outcome(s) by discharge Date Met:  06/07/17 05/25/17 1306 06/01/17 0755 06/07/17 1135   ADL OT LTG   ADL OT LTG, Date Established --  06/01/17 --    ADL OT LTG, Time to Achieve by discharge --  --    ADL OT LTG, Activity Type ADL skills  (Sponge bath and dress.) --  --    ADL OT LTG, [place] Level contact guard;assistive device  (Rolling walker and shower chair if needed.) --  --    ADL OT LTG, Date Goal Reviewed --  --  06/07/17   ADL OT LTG, Outcome --  --  goal not met   ADL OT LTG, Reason Goal Not Met --  --  discharged from facility

## 2017-06-07 NOTE — THERAPY DISCHARGE NOTE
Inpatient Rehabilitation - Occupational Therapy Treatment Note  [facility]     Patient Name: Mariela Woodson  : 1925  MRN: [id number]  Today's Date: 2017  Onset of Illness/Injury or Date of Surgery Date: 17  Date of Referral to OT: 17  Referring Physician: Dr. Aparicio      Admit Date: 2017    Visit Dx:     ICD-10-CM ICD-9-CM   1. Impaired mobility and activities of daily living Z74.09 799.89   2. Abnormality of gait and mobility R26.9 781.2   3. Muscle weakness (generalized) M62.81 728.87   4. Closed left hip fracture, with routine healing, subsequent encounter S72.002D V54.13     Patient Active Problem List   Diagnosis   • Iron deficiency anemia due to chronic blood loss   • Post-polio syndrome   • Simple chronic bronchitis   • Seizure disorder   • Closed left hip fracture   • Iron deficiency anemia   • Post-polio syndrome   • Seizure disorder   • Metabolic encephalopathy   • COPD (chronic obstructive pulmonary disease)   • Encounter for rehabilitation             Adult Rehabilitation Note       17 0915 17 0820 17 1528    Rehab Assessment/Intervention    Discipline physical therapy assistant  -RUIZ occupational therapy assistant  -LM physical therapy assistant  -RW    Document Type therapy note (daily note)  -RUIZ therapy note (daily note)  -KENY therapy note (daily note)  -RW    Subjective Information agree to therapy  -JA agree to therapy  -LM agree to therapy  -RW    Patient Effort, Rehab Treatment good  -JA  good  -RW    Precautions/Limitations non-weight bearing status;fall precautions  -JA  non-weight bearing status  -RW    Recorded by [RUIZ] Darci Harris PTA [LM] PATTI Yeager/L [RW] Clay Recio PTA    Vital Signs    Pre Patient Position Sitting  -JA      Intra Patient Position Sitting  -JA      Post Patient Position Sitting  -JA      Recorded by [RUIZ] Darci Harris PTA      Pain Assessment    Pain Assessment No/denies pain  -JA   No/denies pain  -RW    Cassidy-Akhtar FACES Pain Rating  2  -LM     Pain Score  2  -LM     Pain Intervention(s) Medication (See MAR)  -JA      Recorded by [JA] Darci Harris PTA [LM] PATTI Yeager/L [RW] Clay Recio PTA    Cognitive Assessment/Intervention    Current Cognitive/Communication Assessment   functional  -RW    Orientation Status   oriented x 4  -RW    Follows Commands/Answers Questions   100% of the time  -RW    Personal Safety Interventions   gait belt;nonskid shoes/slippers when out of bed  -RW    Recorded by   [RW] Clay Recio PTA    Transfer Assessment/Treatment    Transfers, Sit-Stand [place]  stand by assist  -LM stand by assist  -RW    Transfers, Stand-Sit [place]  stand by assist  -LM stand by assist  -RW    Transfers, Sit-Stand-Sit, Assist Device   rolling walker  -RW    Recorded by  [LM] PATTI Yeager/L [RW] Clay Recio PTA    Gait Assessment/Treatment    Gait, [place] Level   contact guard assist  -RW    Gait, Assistive Device   rolling walker  -RW    Gait, Distance (Feet)   10   x 2  -RW    Recorded by   [RW] Clay Recio PTA    Upper Body Dressing Assessment/Training    UB Dressing Assess/Train, Clothing Type  doffing:;donning:  -LM     UB Dressing Assess/Train, [place]  independent  -LM     Recorded by  [LM] PATTI Yeager/L     Lower Body Dressing Assessment/Training    LB Dressing Assess/Train, [place]  minimum assist (75% patient effort)  -LM     Recorded by  [LM] PATTI Yeager/L     Grooming Assessment/Training    Grooming Assess/Train, [place] Level  independent  -LM     Grooming Assess/Train, Impairments  --   complete makeup deot application  -LM     Recorded by  [LM] PATTI Yeager/L     Therapy Exercises    Bilateral Lower Extremities AROM:;20 reps;sitting;ankle pumps/circles;hip abduction/adduction;LAQ;glut sets  -  AROM:;20 reps;sitting;hip abduction/adduction;LAQ;glut sets;knee flexion     x 2 sets  -RW    BLE Resistance   theraband  -RW    Recorded by [JA] Darci Harris PTA  [RW] Clay Recio PTA    Positioning and Restraints    Pre-Treatment Position sitting in chair/recliner  -JA in bed  -LM sitting in chair/recliner  -RW    Post Treatment Position wheelchair  -JA bed  -LM wheelchair  -RW    In Wheelchair   with family/caregiver;call light within reach  -RW    Recorded by [JA] Darci Harris PTA [LM] PATTI Yeager/L [RW] Clay Recio PTA      06/06/17 1328 06/06/17 1040 06/06/17 0825    Rehab Assessment/Intervention    Discipline occupational therapy assistant  -MELLY physical therapy assistant  -RW occupational therapy assistant  -MELLY    Document Type therapy note (daily note)  -RC therapy note (daily note)  -RW therapy note (daily note)  -RC    Subjective Information agree to therapy  -RC agree to therapy  -RW agree to therapy  -RC    Patient Effort, Rehab Treatment good  -RC good  -RW good  -RC    Precautions/Limitations non-weight bearing status;fall precautions   ttwb on l le  -RC fall precautions   ttwb  -RW fall precautions;non-weight bearing status   ttwb l le  -RC    Recorded by [RC] PATTI Rosenbaum/L [RW] Clay Recio PTA [RC] PATTI Rosenbaum/L    Vital Signs    Pre SpO2 (%) 97  -RC  93  -RC    O2 Delivery Pre Treatment supplemental O2  -RC  supplemental O2  -RC    Intra SpO2 (%) 86  -RC      O2 Delivery Intra Treatment room air  -RC      Post SpO2 (%) 98  -RC  98  -RC    O2 Delivery Post Treatment supplemental O2  -RC  supplemental O2  -RC    Pre Patient Position Sitting  -RC  Sitting  -RC    Intra Patient Position Standing  -RC  Standing  -RC    Post Patient Position Supine  -RC  Sitting  -RC    Recorded by [RC] PATTI Rosenbaum/PIPER  [RC] PATTI Rosenbaum/PIPER    Pain Assessment    Pain Assessment No/denies pain  -RC No/denies pain  -RW No/denies pain  -RC    Recorded by [RC] PATTI Rosenbaum/L [RW] Clay Recio PTA [RC] Celia BOLTON  Torey, ARMSTRONG/L    Cognitive Assessment/Intervention    Current Cognitive/Communication Assessment functional  -RC functional  -RW functional  -RC    Orientation Status oriented x 4  -RC oriented x 4  -RW oriented x 4  -RC    Follows Commands/Answers Questions 100% of the time  -% of the time  -% of the time  -RC    Personal Safety Interventions gait belt;fall prevention program maintained;nonskid shoes/slippers when out of bed  -RC gait belt;nonskid shoes/slippers when out of bed  -RW     Recorded by [RC] PATTI Rosenbaum/L [RW] Clay Recio PTA [RC] FARHAN RosenbaumA/L    Bed Mobility, Assessment/Treatment    Bed Mob, Sit to Supine, [place] minimum assist (75% patient effort)  -RC      Recorded by [RC] FARHAN RosenbaumA/L      Transfer Assessment/Treatment    Transfers, Chair-Bed [place] contact guard assist  -RC      Transfers, Bed-Chair-Bed, Assist Device rolling walker  -RC      Transfers, Sit-Stand [place] contact guard assist  -RC contact guard assist;stand by assist  -RW contact guard assist  -RC    Transfers, Stand-Sit [place] contact guard assist  -RC contact guard assist;stand by assist  -RW contact guard assist  -RC    Transfers, Sit-Stand-Sit, Assist Device rolling walker  -RC rolling walker  -RW     Toilet Transfer, [place] contact guard assist  -RC      Toilet Transfer, Assistive Device rolling walker  -RC      Bathtub Transfer, [place]   contact guard assist  -RC    Bathtub Transfer, Assistive Device   rolling walker  -RC    Recorded by [RC] PATTI Rosenbaum/L [RW] Clay Recio, PTA [RC] FARHAN RosenbaumA/L    Gait Assessment/Treatment    Gait, [place] Level  contact guard assist  -RW     Gait, Assistive Device  rolling walker  -RW     Gait, Distance (Feet)  6    x 2  -RW     Gait, Gait Deviations  antalgic;left:;step length decreased;stride length decreased  -RW     Recorded by  [RW] Clay Recio PTA     Stairs  Assessment/Treatment    Stairs, [place] Level  --  -RW     Recorded by  [RW] Clay Recio PTA     Wheelchair Training/Management    Wheelchair, Distance Propelled 50  -  -RW     Wheelchair Training Comment  mod ind  -RW     Recorded by [RC] PATTI Rosenbaum/L [RW] Clay Recio PTA     Upper Body Bathing Assessment/Training    UB Bathing Assess/Train Assistive Device   hand-held shower head;grab bars;shower chair with back;long-handled sponge  -RC    UB Bathing Assess/Train, Position   sitting  -RC    UB Bathing Assess/Train, [place] Level   conditional independence  -RC    Recorded by   [RC] PATTI Rosenbaum/L    Lower Body Bathing Assessment/Training    LB Bathing Assess/Train Assistive Device   hand-held shower head;long-handled sponge;shower chair with back  -RC    LB Bathing Assess/Train, Position   sitting  -RC    LB Bathing Assess/Train, [place] Level   stand by assist  -RC    Recorded by   [RC] PATTI Rosenbaum/L    Upper Body Dressing Assessment/Training    UB Dressing Assess/Train, Clothing Type   doffing:;donning:;bra;pull over;t-shirt  -RC    UB Dressing Assess/Train, Position   sitting  -RC    UB Dressing Assess/Train, [place]   minimum assist (75% patient effort)   to hook bra  -RC    Recorded by   [RC] PATTI Rosenbaum/L    Lower Body Dressing Assessment/Training    LB Dressing Assess/Train, Clothing Type   donning:;pants;shoes  -RC    LB Dressing Assess/Train, Assist Device   long-handled shoe horn;dressing stick;reacher  -RC    LB Dressing Assess/Train, Position   sitting;standing  -RC    LB Dressing Assess/Train, [place]   minimum assist (75% patient effort)  -RC    Recorded by   [RC] PATTI Rosenbaum/L    Grooming Assessment/Training    Grooming Assess/Train, Position   sitting  -RC    Grooming Assess/Train, [place] Level   conditional independence  -RC    Recorded by   [RC] PATTI Rosenbaum/L    Therapy Exercises    Bilateral Lower  Extremities  AROM:;20 reps;sitting;hip abduction/adduction;LAQ;glut sets;knee flexion    x 2 sets  -RW     BLE Resistance  theraband  -RW     Bilateral Upper Extremity --   ue bike 5 min, balloon vollyball 5 min  -RC      Recorded by [RC] SAURAV Rosenbaum [RW] Clay Recio PTA     Positioning and Restraints    Pre-Treatment Position sitting in chair/recliner  -RC sitting in chair/recliner  -RW sitting in chair/recliner  -RC    Post Treatment Position bed  -RC wheelchair  -RW wheelchair  -RC    In Bed encouraged to call for assist;call light within reach;with family/caregiver  -RC  with family/caregiver;encouraged to call for assist  -RC    In Wheelchair  patient within staff view  -RW     Recorded by [RC] PATTI Rosenbaum/L [RW] Clay Recio PTA [RC] PATTI Rosenbaum/PIPER      06/05/17 1500 06/05/17 1345 06/05/17 1300    Rehab Assessment/Intervention    Discipline physical therapist  -LMA occupational therapy assistant  -LM     Document Type therapy note (daily note)  -LMA therapy note (daily note)  -LM     Subjective Information agree to therapy  -LMA agree to therapy  -LM     Patient Effort, Rehab Treatment good  -LMA      Precautions/Limitations fall precautions;other (see comments)   TTWB LLE  -LMA      Recorded by [LMA] Kelly Mahmood, PT [LM] Genet Hicks, ARMSTRONG/L     Vital Signs    Pretreatment Heart Rate (beats/min) 66  -LMA      Posttreatment Heart Rate (beats/min) 68  -LMA      Pre SpO2 (%) 94  -LMA      O2 Delivery Pre Treatment supplemental O2  -LMA      Post SpO2 (%) 98  -LMA      O2 Delivery Post Treatment supplemental O2  -LMA      Pre Patient Position Sitting  -LMA      Post Patient Position Sitting  -LMA      Recorded by [LMA] Kelly Mahmood, PT      Pain Assessment    Pain Assessment No/denies pain  -LMA No/denies pain   pt states just soreness   -LM No/denies pain  -LM    Pain Intervention(s)  Medication (See MAR)  -LM     Recorded by [LMA] Kelly Mahmood, PT [LM] Genet SCHMIDT  PATTI Hicks/PIPER [LM] FARHAN YeagerA/L    Cognitive Assessment/Intervention    Current Cognitive/Communication Assessment functional  -LMA      Orientation Status oriented x 4  -LMA      Follows Commands/Answers Questions 100% of the time;able to follow single-step instructions  -LMA      Personal Safety WNL/WFL  -LMA      Personal Safety Interventions fall prevention program maintained;gait belt;muscle strengthening facilitated;nonskid shoes/slippers when out of bed  -LMA      Recorded by [LMA] Kelly Mahmood PT      Bed Mobility, Assessment/Treatment    Bed Mob, Supine to Sit, [place]  contact guard assist  -LM     Bed Mobility, Comment Up in chair upon entering room.  -LMA      Recorded by [LMA] Kelly Mahmood PT [LM] PATTI Yeager/L     Transfer Assessment/Treatment    Transfers, Bed-Chair [place]  contact guard assist  -LM     Transfers, Sit-Stand [place] contact guard assist  -LMA      Transfers, Stand-Sit [place] contact guard assist  -LMA contact guard assist  -LM     Transfers, Sit-Stand-Sit, Assist Device rolling walker  -LMA      Recorded by [LMA] Kelly Mahmood PT [LM] FARHAN YeagerA/L     Gait Assessment/Treatment    Gait, [place] Level contact guard assist  -LMA      Gait, Assistive Device rolling walker  -LMA      Gait, Distance (Feet) 6   x 2  -LMA      Gait, Maintain Weight Bearing Status unable to maintain weight bearing status   Able to maintain for 2 steps, but difficulty once fatigued  -LMA      Recorded by [LMA] Kelly Mahmood PT      Stairs Assessment/Treatment    Stairs, [place] Level not tested  -LMA      Recorded by [LMA] Kelly Mahmood PT      Wheelchair Training/Management    Wheelchair, Distance Propelled 120 feet with SBA  -LMA 50  -LM     Recorded by [LMA] Kelly Mahmood PT [LM] FARHAN YeagerA/L     Therapy Exercises    Bilateral Lower Extremities AROM:;20 reps;sitting;hip flexion;hip abduction/adduction;LAQ;glut sets   AAROM  on L with hip flex  -LMA      Bilateral Upper Extremity  AROM:;10 reps;elbow flexion/extension;shoulder rolls/shrugs;shoulder protraction/retraction;shoulder extension/flexion   2 lb bicep curls and 1 shld flex and should abd add  -LM     BUE Resistance  manual resistance- minimal   and perf yellow tband across chest 2x10 with A + over pulley  -LM     Recorded by [LMA] Kelly Mahmood, PT [LM] PATTI Yeager/L     Positioning and Restraints    Pre-Treatment Position sitting in chair/recliner  -LMA in bed  -LM     Post Treatment Position wheelchair  -LMA wheelchair  -LM     In Wheelchair sitting;call light within reach;with family/caregiver  -LMA      Recorded by [LMA] Kelly Mahmood, PT [LM] PATTI Yeager/PIPER       06/05/17 1110          Rehab Assessment/Intervention    Discipline occupational therapy assistant  -LM      Document Type therapy note (daily note)  -LM      Subjective Information agree to therapy  -LM      Patient Effort, Rehab Treatment good  -LM      Precautions/Limitations fall precautions;other (see comments)   ttwb  -LM      Recorded by [LM] PATTI Yeager/L      Pain Assessment    Pain Assessment No/denies pain  -LM      Recorded by [LM] PATTI Yeager/L      Cognitive Assessment/Intervention    Current Cognitive/Communication Assessment functional  -LM      Recorded by [LM] PATTI Yeager/L      Transfer Assessment/Treatment    Transfers, Sit-Stand [place] --   wheelchair push ups x 10 reps   -LM      Recorded by [LM] PATTI Yeager/L      Wheelchair Training/Management    Wheelchair, Distance Propelled 120 sba  -LM      Recorded by [LM] PATTI Yeager/L      Therapy Exercises    Bilateral Upper Extremity --   UEE bike x 15 min with 1 rb  -LM      Recorded by [LM] PATTI Yeager/L      Positioning and Restraints    Pre-Treatment Position sitting in chair/recliner  -LM      Post Treatment Position wheelchair  -LM      Recorded  by [LM] Genet Hicks, ARMSTRONG/L        User Key  (r) = Recorded By, (t) = Taken By, (c) = Cosigned By    Initials Name Effective Dates    LMA Kelly Mahmood, PT 06/15/16 -     JA Darci Harris, PTA 10/17/16 -     RW Clay RICE Hemal, PTA 10/17/16 -     RC Celia Lema, ARMSTRONG/L 10/17/16 -     LM Genet Hicks, ARMSTRONG/L 10/17/16 -                 OT Goals       06/07/17 1326 06/07/17 1324 06/07/17 1135    Transfer Training OT LTG    Transfer Training OT LTG, Date Goal Reviewed 06/07/17  -LM  06/07/17  -RM    Transfer Training OT LTG, Outcome goal met  -LM  goal not met  -RM    Transfer Training OT LTG, Reason Goal Not Met   discharged from facility  -RM    Strength OT LTG    Strength Goal OT LTG, Date Goal Reviewed 06/07/17  -LM  06/07/17  -RM    Strength Goal OT LTG, Outcome goal partially met  -LM  goal not met  -RM    Strength Goal OT LTG, Reason Goal Not Met   discharged from facility  -RM    Static Standing Balance OT STG    Static Standing Balance OT STG, Date Goal Reviewed 06/07/17  -LM 06/07/17  -LM 06/07/17  -RM    Static Standing Balance OT STG, Outcome goal partially met  -LM goal partially met  -LM goal not met  -RM    Static Standing Balance OT STG, Reason Goal Not Met   discharged from facility  -RM    ADL OT LTG    ADL OT LTG, Date Goal Reviewed  06/07/17  -LM 06/07/17  -RM    ADL OT LTG, Outcome  goal partially met  -LM goal not met  -RM    ADL OT LTG, Reason Goal Not Met   discharged from facility  -RM      06/06/17 1337 06/06/17 1335 06/06/17 1327    Transfer Training OT LTG    Transfer Training OT LTG, Date Goal Reviewed  06/06/17  -RC     Transfer Training OT LTG, Outcome  goal ongoing  -RC     Strength OT LTG    Strength Goal OT LTG, Date Goal Reviewed  06/06/17  -RC     Strength Goal OT LTG, Outcome  goal ongoing  -RC     Static Standing Balance OT STG    Static Standing Balance OT STG, Date Goal Reviewed (P)  06/06/17  -RC      Static Standing Balance OT STG, Outcome (P)  goal ongoing   -RC      ADL OT LTG    ADL OT LTG, Date Goal Reviewed  06/06/17  -RC (P)  06/06/17  -RC    ADL OT LTG, Outcome  goal ongoing  -RC (P)  goal ongoing  -RC      06/06/17 1325 06/05/17 1456 06/05/17 1338    Transfer Training OT LTG    Transfer Training OT LTG, Date Goal Reviewed   06/05/17  -LM    Transfer Training OT LTG, Outcome   goal ongoing  -LM    Strength OT LTG    Strength Goal OT LTG, Date Goal Reviewed   06/05/17  -LM    Strength Goal OT LTG, Outcome   goal ongoing  -LM    Static Standing Balance OT STG    Static Standing Balance OT STG, Date Goal Reviewed 06/06/17  -RC  06/05/17  -LM    Static Standing Balance OT STG, Outcome goal ongoing  -RC  goal ongoing  -LM    ADL OT LTG    ADL OT LTG, Date Goal Reviewed  (P)  06/05/17  -LM 06/05/17  -LM    ADL OT LTG, Outcome   goal ongoing  -LM      06/02/17 0900 06/01/17 0755 05/31/17 1301    Transfer Training OT LTG    Transfer Training OT LTG, Date Goal Reviewed 06/02/17  -JH 06/01/17  -JH 05/31/17  -LM    Transfer Training OT LTG, Outcome   goal ongoing  -LM    Strength OT LTG    Strength Goal OT LTG, Date Goal Reviewed 06/02/17  -JH 06/01/17  -JH 05/31/17  -LM    Strength Goal OT LTG, Outcome   goal ongoing  -LM    Static Standing Balance OT STG    Static Standing Balance OT STG, Date Goal Reviewed 06/02/17  -JH 06/01/17  -JH 05/31/17  -LM    Static Standing Balance OT STG, Outcome   goal ongoing  -LM    ADL OT LTG    ADL OT LTG, Date Established  06/01/17  -JH     ADL OT LTG, Date Goal Reviewed 06/02/17  -JH  05/31/17  -LM    ADL OT LTG, Outcome   goal ongoing  -LM      05/30/17 1443 05/30/17 1333 05/29/17 1050    Transfer Training OT LTG    Transfer Training OT LTG, Date Goal Reviewed 05/30/17  -LM 05/30/17  -BH 05/29/17  -JH    Transfer Training OT LTG, Outcome goal ongoing  -LM goal ongoing  -BH     Strength OT LTG    Strength Goal OT LTG, Date Goal Reviewed 05/30/17  -LM 05/30/17  - 05/29/17  -    Strength Goal OT LTG, Outcome goal ongoing  - goal  ongoing  -BH     Static Standing Balance OT STG    Static Standing Balance OT STG, Date Goal Reviewed 05/30/17  -LM 05/30/17  -BH 05/29/17  -JH    Static Standing Balance OT STG, Outcome goal ongoing  -LM goal ongoing  -BH     ADL OT LTG    ADL OT LTG, Date Goal Reviewed 05/30/17  -LM 05/30/17  -BH 05/29/17  -JH    ADL OT LTG, Outcome goal ongoing  -LM goal ongoing  -BH       05/27/17 0611 05/26/17 1320 05/25/17 1518    Transfer Training OT LTG    Transfer Training OT LTG, Date Goal Reviewed 05/27/17  -TO 05/26/17  -LM 05/25/17  -LM    Transfer Training OT LTG, Outcome  goal ongoing  -LM goal ongoing  -LM    Strength OT LTG    Strength Goal OT LTG, Date Goal Reviewed 05/27/17  -TO 05/26/17  -LM 05/25/17  -LM    Strength Goal OT LTG, Outcome  goal ongoing  -LM goal ongoing  -LM    Static Standing Balance OT STG    Static Standing Balance OT STG, Date Goal Reviewed 05/27/17  -TO 05/26/17  -LM 05/25/17  -LM    Static Standing Balance OT STG, Outcome  goal ongoing  -LM goal ongoing  -LM    ADL OT LTG    ADL OT LTG, Date Goal Reviewed 05/27/17  -TO 05/26/17  -LM     ADL OT LTG, Outcome  goal ongoing  -LM goal ongoing  -LM      05/25/17 1306          Transfer Training OT LTG    Transfer Training OT LTG, Date Established 05/25/17  -RB      Transfer Training OT LTG, Time to Achieve by discharge  -RB      Transfer Training OT LTG, Activity Type all transfers  -RB      Transfer Training OT LTG, [place] Level contact guard assist  -RB      Transfer Training OT LTG, Assist Device walker, rolling;commode, bedside  -RB      Strength OT LTG    Strength Goal OT LTG, Date Established 05/25/17  -RB      Strength Goal OT LTG, Time to Achieve by discharge  -RB      Strength Goal OT LTG, Measure to Achieve 1 set of 10 reps all planes with 1/2 to 1 lb wrist wts or dumbells to increase UE strength.  -RB      Static Standing Balance OT STG    Static Standing Balance OT STG, Date Established 05/25/17  -RB      Static Standing Balance  OT STG, Time to Achieve 2 wks  -RB      Static Standing Balance OT STG, [place] Level contact guard assist   5 minutes with functional activity and 0-1 rest break.  -RB      Static Standing Balance OT STG, Assist Device assistive device   Rolling wakler.  -RB      ADL OT LTG    ADL OT LTG, Date Established 05/25/17  -RB      ADL OT LTG, Time to Achieve by discharge  -RB      ADL OT LTG, Activity Type ADL skills   Sponge bath and dress.  -RB      ADL OT LTG, [place] Level contact guard;assistive device   Rolling walker and shower chair if needed.  -RB        User Key  (r) = Recorded By, (t) = Taken By, (c) = Cosigned By    Initials Name Provider Type    RB Curly Rich, OT Occupational Therapist     Katherine Pena, OTR/L Occupational Therapist     Celia Lema, ARMSTRONG/L Occupational Therapy Assistant     Siomara Bob, ARMSTRONG/L Occupational Therapy Assistant     Genet Hicks, ARMSTRONG/L Occupational Therapy Assistant    TO Andi Velasquez, ARMSTRONG/L Occupational Therapy Assistant     Paulette Ennis, OT Occupational Therapist          Occupational Therapy Education     Title: PT OT SLP Therapies (Active)     Topic: Occupational Therapy (Resolved)     Point: ADL training (Resolved)    Description: Instruct learner(s) on proper safety adaptation and remediation techniques during self care or transfers.   Instruct in proper use of assistive devices.    Learning Progress Summary    Learner Readiness Method Response Comment Documented by Status   Patient Acceptance E NR ttwb  06/06/17 1049 Active    Acceptance E NR   06/05/17 1340 Active    Acceptance E NR   06/02/17 1318 Active    Acceptance E NR   06/01/17 1121 Active    Acceptance E VU,NR Educated pt on hand placement and safety with t/f. Educated pt on weight bear status with t/f. Educated pt on safety and call for assist.  05/30/17 1457 Done    Acceptance E VU   05/30/17 1452 Done    Acceptance E NR   05/29/17 1350 Active    Acceptance D  NR Pt educated on LH reacher for donning pants with decreased understanding 2* to pt nauseated and fatigued post bathing. TO 05/27/17 1105 Active   Family Acceptance E NR   06/02/17 1318 Active    Acceptance E NR   06/01/17 1121 Active    Acceptance E NR   05/29/17 1350 Active    Acceptance D NR Pt educated on LH reacher for donning pants with decreased understanding 2* to pt nauseated and fatigued post bathing. TO 05/27/17 1105 Active               Point: Home exercise program (Resolved)    Description: Instruct learner(s) on appropriate technique for monitoring, assisting and/or progressing therapeutic exercises/activities.    Learning Progress Summary    Learner Readiness Method Response Comment Documented by Status   Patient Acceptance E NR   06/05/17 1340 Active    Acceptance E NR   06/02/17 1318 Active    Acceptance E NR   06/01/17 1121 Active    Acceptance E VU   05/30/17 1452 Done    Acceptance E NR   05/29/17 1350 Active   Family Acceptance E NR   06/02/17 1318 Active    Acceptance E NR   06/01/17 1121 Active    Acceptance E NR   05/29/17 1350 Active               Point: Precautions (Resolved)    Description: Instruct learner(s) on prescribed precautions during self-care and functional transfers.    Learning Progress Summary    Learner Readiness Method Response Comment Documented by Status   Patient Acceptance E NR ttwb  06/06/17 1049 Active    Acceptance E NR   06/05/17 1340 Active    Acceptance E NR   06/02/17 1318 Active    Acceptance E NR   06/01/17 1121 Active    Acceptance E VU,NR Educated pt on hand placement and safety with t/f. Educated pt on weight bear status with t/f. Educated pt on safety and call for assist.  05/30/17 1457 Done    Acceptance E VU   05/30/17 1452 Done    Acceptance E NR   05/29/17 1350 Active    Acceptance E NR Edu pt/family on no OOB without assist. RB 05/25/17 1304 Active   Family Acceptance E NR   06/02/17 1318 Active    Acceptance E NR    06/01/17 1121 Active    Acceptance E NR   05/29/17 1350 Active    Acceptance E NR Edu pt/family on no OOB without assist.  05/25/17 1304 Active               Point: Body mechanics (Resolved)    Description: Instruct learner(s) on proper positioning and spine alignment during self-care, functional mobility activities and/or exercises.    Learning Progress Summary    Learner Readiness Method Response Comment Documented by Status   Patient Acceptance E NR   06/05/17 1340 Active    Acceptance E NR   06/02/17 1318 Active    Acceptance E NR   06/01/17 1121 Active    Acceptance E VU,NR Educated pt on hand placement and safety with t/f. Educated pt on weight bear status with t/f. Educated pt on safety and call for assist.  05/30/17 1457 Done    Acceptance E VU   05/30/17 1452 Done    Acceptance E NR   05/29/17 1350 Active   Family Acceptance E NR   06/02/17 1318 Active    Acceptance E NR   06/01/17 1121 Active    Acceptance E NR   05/29/17 1350 Active                      User Key     Initials Effective Dates Name Provider Type Discipline     06/15/16 -  Curly Rich, OT Occupational Therapist OT     10/17/16 -  Katherine Pena, OTR/L Occupational Therapist OT     10/17/16 -  Celia Lema, ARMSTRONG/L Occupational Therapy Assistant OT     10/17/16 -  Siomara Bob ARMSTRONG/L Occupational Therapy Assistant OT     10/17/16 -  Genet Hicks ARMSTRONG/L Occupational Therapy Assistant OT     10/17/16 -  Andi Velasquez, ARMSTRONG/L Occupational Therapy Assistant OT                  OT Recommendation and Plan  Anticipated Equipment Needs At Discharge: front wheeled walker, tub bench, wheelchair  Anticipated Discharge Disposition: skilled nursing facility  Planned Therapy Interventions: activity intolerance, ADL retraining, balance training, energy conservation, strengthening, transfer training  Therapy Frequency: other (see comments) (3-14x/wk)  Plan of Care Review  Plan Of Care Reviewed With:  patient  Progress: progress towards functional goals is fair  Outcome Summary/Follow up Plan: pt made fair progress   pt to dc this am to nursing facility with cont ot        Outcome Measures       06/07/17 1300 06/07/17 0915 06/06/17 1100    How much help from another person do you currently need...    Turning from your back to your side while in flat bed without using bedrails?  3  -JA 3  -RW    Moving from lying on back to sitting on the side of a flat bed without bedrails?  3  -JA 3  -RW    Moving to and from a bed to a chair (including a wheelchair)?  3  -JA 3  -RW    Standing up from a chair using your arms (e.g., wheelchair, bedside chair)?  3  -JA 3  -RW    Climbing 3-5 steps with a railing?  1  -JA 1  -RW    To walk in hospital room?  3  -JA 3  -RW    AM-PAC 6 Clicks Score  16  -JA 16  -RW    How much help from another is currently needed...    Putting on and taking off regular lower body clothing? 3  -LM      Bathing (including washing, rinsing, and drying) 3  -LM      Toileting (which includes using toilet bed pan or urinal) 3  -LM      Putting on and taking off regular upper body clothing 3  -LM      Taking care of personal grooming (such as brushing teeth) 4  -LM      Eating meals 4  -LM      Score 20  -LM      Functional Assessment    Outcome Measure Options  AM-PAC 6 Clicks Basic Mobility (PT)  -JA       06/06/17 0825 06/05/17 0838       How much help from another person do you currently need...    Turning from your back to your side while in flat bed without using bedrails?  3  -LMA     Moving from lying on back to sitting on the side of a flat bed without bedrails?  3  -LMA     Moving to and from a bed to a chair (including a wheelchair)?  3  -LMA     Standing up from a chair using your arms (e.g., wheelchair, bedside chair)?  3  -LMA     Climbing 3-5 steps with a railing?  1  -LMA     To walk in hospital room?  3  -LMA     AM-PAC 6 Clicks Score  16  -LMA     How much help from another is currently  needed...    Putting on and taking off regular lower body clothing? 3  -RC      Bathing (including washing, rinsing, and drying) 3  -RC      Toileting (which includes using toilet bed pan or urinal) 3  -RC      Putting on and taking off regular upper body clothing 3  -RC      Taking care of personal grooming (such as brushing teeth) 4  -RC      Eating meals 4  -RC      Score 20  -RC      Functional Assessment    Outcome Measure Options  AM-PAC 6 Clicks Basic Mobility (PT)  -LMA       User Key  (r) = Recorded By, (t) = Taken By, (c) = Cosigned By    Initials Name Provider Type    LMA Kelly Mahmood, PT Physical Therapist    RUIZ Harris, PTA Physical Therapy Assistant    RW Clay Recio, PTA Physical Therapy Assistant    RC PATTI Rosenbaum/L Occupational Therapy Assistant    PATTI Etienne/L Occupational Therapy Assistant           Time Calculation:         Time Calculation- OT       06/07/17 1333          Time Calculation- OT    OT Start Time 0820  -LM      OT Stop Time 0900  -LM      OT Time Calculation (min) 40 min  -LM      Total Timed Code Minutes- OT 40 minute(s)  -LM        User Key  (r) = Recorded By, (t) = Taken By, (c) = Cosigned By    Initials Name Provider Type     PATTI Yeager/L Occupational Therapy Assistant           Therapy Charges for Today     Code Description Service Date Service Provider Modifiers Qty    [id number]  OT SELF CARE/MGMT/TRAIN EA 15 MIN 6/7/2017 SAURAV Yeager GO 3          OT G-codes  OT Professional Judgement Used?: Yes  OT Functional Scales Options: AM-PAC 6 Clicks Daily Activity (OT)  Score: 14  Functional Limitation: Self care  Self Care Current Status (): At least 40 percent but less than 60 percent impaired, limited or restricted  Self Care Goal Status (): At least 20 percent but less than 40 percent impaired, limited or restricted    SAURAV Yeager  6/7/2017

## 2017-06-07 NOTE — SIGNIFICANT NOTE
06/07/17 0955   PT Discharge Summary   Anticipated Discharge Disposition skilled nursing facility   Reason for Discharge Discharge from facility;Per MD order   Outcomes Achieved Patient able to partially acheive established goals   Discharge Destination SNF

## 2017-06-07 NOTE — THERAPY DISCHARGE NOTE
Inpatient Rehabilitation - Occupational Therapy Discharge Summary  [facility]     Patient Name: Mariela Woodson  : 1925  MRN: [id number]    Today's Date: 2017  Onset of Illness/Injury or Date of Surgery Date: 17    Date of Referral to OT: 17  Referring Physician: Dr. Aparicio      Admit Date: 2017        OT Recommendation and Plan    Visit Dx:    ICD-10-CM ICD-9-CM   1. Impaired mobility and activities of daily living Z74.09 799.89   2. Abnormality of gait and mobility R26.9 781.2   3. Muscle weakness (generalized) M62.81 728.87   4. Closed left hip fracture, with routine healing, subsequent encounter S72.002D V54.13                     OT Goals       17 1135 17 1337 17 1335    Transfer Training OT LTG    Transfer Training OT LTG, Date Goal Reviewed 17  -RM  17  -RC    Transfer Training OT LTG, Outcome goal not met  -RM  goal ongoing  -RC    Transfer Training OT LTG, Reason Goal Not Met discharged from facility  -      Strength OT LTG    Strength Goal OT LTG, Date Goal Reviewed 17  -RM  17  -RC    Strength Goal OT LTG, Outcome goal not met  -RM  goal ongoing  -RC    Strength Goal OT LTG, Reason Goal Not Met discharged from facility  -      Static Standing Balance OT STG    Static Standing Balance OT STG, Date Goal Reviewed 17  -RM (P)  17  -RC     Static Standing Balance OT STG, Outcome goal not met  -RM (P)  goal ongoing  -RC     Static Standing Balance OT STG, Reason Goal Not Met discharged from facility  -      ADL OT LTG    ADL OT LTG, Date Goal Reviewed 17  -RM  17  -RC    ADL OT LTG, Outcome goal not met  -RM  goal ongoing  -RC    ADL OT LTG, Reason Goal Not Met discharged from facility  -        17 1327 17 1325 17 1456    Static Standing Balance OT STG    Static Standing Balance OT STG, Date Goal Reviewed  17  -RC     Static Standing Balance OT STG, Outcome  goal ongoing  -RC      ADL OT LTG    ADL OT LTG, Date Goal Reviewed (P)  06/06/17  -RC  (P)  06/05/17  -LM    ADL OT LTG, Outcome (P)  goal ongoing  -RC        06/05/17 1338 06/02/17 0900 06/01/17 0755    Transfer Training OT LTG    Transfer Training OT LTG, Date Goal Reviewed 06/05/17  -LM 06/02/17  - 06/01/17  -    Transfer Training OT LTG, Outcome goal ongoing  -LM      Strength OT LTG    Strength Goal OT LTG, Date Goal Reviewed 06/05/17  -LM 06/02/17  - 06/01/17  -    Strength Goal OT LTG, Outcome goal ongoing  -LM      Static Standing Balance OT STG    Static Standing Balance OT STG, Date Goal Reviewed 06/05/17  -LM 06/02/17  - 06/01/17  -    Static Standing Balance OT STG, Outcome goal ongoing  -LM      ADL OT LTG    ADL OT LTG, Date Established   06/01/17  -    ADL OT LTG, Date Goal Reviewed 06/05/17  -LM 06/02/17  -     ADL OT LTG, Outcome goal ongoing  -LM        05/31/17 1301 05/30/17 1443 05/30/17 1333    Transfer Training OT LTG    Transfer Training OT LTG, Date Goal Reviewed 05/31/17  -LM 05/30/17  -LM 05/30/17  -    Transfer Training OT LTG, Outcome goal ongoing  -LM goal ongoing  -LM goal ongoing  -BH    Strength OT LTG    Strength Goal OT LTG, Date Goal Reviewed 05/31/17  -LM 05/30/17  -LM 05/30/17  -    Strength Goal OT LTG, Outcome goal ongoing  -LM goal ongoing  -LM goal ongoing  -BH    Static Standing Balance OT STG    Static Standing Balance OT STG, Date Goal Reviewed 05/31/17  -LM 05/30/17  -LM 05/30/17  -    Static Standing Balance OT STG, Outcome goal ongoing  -LM goal ongoing  -LM goal ongoing  -BH    ADL OT LTG    ADL OT LTG, Date Goal Reviewed 05/31/17  -LM 05/30/17  -LM 05/30/17  -    ADL OT LTG, Outcome goal ongoing  -LM goal ongoing  -LM goal ongoing  -BH      05/29/17 1050 05/27/17 0611 05/26/17 1320    Transfer Training OT LTG    Transfer Training OT LTG, Date Goal Reviewed 05/29/17  -JH 05/27/17  -TO 05/26/17  -LM    Transfer Training OT LTG, Outcome   goal ongoing  -LM    Strength  OT LTG    Strength Goal OT LTG, Date Goal Reviewed 05/29/17  -JH 05/27/17  -TO 05/26/17  -LM    Strength Goal OT LTG, Outcome   goal ongoing  -LM    Static Standing Balance OT STG    Static Standing Balance OT STG, Date Goal Reviewed 05/29/17  -JH 05/27/17  -TO 05/26/17  -LM    Static Standing Balance OT STG, Outcome   goal ongoing  -LM    ADL OT LTG    ADL OT LTG, Date Goal Reviewed 05/29/17  -JH 05/27/17  -TO 05/26/17  -LM    ADL OT LTG, Outcome   goal ongoing  -LM      05/25/17 1518 05/25/17 1306       Transfer Training OT LTG    Transfer Training OT LTG, Date Established  05/25/17  -RB     Transfer Training OT LTG, Time to Achieve  by discharge  -RB     Transfer Training OT LTG, Activity Type  all transfers  -RB     Transfer Training OT LTG, [employer] Level  contact guard assist  -RB     Transfer Training OT LTG, Assist Device  walker, rolling;commode, bedside  -RB     Transfer Training OT LTG, Date Goal Reviewed 05/25/17  -LM      Transfer Training OT LTG, Outcome goal ongoing  -LM      Strength OT LTG    Strength Goal OT LTG, Date Established  05/25/17  -RB     Strength Goal OT LTG, Time to Achieve  by discharge  -RB     Strength Goal OT LTG, Measure to Achieve  1 set of 10 reps all planes with 1/2 to 1 lb wrist wts or dumbells to increase UE strength.  -RB     Strength Goal OT LTG, Date Goal Reviewed 05/25/17  -LM      Strength Goal OT LTG, Outcome goal ongoing  -LM      Static Standing Balance OT STG    Static Standing Balance OT STG, Date Established  05/25/17  -RB     Static Standing Balance OT STG, Time to Achieve  2 wks  -RB     Static Standing Balance OT STG, [employer] Level  contact guard assist   5 minutes with functional activity and 0-1 rest break.  -RB     Static Standing Balance OT STG, Assist Device  assistive device   Rolling wakler.  -RB     Static Standing Balance OT STG, Date Goal Reviewed 05/25/17  -LM      Static Standing Balance OT STG, Outcome goal ongoing  -LM      ADL OT LTG     ADL OT LTG, Date Established  05/25/17  -RB     ADL OT LTG, Time to Achieve  by discharge  -RB     ADL OT LTG, Activity Type  ADL skills   Sponge bath and dress.  -RB     ADL OT LTG, [place] Level  contact guard;assistive device   Rolling walker and shower chair if needed.  -RB     ADL OT LTG, Outcome goal ongoing  -LM        User Key  (r) = Recorded By, (t) = Taken By, (c) = Cosigned By    Initials Name Provider Type    RB Curly Rich, OT Occupational Therapist     Katherine Pena, OTR/L Occupational Therapist    RC Celia Lema, ARMSTRONG/L Occupational Therapy Assistant    JH Siomara Bob, ARMSTRONG/L Occupational Therapy Assistant    LM Genet Hicks, ARMSTRONG/L Occupational Therapy Assistant    TO Andi Velasquez, ARMSTRONG/L Occupational Therapy Assistant    RM Paulette Ennis, OT Occupational Therapist                Outcome Measures       06/07/17 0915 06/06/17 1100 06/06/17 0825    How much help from another person do you currently need...    Turning from your back to your side while in flat bed without using bedrails? 3  -JA 3  -RW     Moving from lying on back to sitting on the side of a flat bed without bedrails? 3  -JA 3  -RW     Moving to and from a bed to a chair (including a wheelchair)? 3  -JA 3  -RW     Standing up from a chair using your arms (e.g., wheelchair, bedside chair)? 3  -JA 3  -RW     Climbing 3-5 steps with a railing? 1  -JA 1  -RW     To walk in hospital room? 3  -JA 3  -RW     AM-PAC 6 Clicks Score 16  -JA 16  -RW     How much help from another is currently needed...    Putting on and taking off regular lower body clothing?   3  -RC    Bathing (including washing, rinsing, and drying)   3  -RC    Toileting (which includes using toilet bed pan or urinal)   3  -RC    Putting on and taking off regular upper body clothing   3  -RC    Taking care of personal grooming (such as brushing teeth)   4  -RC    Eating meals   4  -RC    Score   20  -RC    Functional Assessment    Outcome Measure Options  -[facility] 6 Clicks Basic Mobility (PT)  -JA        06/05/17 0838          How much help from another person do you currently need...    Turning from your back to your side while in flat bed without using bedrails? 3  -LM      Moving from lying on back to sitting on the side of a flat bed without bedrails? 3  -LM      Moving to and from a bed to a chair (including a wheelchair)? 3  -LM      Standing up from a chair using your arms (e.g., wheelchair, bedside chair)? 3  -LM      Climbing 3-5 steps with a railing? 1  -LM      To walk in hospital room? 3  -LM      AM-[facility] 6 Clicks Score 16  -LM      Functional Assessment    Outcome Measure Options AM-PAC 6 Clicks Basic Mobility (PT)  -LM        User Key  (r) = Recorded By, (t) = Taken By, (c) = Cosigned By    Initials Name Provider Type    LM Kelly Mahmood, PT Physical Therapist    RUIZ Harris, PTA Physical Therapy Assistant    MAURICIO Recio, RHODA Physical Therapy Assistant    PATTI Porter/L Occupational Therapy Assistant              OT Discharge Summary  Anticipated Discharge Disposition: home with 24/7 care, skilled nursing facility  Reason for Discharge: Discharge from facility  Outcomes Achieved: Refer to plan of care for updates on goals achieved  Discharge Destination: SNF      Paulette Ennis OT  6/7/2017

## 2017-06-07 NOTE — CONSULTS
Adult Nutrition  Assessment    Patient Name:  Mariela Woodson  YOB: 1925  MRN: [id number]  Admit Date:  5/24/2017    Assessment Date:  6/7/2017          Reason for Assessment       06/07/17 0902    Reason for Assessment    Reason For Assessment/Visit follow up protocol    Time Spent (min) 10    Diagnosis Diagnosis    Ortho Fracture    Factors Affecting Nutrition Factors                Nutrition/Diet History       06/07/17 0902    Nutrition/Diet History    Patient Reported Diet/Restrictions/Preferences regular    Food Preferences prefers jessica milk to jessica ensure or ensure clear.              Labs/Tests/Procedures/Meds       06/07/17 0902    Labs/Tests/Procedures/Meds    Labs/Tests Review Hgb Hct                Nutrition Prescription Ordered       06/07/17 0903    Nutrition Prescription PO    Current PO Diet Regular    Supplement Milk    Supplement Frequency 3 times a day            Evaluation of Received Nutrient/Fluid Intake       06/07/17 0903    PO Evaluation    Number of Meals 3    % PO Intake 75            Comments:  Pt is alert and talkative excited to talk with another patient. Pt is eating better(75%) and dislikes the ensure clear in apple even at breakfast.willing to drink 2% milk at breakfast and jessica milk at lunch and supper to supplement diet. Pt's d/c date is this week she reports. Weight is up since admit. H/h is below normal limits. Pt has improved in her therapy and is pleased with her progress. RDN staff to continue to monitor.         Electronically signed by:  Kelly Song RD  06/07/17 9:04 AM

## 2017-06-07 NOTE — PLAN OF CARE
Problem: Patient Care Overview (Adult)  Goal: Plan of Care Review  Outcome: Unable to achieve outcome(s) by discharge Date Met:  06/07/17 06/07/17 1326   Coping/Psychosocial Response Interventions   Plan Of Care Reviewed With patient   Patient Care Overview   Progress progress towards functional goals is fair   Outcome Evaluation   Outcome Summary/Follow up Plan pt made fair progress pt to dc this am to nursing facility with cont ot         Problem: Inpatient Occupational Therapy  Goal: Transfer Training Goal 1 LTG- OT  Outcome: Ongoing (interventions implemented as appropriate)    06/07/17 1326   Transfer Training OT LTG   Transfer Training OT LTG, Date Goal Reviewed 06/07/17   Transfer Training OT LTG, Outcome goal met       Goal: Strength Goal LTG- OT  Outcome: Unable to achieve outcome(s) by discharge Date Met:  06/07/17 06/07/17 1326   Strength OT LTG   Strength Goal OT LTG, Date Goal Reviewed 06/07/17   Strength Goal OT LTG, Outcome goal        06/07/17 1326   Strength OT LTG   Strength Goal OT LTG, Date Goal Reviewed 06/07/17   Strength Goal OT LTG, Outcome goal partially met   not met       Goal: Static Standing Balance Goal OT- STG  Outcome: Unable to achieve outcome(s) by discharge Date Met:  06/07/17 06/07/17 1326   Static Standing Balance OT STG   Static Standing Balance OT STG, Date Goal Reviewed 06/07/17   Static Standing Balance OT STG, Outcome goal partially met

## 2017-06-07 NOTE — THERAPY DISCHARGE NOTE
Inpatient Rehabilitation - Physical Therapy Treatment Note/Discharge  [facility]     Patient Name: Mariela Woodson  : 1925  MRN: [id number]  Today's Date: 2017  Onset of Illness/Injury or Date of Surgery Date: 17  Date of Referral to PT: 17  Referring Physician: Dr. Aparicio    Admit Date: 2017    Visit Dx:    ICD-10-CM ICD-9-CM   1. Impaired mobility and activities of daily living Z74.09 799.89   2. Abnormality of gait and mobility R26.9 781.2   3. Muscle weakness (generalized) M62.81 728.87   4. Closed left hip fracture, with routine healing, subsequent encounter S72.002D V54.13     Patient Active Problem List   Diagnosis   • Iron deficiency anemia due to chronic blood loss   • Post-polio syndrome   • Simple chronic bronchitis   • Seizure disorder   • Closed left hip fracture   • Iron deficiency anemia   • Post-polio syndrome   • Seizure disorder   • Metabolic encephalopathy   • COPD (chronic obstructive pulmonary disease)   • Encounter for rehabilitation       Physical Therapy Education     Title: PT OT SLP Therapies (Active)     Topic: Physical Therapy (Active)     Point: Mobility training (Resolved)    Learning Progress Summary    Learner Readiness Method Response Comment Documented by Status   Patient Acceptance E NR,VU Discussed with family regarding definition TTWB at L with mobility & transfers.  17 1036 Done    Acceptance E,TB,D NR Rienforced TTWB & benefits of mobility  17 1018 Active   Family Acceptance E NR,VU Discussed with family regarding definition TTWB at L with mobility & transfers.  17 1036 Done               Point: Precautions (Resolved)    Learning Progress Summary    Learner Readiness Method Response Comment Documented by Status   Patient Acceptance E VU reviewed ttwb  17 1112 Done    Acceptance E VU reviewed ttwb with gt and transfers. talked with lita about her moms ability to care for pt at home.  17 1253 Done     Acceptance E NR,VU Discussed with family regarding definition TTWB at L with mobility & transfers.  05/30/17 1036 Done    Acceptance E,TB,D NR Rienforced TTWB & benefits of mobility  05/29/17 1018 Active    Acceptance E NR reviewed ttwb but pt unable to maintain if asked to mobilize.  05/27/17 1152 Active    Acceptance E NR pt edu on TTWB and how to maintain TTWB.  05/26/17 1245 Active    Acceptance E NR Educated patient's family on precautions re: TTWB LLE LM 05/25/17 1446 Active   Family Acceptance E VU reviewed ttwb with gt and transfers. talked with lita about her moms ability to care for pt at home.  05/31/17 1253 Done    Acceptance E NR,VU Discussed with family regarding definition TTWB at L with mobility & transfers.  05/30/17 1036 Done    Acceptance E NR Educated patient's family on precautions re: TTWB LLE LM 05/25/17 1446 Active                      User Key     Initials Effective Dates Name Provider Type Discipline     06/15/16 -  Kelly Mahmood, PT Physical Therapist PT     10/17/16 -  Darci Harris, PTA Physical Therapy Assistant PT    GIOVANA 10/17/16 -  Juan Desouza, PTA Physical Therapy Assistant PT     10/17/16 -  Clay Recio, PTA Physical Therapy Assistant PT                    IP PT Goals       06/07/17 0915 06/06/17 1623 06/05/17 1617    Bed Mobility PT LTG    Bed Mobility PT LTG, Date Goal Reviewed 06/07/17  -JA 06/06/17  -RW 06/05/17  -LM    Bed Mobility PT LTG, Outcome goal not met  - goal ongoing  -RW goal ongoing  -LM    Bed Mobility PT LTG, Reason Goal Not Met discharged from facility  -      Transfer Training PT LTG    Transfer Training PT  LTG, Date Goal Reviewed 06/07/17  -JA 06/06/17  -RW 06/05/17  -LM    Transfer Training PT LTG, Outcome goal not met  - goal ongoing  -RW goal ongoing  -LM    Transfer Training PT LTG, Reason Goal Not Met discharged from facility  -      Gait Training PT LTG    Gait Training Goal PT LTG, Date Goal Reviewed  06/07/17  -JA 06/06/17  -RW     Gait Training Goal PT LTG, Outcome goal not met  -JA goal ongoing  -RW     Gait Training Goal PT LTG, Reason Goal Not Met discharged from facility  -JA        06/05/17 1500 06/05/17 1150 06/05/17 0838    Bed Mobility PT LTG    Bed Mobility PT LTG, Date Established   06/05/17  -LM    Bed Mobility PT LTG, Time to Achieve   by discharge  -LM    Bed Mobility PT LTG, Activity Type   supine to sit/sit to supine  -LM    Bed Mobility PT LTG, [place] Level   supervision required  -LM    Bed Mobility PT Goal  LTG, Assist Device   bed rails  -LM    Bed Mobility PT LTG, Outcome   goal ongoing  -LM    Transfer Training PT STG    Transfer Training PT STG, Date Goal Reviewed  05/30/17  -LM     Transfer Training PT STG, Outcome  goal met  -LM     Transfer Training PT LTG    Transfer Training PT LTG, Date Established   06/05/17  -LM    Transfer Training PT LTG, Time to Achieve   by discharge  -LM    Transfer Training PT LTG, Activity Type   bed to chair /chair to bed  -LM    Transfer Training PT LTG, [place] Level   supervision required  -LM    Transfer Training PT LTG, Assist Device   --   AAD  -LM    Transfer Training PT LTG, Additional Goal   While maintaining TTWB LLE  -LM    Transfer Training PT LTG, Outcome   goal ongoing  -LM    Gait Training PT LTG    Gait Training Goal PT LTG, Date Established   06/05/17  -LM    Gait Training Goal PT LTG, Time to Achieve   by discharge  -LM    Gait Training Goal PT LTG, [place] Level   contact guard assist  -LM    Gait Training Goal PT LTG, Assist Device   --   AAD  -LM    Gait Training Goal PT LTG, Distance to Achieve   25 feet  -LM    Gait Training Goal PT LTG, Additional Goal   While maintaining TTWB LLE  -LM    Gait Training Goal PT LTG, Date Goal Reviewed 06/05/17  -LM      Gait Training Goal PT LTG, Outcome goal ongoing  -LM  goal ongoing  -LM      06/02/17 1444 06/01/17 1612 05/31/17 1554    Bed Mobility PT LTG    Bed Mobility PT LTG,  Date Goal Reviewed 06/02/17  -RW 06/01/17  -RW 05/31/17  -RW    Bed Mobility PT LTG, Outcome goal met  -RW goal ongoing  -RW goal ongoing  -RW    Transfer Training PT LTG    Transfer Training PT  LTG, Date Goal Reviewed  06/01/17  -RW 05/31/17  -RW    Transfer Training PT LTG, Outcome  goal met  -RW       05/30/17 1525 05/29/17 1337 05/27/17 1544    Bed Mobility PT LTG    Bed Mobility PT LTG, Date Goal Reviewed 05/30/17  -JA 05/29/17  -CA 05/27/17  -RW    Bed Mobility PT LTG, Outcome goal ongoing  -JA goal ongoing  -CA goal ongoing  -RW    Transfer Training PT STG    Transfer Training PT STG, Date Goal Reviewed 05/30/17  -JA 05/29/17  -CA 05/27/17  -RW    Transfer Training PT STG, Outcome goal met  -JA goal ongoing  -CA goal ongoing  -RW    Transfer Training PT LTG    Transfer Training PT  LTG, Date Goal Reviewed 05/30/17  -JA 05/29/17  -CA 05/27/17  -RW    Transfer Training PT LTG, Outcome goal ongoing  -JA goal ongoing  -CA goal ongoing  -RW    Wheelchair Propulsion PT LTG    Wheelchair Propulsion Goal PT LTG, Date Goal Reviewed 05/30/17  -JA 05/29/17  -CA 05/27/17  -RW    Wheelchair Propulsion Goal PT LTG, Outcome goal met  -JA goal ongoing  -CA goal ongoing  -RW      05/26/17 1300 05/26/17 1033 05/25/17 1518    Bed Mobility PT LTG    Bed Mobility PT LTG, Date Goal Reviewed 05/26/17  -GIOVANA 05/26/17  -GIOVANA (P)  05/25/17  -RW    Bed Mobility PT LTG, Outcome goal ongoing  -GIOVANA goal ongoing  -GIOVANA (P)  goal ongoing  -RW    Transfer Training PT STG    Transfer Training PT STG, Date Goal Reviewed 05/26/17  -GIOVANA 05/26/17  -GIOVANA (P)  05/25/17  -RW    Transfer Training PT STG, Outcome goal ongoing  -GIOVANA goal ongoing  -GIOVANA (P)  goal ongoing  -RW    Transfer Training PT LTG    Transfer Training PT  LTG, Date Goal Reviewed 05/26/17  -GIOVANA 05/26/17  -GIOVANA (P)  05/25/17  -RW    Transfer Training PT LTG, Outcome goal ongoing  -GIOVANA goal ongoing  -GIOVANA (P)  goal ongoing  -RW    Wheelchair Propulsion PT LTG    Wheelchair Propulsion Goal PT LTG, Date  Goal Reviewed 05/26/17  -GIOVANA 05/26/17  -GIOVANA     Wheelchair Propulsion Goal PT LTG, Outcome goal ongoing  -GIOVANA goal ongoing  -GIOVANA (P)  goal ongoing  -RW      05/25/17 1033          Bed Mobility PT LTG    Bed Mobility PT LTG, Date Established 05/25/17  -LM      Bed Mobility PT LTG, Time to Achieve by discharge  -LM      Bed Mobility PT LTG, Activity Type supine to sit/sit to supine  -LM      Bed Mobility PT LTG, [place] Level minimum assist (75% patient effort)  -LM      Bed Mobility PT Goal  LTG, Assist Device bed rails  -LM      Bed Mobility PT LTG, Outcome goal ongoing  -LM      Transfer Training PT STG    Transfer Training PT STG, Date Established 05/25/17  -LM      Transfer Training PT STG, Time to Achieve 5 days  -LM      Transfer Training PT STG, Activity Type sit to stand/stand to sit  -LM      Transfer Training PT STG, [place] Level moderate assist (50% patient effort)  -LM      Transfer Training PT STG, Assist Device --   AAD  -LM      Transfer Training PT STG, Additional Goal Maintaining TTWB LLE  -LM      Transfer Training PT STG, Outcome goal ongoing  -LM      Transfer Training PT LTG    Transfer Training PT LTG, Date Established 05/25/17  -LM      Transfer Training PT LTG, Time to Achieve by discharge  -LM      Transfer Training PT LTG, Activity Type bed to chair /chair to bed  -LM      Transfer Training PT LTG, [place] Level minimum assist (75% patient effort)  -LM      Transfer Training PT LTG, Assist Device --   AAD  -LM      Transfer Training PT LTG, Additional Goal Maintaining TTWB LLE  -LM      Transfer Training PT LTG, Outcome goal ongoing  -LM      Wheelchair Propulsion PT LTG    Wheelchair Propulsion Goal PT LTG, Date Established 05/25/17  -LM      Wheelchair Propulsion Goal PT LTG, Time to Achieve by discharge  -LM      Wheelchair Propulsion Goal PT LTG, [place] Level contact guard assist  -LM      Wheelchair Propulsion Goal PT LTG, Distance to Achieve 50 feet  -LM       Wheelchair Propulsion Goal PT LTG, Outcome goal ongoing  -LM        User Key  (r) = Recorded By, (t) = Taken By, (c) = Cosigned By    Initials Name Provider Type    LM Kelly Mahmood, PT Physical Therapist    RUIZ Harris, RHODA Physical Therapy Assistant    NILDA Bro, [facility] Physical Therapy Assistant    GIOVANA Desouza, PTA Physical Therapy Assistant    MAURICIO Recio, [facility] Physical Therapy Assistant              Adult Rehabilitation Note       06/07/17 0915 06/06/17 1528 06/06/17 1328    Rehab Assessment/Intervention    Discipline physical therapy assistant  -RUIZ physical therapy assistant  -RW occupational therapy assistant  -RC    Document Type therapy note (daily note)  -RUIZ therapy note (daily note)  -RW therapy note (daily note)  -RC    Subjective Information agree to therapy  -JA agree to therapy  -RW agree to therapy  -RC    Patient Effort, Rehab Treatment good  -JA good  -RW good  -RC    Precautions/Limitations non-weight bearing status;fall precautions  -JA non-weight bearing status  -RW non-weight bearing status;fall precautions   ttwb on l le  -RC    Recorded by [JA] Darci Harris PTA [RW] Clay Recio PTA [RC] Celia Lema, ARMSTRONG/L    Vital Signs    Pre SpO2 (%)   97  -RC    O2 Delivery Pre Treatment   supplemental O2  -RC    Intra SpO2 (%)   86  -RC    O2 Delivery Intra Treatment   room air  -RC    Post SpO2 (%)   98  -RC    O2 Delivery Post Treatment   supplemental O2  -RC    Pre Patient Position Sitting  -JA  Sitting  -RC    Intra Patient Position Sitting  -JA  Standing  -RC    Post Patient Position Sitting  -JA  Supine  -RC    Recorded by [RUIZ] Darci Harris PTA  [RC] Celia Lema, ARMSTRONG/L    Pain Assessment    Pain Assessment No/denies pain  -JA No/denies pain  -RW No/denies pain  -RC    Pain Intervention(s) Medication (See MAR)  -JA      Recorded by [RUIZ] Darci Harris PTA [RW] Clay Recio PTA [RC] Celia Lema ARMSTRONG/L    Cognitive Assessment/Intervention     Current Cognitive/Communication Assessment  functional  -RW functional  -RC    Orientation Status  oriented x 4  -RW oriented x 4  -RC    Follows Commands/Answers Questions  100% of the time  -% of the time  -RC    Personal Safety Interventions  gait belt;nonskid shoes/slippers when out of bed  -RW gait belt;fall prevention program maintained;nonskid shoes/slippers when out of bed  -RC    Recorded by  [RW] Clay Recio PTA [RC] FARHAN RosenbaumA/L    Bed Mobility, Assessment/Treatment    Bed Mob, Sit to Supine, [place]   minimum assist (75% patient effort)  -RC    Recorded by   [RC] Celia Lema ARMSTRONG/L    Transfer Assessment/Treatment    Transfers, Chair-Bed [place]   contact guard assist  -RC    Transfers, Bed-Chair-Bed, Assist Device   rolling walker  -RC    Transfers, Sit-Stand [place]  stand by assist  -RW contact guard assist  -RC    Transfers, Stand-Sit [place]  stand by assist  -RW contact guard assist  -RC    Transfers, Sit-Stand-Sit, Assist Device  rolling walker  -RW rolling walker  -RC    Toilet Transfer, [place]   contact guard assist  -RC    Toilet Transfer, Assistive Device   rolling walker  -RC    Recorded by  [RW] Clay Recio PTA [RC] Celia Lema ARMSTRONG/L    Gait Assessment/Treatment    Gait, [place] Level  contact guard assist  -RW     Gait, Assistive Device  rolling walker  -RW     Gait, Distance (Feet)  10   x 2  -RW     Recorded by  [RW] Clay Recio PTA     Wheelchair Training/Management    Wheelchair, Distance Propelled   50  -RC    Recorded by   [RC] Celia Lema ARMSTRONG/L    Therapy Exercises    Bilateral Lower Extremities AROM:;20 reps;sitting;ankle pumps/circles;hip abduction/adduction;LAQ;glut sets  -JA AROM:;20 reps;sitting;hip abduction/adduction;LAQ;glut sets;knee flexion    x 2 sets  -RW     BLE Resistance  theraband  -RW     Bilateral Upper Extremity   --   ue bike 5 min, balloon vollyball 5 min  -RC    Recorded by [JA]  Darci Harris PTA [RW] Clay Recio PTA [RC] Celia Lema ARMSTRONG/L    Positioning and Restraints    Pre-Treatment Position sitting in chair/recliner  -JA sitting in chair/recliner  -RW sitting in chair/recliner  -RC    Post Treatment Position wheelchair  -JA wheelchair  -RW bed  -RC    In Bed   encouraged to call for assist;call light within reach;with family/caregiver  -RC    In Wheelchair  with family/caregiver;call light within reach  -RW     Recorded by [JA] Darci Harris PTA [RW] Clay Recio PTA [RC] Celia Lema ARMSTRONG/L      06/06/17 1040 06/06/17 0825 06/05/17 1500    Rehab Assessment/Intervention    Discipline physical therapy assistant  -RW occupational therapy assistant  -RC physical therapist  -LM    Document Type therapy note (daily note)  -RW therapy note (daily note)  -RC therapy note (daily note)  -LM    Subjective Information agree to therapy  -RW agree to therapy  -RC agree to therapy  -LM    Patient Effort, Rehab Treatment good  -RW good  -RC good  -LM    Precautions/Limitations fall precautions   ttwb  -RW fall precautions;non-weight bearing status   ttwb l le  -RC fall precautions;other (see comments)   TTWB LLE  -LM    Recorded by [RW] Clay Recio PTA [RC] FARHAN RosenbaumA/L [LM] Kelly Mahmood, CHANTEL    Vital Signs    Pretreatment Heart Rate (beats/min)   66  -LM    Posttreatment Heart Rate (beats/min)   68  -LM    Pre SpO2 (%)  93  -RC 94  -LM    O2 Delivery Pre Treatment  supplemental O2  -RC supplemental O2  -LM    Post SpO2 (%)  98  -RC 98  -LM    O2 Delivery Post Treatment  supplemental O2  -RC supplemental O2  -LM    Pre Patient Position  Sitting  -RC Sitting  -LM    Intra Patient Position  Standing  -RC     Post Patient Position  Sitting  -RC Sitting  -LM    Recorded by  [RC] PATTI Rosenbaum/PIPER [LM] Kelly Mahmood, CHANTEL    Pain Assessment    Pain Assessment No/denies pain  -RW No/denies pain  -RC No/denies pain  -LM    Recorded by [RW] Clay Recio PTA  [RC] PATTI Rosenbaum/PIPER [LM] Kelly Mahomod, PT    Cognitive Assessment/Intervention    Current Cognitive/Communication Assessment functional  -RW functional  -RC functional  -LM    Orientation Status oriented x 4  -RW oriented x 4  -RC oriented x 4  -LM    Follows Commands/Answers Questions 100% of the time  -% of the time  -% of the time;able to follow single-step instructions  -LM    Personal Safety   WNL/WFL  -LM    Personal Safety Interventions gait belt;nonskid shoes/slippers when out of bed  -RW  fall prevention program maintained;gait belt;muscle strengthening facilitated;nonskid shoes/slippers when out of bed  -LM    Recorded by [RW] Clay Recio, PTA [RC] PATTI Rosenbaum/PIPER [LM] Kelly Mahmood, PT    Bed Mobility, Assessment/Treatment    Bed Mobility, Comment   Up in chair upon entering room.  -LM    Recorded by   [LM] Kelly Mahmood, CHANTEL    Transfer Assessment/Treatment    Transfers, Sit-Stand [place] contact guard assist;stand by assist  -RW contact guard assist  -RC contact guard assist  -LM    Transfers, Stand-Sit [place] contact guard assist;stand by assist  -RW contact guard assist  -RC contact guard assist  -LM    Transfers, Sit-Stand-Sit, Assist Device rolling walker  -RW  rolling walker  -LM    Bathtub Transfer, [place]  contact guard assist  -RC     Bathtub Transfer, Assistive Device  rolling walker  -RC     Recorded by [RW] Clay Recio, PTA [RC] PATTI Rosenbaum/PIPER [LM] Kelly Mahmood, PT    Gait Assessment/Treatment    Gait, [place] Level contact guard assist  -RW  contact guard assist  -LM    Gait, Assistive Device rolling walker  -RW  rolling walker  -LM    Gait, Distance (Feet) 6    x 2  -RW  6   x 2  -LM    Gait, Gait Deviations antalgic;left:;step length decreased;stride length decreased  -RW      Gait, Maintain Weight Bearing Status   unable to maintain weight bearing status   Able to maintain for 2 steps, but difficulty once fatigued  -LM     Recorded by [RW] Clay Recio PTA  [LM] Kelly Mahmood, PT    Stairs Assessment/Treatment    Stairs, [place] Level --  -RW  not tested  -LM    Recorded by [RW] Clay Recio PTA  [LM] Kelly Mahmood, PT    Wheelchair Training/Management    Wheelchair, Distance Propelled 170  -RW  120 feet with SBA  -LM    Wheelchair Training Comment mod ind  -RW      Recorded by [RW] Clay Recio, RHODA  [LM] Kelly Mahmood, PT    Upper Body Bathing Assessment/Training    UB Bathing Assess/Train Assistive Device  hand-held shower head;grab bars;shower chair with back;long-handled sponge  -RC     UB Bathing Assess/Train, Position  sitting  -RC     UB Bathing Assess/Train, [place] Level  conditional independence  -RC     Recorded by  [RC] FARHAN RosenbaumA/L     Lower Body Bathing Assessment/Training    LB Bathing Assess/Train Assistive Device  hand-held shower head;long-handled sponge;shower chair with back  -RC     LB Bathing Assess/Train, Position  sitting  -RC     LB Bathing Assess/Train, [place] Level  stand by assist  -RC     Recorded by  [RC] FARHAN RosenbaumA/L     Upper Body Dressing Assessment/Training    UB Dressing Assess/Train, Clothing Type  doffing:;donning:;bra;pull over;t-shirt  -RC     UB Dressing Assess/Train, Position  sitting  -RC     UB Dressing Assess/Train, [place]  minimum assist (75% patient effort)   to hook bra  -RC     Recorded by  [RC] FARHAN RosenbaumA/L     Lower Body Dressing Assessment/Training    LB Dressing Assess/Train, Clothing Type  donning:;pants;shoes  -RC     LB Dressing Assess/Train, Assist Device  long-handled shoe horn;dressing stick;reacher  -RC     LB Dressing Assess/Train, Position  sitting;standing  -RC     LB Dressing Assess/Train, [place]  minimum assist (75% patient effort)  -RC     Recorded by  [RC] PATTI Rosenbaum/L     Grooming Assessment/Training    Grooming Assess/Train, Position  sitting  -RC     Grooming Assess/Train, [place] Level   conditional independence  -RC     Recorded by  [RC] FARHAN RosenbaumA/L     Therapy Exercises    Bilateral Lower Extremities AROM:;20 reps;sitting;hip abduction/adduction;LAQ;glut sets;knee flexion    x 2 sets  -RW  AROM:;20 reps;sitting;hip flexion;hip abduction/adduction;LAQ;glut sets   AAROM on L with hip flex  -LM    BLE Resistance theraband  -RW      Recorded by [RW] Clay Recio PTA  [LM] Kelly Mahmood, PT    Positioning and Restraints    Pre-Treatment Position sitting in chair/recliner  -RW sitting in chair/recliner  -RC sitting in chair/recliner  -LM    Post Treatment Position wheelchair  -RW wheelchair  -RC wheelchair  -LM    In Bed  with family/caregiver;encouraged to call for assist  -RC     In Wheelchair patient within staff view  -RW  sitting;call light within reach;with family/caregiver  -LM    Recorded by [RW] Clay Recio PTA [RC] PATTI Rosenbaum/PIPER [LM] Kelly Mahmood, PT      06/05/17 1345 06/05/17 1300 06/05/17 1110    Rehab Assessment/Intervention    Discipline occupational therapy assistant  -LMA  occupational therapy assistant  -LMA    Document Type therapy note (daily note)  -LMA  therapy note (daily note)  -LMA    Subjective Information agree to therapy  -LMA  agree to therapy  -LMA    Patient Effort, Rehab Treatment   good  -LMA    Precautions/Limitations   fall precautions;other (see comments)   ttwb  -LMA    Recorded by [LMA] Genet Hicks ARMSTRONG/L  [LMA] Genet Hicks ARMSTRONG/L    Pain Assessment    Pain Assessment No/denies pain   pt states just soreness   -LMA No/denies pain  -LMA No/denies pain  -LMA    Pain Intervention(s) Medication (See MAR)  -LMA      Recorded by [LMA] FARHAN YeagerA/PIPER [LMA] FARHAN YeagerA/PPIER [LMA] Genet Hicks ARMSTRONG/L    Cognitive Assessment/Intervention    Current Cognitive/Communication Assessment   functional  -LMA    Recorded by   [LMA] Genet Hicks ARMSTRONG/L    Bed Mobility, Assessment/Treatment    Bed Mob, Supine to  Sit, [place] contact guard assist  -LMA      Recorded by [LMA] Genet Hicks ARMSTRONG/L      Transfer Assessment/Treatment    Transfers, Bed-Chair [place] contact guard assist  -LMA      Transfers, Sit-Stand [place]   --   wheelchair push ups x 10 reps   -LMA    Transfers, Stand-Sit [place] contact guard assist  -LMA      Recorded by [LMA] FARHAN YeagerA/PIPER  [LMA] FARHAN YeagerA/L    Wheelchair Training/Management    Wheelchair, Distance Propelled 50  -LMA  120 sba  -LMA    Recorded by [LMA] FARHAN YeagerA/PIPER  [LMA] FARHAN YeagerA/L    Therapy Exercises    Bilateral Upper Extremity AROM:;10 reps;elbow flexion/extension;shoulder rolls/shrugs;shoulder protraction/retraction;shoulder extension/flexion   2 lb bicep curls and 1 shld flex and should abd add  -LMA  --   UEE bike x 15 min with 1 rb  -LMA    BUE Resistance manual resistance- minimal   and perf yellow tband across chest 2x10 with A + over pulley  -LMA      Recorded by [LMA] FARHAN YeagerA/PIPER  [LMA] FARHAN YeagerA/L    Positioning and Restraints    Pre-Treatment Position in bed  -LMA  sitting in chair/recliner  -LMA    Post Treatment Position wheelchair  -LMA  wheelchair  -LMA    Recorded by [LMA] FARHAN YeagerA/PIPER  [LMA] Genet Hicks ARMSTRONG/L      User Key  (r) = Recorded By, (t) = Taken By, (c) = Cosigned By    Initials Name Effective Dates    LM Kelly Mahmood, PT 06/15/16 -     RUIZ Harris, PTA 10/17/16 -     MAURICIO Recio, PTA 10/17/16 -     MELLY Lema, ARMSTRONG/L 10/17/16 -     LMA FARHAN YeagerA/PIPER 10/17/16 -           PT Recommendation and Plan  Anticipated Discharge Disposition: skilled nursing facility  Planned Therapy Interventions: balance training, bed mobility training, gait training, home exercise program, motor coordination training, neuromuscular re-education, patient/family education, postural re-education, ROM (Range of Motion), stair  training, strengthening, stretching, transfer training, wheelchair management/propulsion training  PT Frequency: other (see comments) (5-14 times/wk)  Plan of Care Review  Plan Of Care Reviewed With: patient, family  Progress: progress toward functional goals as expected  Outcome Summary/Follow up Plan: Pt. met 2/5 goals prior to D/C to NH this a.m.           Outcome Measures       06/07/17 0915 06/06/17 1100 06/06/17 0825    How much help from another person do you currently need...    Turning from your back to your side while in flat bed without using bedrails? 3  -JA 3  -RW     Moving from lying on back to sitting on the side of a flat bed without bedrails? 3  -JA 3  -RW     Moving to and from a bed to a chair (including a wheelchair)? 3  -JA 3  -RW     Standing up from a chair using your arms (e.g., wheelchair, bedside chair)? 3  -JA 3  -RW     Climbing 3-5 steps with a railing? 1  -JA 1  -RW     To walk in hospital room? 3  -JA 3  -RW     AM-PAC 6 Clicks Score 16  -JA 16  -RW     How much help from another is currently needed...    Putting on and taking off regular lower body clothing?   3  -RC    Bathing (including washing, rinsing, and drying)   3  -RC    Toileting (which includes using toilet bed pan or urinal)   3  -RC    Putting on and taking off regular upper body clothing   3  -RC    Taking care of personal grooming (such as brushing teeth)   4  -RC    Eating meals   4  -RC    Score   20  -RC    Functional Assessment    Outcome Measure Options AM-PAC 6 Clicks Basic Mobility (PT)  -JA        06/05/17 0838          How much help from another person do you currently need...    Turning from your back to your side while in flat bed without using bedrails? 3  -LM      Moving from lying on back to sitting on the side of a flat bed without bedrails? 3  -LM      Moving to and from a bed to a chair (including a wheelchair)? 3  -LM      Standing up from a chair using your arms (e.g., wheelchair, bedside chair)? 3   -LM      Climbing 3-5 steps with a railing? 1  -LM      To walk in hospital room? 3  -LM      AM-PAC 6 Clicks Score 16  -LM      Functional Assessment    Outcome Measure Options AM-PAC 6 Clicks Basic Mobility (PT)  -LM        User Key  (r) = Recorded By, (t) = Taken By, (c) = Cosigned By    Initials Name Provider Type    LM Kelly Mahmood, PT Physical Therapist    RUIZ Harris PTA Physical Therapy Assistant    RW Clay Recio, RHODA Physical Therapy Assistant    MELLY Lema ARMSTRONG/L Occupational Therapy Assistant           Time Calculation:         PT Charges       06/07/17 0954          Time Calculation    Start Time 0915  -JA      Stop Time 0945  -RUIZ      Time Calculation (min) 30 min  -RUIZ      Time Calculation- PT    Total Timed Code Minutes- PT 30 minute(s)  -RUIZ        User Key  (r) = Recorded By, (t) = Taken By, (c) = Cosigned By    Initials Name Provider Type    RUIZ Harris PTA Physical Therapy Assistant          Therapy Charges for Today     Code Description Service Date Service Provider Modifiers Qty    07423934050 HC PT THER PROC EA 15 MIN 6/7/2017 Darci Harris PTA GP 2          PT G-Codes  PT Professional Judgement Used?: Yes  Outcome Measure Options: AM-PAC 6 Clicks Basic Mobility (PT)  Score: 8  Functional Limitation: Mobility: Walking and moving around  Mobility: Walking and Moving Around Current Status (): At least 80 percent but less than 100 percent impaired, limited or restricted  Mobility: Walking and Moving Around Goal Status (): At least 40 percent but less than 60 percent impaired, limited or restricted    PT Discharge Summary  Anticipated Discharge Disposition: skilled nursing facility  Reason for Discharge: Discharge from facility, Per MD order  Outcomes Achieved: Patient able to partially acheive established goals  Discharge Destination: SNF    Darci Harris PTA  6/7/2017

## 2017-06-07 NOTE — PLAN OF CARE
Problem: Patient Care Overview (Adult)  Goal: Plan of Care Review  Outcome: Ongoing (interventions implemented as appropriate)  Goal: Adult Individualization and Mutuality  Outcome: Ongoing (interventions implemented as appropriate)  Goal: Discharge Needs Assessment  Outcome: Ongoing (interventions implemented as appropriate)    Problem: Fractured Hip (Adult)  Goal: Signs and Symptoms of Listed Potential Problems Will be Absent or Manageable (Fractured Hip)  Outcome: Ongoing (interventions implemented as appropriate)    Problem: Fall Risk (Adult)  Goal: Absence of Falls  Outcome: Ongoing (interventions implemented as appropriate)    Problem: Functional Deficit (Adult,Obstetrics,Pediatric)  Goal: Signs and Symptoms of Listed Potential Problems Will be Absent or Manageable (Functional Deficit)  Outcome: Ongoing (interventions implemented as appropriate)    06/07/17 0439   Functional Deficit   Problems Assessed (Functional Deficit) all   Problems Present (Functional Deficit) pain;functional activity tolerance impairment;mobility impairment;muscle strength impairment

## 2017-06-07 NOTE — PLAN OF CARE
Problem: Inpatient Occupational Therapy  Goal: Static Standing Balance Goal OT- STG  Outcome: Unable to achieve outcome(s) by discharge Date Met:  06/07/17 06/07/17 1324   Static Standing Balance OT STG   Static Standing Balance OT STG, Date Goal Reviewed 06/07/17   Static Standing Balance OT STG, Outcome goal partially met       Goal: ADL Goal LTG- OT  Outcome: Unable to achieve outcome(s) by discharge Date Met:  06/07/17 06/07/17 1324   ADL OT LTG   ADL OT LTG, Date Goal Reviewed 06/07/17   ADL OT LTG, Outcome goal partially met

## 2017-06-22 PROBLEM — J18.9 PNEUMONIA OF LEFT LOWER LOBE DUE TO INFECTIOUS ORGANISM: Status: ACTIVE | Noted: 2017-01-01

## 2017-06-22 PROBLEM — I50.43 ACUTE ON CHRONIC COMBINED SYSTOLIC AND DIASTOLIC CONGESTIVE HEART FAILURE (HCC): Chronic | Status: ACTIVE | Noted: 2017-01-01

## 2017-06-22 PROBLEM — R09.02 HYPOXEMIA: Status: ACTIVE | Noted: 2017-01-01

## 2017-06-22 PROBLEM — D64.9 ANEMIA: Status: ACTIVE | Noted: 2017-01-01

## 2017-06-22 PROBLEM — J90 PLEURAL EFFUSION: Status: ACTIVE | Noted: 2017-01-01

## 2017-07-05 NOTE — TELEPHONE ENCOUNTER
Lori called from [place], pt Hgb 7.2, wanting to know if Oumar wanted blood transfusion.  Per MD-Oumar, collect additional lab-folate, vitamin B-12, iron studies, and ferritin.  Nursing Home to collect---will review lab results to consider IV iron infusions.  Ayaka May RN  July 5, 2017  3:55 PM

## 2017-07-07 NOTE — PROGRESS NOTES
TWO PATIENT IDENTIFIERS WERE USED. CONSENT WAS SIGNED PER FAMILY EDUCATION MATERIAL WAS GIVEN TO PATIENT AND / OR FAMILY. THE PATIENT WAS DRAPED WITH FULL BODY DRAPE AND PATIENT'SRIGHT   ARM WAS PREPPED WITH CHLORAPREP.  ULTRASOUND WAS USED TO LOCALIZE THERIGHT BASILIC VEIN. SUBCUTANEOUS TISSUE AT THE CATHETER SITE WAS INFILTRATED WITH 2% LIDOCAINE. UNDER ULTRASOUND GUIDANCE, THE VEIN WAS ACCESSED WITH A 21GAUGE  NEEDLE. AN 0.018 WIRE WAS THEN THREADED THROUGH THE NEEDLE INTO THE CENTRAL VENOUS SYSTEM. THE 21GAUGE  NEEDLE WAS REMOVED AND A 5 FRENCHPEEL AWAY SHEATH WAS PLACED OVER THE WIRE. THE PICC LINE CATHETER WAS CUT AT 32 CM. THE PICC LINE CATHETER WAS THEN PLACED OVER THE WIRE INTO THE VEIN, THE SHEATH WAS PEELED AWAY,WIRE WAS REMOVED. CATHETER WAS FLUSHED WITH NORMAL SALINE AND TIPS APPLIED. BIOPATCH PLACED. CATHETER SECURED WITHSTATLOCK  AND TEGADERM. PATIENT TOLERATED PROCEDURE WELL. THIS WAS DONE IN   ANGIOSUITE      IMPRESSION: SUCCESSFUL PLACEMENT OF SINGLE LUMEN SOLO PICC        Janine Cordero  7/7/2017  12:15 PM

## 2017-07-10 PROBLEM — Z45.2 ENCOUNTER FOR VENOUS ACCESS DEVICE CARE: Status: ACTIVE | Noted: 2017-01-01

## 2017-07-10 NOTE — PROGRESS NOTES
Adult Outpatient Nutrition  Assessment    Patient Name:  Mariela Woodson  YOB: 1925  MRN: [id number]        Assessment Date:  7/10/2017                        Comments: Very pleasant 89WF visiting [facility] due to anemia (getting blood). Daughter stated   patient recovering from recent hip fx. Surgical repair required. At [facility] for rehabilitation. Patient not enjoying the NH meals.  Daughter is providing some meals from home. Explained that order can be obtained from MD if supplements  Desired. Daughter agreed to let RD know if order needed. No recent weight avail.        Electronically signed by:  Arlene Akhtar RD  07/10/17 1:06 PM

## 2017-07-10 NOTE — PROGRESS NOTES
Blood draw only, multiple antibiodies, pt will come back tomorrow for blood transfusion.  Ayaka May RN  July 10, 2017  11:00 AM

## 2017-07-11 NOTE — PROGRESS NOTES
Called [facility], spoke with Bc--request for [facility] to call PACS to arrange transportation today.  Called [facility], spoke with Kelly, [facility] will call PACS to arrange transportation for today's blood transfusion.  Spoke with Kylie in Blood Bank, blood is here and ready to prepare for patient.  Ayaka May RN  July 11, 2017  8:37 AM

## 2017-07-12 NOTE — TELEPHONE ENCOUNTER
PICC will be source for infection.  In case we need again in future will get it placed again. If nursing home wants to check in sooner than 1 month it is ok. But 1 month receheck is ok from my point of view.

## 2017-07-12 NOTE — TELEPHONE ENCOUNTER
"Daughter called, she does not want to wait a month to have CBC re-check.  She states--\"pt is more confused when her blood is low and she is still not herself.\"  She request that we not d/c PICC line immediately.  She would like In[facility] to reconsider rechecking CBC sooner that 1 month.  I informed daughter that I would send [facility] message.  Daughter informed that if PICC line is not d/c by Friday she will need to let nursing home flush and change dressing or we will need to transport patient to  for us to do.  Daughter wants to call us back tomorrow.  Ayaka May RN  July 12, 2017  3:49 PM    "

## 2017-07-13 NOTE — TELEPHONE ENCOUNTER
Called daughter, explained the risk for infection, and MD-Oumar recommendation. Verbal understanding by daughter.  Patient will come tomorrow to d/c PICC line.  Daughter will report any s/s-short of air, fatigue, palor, rapid heart beat/palpitation to the nursing home if Hgb needs to be checked before [facility]-1 month RTC.  Ayaka May RN  July 13, 2017  9:14 AM

## 2017-07-28 NOTE — PROGRESS NOTES
Mariela Woodson is a 89 y.o. female returns for     Chief Complaint   Patient presents with   • Left Hip - Post-op       HISTORY OF PRESENT ILLNESS: Procedure(s):  HIP INTERTROCHANTERIC NAILING - left done on 5/22/2017. xrays done today in office.   Has started walking with a walker and is doing very well.     CONCURRENT MEDICAL HISTORY:    Past Medical History:   Diagnosis Date   • CHF (congestive heart failure)    • COPD (chronic obstructive pulmonary disease)    • Hypothyroidism    • Iron deficiency anemia    • Polio 1953   • Seizures        Allergies   Allergen Reactions   • Valium [Diazepam]          Current Outpatient Prescriptions:   •  albuterol (PROVENTIL HFA;VENTOLIN HFA) 108 (90 BASE) MCG/ACT inhaler, Inhale 2 puffs Every 6 (Six) Hours., Disp: , Rfl:   •  ALPRAZolam (XANAX) 0.25 MG tablet, Take 1 tablet by mouth Daily. (Patient taking differently: Take 0.25 mg by mouth Every Night.), Disp: 30 tablet, Rfl: 0  •  aspirin 81 MG tablet, Take 1 tablet by mouth Daily., Disp: 1 tablet, Rfl: 1  •  bimatoprost (LUMIGAN) 0.01 % ophthalmic drops, Administer 1 drop to the right eye Daily., Disp: , Rfl:   •  dorzolamide-timolol (COSOPT) 22.3-6.8 MG/ML ophthalmic solution, Administer  to the right eye 2 (Two) Times a Day., Disp: , Rfl:   •  fluticasone (FLONASE) 50 MCG/ACT nasal spray, 2 sprays by Each Nare route Daily., Disp: , Rfl:   •  furosemide (LASIX) 20 MG tablet, Take 2 tablets by mouth Daily., Disp: 30 tablet, Rfl: 1  •  lansoprazole (PREVACID) 15 MG capsule, Take 15 mg by mouth Daily., Disp: , Rfl:   •  levothyroxine (SYNTHROID, LEVOTHROID) 75 MCG tablet, Take 1 tablet by mouth Daily., Disp: 30 tablet, Rfl: 1  •  lidocaine (LIDODERM) 5 %, Place 1 patch on the skin Daily. Remove & Discard patch within 12 hours or as directed by MD, Disp: 20 patch, Rfl: 1  •  Multiple Vitamins-Minerals (PRESERVISION AREDS) capsule, Take  by mouth 2 (Two) Times a Day., Disp: , Rfl:   •  phenytoin (DILANTIN) 100 MG ER capsule,  Take 100 mg by mouth Every Night., Disp: , Rfl:   •  potassium chloride (MICRO-K) 10 MEQ CR capsule, Take 2 capsules by mouth Daily., Disp: 30 capsule, Rfl: 1  •  promethazine-codeine (PHENERGAN with CODEINE) 6.25-10 MG/5ML syrup, Take 2.5 mL by mouth Every Night., Disp: 180 mL, Rfl: 2  •  propranolol (INDERAL) 20 MG tablet, Take 20 mg by mouth 2 (Two) Times a Day., Disp: , Rfl:   •  sennosides-docusate sodium (SENOKOT-S) 8.6-50 MG tablet, Take 1 tablet by mouth 2 (Two) Times a Day., Disp: 1 tablet, Rfl: 1    Past Surgical History:   Procedure Laterality Date   • CATARACT EXTRACTION     • HIP INTERTROCHANTERIC NAILING Left 5/22/2017    Procedure: HIP INTERTROCHANTERIC NAILING - left - short;  Surgeon: Ramos Handley MD;  Location: [facility];  Service:        [facility]  No fevers or chills.  No chest pain or shortness of air.  No GI or  disturbances.    PHYSICAL EXAMINATION:       There were no vitals taken for this visit.    Physical Exam   Constitutional: She is oriented to person, place, and time. She appears well-developed and well-nourished.   Neurological: She is alert and oriented to person, place, and time.   Psychiatric: She has a normal mood and affect. Her behavior is normal. Judgment and thought content normal.       GAIT:     []  Normal  [x]  Antalgic    Assistive device: []  None  [x]  Walker     []  Crutches  []  Cane     [x]  Wheelchair  []  Stretcher    Left Hip Exam     Comments:  Good hip motion actively.  Good distal pulses and sensation.  Mild swelling in the lower leg.                Xr Femur 2 View Left    Result Date: 7/28/2017  Narrative: AP and lateral of left femur show acceptable position and alignment of the subtrochanteric fracture status post intramedullary rodding.  Progressive healing is noted at the fracture site.  No sign of implant loosening or failure is noted.  No other acute bony abnormality is noted. 07/28/17 at 11:04 AM by Ramos Handley MD     Us Guided Vascular  Access    Result Date: 7/7/2017  Narrative: This procedure was auto-finalized with no dictation required.    Xr Pelvis 1 Or 2 View    Result Date: 7/28/2017  Narrative: AP standing pelvis shows acceptable position and alignment with mild pelvic obliquity to the left side.  He has evidence of chronic degenerative changes in the lower lumbar spine.  Right hip shows moderate osteoarthritic changes with no acute finding.  Left hip shows subtrochanteric femur fracture with progressive healing status post intramedullary rodding.  No sign of implant loosening or failure is noted. 07/28/17 at 11:05 AM by Ramos Handley MD     Xr Chest Post Cva Port    Result Date: 7/7/2017  Narrative: A single view. CLINICAL INDICATION: PICC line placement. COMPARISON: Chest PA and lateral June 28, 2017. FINDINGS: Cardiomegaly. Lung fields clear. Dense calcification, bone island medial aspect left first rib unchanged since prior exams.     Impression: CONCLUSION: Right arm PICC line catheter. Catheter tip in superior vena cava in satisfactory position. Electronically signed by:  Lew Waldron MD  7/7/2017 11:58 AM CDT Workstation: [id number]    Ir Picc Wo Fluoro Guidance    Result Date: 7/7/2017  Narrative: This procedure was auto-finalized with no dictation required.          ASSESSMENT:    Diagnoses and all orders for this visit:    Closed nondisplaced intertrochanteric fracture of left femur with routine healing, subsequent encounter  -     Ambulatory Referral to Physical Therapy Evaluate and treat  -     XR Femur 2 View Left; Future          PLAN    Progress weight bearing as tolerated.  Continue use of walker.  Slowly progress as pain and mobility allows.  No restrictions.      Return for plan repeat xrays by mobile xray in 6 weeks..    Ramos Handley MD

## 2017-08-04 NOTE — ED TRIAGE NOTES
Pt presents to ED from nursing home by Dr. Kaur with asymptomatic anemia. Pt presented with a hemoglobin of 6.3 at the nursing home.

## 2017-08-04 NOTE — ED PROVIDER NOTES
"Subjective   HPI Comments: Pt sent in for Hgb of 6.3.  Pt had hip surgery 5/2017 and has had problems with anemia since.  Pt has had 2 prior transfusions after her surgery.  H/o PUD.  Daughter reports that pt had \"pink tinge\" on the toilet paper the other day after a bowel movement.    Patient is a 89 y.o. female presenting with general illness.   Illness   Severity:  Moderate  Onset quality:  Gradual  Timing:  Constant  Progression:  Worsening  Chronicity:  Recurrent  Associated symptoms: no abdominal pain, no chest pain, no congestion, no cough, no diarrhea, no ear pain, no fatigue, no fever, no headaches, no loss of consciousness, no myalgias, no nausea, no rash, no rhinorrhea, no shortness of breath, no sore throat, no vomiting and no wheezing        Review of Systems   Constitutional: Negative for activity change, appetite change, chills, fatigue and fever.   HENT: Negative for congestion, ear pain, rhinorrhea and sore throat.    Eyes: Negative.  Negative for discharge and redness.   Respiratory: Negative.  Negative for cough, chest tightness, shortness of breath and wheezing.    Cardiovascular: Negative.  Negative for chest pain, palpitations and leg swelling.   Gastrointestinal: Negative.  Negative for abdominal pain, diarrhea, nausea and vomiting.   Genitourinary: Negative for difficulty urinating, dysuria, flank pain and urgency.   Musculoskeletal: Negative.  Negative for arthralgias, back pain, joint swelling, myalgias and neck pain.   Skin: Negative.  Negative for color change and rash.   Neurological: Positive for weakness. Negative for dizziness, seizures, loss of consciousness, speech difficulty, numbness and headaches.   Psychiatric/Behavioral: Negative for behavioral problems.   All other systems reviewed and are negative.      Past Medical History:   Diagnosis Date   • CHF (congestive heart failure)    • COPD (chronic obstructive pulmonary disease)    • Hypothyroidism    • Iron deficiency anemia  "   • Polio 1953   • Seizures        Allergies   Allergen Reactions   • Valium [Diazepam]        Past Surgical History:   Procedure Laterality Date   • CATARACT EXTRACTION     • HIP INTERTROCHANTERIC NAILING Left 5/22/2017    Procedure: HIP INTERTROCHANTERIC NAILING - left - short;  Surgeon: Ramos Handley MD;  Location: [facility];  Service:        Family History   Problem Relation Age of Onset   • Breast cancer Mother    • Colon cancer Father    • Prostate cancer Father    • Breast cancer Sister    • Prostate cancer Brother    • Breast cancer Paternal Aunt    • Breast cancer Daughter    • Melanoma Daughter    • Cancer Son    • Leukemia Brother        Social History     Social History   • Marital status:      Spouse name: N/A   • Number of children: N/A   • Years of education: N/A     Social History Main Topics   • Smoking status: Former Smoker     Packs/day: 0.50     Types: Cigarettes     Quit date: 1987   • Smokeless tobacco: None   • Alcohol use No   • Drug use: No   • Sexual activity: Defer     Other Topics Concern   • None     Social History Narrative           Objective   Physical Exam   Constitutional: She is oriented to person, place, and time. She appears well-developed and well-nourished.   HENT:   Head: Normocephalic and atraumatic.   Mouth/Throat: Oropharynx is clear and moist.   Eyes: Conjunctivae and EOM are normal. Pupils are equal, round, and reactive to light.   Neck: Normal range of motion. Neck supple.   Cardiovascular: Normal rate, regular rhythm and normal heart sounds.  Exam reveals no gallop and no friction rub.    No murmur heard.  Pulmonary/Chest: Effort normal and breath sounds normal. She has no wheezes. She has no rales.   Abdominal: Soft. Bowel sounds are normal. She exhibits no mass. There is no tenderness. There is no rebound and no guarding.   Musculoskeletal: Normal range of motion. She exhibits no tenderness.   Neurological: She is alert and oriented to person, place,  and time. She has normal reflexes. No cranial nerve deficit.   Skin: Skin is warm and dry. There is pallor.   Nursing note and vitals reviewed.      Procedures         ED Course  ED Course      Spoke with Dr Forman and will admit for transfusion            MDM  Number of Diagnoses or Management Options  Anemia, unspecified type:       Final diagnoses:   Anemia, unspecified type            Renato Abraham MD  09/24/17 1955

## 2017-08-04 NOTE — PROGRESS NOTES
[facility] Medicine Admission      Date of Admission: 8/4/2017      Primary Care Physician: Benito Kaur MD      Chief Complaint: Nausea     HPI:Thisis a pleasant 89 y old woman who is at [facility] for rehab after  Hip surgery by Dr Handley . Patient has a hx of iron deficiency  Anemia followed by Dr Oseguera and had a remote hx of pud . She get transfused on a regular basis at the nursing home   Today her Hb was 6.3 she was sent to our ED   Few days ago while  Cleaning her the staff noticed some blood but it was a one time  Episode , she does not report any bleeding . She just felt nauseated . She denies chest pain  Or sob     Past Medical History:  has a past medical history of CHF (congestive heart failure); COPD (chronic obstructive pulmonary disease); Hypothyroidism; Iron deficiency anemia; Polio (1953); and Seizures.    Past Surgical History:  has a past surgical history that includes Cataract extraction and Hip Intertrochanteric Nailing (Left, 5/22/2017).    Family History: family history includes Breast cancer in her daughter, mother, paternal aunt, and sister; Cancer in her son; Colon cancer in her father; Leukemia in her brother; Melanoma in her daughter; Prostate cancer in her brother and father.    Social History:  reports that she quit smoking about 30 years ago. Her smoking use included Cigarettes. She smoked 0.50 packs per day. She does not have any smokeless tobacco history on file. She reports that she does not drink alcohol or use illicit drugs.    Allergies:   Allergies   Allergen Reactions   • Valium [Diazepam]        Medications:   Prior to Admission medications    Medication Sig Start Date End Date Taking? Authorizing Provider   albuterol (PROVENTIL HFA;VENTOLIN HFA) 108 (90 BASE) MCG/ACT inhaler Inhale 2 puffs Every 6 (Six) Hours. 4/6/17 8/5/17 Yes Historical Provider, MD   ALPRAZolam (XANAX) 0.25 MG tablet Take 1 tablet by mouth  Daily.  Patient taking differently: Take 0.25 mg by mouth Every Night. 6/7/17  Yes Tariq Aparicio MD   aspirin 81 MG tablet Take 1 tablet by mouth Daily. 6/7/17  Yes Tariq Aparicio MD   bimatoprost (LUMIGAN) 0.01 % ophthalmic drops Administer 1 drop to the right eye Daily.   Yes Historical Provider, MD   dorzolamide-timolol (COSOPT) 22.3-6.8 MG/ML ophthalmic solution Administer  to the right eye 2 (Two) Times a Day.   Yes Historical Provider, MD   fluticasone (FLONASE) 50 MCG/ACT nasal spray 2 sprays by Each Nare route Daily. 1/19/17  Yes Historical Provider, MD   furosemide (LASIX) 20 MG tablet Take 2 tablets by mouth Daily. 6/28/17  Yes Jim Badillo MD   lansoprazole (PREVACID) 15 MG capsule Take 15 mg by mouth Daily.   Yes Historical Provider, MD   levothyroxine (SYNTHROID, LEVOTHROID) 75 MCG tablet Take 1 tablet by mouth Daily. 6/28/17  Yes Jim Badillo MD   lidocaine (LIDODERM) 5 % Place 1 patch on the skin Daily. Remove & Discard patch within 12 hours or as directed by MD 6/7/17  Yes Tariq Aparicio MD   Multiple Vitamins-Minerals (PRESERVISION AREDS) capsule Take  by mouth 2 (Two) Times a Day.   Yes Historical Provider, MD   phenytoin (DILANTIN) 100 MG ER capsule Take 100 mg by mouth Every Night. 12/19/16  Yes Historical Provider, MD   potassium chloride (MICRO-K) 10 MEQ CR capsule Take 2 capsules by mouth Daily. 6/28/17  Yes Jim Badillo MD   promethazine-codeine (PHENERGAN with CODEINE) 6.25-10 MG/5ML syrup Take 2.5 mL by mouth Every Night. 6/7/17  Yes Tariq Aparicio MD   propranolol (INDERAL) 20 MG tablet Take 20 mg by mouth 2 (Two) Times a Day. 1/25/17  Yes Historical Provider, MD   sennosides-docusate sodium (SENOKOT-S) 8.6-50 MG tablet Take 1 tablet by mouth 2 (Two) Times a Day. 6/7/17  Yes Tariq Aparicio MD         Review of Systems:  Review of Systems   Constitutional: Negative for activity change, appetite change, chills, diaphoresis, fatigue, fever and  unexpected weight change.   HENT: Negative.  Negative for congestion, dental problem, drooling, ear discharge, ear pain, facial swelling, hearing loss, mouth sores, nosebleeds, postnasal drip, rhinorrhea, sinus pressure, sneezing, tinnitus, trouble swallowing and voice change.    Eyes: Negative for photophobia and discharge.   Respiratory: Negative for apnea, cough, choking, shortness of breath, wheezing and stridor.    Cardiovascular: Negative for chest pain, palpitations and leg swelling.   Gastrointestinal: Positive for nausea. Negative for abdominal pain, anal bleeding, blood in stool, constipation, diarrhea, rectal pain and vomiting.   Endocrine: Negative for cold intolerance, heat intolerance, polydipsia, polyphagia and polyuria.   Genitourinary: Negative for dysuria, enuresis, frequency, hematuria and urgency.   Musculoskeletal: Negative for arthralgias, back pain, joint swelling, myalgias, neck pain and neck stiffness.   Skin: Negative for color change, pallor, rash and wound.   Allergic/Immunologic: Negative for food allergies and immunocompromised state.   Neurological: Positive for weakness. Negative for dizziness, tremors, seizures, syncope, facial asymmetry, speech difficulty, light-headedness, numbness and headaches.   Hematological: Negative for adenopathy.   Psychiatric/Behavioral: Negative for agitation, behavioral problems, confusion, hallucinations, sleep disturbance and suicidal ideas. The patient is not nervous/anxious.       Otherwise complete ROS is negative except as mentioned above.    Physical Exam:   Temp:  [97.5 °F (36.4 °C)] 97.5 °F (36.4 °C)  Heart Rate:  [59] 59  Resp:  [18] 18  BP: (127)/(56) 127/56  Physical Exam   Constitutional: She is oriented to person, place, and time. She appears well-developed and well-nourished. No distress.   HENT:   Head: Normocephalic and atraumatic.   Mouth/Throat: Mucous membranes are dry.   Eyes: EOM are normal. Pupils are equal, round, and reactive to  light.   Neck: Normal range of motion. Neck supple. No JVD present. No tracheal deviation present. No thyromegaly present.   Cardiovascular: Normal rate and regular rhythm.  Exam reveals no gallop and no friction rub.    No murmur heard.  Pulmonary/Chest: No stridor.   Abdominal: Soft. Bowel sounds are normal. She exhibits no distension and no ascites. There is no hepatosplenomegaly. There is no tenderness.   Musculoskeletal: She exhibits edema.   Neurological: She is alert and oriented to person, place, and time. She has normal reflexes. No cranial nerve deficit.   Skin: Skin is warm and dry. She is not diaphoretic.   Psychiatric: She has a normal mood and affect. Her speech is normal and behavior is normal. Judgment and thought content normal. Cognition and memory are normal.         Results Reviewed:  I have personally reviewed current lab, radiology, and data and agree with results.  Lab Results (last 24 hours)     Procedure Component Value Units Date/Time    Extra Tubes [[id number]] Collected:  08/04/17 1551    Specimen:  Blood from Blood, Venous Line Updated:  08/04/17 1556    Narrative:       The following orders were created for panel order Extra Tubes.  Procedure                               Abnormality         Status                     ---------                               -----------         ------                     Gold Top - SST[[id number]]                                   In process                   Please view results for these tests on the individual orders.    [facility] [[id number]] Collected:  08/04/17 1551    Specimen:  Blood Updated:  08/04/17 1556        Imaging Results (last 24 hours)     ** No results found for the last 24 hours. **            Assessment:  Iron def anemia             Plan:  Admit to med surg floor   Type and screen   Transfuse one  Unit today then another one tomorrow  Iron profile vit b12 folate ferritin   Stool for occult blood   Iv zofran   Continue home meds as   Medical course dictates   Dvt prophylaxis wes flor (family refusing blood thinners and hb is very low)  Talked to DR Oseguera over the phone  He will see her as out patient     I discussed the patients findings and my recommendations with: patient and daughter     Jerrica Forman MD  08/04/17  4:36 PM

## 2017-08-05 NOTE — PROGRESS NOTES
TWO PATIENT IDENTIFIERS WERE USED. THE PATIENT WAS DRAPED WITH A FULL BODY DRAPE AND THE PATIENT'S RIGHT   ARM WAS PREPPED WITH CHLORA PREP. ULTRASOUND WAS USED TO LOCALIZE THERIGHT BASILIC VEIN. SUBCUTANEOUS TISSUE AT THE CATHETER SITE WAS INFILTRATED WITH 2% LIDOCAINE. UNDER ULTRASOUND GUIDANCE, THE VEIN WAS ACCESSED WITH A 21 GAUGE  NEEDLE. AN 0.018 WIRE WAS THEN THREADED THROUGH THE NEEDLE. THE 21 GAUGE NEEDLE WAS REMOVED AND A 4 [place]ian SHEATH WAS PLACED OVER THE WIRE INTO THE VEIN.THE MIDLINE CATHETER WAS TRIMMED TO 15CM.THE MIDLINE CATHETER WAS THEN PLACED OVER THE WIRE INTO THE VEIN, THE SHEATH WAS PEELED AWAY, WIRE WAS REMOVED. CATHETER WAS FLUSHED WITH NORMAL SALINE AND CATHETER TIP APPLIED. BIOPATCH PLACED. CATHETER SECURED WITH STAT LOCK AND TEGADERM. PATIENT TOLERATED PROCEDURE WELL. THIS WAS DONE IN BEDSIDE      IMPRESSION:SUCCESSFUL PLACEMENT OF MIDLINE          Amy Story RN  8/5/2017  11:00 AM

## 2017-08-05 NOTE — PROGRESS NOTES
[facility] Medicine Services  INPATIENT PROGRESS NOTE    Length of Stay: 1  Date of Admission: 8/4/2017  Primary Care Physician: Benito Kaur MD    Subjective   Chief Complaint: Anemia  HPI:   A 89-year-old female with a history of peptic ulcer disease is here because she had a hemoglobin of 6.8.  Patient has been transfused with 1 unit of blood.  Patient also has a history of CHF.  Patient has hx of COPD; hypoxic on oxygen since her surgery done by  on 05/22/2017.     Review of Systems   Unable to perform ROS: Acuity of condition        All pertinent negatives and positives are as above. All other systems have been reviewed and are negative unless otherwise stated.     Objective    Temp:  [96 °F (35.6 °C)-98 °F (36.7 °C)] 97.6 °F (36.4 °C)  Heart Rate:  [50-68] 59  Resp:  [16-20] 20  BP: (100-153)/(49-61) 132/61    Physical Exam   Constitutional: She is well-developed, well-nourished, and in no distress. No distress.   HENT:   Head: Normocephalic and atraumatic.   Right Ear: External ear normal.   Left Ear: External ear normal.   Eyes: EOM are normal. Right eye exhibits no discharge. Left eye exhibits no discharge.   Neck: No JVD present. No tracheal deviation present. No thyromegaly present.   Cardiovascular: Normal rate, regular rhythm and normal heart sounds.  Exam reveals no friction rub.    No murmur heard.  Pulmonary/Chest: No respiratory distress. She has no wheezes. She has no rales.   Abdominal: She exhibits no distension. There is no tenderness. There is no rebound.   Musculoskeletal: She exhibits no edema or tenderness.   Neurological: No cranial nerve deficit. Coordination normal.   Skin: No rash noted. She is not diaphoretic. No erythema. No pallor.        Psychiatric: Mood, memory, affect and judgment normal.           Results Review:  I have reviewed the labs, radiology results, and diagnostic studies.    Laboratory Data:         Invalid input(s):  LABALBU, PROT  CrCl cannot be calculated (Patient's most recent sCr result is older than the maximum 30 days allowed.).            Results from last 7 days  Lab Units 08/05/17  0753 08/04/17  1112   WBC 10*3/mm3 5.83  --    HEMOGLOBIN g/dL 8.1* 6.3*   HEMATOCRIT % 28.0* 22.2*   PLATELETS 10*3/mm3 139*  --            Culture Data:   No results found for: BLOODCX  No results found for: URINECX  No results found for: RESPCX  No results found for: WOUNDCX  No results found for: STOOLCX  No components found for: BODYFLD    Radiology Data:   Imaging Results (last 24 hours)     Procedure Component Value Units Date/Time    IR Insert Midline Without Port Pump 5 Plus [[id number]] Resulted:  08/05/17 1101     Updated:  08/05/17 1101    Narrative:       This procedure was auto-finalized with no dictation required.    US Guided Vascular Access [[id number]] Resulted:  08/05/17 1155     Updated:  08/05/17 1155    Narrative:       This procedure was auto-finalized with no dictation required.          I have reviewed the patient current medications.     Assessment/Plan     Active Hospital Problems (** Indicates Principal Problem)    Diagnosis Date Noted   • Anemia [D64.9] 06/22/2017      Resolved Hospital Problems    Diagnosis Date Noted Date Resolved   No resolved problems to display.         1. Acute GI bleed:   -S/P 1 PRBC; post transfusion h/h is 8.1   -Lasix given after transfusion  -Protonix drip will be started   -FOBT pending  -Will continue to monitor the patient; if GI bleed occurs pt needs to be transferred because there is no GI coverage.     2. CHF  -Echo ws done on 06/22/2017 with EF of 55%  -Patient has 3+ edema  -Lasix given; will monitor UOP; strict I/O ordered    3. Anxiet  -Continue Xanax     4. Hypoxia  -Ordered ABG     5. Confusion  -UA with culture ordered  -Will r/o PE after obtaining CMP (creatinine)              Discharge Planning: I expect patient to be discharged to  home in 2-3 days.      DVT Prophylaxis:  No anticoagulation because of bleeding; SCD/TEDs        This document has been electronically signed by Declan Montemayor MD on August 5, 2017 5:08 PM

## 2017-08-05 NOTE — PLAN OF CARE
Problem: Patient Care Overview (Adult)  Goal: Plan of Care Review  Outcome: Ongoing (interventions implemented as appropriate)    08/05/17 0513   Coping/Psychosocial Response Interventions   Plan Of Care Reviewed With patient   Patient Care Overview   Progress no change   Outcome Evaluation   Outcome Summary/Follow up Plan Pt recieved 1 unit PRBC this shift. Has struggled to maintain O2 sat.       Goal: Adult Individualization and Mutuality  Outcome: Ongoing (interventions implemented as appropriate)  Goal: Discharge Needs Assessment  Outcome: Ongoing (interventions implemented as appropriate)    Problem: Fall Risk (Adult)  Goal: Identify Related Risk Factors and Signs and Symptoms  Outcome: Ongoing (interventions implemented as appropriate)  Goal: Absence of Falls  Outcome: Ongoing (interventions implemented as appropriate)    Problem: Fluid Volume Deficit (Adult)  Goal: Identify Related Risk Factors and Signs and Symptoms  Outcome: Outcome(s) achieved Date Met:  08/05/17  Goal: Fluid/Electrolyte Balance  Outcome: Ongoing (interventions implemented as appropriate)  Goal: Comfort/Well Being  Outcome: Ongoing (interventions implemented as appropriate)

## 2017-08-06 NOTE — NURSING NOTE
Dr. Montemayor was contacted by phone by YARON Allison and requested to come to the unit to speak with himself and this RN. Nursing staff were concerned because there were reports that patient may have received blood products in error or that contained an error. Dr. Montemayor expressed that she was contacted by the director of lab services this evening regarding an error involving screening for antigen/antibody reaction for the patients blood. Dr. Montemayor contacted hematologist.  stated that no adverse reaction had occurred with the patient and that she was monitoring the patient and would provide IV hydration. Dr. Montemayor stated she did not feel she could adequately explain the process failure that occurred when screening the blood and had requested Dr. Zamora, Director of Hematology, to speak with the patient.

## 2017-08-06 NOTE — PLAN OF CARE
Problem: Patient Care Overview (Adult)  Goal: Plan of Care Review  Outcome: Ongoing (interventions implemented as appropriate)    08/06/17 0256   Coping/Psychosocial Response Interventions   Plan Of Care Reviewed With patient   Patient Care Overview   Progress no change   Outcome Evaluation   Outcome Summary/Follow up Plan Pt has become increasingly confused. Feet and toes are now cold and tips of toes are slightly discolored.       Goal: Adult Individualization and Mutuality  Outcome: Ongoing (interventions implemented as appropriate)  Goal: Discharge Needs Assessment  Outcome: Ongoing (interventions implemented as appropriate)    Problem: Fall Risk (Adult)  Goal: Identify Related Risk Factors and Signs and Symptoms  Outcome: Outcome(s) achieved Date Met:  08/06/17  Goal: Absence of Falls  Outcome: Ongoing (interventions implemented as appropriate)    Problem: Fluid Volume Deficit (Adult)  Goal: Fluid/Electrolyte Balance  Outcome: Ongoing (interventions implemented as appropriate)  Goal: Comfort/Well Being  Outcome: Ongoing (interventions implemented as appropriate)

## 2017-08-06 NOTE — PROGRESS NOTES
[facility]  INPATIENT PROGRESS NOTE    Length of Stay: 2  Date of Admission: 8/4/2017  Primary Care Physician: Benito Kaur MD    Subjective   Chief Complaint: Anemia  HPI:   A 89-year-old female with a history of peptic ulcer disease is here because she had a hemoglobin of 6.8.  Patient has been transfused with 1 unit of blood.  Patient also has a history of CHF.  Patient has hx of COPD; hypoxic on oxygen since her surgery done by  on 05/22/2017.   Patient is currently on bipap; hx of diastolic CHF; initial ABG showed pH of 7.1 which is trending upwards. Patient is struggling to breathe and more anxious.     Review of Systems   Unable to perform ROS: Acuity of condition        All pertinent negatives and positives are as above. All other systems have been reviewed and are negative unless otherwise stated.     Objective    Temp:  [96.4 °F (35.8 °C)-98.1 °F (36.7 °C)] 96.5 °F (35.8 °C)  Heart Rate:  [47-92] 64  Resp:  [18-22] 22  BP: ()/(40-61) 99/40    Physical Exam   Constitutional: No distress.   HENT:   Head: Normocephalic and atraumatic.   Right Ear: External ear normal.   Left Ear: External ear normal.   Eyes: EOM are normal. Right eye exhibits no discharge. Left eye exhibits no discharge.   Neck: No JVD present. No tracheal deviation present. No thyromegaly present.   Cardiovascular: Normal rate, regular rhythm and normal heart sounds.  Exam reveals no friction rub.    No murmur heard.  Pulmonary/Chest: No respiratory distress. She has no wheezes. She has no rales.   Abdominal: She exhibits no distension. There is no tenderness. There is no rebound.   Musculoskeletal: She exhibits no edema or tenderness.   Neurological: No cranial nerve deficit. Coordination normal.   Skin: No rash noted. She is not diaphoretic. No erythema. No pallor.        Psychiatric: Judgment normal.           Results Review:  I have reviewed the labs, radiology  results, and diagnostic studies.    Laboratory Data:     Results from last 7 days  Lab Units 08/06/17  0835 08/06/17  0706 08/06/17  0548  08/05/17  1707 08/05/17  1651   SODIUM mmol/L  --  138  --   --   --  138   SODIUM, ARTERIAL mmol/L 136.5*  --  136.4*  < >  --   --    POTASSIUM mmol/L  --  4.9  --   --   --  5.1   CHLORIDE mmol/L  --  92*  --   --   --  94*   CO2 mmol/L  --  40.0*  --   --   --  42.0*   BUN mg/dL  --  32*  --   --   --  31*   CREATININE mg/dL  --  1.01*  --   --   --  0.81   GLUCOSE mg/dL  --  89  --   --   --  103*   GLUCOSE, ARTERIAL mmol/L 71  --  93  < >  --   --    CALCIUM mg/dL  --  8.8  --   --   --  8.9   BILIRUBIN mg/dL  --  0.4  --   --  0.3 0.3   ALK PHOS U/L  --  141*  --   --   --  147*   ALT (SGPT) U/L  --  41  --   --   --  39   AST (SGOT) U/L  --  28  --   --   --  39*   ANION GAP mmol/L  --  6.0  --   --   --  2.0*   < > = values in this interval not displayed.  Estimated Creatinine Clearance: 29.7 mL/min (by C-G formula based on Cr of 1.01).            Results from last 7 days  Lab Units 08/06/17  0348 08/06/17  0049 08/05/17  1941 08/05/17  0753 08/04/17  1112   WBC 10*3/mm3  --   --   --  5.83  --    HEMOGLOBIN g/dL 8.4* 8.1* 8.0* 8.1* 6.3*   HEMATOCRIT % 29.2* 29.5* 27.6* 28.0* 22.2*   PLATELETS 10*3/mm3  --   --   --  139*  --            Culture Data:   No results found for: BLOODCX  No results found for: URINECX  No results found for: RESPCX  No results found for: WOUNDCX  No results found for: STOOLCX  No components found for: BODYFLD    Radiology Data:   Imaging Results (last 24 hours)     Procedure Component Value Units Date/Time    IR Insert Midline Without Port Pump 5 Plus [[id number]] Resulted:  08/05/17 1101     Updated:  08/05/17 1101    Narrative:       This procedure was auto-finalized with no dictation required.    US Guided Vascular Access [[id number]] Resulted:  08/05/17 1155     Updated:  08/05/17 1155    Narrative:       This procedure was auto-finalized  with no dictation required.    CT Abdomen Pelvis With Contrast [[id number]] Collected:  08/05/17 2049     Updated:  08/05/17 2134    Narrative:         CT angiogram chest with contrast and CT abdomen and pelvis with  contrast on 8/5/2017     CLINICAL INDICATION: Shortness of breath, fever, generalized  abdominal pain, lethargic    TECHNIQUE: Multiple axial images are obtained throughout the  chest following the administration of IV contrast.  Computer  generated 3D reconstructions/MIPS were performed. Multiple axial  images are also obtained throughout the abdomen and pelvis  following the administration of IV and oral contrast. This study  was performed with techniques to keep radiation doses as low as  reasonably achievable, (ALARA).   Total DLP is 679.2 mGy*cm.    COMPARISON: CT chest from 6/13/2017     FINDINGS:     CHEST: There are small-to-moderate sized bilateral pleural  effusions with mild adjacent bilateral lower lung atelectasis.  Emphysematous changes of the lungs are noted. There is mild  dependent atelectasis within the lingula and right middle lobe.  There is no pericardial effusion. Coronary artery calcifications  and other vascular calcifications are noted. There is reflux of  contrast into the IVC and hepatic veins indicating elevated right  heart pressure. There is no aortic aneurysm or dissection. 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 The left  vertebral artery originates directly off the aortic arch. There  are no filling defects within the pulmonary arteries to suggest  pulmonary embolus. No acute bony abnormality is noted.    ABDOMEN: There is bilateral adrenal prominence that appears  stable likely related to hyperplasia or adenomas. Small left  renal cysts are noted. The solid abdominal organs are otherwise  unremarkable. Extensive vascular calcifications are noted. There  is no abdominal adenopathy. There  is no free fluid or free air  within the abdomen. The abdominal portion of the GI tract is  unremarkable. Anasarca is noted in the subcutaneous tissues.    Pelvis: There is diverticulosis. Alvarez catheter is noted in the  bladder. Pelvic organs appear unremarkable by CT. There is no  free fluid in the pelvis. There is no pelvic adenopathy. The  pelvic portion of the GI tract including the appendix is  otherwise unremarkable. Degenerative changes are noted in the  spine. Intramedullary anitha is partially imaged in the left hip.      Impression:       1. No evidence of pulmonary embolus.  2. Bilateral pleural effusions with some anasarca suggesting some  volume overload and/or third spacing. There is also evidence of  elevated right heart pressure.  3. Diverticulosis.    Electronically signed by:  Tato Cowan  8/5/2017 9:33 PM CDT  Workstation: [id number]    CT Angiogram Chest With Contrast [[id number]] Collected:  08/05/17 2049     Updated:  08/05/17 2134    Narrative:         CT angiogram chest with contrast and CT abdomen and pelvis with  contrast on 8/5/2017     CLINICAL INDICATION: Shortness of breath, fever, generalized  abdominal pain, lethargic    TECHNIQUE: Multiple axial images are obtained throughout the  chest following the administration of IV contrast.  Computer  generated 3D reconstructions/MIPS were performed. Multiple axial  images are also obtained throughout the abdomen and pelvis  following the administration of IV and oral contrast. This study  was performed with techniques to keep radiation doses as low as  reasonably achievable, (ALARA).   Total DLP is 679.2 mGy*cm.    COMPARISON: CT chest from 6/13/2017     FINDINGS:     CHEST: There are small-to-moderate sized bilateral pleural  effusions with mild adjacent bilateral lower lung atelectasis.  Emphysematous changes of the lungs are noted. There is mild  dependent atelectasis within the lingula and right middle lobe.  There is no  pericardial effusion. Coronary artery calcifications  and other vascular calcifications are noted. There is reflux of  contrast into the IVC and hepatic veins indicating elevated right  heart pressure. There is no aortic aneurysm or dissection. 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 The left  vertebral artery originates directly off the aortic arch. There  are no filling defects within the pulmonary arteries to suggest  pulmonary embolus. No acute bony abnormality is noted.    ABDOMEN: There is bilateral adrenal prominence that appears  stable likely related to hyperplasia or adenomas. Small left  renal cysts are noted. The solid abdominal organs are otherwise  unremarkable. Extensive vascular calcifications are noted. There  is no abdominal adenopathy. There is no free fluid or free air  within the abdomen. The abdominal portion of the GI tract is  unremarkable. Anasarca is noted in the subcutaneous tissues.    Pelvis: There is diverticulosis. Alvarez catheter is noted in the  bladder. Pelvic organs appear unremarkable by CT. There is no  free fluid in the pelvis. There is no pelvic adenopathy. The  pelvic portion of the GI tract including the appendix is  otherwise unremarkable. Degenerative changes are noted in the  spine. Intramedullary anitha is partially imaged in the left hip.      Impression:       1. No evidence of pulmonary embolus.  2. Bilateral pleural effusions with some anasarca suggesting some  volume overload and/or third spacing. There is also evidence of  elevated right heart pressure.  3. Diverticulosis.    Electronically signed by:  Tato Cowan  8/5/2017 9:33 PM CDT  Workstation: [id number]          I have reviewed the patient current medications.     Assessment/Plan     Active Hospital Problems (** Indicates Principal Problem)    Diagnosis Date Noted   • Anemia [D64.9] 06/22/2017      Resolved Hospital  Problems    Diagnosis Date Noted Date Resolved   No resolved problems to display.         1. Acute GI bleed:   -S/P 1 PRBC; post transfusion h/h is 8.1   -Lasix given after transfusion  -Protonix drip started   -FOBT pending  -Will continue to monitor the patient; if GI bleed occurs pt needs to be transferred because there is no GI coverage.   -Patient was given incompatible blood transfusion on 08/05/2017; blood bank called me and acknowledged it.  is on board with it and he will have conversation with patient regarding blood transfusion. Patient had no elevation in bilirubin. Haptoglobin and LDH normal.     2. CHF  -Echo ws done on 06/22/2017 with EF of 55%  -Patient has 3+ edema  -Lasix given; will monitor UOP; strict I/O ordered    3. Anxiet  -Continue Xanax     4. Hypoxia  -Initial ABG showed pH of 7.1 and third ABG showed pH of 7.2  -Patient is struggling to breathe  -Transferring to ICU     5. Confusion  -UA with culture ordered  -Will r/o PE after obtaining CMP (creatinine)      6. Increased right heart pressure  -May be due to fluid overload  -Pt will need lasix   - in ICU have been informed and she is aware of patient's situation       Discharge Planning: I expect patient to be discharged to  [facility] when she is stable.       DVT Prophylaxis: No anticoagulation because of bleeding; SCD/TEDs        This document has been electronically signed by Declan Montemayor MD on August 6, 2017 8:46 AM

## 2017-08-06 NOTE — NURSING NOTE
Nursing reviewed ABG's drawn this morning with respiratory therapy. Patient has had a decline in cognition. Nursing questioned whether patient would be appropriate for BiPap. Respiratory notified Dr. Huizar whom wrote a new order for Bipap. Family is at bedside and is aware of patients condition.

## 2017-08-07 PROBLEM — R41.82 ALTERED MENTAL STATUS, UNSPECIFIED: Status: ACTIVE | Noted: 2017-01-01

## 2017-08-07 PROBLEM — J96.21 ACUTE ON CHRONIC RESPIRATORY FAILURE WITH HYPOXIA (HCC): Status: ACTIVE | Noted: 2017-01-01

## 2017-08-07 PROBLEM — J96.01 ACUTE RESPIRATORY FAILURE WITH HYPOXIA (HCC): Status: ACTIVE | Noted: 2017-01-01

## 2017-08-07 PROBLEM — G25.0 BENIGN ESSENTIAL TREMOR: Status: ACTIVE | Noted: 2017-01-01

## 2017-08-07 PROBLEM — E03.9 HYPOTHYROIDISM: Status: ACTIVE | Noted: 2017-01-01

## 2017-08-07 PROBLEM — I50.32 CHRONIC CONGESTIVE HEART FAILURE WITH LEFT VENTRICULAR DIASTOLIC DYSFUNCTION (HCC): Status: ACTIVE | Noted: 2017-01-01

## 2017-08-07 PROBLEM — S72.002A CLOSED LEFT HIP FRACTURE (HCC): Chronic | Status: ACTIVE | Noted: 2017-01-01

## 2017-08-07 PROBLEM — K92.2 ACUTE GI BLEEDING: Status: ACTIVE | Noted: 2017-01-01

## 2017-08-07 PROBLEM — F41.9 ANXIETY: Status: ACTIVE | Noted: 2017-01-01

## 2017-08-07 NOTE — PROGRESS NOTES
We spoke with family in the presence of Dr Morrow about the patient who got blood with wrong antibodies   (it was a lab error )  Patient did not have a transfusion reaction as she did not have any hemolysis or fever ,her kidney function remained stable .   She did have some volume overload but it was delayed  It was not TRALI as TRALI occurs within 6 hours after transfusion   Family will speak t o DR Oseguera in am  In regard to the plan of care , the y dont seem to want any invasive procedures due   Her  Old age .

## 2017-08-07 NOTE — PROGRESS NOTES
DATE OF VISIT: 8/7/2017    REASON FOR VISIT: anemia    HISTORY OF PRESENT ILLNESS:  She has a PMH of GIB, and is admitted for a hgb of 5 g. There is a concern she is at risk for a delayed transfusion reaction due to a Liliana antibody. Her ferritin is 19, diagnostic of iron deficiency. Her creatinine has risen a little and she is oliguric.          PAST MEDICAL HISTORY:    Past Medical History:   Diagnosis Date   • CHF (congestive heart failure)    • COPD (chronic obstructive pulmonary disease)    • History of transfusion    • Hypothyroidism    • Iron deficiency anemia    • Polio 1953   • Seizures          ALLERGIES:    Allergies   Allergen Reactions   • Valium [Diazepam]              Current Facility-Administered Medications   Medication Dose Route Frequency Provider Last Rate Last Dose   • acetaminophen (TYLENOL) suppository 325 mg  325 mg Rectal Q4H PRN Tu Huizar MD   325 mg at 08/07/17 0400   • ALPRAZolam (XANAX) tablet 0.25 mg  0.25 mg Oral Daily Jerrica Forman MD   0.25 mg at 08/06/17 2026   • dexamethasone (DECADRON) injection 4 mg  4 mg Intravenous Q6H Teja Liriano MD       • dorzolamide-timolol (COSOPT) ophthalmic solution 1 drop  1 drop Right Eye Q12H Jerrica Forman MD   1 drop at 08/07/17 0855   • furosemide (LASIX) injection 40 mg  40 mg Intravenous Q12H Declan Montemayor MD   40 mg at 08/07/17 0853   • hydrOXYzine (VISTARIL) capsule 25 mg  25 mg Oral TID PRN Jerrica Forman MD   25 mg at 08/06/17 1025   • latanoprost (XALATAN) 0.005 % ophthalmic solution 1 drop  1 drop Both Eyes Nightly Jerrica Forman MD   1 drop at 08/06/17 2028   • levothyroxine (SYNTHROID, LEVOTHROID) tablet 100 mcg  100 mcg Oral Daily Jerrica Forman MD   100 mcg at 08/07/17 0854   • norepinephrine (LEVOPHED) 8,000 mcg in sodium chloride 0.9 % 250 mL (32 mcg/mL) infusion  0.02-0.3 mcg/kg/min Intravenous Titrated Tu Huizar MD   Stopped at 08/06/17 2237   • nystatin (MYCOSTATIN) powder   Topical Q12H Jerrica SALDAÑA  MD Dasia       • ondansetron (ZOFRAN) injection 4 mg  4 mg Intravenous Q6H PRN Jerrica Forman MD       • pantoprazole (PROTONIX) 40 mg/100 mL (0.4 mg/mL) in 0.9% NS IVPB  8 mg/hr Intravenous Continuous Declan Montemayor MD 20 mL/hr at 08/07/17 1133 8 mg/hr at 08/07/17 1133   • phenytoin (DILANTIN) ER capsule 100 mg  100 mg Oral Nightly Jerrica Forman MD   100 mg at 08/06/17 2026   • propranolol (INDERAL) tablet 20 mg  20 mg Oral BID Jerrica Forman MD   20 mg at 08/07/17 0853   • sennosides-docusate sodium (SENOKOT-S) 8.6-50 MG tablet 1 tablet  1 tablet Oral BID Jerrica Forman MD   1 tablet at 08/07/17 0854   • sodium chloride 0.9 % flush 1-10 mL  1-10 mL Intravenous PRN Jerrica Forman MD           SOCIAL HISTORY:    Social History   Substance Use Topics   • Smoking status: Former Smoker     Packs/day: 0.50     Types: Cigarettes     Quit date: 1987   • Smokeless tobacco: None   • Alcohol use No         Surgical History :  Past Surgical History:   Procedure Laterality Date   • CATARACT EXTRACTION     • HIP INTERTROCHANTERIC NAILING Left 5/22/2017    Procedure: HIP INTERTROCHANTERIC NAILING - left - short;  Surgeon: Ramos Handley MD;  Location: [facility];  Service:        Family History   Problem Relation Age of Onset   • Breast cancer Mother    • Colon cancer Father    • Prostate cancer Father    • Breast cancer Sister    • Prostate cancer Brother    • Breast cancer Paternal Aunt    • Breast cancer Daughter    • Melanoma Daughter    • Cancer Son    • Leukemia Brother        REVIEW OF SYSTEMS:      CONSTITUTIONAL:  No fever, chills, or night sweats.     HEENT:  No epistaxis, mouth sores, or difficulty swallowing.    RESPIRATORY:  No new shortness of breath or cough at present.    CARDIOVASCULAR:  No chest pain or palpitations.    GASTROINTESTINAL:  No abdominal pain, nausea, vomiting, or blood in the stool.    GENITOURINARY:  No dysuria or hematuria.    MUSCULOSKELETAL:  No any new back pain or  "arthralgias.     NEUROLOGICAL:  No tingling or numbness. No new headache or dizziness.     LYMPHATICS:  Denies any abnormal swollen and anywhere in the body.    SKIN:  Denies any new skin rash.    PHYSICAL EXAMINATION:      VITAL SIGNS:  /65  Pulse 58  Temp 97.6 °F (36.4 °C) (Axillary)   Resp 20  Ht 61\" (154.9 cm)  Wt 106 lb 11.2 oz (48.4 kg)  SpO2 (!) 81%  BMI 20.16 kg/m2    GENERAL:  Not in any distress.    HEENT:  Normocephalic, Atraumatic.Mild Conjunctival pallor. No icterus. Extraocular Movements Intact. No Facial Asymmetry noted.    NECK:  No adenopathy. No JVD.    RESPIRATORY:  Fair air entry bilateral. No rhonchi or wheezing.    CARDIOVASCULAR:  S1, S2. Regular rate and rhythm. No murmur or gallop appreciated.    ABDOMEN:  Soft, obese, nontender. Bowel sounds present in all four quadrants.  No organomegaly appreciated.    EXTREMITIES:  No edema.No Calf Tenderness.    NEUROLOGIC:  encephalopathic    SKIN : No new skin lesion identified  DIAGNOSTIC DATA:    Glucose   Date Value Ref Range Status   08/07/2017 99 60 - 100 mg/dL Final     Glucose, Arterial   Date Value Ref Range Status   08/06/2017 71 mmol/L Final     Sodium   Date Value Ref Range Status   08/07/2017 141 137 - 145 mmol/L Final     Potassium   Date Value Ref Range Status   08/07/2017 4.6 3.5 - 5.1 mmol/L Final     CO2   Date Value Ref Range Status   08/07/2017 38.0 (H) 22.0 - 31.0 mmol/L Final     Chloride   Date Value Ref Range Status   08/07/2017 94 (L) 95 - 110 mmol/L Final     Anion Gap   Date Value Ref Range Status   08/07/2017 9.0 5.0 - 15.0 mmol/L Final     Creatinine   Date Value Ref Range Status   08/07/2017 1.02 (H) 0.50 - 1.00 mg/dL Final     BUN   Date Value Ref Range Status   08/07/2017 36 (H) 7 - 21 mg/dL Final     BUN/Creatinine Ratio   Date Value Ref Range Status   08/07/2017 35.3 (H) 7.0 - 25.0 Final     Calcium   Date Value Ref Range Status   08/07/2017 9.1 8.4 - 10.2 mg/dL Final     eGFR Non  Amer   Date Value " Ref Range Status   08/07/2017 51 (L) >60 mL/min/1.73 Final     Alkaline Phosphatase   Date Value Ref Range Status   08/07/2017 142 (H) 38 - 126 U/L Final     Total Protein   Date Value Ref Range Status   08/07/2017 5.5 (L) 6.3 - 8.6 g/dL Final     ALT (SGPT)   Date Value Ref Range Status   08/07/2017 42 9 - 52 U/L Final     AST (SGOT)   Date Value Ref Range Status   08/07/2017 30 14 - 36 U/L Final     Total Bilirubin   Date Value Ref Range Status   08/07/2017 0.4 0.2 - 1.3 mg/dL Final     Albumin   Date Value Ref Range Status   08/07/2017 3.00 (L) 3.40 - 4.80 g/dL Final     Globulin   Date Value Ref Range Status   08/07/2017 2.5 2.3 - 3.5 gm/dL Final     A/G Ratio   Date Value Ref Range Status   08/07/2017 1.2 1.1 - 1.8 g/dL Final     Lab Results   Component Value Date    WBC 7.06 08/07/2017    HGB 7.3 (L) 08/07/2017    HCT 26.3 (L) 08/07/2017    .3 (H) 08/07/2017     (L) 08/07/2017     Lab Results   Component Value Date    NEUTROABS 5.27 08/07/2017    IRON <10 (L) 08/05/2017    TIBC 395 08/05/2017    LABIRON  08/05/2017      Comment:      Unable to calculate.    FERRITIN 19.10 08/05/2017    [id number] >1000 (H) 08/05/2017    FOLATE >20.00 08/04/2017     No results found for: , LABCA2, AFPTM, HCGQUANT, , CHROMGRNA, [id number], CEA, REFLABREPO]        RECOMMENDATION  ASSESSMENT AND PLAN:    anemia due  To GIB   Transfuse to 7 g  Iron deficiency proven with ferritin under 100  Steroids to prevent delayed transfusion reaction  Oral or iv iron if intolerant or anemia does not respond  Nephrology consultation    Teja Liriano MD  8/7/2017  12:05 PM

## 2017-08-07 NOTE — PROGRESS NOTES
Discharge Planning Assessment  [facility]     Patient Name: Mariela Woodson  MRN: [id number]  Today's Date: 8/7/2017    Admit Date: 8/4/2017          Discharge Needs Assessment       08/07/17 1137    Living Environment    Lives With facility resident    Home Accessibility no concerns    Transportation Available family or friend will provide    Living Environment    Provides Primary Care For no one, unable/limited ability to care for self    Quality Of Family Relationships supportive;involved    Able to Return to Prior Living Arrangements yes    Living Arrangement Comments Pt is a resident of [facility]. According to Janet pt does not have official bed hold however they will accept her back once medically cleared. Prior to her stay at [facility] pt was on ARU.    Discharge Needs Assessment    Concerns To Be Addressed adjustment to diagnosis/illness concerns    Anticipated Changes Related to Illness none    Equipment Currently Used at Home walker, rolling;hospital bed    Discharge Facility/Level Of Care Needs nursing facility, skilled    Current Discharge Risk chronically ill    Discharge Disposition skilled nursing facility    Discharge Planning Comments Plan is for pt to return to nursing home once cleared medically.            Discharge Plan       08/07/17 1140    Case Management/Social Work Plan    Plan Return to [facility]    Patient/Family In Agreement With Plan yes    Additional Comments LSW assesment complete. pt is a resident of [facility]. pt was at [facility] receiving therapy. pt appears to have good family support. According to family plan is for pt to return to nursing home once medically cleared. LSW/case mgt will follow up as consulted and complete arrangements as ordered.      08/07/17 1035    Case Management/Social Work Plan    Additional Comments Team rounding complete - Gi Bleed.  Hg. 7.6.  Cont. POC.         Discharge Placement     No information found        Expected Discharge Date and Time     Expected Discharge Date  Expected Discharge Time    Aug 9, 2017               Demographic Summary       08/07/17 1132    Referral Information    Admission Type inpatient    Referral Source high risk screening    Reason For Consult discharge planning    Record Reviewed medical record    Contact Information    Permission Granted to Share Information With     Primary Care Physician Information    Name Dr Kaur            Functional Status       08/07/17 1135    Functional Status Prior    Ambulation 3-->assistive equipment and person    Transferring 3-->assistive equipment and person    Toileting 3-->assistive equipment and person    Bathing 3-->assistive equipment and person    Dressing 2-->assistive person    Eating 2-->assistive person    Communication 0-->understands/communicates without difficulty    Swallowing 0-->swallows foods/liquids without difficulty    IADL    Medications completely dependent    Meal Preparation completely dependent    Housekeeping completely dependent    Laundry completely dependent    Shopping completely dependent    Oral Care completely dependent    Activity Tolerance    Current Activity Limitations weakness severe, generalized    Usual Activity Tolerance fair    Current Activity Tolerance poor    Cognitive/Perceptual/Developmental    Current Mental Status/Cognitive Functioning unable to assess    Recent Changes in Mental Status/Cognitive Functioning unable to assess    Developmental Stage (Eriksson's Stages of Development) Stage 8 (65 years-death/Late Adulthood) Integrity vs. Despair            Psychosocial     None            Abuse/Neglect     None            Legal     None            Substance Abuse     None            Patient Forms     None          [facility] [place]

## 2017-08-07 NOTE — THERAPY EVALUATION
Acute Care - Physical Therapy Initial Evaluation  [facility]     Patient Name: Mariela Woodson  : 1925  MRN: [id number]  Today's Date: 2017   Onset of Illness/Injury or Date of Surgery Date: 17  Date of Referral to PT: 17  Referring Physician: Dr. Rema Lim      Admit Date: 2017     Visit Dx:    ICD-10-CM ICD-9-CM   1. Anemia, unspecified type D64.9 285.9   2. Oropharyngeal dysphagia R13.12 787.22   3. Impaired physical mobility Z74.09 781.99     Patient Active Problem List   Diagnosis   • Iron deficiency anemia due to chronic blood loss   • Post-polio syndrome   • Simple chronic bronchitis   • Seizure disorder   • Closed left hip fracture   • Iron deficiency anemia   • Post-polio syndrome   • Seizure disorder   • Metabolic encephalopathy   • COPD (chronic obstructive pulmonary disease)   • Encounter for rehabilitation   • Pneumonia of left lower lobe due to infectious organism   • Hypoxemia   • Pleural effusion   • Anemia   • Acute on chronic combined systolic and diastolic congestive heart failure   • Encounter for venous access device care   • Anxiety   • Chronic congestive heart failure with left ventricular diastolic dysfunction   • Acute GI bleeding   • Acute on chronic respiratory failure with hypoxia   • Altered mental status, unspecified   • Hypothyroidism   • Benign essential tremor     Past Medical History:   Diagnosis Date   • CHF (congestive heart failure)    • COPD (chronic obstructive pulmonary disease)    • History of transfusion    • Hypothyroidism    • Iron deficiency anemia    • Polio    • Seizures      Past Surgical History:   Procedure Laterality Date   • CATARACT EXTRACTION     • HIP INTERTROCHANTERIC NAILING Left 2017    Procedure: HIP INTERTROCHANTERIC NAILING - left - short;  Surgeon: Ramos Handley MD;  Location: [facility];  Service:           PT ASSESSMENT (last 72 hours)      PT Evaluation       17 1600 17 1530    Rehab  Evaluation    Document Type  evaluation  -    Subjective Information  agree to therapy  -GIOVANA    Patient Effort, Rehab Treatment  good  -GIOVANA    Symptoms Noted Comment  During interview, noted O2 sats dropped to 77% on 4L through nasal cannula. RN notified and placed pt on venturi mask. afterwards O2 sats ranged from 98%to 100%  -    General Information    Patient Profile Review  yes  -GIOVANA    Onset of Illness/Injury or Date of Surgery Date  08/04/17  -    Referring Physician  Dr. Rema Lim  -    General Observations  Pt supine; pt groggy;once venturi mask placed, pt roused more easily, but would doze often. Noted toro, OMKAR, telemetry  -GIOVANA    Pertinent History Of Current Problem  Pt admitted to [facility] with acute GI bleed, anemia with chronic CHF/respiratory failure with volume overload/B pleural effusions following reaction to blood transfusion.  -GIOVANA    Precautions/Limitations  fall precautions;oxygen therapy device and L/min;other (see comments)   vital signs;pt has h/o postpolio syndrome  -GIOVANA    Prior Level of Function  min assist:;gait;transfer;independent:;bathing;dressing   NH staff wheeled pt to bathroom and to shower  -GIOVANA    Equipment Currently Used at Home  wheelchair;walker, rolling  -GIOVANA    Plans/Goals Discussed With  patient and family  -GIOVANA    Risks Reviewed  patient and family:  -GIOVANA    Benefits Reviewed  patient and family:  -GIOVANA    Barriers to Rehab  medically complex  -    Living Environment    Lives With  facility resident   since recent IM rodding L hip fx  -    Living Arrangements  extended care facility   Pt resident of [facility]  -    Home Accessibility  no concerns  -    Living Environment Comment  Pt/dtr reports pt working on walking with RW in therapy. She was just needing assist of 1 for transfers. Says NH assisted her in/out of shower but she was usually able to do most of bathign herself  -GIOVANA    Clinical Impression    Date of Referral to PT  08/07/17  -    PT Diagnosis  anemia,  GI Bleeding, CHF/respiratory failure, h/o post polio syndrome  -    Prognosis  per MD  -GIOVANA    Patient/Family Goals Statement  Be stronger;able to assist with care/walk  -    Criteria for Skilled Therapeutic Interventions Met  yes  -GIOVANA    Rehab Potential  good, to achieve stated therapy goals  -    Predicted Duration of Therapy Intervention (days/wks)  until discharge  -    Vital Signs    Pre Systolic BP Rehab  118  -GIOVANA    Pre Treatment Diastolic BP  56  -GIOVANA    Post Systolic BP Rehab  114  -GIOVANA    Post Treatment Diastolic BP  56  -GIOVANA    Pretreatment Heart Rate (beats/min)  59  -GIOVANA    Posttreatment Heart Rate (beats/min)  58  -GIOVANA    Pre SpO2 (%)  92  -GIOVANA    O2 Delivery Pre Treatment  supplemental O2   nasal cannula on 4L  -GIOVANA    Intra SpO2 (%)  77  -GIOVANA    O2 Delivery Intra Treatment  supplemental O2   nasal cannula on 4L/min  -GIOVANA    Post SpO2 (%)  100  -GIOVANA    O2 Delivery Post Treatment  supplemental O2   venturi mask  -GIOVANA    Pre Patient Position  Supine  -GIOVANA    Intra Patient Position  Supine  -GIOVANA    Post Patient Position  Supine  -    Pain Assessment    Pain Assessment  No/denies pain  -    Vision Assessment/Intervention    Visual Impairment  WFL with corrective lenses  -    Cognitive Assessment/Intervention    Current Cognitive/Communication Assessment  impaired  -    Orientation Status  oriented to;person   knew birth month and day, not year  -    Follows Commands/Answers Questions  50% of the time;needs increased time;needs cueing;needs repetition  -    ROM (Range of Motion)    General ROM  upper extremity range of motion deficits identified;lower extremity range of motion deficits identified  -    General UE Assessment    ROM Detail  BUE: AROM WFL hands, wrists;AAROM WFL elbows/shoulders  -    General LE Assessment    ROM Detail  BLE: AROM WFL ankles/feet, AAROM WFL knees, hips  -    MMT (Manual Muscle Testing)    General MMT Assessment  upper extremity strength deficits identified;lower  extremity strength deficits identified  -    Upper Extremity    Upper Ext Manual Muscle Testing Detail  BUE: 3-3+/5 , 3/5 wrists , 3-/5 elbows, shoulders  -    Lower Extremity    Lower Ext Manual Muscle Testing Detail  RLE: 3+/5 ankle/foot, 3/5 knee, 3-/5 hip;LLE: 3/5 ankle/foot, knee, 3-/5 hip  -    Muscle Tone Assessment    Muscle Tone Assessment Bilateral Upper Extremities;Bilateral Lower Extremities  -LR     Bilateral Upper Extremities Muscle Tone Assessment mildly decreased tone  -LR     Bilateral Lower Extremities Muscle Tone Assessment mildly decreased tone  -LR     Mobility Assessment/Training    Extremity Weight-Bearing Status  left lower extremity  -GIOVANA    Left Lower Extremity Weight-Bearing  weight-bearing as tolerated  -    Bed Mobility, Assessment/Treatment    Bed Mobility, Comment  deferred mobility assessment  as pt not alert enough to safely participate and also needing venturi mask to keep O2 sats up.  -    Transfer Assessment/Treatment    Transfer, Comment  deferred  -    Gait Assessment/Treatment    Gait, Comment  deferred  -    Therapy Exercises    Bilateral Lower Extremities  AAROM:;5 reps;supine;ankle pumps/circles;heel slides;hip abduction/adduction;SAQ   did not do abduction exercise LLE due to recent trochanterfx  -    Sensory Assessment/Intervention    Light Touch  LUE;RUE;LLE;RLE  -    LUE Light Touch  [facility]  -    RUE Light Touch  WNL  -    LLE Light Touch  WNL  -    RLE Light Touch  WN  -    Edema Management    Edema Amount  none   in extremities  -    Positioning and Restraints    Pre-Treatment Position  in bed  -    Post Treatment Position  bed  -    In Bed  supine;call light within reach;encouraged to call for assist;with family/caregiver;patient within staff view  -      08/07/17 1230 08/07/17 1200    Rehab Evaluation    Document Type therapy note (daily note)  -EK     Subjective Information no complaints;agree to therapy  -EK     Patient Effort,  Rehab Treatment adequate  -EK     Pain Assessment    Pain Assessment No/denies pain  -EK     Pain Score 0  -EK     Post Pain Score 0  -EK     Muscle Tone Assessment    Muscle Tone Assessment  Bilateral Upper Extremities;Bilateral Lower Extremities  -LR    Bilateral Upper Extremities Muscle Tone Assessment  mildly decreased tone  -LR    Bilateral Lower Extremities Muscle Tone Assessment  mildly decreased tone  -LR    Positioning and Restraints    Pre-Treatment Position in bed  -EK     Post Treatment Position bed  -EK       08/07/17 1137 08/07/17 0844    Rehab Evaluation    Document Type  evaluation  -EK    Subjective Information  no complaints;agree to therapy  -EK    General Information    Equipment Currently Used at Home walker, rolling;hospital bed  -CM     Living Environment    Lives With facility resident  -CM     Home Accessibility no concerns  -CM     Transportation Available family or friend will provide  -CM     Pain Assessment    Pain Assessment  No/denies pain  -EK    Pain Score  0  -EK    Cognitive Assessment/Intervention    Current Cognitive/Communication Assessment  impaired  -EK    Orientation Status  oriented to;person;oriented x 4  -EK    Positioning and Restraints    Pre-Treatment Position  in bed  -EK    Post Treatment Position  bed  -EK      08/07/17 0800 08/07/17 0400    Muscle Tone Assessment    Muscle Tone Assessment Bilateral Upper Extremities;Bilateral Lower Extremities  -LR Bilateral Upper Extremities;Bilateral Lower Extremities  -AA    Bilateral Upper Extremities Muscle Tone Assessment mildly decreased tone  -LR mildly decreased tone  -AA    Bilateral Lower Extremities Muscle Tone Assessment mildly decreased tone  -LR mildly decreased tone  -AA      08/07/17 0000 08/06/17 2000    Muscle Tone Assessment    Muscle Tone Assessment Bilateral Upper Extremities;Bilateral Lower Extremities  -AA Bilateral Upper Extremities;Bilateral Lower Extremities  -AA    Bilateral Upper Extremities Muscle Tone  Assessment mildly decreased tone  -AA mildly decreased tone  -AA    Bilateral Lower Extremities Muscle Tone Assessment mildly decreased tone  -AA mildly decreased tone  -AA      08/06/17 1600 08/06/17 1200    Muscle Tone Assessment    Muscle Tone Assessment Bilateral Upper Extremities;Bilateral Lower Extremities  -MA Bilateral Upper Extremities;Bilateral Lower Extremities  -MA    Bilateral Upper Extremities Muscle Tone Assessment mildly decreased tone  -MA mildly decreased tone  -MA    Bilateral Lower Extremities Muscle Tone Assessment mildly decreased tone  -MA mildly decreased tone  -MA      08/06/17 0937 08/04/17 1758    Living Environment    Lives With  facility resident  -SM    Living Arrangements  residential facility  -SM    Home Accessibility  no concerns  -SM    Stair Railings at Home  none  -SM    Type of Financial/Environmental Concern  none  -SM    Transportation Available  none  -SM    Muscle Tone Assessment    Muscle Tone Assessment Bilateral Upper Extremities;Bilateral Lower Extremities  -MA     Bilateral Upper Extremities Muscle Tone Assessment mildly decreased tone  -MA     Bilateral Lower Extremities Muscle Tone Assessment mildly decreased tone  -MA       08/04/17 1756       General Information    Equipment Currently Used at Home walker, rolling  -SM       User Key  (r) = Recorded By, (t) = Taken By, (c) = Cosigned By    Initials Name Provider Type    TIAGO Triplett, CCC-SLP Speech and Language Pathologist    GIOVANA Story, PT Physical Therapist    LR Kelsey [place], RN Registered Nurse    BRIANDA Main, RN Registered Nurse    ADILENE Caldwell, EVENSW     CUONG Bro, YARON Registered Nurse    JOSELYN Johnson, RN Registered Nurse              PT Recommendation and Plan  Anticipated Discharge Disposition: skilled nursing facility (with continued therapy servcies vs LTACH)  Planned Therapy Interventions: balance training, bed mobility training, gait  training, strengthening, transfer training  PT Frequency: other (see comments) (5-14 times per week)  Plan of Care Review  Plan Of Care Reviewed With: patient, family  Outcome Summary/Follow up Plan: PT evaluation completed. Initially O2 sats 92% on nasal cannula then dropped to 77%. RN placed pt on venturi mask and O2 sats adalid to 98%to 100%. Pt inconsistent with ability to participate with ROM exercises. Function limited primarily by decreased alertness, strength and tolerance for functional mobility and activities. Pt will benefit from skilled PT to gently regain lost function as pt improves medically. If pt discharged prior to goals being met, PT recommends SNF with continued therapy services vs LTACH.          IP PT Goals       08/07/17 1729          Bed Mobility PT LTG    Bed Mobility PT LTG, Date Established 08/07/17  -      Bed Mobility PT LTG, Time to Achieve by discharge  -      Bed Mobility PT LTG, Activity Type all bed mobility  -      Bed Mobility PT LTG, [place] Level supervision required;conditional independence  -      Bed Mobility PT LTG, Outcome goal ongoing  -      Transfer Training PT LTG    Transfer Training PT LTG, Date Established 08/07/17  -      Transfer Training PT LTG, Time to Achieve by discharge  -GIOVANA      Transfer Training PT LTG, Activity Type bed to chair /chair to bed;sit to stand/stand to sit  -      Transfer Training PT LTG, [place] Level minimum assist (75% patient effort);contact guard assist  -GIOVANA      Transfer Training PT LTG, Assist Device walker, rolling  -      Transfer Training PT LTG, Outcome goal ongoing  -GIOVANA      Gait Training PT LTG    Gait Training Goal PT LTG, Date Established 08/07/17  -      Gait Training Goal PT LTG, Time to Achieve by discharge  -GIOVANA      Gait Training Goal PT LTG, [place] Level minimum assist (75% patient effort);contact guard assist  -GIOVANA      Gait Training Goal PT LTG, Distance to Achieve 5ft  -GIOVANA      Gait Training  Goal PT LTG, Additional Goal 50ftx2  -      Gait Training Goal PT LTG, Outcome goal ongoing  -      Strength Goal PT LTG    Strength Goal PT LTG, Date Established 08/07/17  -      Strength Goal PT LTG, Time to Achieve by discharge  -      Strength Goal PT LTG, Measure to Achieve Pt will perform 15 reps AAROM exercises  -      Strength Goal PT LTG, Functional Goal Pt will perform 15 reps AROM exercises  -      Strength Goal PT LTG, Additional Goal Pt will tolerate OOB 2-3 hours per day  -      Strength Goal PT LTG, Outcome goal ongoing  -        User Key  (r) = Recorded By, (t) = Taken By, (c) = Cosigned By    Initials Name Provider Type    GIOVANA Story, PT Physical Therapist                Outcome Measures       08/07/17 1530          How much help from another person do you currently need...    Turning from your back to your side while in flat bed without using bedrails? 2  -GIOVANA      Moving from lying on back to sitting on the side of a flat bed without bedrails? 2  -GIOVANA      Moving to and from a bed to a chair (including a wheelchair)? 1  -GIOVANA      Standing up from a chair using your arms (e.g., wheelchair, bedside chair)? 1  -GIOVANA      Climbing 3-5 steps with a railing? 1  -GIOVANA      To walk in hospital room? 1  -GIOVANA      AM-PAC 6 Clicks Score 8  -      Functional Assessment    Outcome Measure Options AM-PAC 6 Clicks Basic Mobility (PT)  -        User Key  (r) = Recorded By, (t) = Taken By, (c) = Cosigned By    Initials Name Provider Type    GIOVANA Story PT Physical Therapist           Time Calculation:         PT Charges       08/07/17 1737 08/07/17 1736       Time Calculation    Start Time 1530  - 1530  -     Stop Time 1608  -      Time Calculation (min) 38 min  -      PT Received On 08/07/17  -      PT Goal Re-Cert Due Date 08/20/17  -      Time Calculation- PT    Total Timed Code Minutes- PT 23 minute(s)  -        User Key  (r) = Recorded By, (t) = Taken By, (c) = Cosigned  By    Initials Name Provider Type    GIOVANA Natalie Story PT Physical Therapist          Therapy Charges for Today     Code Description Service Date Service Provider Modifiers Qty    63274338189 HC PT MOBILITY CURRENT 8/7/2017 Natalie Story, PT GP, CM 1    22761285977 HC PT MOBILITY PROJECTED 8/7/2017 Natalie Story, PT GP, CK 1    71370401212 HC PT EVAL HIGH COMPLEXITY 2 8/7/2017 Natalie Story, PT GP 1    52386833330 HC PT THER PROC EA 15 MIN 8/7/2017 Natalie Story PT GP 1          PT G-Codes  PT Professional Judgement Used?: Yes  Outcome Measure Options: AM-PAC 6 Clicks Basic Mobility (PT)  Score: 8  Functional Limitation: Mobility: Walking and moving around  Mobility: Walking and Moving Around Current Status (): At least 80 percent but less than 100 percent impaired, limited or restricted  Mobility: Walking and Moving Around Goal Status (): At least 40 percent but less than 60 percent impaired, limited or restricted      Natalie Story PT  8/7/2017

## 2017-08-07 NOTE — PLAN OF CARE
Problem: Fall Risk (Adult)  Goal: Absence of Falls  Outcome: Ongoing (interventions implemented as appropriate)    Problem: Fluid Volume Deficit (Adult)  Goal: Identify Related Risk Factors and Signs and Symptoms  Outcome: Ongoing (interventions implemented as appropriate)  Goal: Fluid/Electrolyte Balance  Outcome: Ongoing (interventions implemented as appropriate)  Goal: Comfort/Well Being  Outcome: Ongoing (interventions implemented as appropriate)    Problem: Older Inpatient, Acutely Ill (Adult)  Goal: Signs and Symptoms of Listed Potential Problems Will be Absent or Manageable (Older Inpatient, Acutely Ill)  Outcome: Ongoing (interventions implemented as appropriate)

## 2017-08-07 NOTE — NURSING NOTE
Explained to the patient's daughter that thin liquids could cause the patient to aspirate considering the patient has had a change in mental status.  The daughter had just given the patient a sip of water, and the patient coughed continuously after swallowing.  After explaining the risks of aspiration and the signs/symptoms of aspiration, the use of mouth swabs were encouraged to wet the mouth.

## 2017-08-07 NOTE — THERAPY TREATMENT NOTE
Acute Care - Speech Language Pathology Treatment Note  [facility]     Patient Name: Mariela Woodson  : 1925  MRN: [id number]  Today's Date: 2017         Admit Date: 2017   Pt slightly more awake. Pt required max cues to accept bolus and only accepted 10 bites. Pt with mild cough x1 on thin by spoon. Educated pt's daughter on diet changed to puree and will monitor s/s of aspiration for regular liquids. Nurse aware of small sips by spoon; no straw. Diet changed to puree this date.       Visit Dx:      ICD-10-CM ICD-9-CM   1. Anemia, unspecified type D64.9 285.9   2. Oropharyngeal dysphagia R13.12 787.22     Patient Active Problem List   Diagnosis   • Iron deficiency anemia due to chronic blood loss   • Post-polio syndrome   • Simple chronic bronchitis   • Seizure disorder   • Closed left hip fracture   • Iron deficiency anemia   • Post-polio syndrome   • Seizure disorder   • Metabolic encephalopathy   • COPD (chronic obstructive pulmonary disease)   • Encounter for rehabilitation   • Pneumonia of left lower lobe due to infectious organism   • Hypoxemia   • Pleural effusion   • Anemia   • Acute on chronic combined systolic and diastolic congestive heart failure   • Encounter for venous access device care   • Anxiety   • Chronic congestive heart failure with left ventricular diastolic dysfunction   • Acute GI bleeding   • Acute on chronic respiratory failure with hypoxia   • Altered mental status, unspecified   • Hypothyroidism   • Benign essential tremor              Adult Rehabilitation Note       17 1230 17 0844       Rehab Assessment/Intervention    Discipline speech language pathologist  -EK speech language pathologist  -EK     Document Type therapy note (daily note)  -EK      Subjective Information no complaints;agree to therapy  -EK      Patient Effort, Rehab Treatment adequate  -EK      Precautions/Limitations, Vision  WFL with corrective lenses  -EK     Precautions/Limitations,  Hearing  hearing impairment, bilaterally  -EK     Recorded by [EK] REJI Walker [EK] REJI Walker     Pain Assessment    Pain Assessment No/denies pain  -EK No/denies pain  -EK     Pain Score 0  -EK 0  -EK     Post Pain Score 0  -EK      Recorded by [EK] REJI Walker [EK] REJI Walker     Cognitive Assessment/Intervention    Current Cognitive/Communication Assessment  impaired  -EK     Orientation Status  oriented to;person;oriented x 4  -EK     Recorded by  [EK] REJI Walker     Dysphagia Treatment Objectives and Progress    Dysphagia Treatment Objectives Other 1  -EK Other 1  -EK     Recorded by [EK] REJI Walker [EK] REJI Walker     Dysphagia Other 1    Dysphagia Other 1 Objective Pt to tolerate po trials of puree, mechanical soft, and thin liquids via spoon until alertness increases for diet safety.   -EK Pt to tolerate po trials of puree, mechanical soft, and thin liquids via spoon until alertness increases for diet safety.   -EK     Status: Dysphagia Other 1 Progress slower than expected  -EK New  -EK     Dysphagia Other 1 Progress with consistent cues;70%  -EK      Comments: Dysphagia Other 1 Pt with less coughing when at noon meal. Pt tolerated puree trials at noon meal. Continue thin by spoon.   -EK      Recorded by [EK] REJI Walker [EK] REJI Walker     Positioning and Restraints    Pre-Treatment Position in bed  -EK in bed  -EK     Post Treatment Position bed  -EK bed  -EK     Recorded by [EK] REJI Walker [EK] REJI Walker       User Key  (r) = Recorded By, (t) = Taken By, (c) = Cosigned By    Initials Name Effective Dates    REJI Lane 04/06/17 -               IP SLP Goals       08/07/17 1007          Safely  Consume Diet    Safely Consume Diet- SLP, Time to Achieve by discharge  -EK      Safely Consume Diet- SLP, Additional Goal Pt to tolerate least restrictive diet with no s/s of aspiration for adequate nutrition and hydratio.   -EK        User Key  (r) = Recorded By, (t) = Taken By, (c) = Cosigned By    Initials Name Provider Type    REJI Lane Speech and Language Pathologist          EDUCATION  The patient has been educated in the following areas:   Dysphagia (Swallowing Impairment).    SLP Recommendation and Plan                               Plan of Care Review  Plan Of Care Reviewed With: patient, daughter  Progress: no change  Outcome Summary/Follow up Plan: Pt lethargic and displayed s/s of aspiration and coughing on thin liquid trials. SLP to keep pt NPO until alertness increases. Nurse and family aware.          Time Calculation:         Time Calculation- SLP       08/07/17 1250 08/07/17 1010       Time Calculation- SLP    SLP Start Time 1230  -EK 0944  -EK     SLP Stop Time 1253  -EK 1022  -EK     SLP Time Calculation (min) 23 min  -EK 38 min  -EK     SLP Received On 08/07/17  -EK 08/07/17  -EK     SLP Goal Re-Cert Due Date  08/20/17  -EK       User Key  (r) = Recorded By, (t) = Taken By, (c) = Cosigned By    Initials Name Provider Type    REJI Lane Speech and Language Pathologist          Therapy Charges for Today     Code Description Service Date Service Provider Modifiers Qty    87125988351 HC ST EVAL ORAL PHARYNG SWALLOW 3 8/7/2017 REJI Walker GN 1    [id number] HC ST TREATMENT SWALLOW 2 8/7/2017 REJI Walker GN 1               REJI Walker  8/7/2017

## 2017-08-07 NOTE — PLAN OF CARE
Problem: Patient Care Overview (Adult)  Goal: Plan of Care Review  Outcome: Ongoing (interventions implemented as appropriate)    08/07/17 0655   Coping/Psychosocial Response Interventions   Plan Of Care Reviewed With patient;daughter   Patient Care Overview   Progress declining   Outcome Evaluation   Outcome Summary/Follow up Plan Pt's confusion has worsened; moans continously while awake. Family refuses PRN MS. Urinary output decreased, MD notified. Episode of hypotension during the night, otherwise VSS.  Pt remained on venturi mask through out the night at 55%. Discussed plan of care with family.        Goal: Adult Individualization and Mutuality  Outcome: Ongoing (interventions implemented as appropriate)  Goal: Discharge Needs Assessment  Outcome: Ongoing (interventions implemented as appropriate)    Problem: Fall Risk (Adult)  Goal: Absence of Falls  Outcome: Ongoing (interventions implemented as appropriate)    Problem: Fluid Volume Deficit (Adult)  Goal: Fluid/Electrolyte Balance  Outcome: Ongoing (interventions implemented as appropriate)  Goal: Comfort/Well Being  Outcome: Ongoing (interventions implemented as appropriate)

## 2017-08-07 NOTE — THERAPY EVALUATION
Acute Care - Speech Language Pathology   Swallow Initial Evaluation [facility]     Patient Name: Mariela Woodson  : 1925  MRN: [id number]  Today's Date: 2017               Admit Date: 2017     Pt with decreased alertness. Pt oriented to person and place. Pt would answere simple questions however required cues to open eyes during po trials. Pt displayed s/s of aspiration with thin liquids; improved tolerance with ice chips, and no s/s of aspiration with soft or puree trials. Discussed with daughter at length about pt not providing po until alertness improves. SLP will return later in day to determine if pt is appropriate for diet at that time. Daughter in agreement and understood reasons to return before diet recommendations are made.     Visit Dx:     ICD-10-CM ICD-9-CM   1. Anemia, unspecified type D64.9 285.9   2. Oropharyngeal dysphagia R13.12 787.22     Patient Active Problem List   Diagnosis   • Iron deficiency anemia due to chronic blood loss   • Post-polio syndrome   • Simple chronic bronchitis   • Seizure disorder   • Closed left hip fracture   • Iron deficiency anemia   • Post-polio syndrome   • Seizure disorder   • Metabolic encephalopathy   • COPD (chronic obstructive pulmonary disease)   • Encounter for rehabilitation   • Pneumonia of left lower lobe due to infectious organism   • Hypoxemia   • Pleural effusion   • Anemia   • Acute on chronic combined systolic and diastolic congestive heart failure   • Encounter for venous access device care   • Anxiety   • Chronic congestive heart failure with left ventricular diastolic dysfunction   • Acute GI bleeding   • Acute on chronic respiratory failure with hypoxia   • Altered mental status, unspecified   • Hypothyroidism   • Benign essential tremor     Past Medical History:   Diagnosis Date   • CHF (congestive heart failure)    • COPD (chronic obstructive pulmonary disease)    • History of transfusion    • Hypothyroidism    • Iron  deficiency anemia    • Polio 1953   • Seizures      Past Surgical History:   Procedure Laterality Date   • CATARACT EXTRACTION     • HIP INTERTROCHANTERIC NAILING Left 5/22/2017    Procedure: HIP INTERTROCHANTERIC NAILING - left - short;  Surgeon: Ramos Handley MD;  Location: [facility];  Service:           SWALLOW EVALUATION (last 72 hours)      Swallow Evaluation       08/07/17 0844                Rehab Evaluation    Document Type evaluation  -EK        Subjective Information no complaints;agree to therapy  -EK        General Information    Patient Profile Review yes  -EK          User Key  (r) = Recorded By, (t) = Taken By, (c) = Cosigned By    Initials Name Effective Dates    TIAGO Triplett CCC-SLP 04/06/17 -         EDUCATION  The patient has been educated in the following areas:   Dysphagia (Swallowing Impairment).    SLP Recommendation and Plan                                           Plan of Care Review  Plan Of Care Reviewed With: patient, daughter  Progress: no change  Outcome Summary/Follow up Plan: Pt lethargic and displayed s/s of aspiration and coughing on thin liquid trials. SLP to keep pt NPO until alertness increases. Nurse and family aware.          IP SLP Goals       08/07/17 1007          Safely Consume Diet    Safely Consume Diet- SLP, Time to Achieve by discharge  -EK      Safely Consume Diet- SLP, Additional Goal Pt to tolerate least restrictive diet with no s/s of aspiration for adequate nutrition and hydratio.   -EK        User Key  (r) = Recorded By, (t) = Taken By, (c) = Cosigned By    Initials Name Provider Type    TIAGO Triplett CCC-SLP Speech and Language Pathologist               Time Calculation:         Time Calculation- SLP       08/07/17 1010          Time Calculation- SLP    SLP Start Time 0944  -EK      SLP Stop Time 1022  -EK      SLP Time Calculation (min) 38 min  -EK      SLP Received On 08/07/17  -EK      SLP Goal Re-Cert Due Date  08/20/17  -EK        User Key  (r) = Recorded By, (t) = Taken By, (c) = Cosigned By    Initials Name Provider Type    REJI Lane Speech and Language Pathologist          Therapy Charges for Today     Code Description Service Date Service Provider Modifiers Qty    [id number]  ST EVAL ORAL PHARYNG SWALLOW 3 8/7/2017 REJI Walker GN 1               REJI Walker  8/7/2017

## 2017-08-07 NOTE — PLAN OF CARE
Problem: Patient Care Overview (Adult)  Goal: Plan of Care Review  Outcome: Ongoing (interventions implemented as appropriate)    08/07/17 1007   Coping/Psychosocial Response Interventions   Plan Of Care Reviewed With patient;daughter   Patient Care Overview   Progress no change   Outcome Evaluation   Outcome Summary/Follow up Plan Pt lethargic and displayed s/s of aspiration and coughing on thin liquid trials. SLP recommended pt to not provide any po until SLP returns. Nurse and family aware.         Problem: Inpatient SLP  Goal: Dysphagia- Patient will safely consume diet as per recommendation with no signs/symptoms of aspiration  Outcome: Ongoing (interventions implemented as appropriate)    08/07/17 1007   Safely Consume Diet   Safely Consume Diet- SLP, Time to Achieve by discharge   Safely Consume Diet- SLP, Additional Goal Pt to tolerate least restrictive diet with no s/s of aspiration for adequate nutrition and hydratio.

## 2017-08-07 NOTE — PROGRESS NOTES
Conference with Dr. Montemayor, Dr. oFrman, patient, patient's daughter, and an RN cousin from [place].  Patient was short of breath and not engaged in the conversation - discussion was primarily with the daughter.  Discussed patient's condition - daughter reported that she was short of breath and somewhat confused on admission, which had been a problem off and on since surgery for broken hip in May.  Dr. Montemayor reported that the patient was more short of breath and confused on the morning of August 6, and so she transferred her to the Critical Care Unit.  I described some hazards of blood transfusion and indicated that the patient had antibodies to one of the antigens in the transfused blood.  The patient is known by the laboratory to have antibodies to [place], Maldonado, and E antigens.  This was an incompatible unit, and was released due to lab error.  However, there was no evidence of hemolysis, and we do not believe that the patient suffered any harm from the error.  Blood transfusions can add to fluid overload, however, and it is difficult to determine what effect, if any, the transfusion had on this patient's fluid status.  Dr. Forman requested consultation from Dr. Oseguera regarding blood management on his return Monday, August 7, 2017.  All questions were answered, and the daughter seemed to have good understanding.  Outstanding issues to be addressed at a later time include ongoing blood management, potential workup for gastrointestinal bleeding, and what level of intervention to pursue.

## 2017-08-07 NOTE — PLAN OF CARE
Problem: Patient Care Overview (Adult)  Goal: Plan of Care Review  Outcome: Ongoing (interventions implemented as appropriate)    08/07/17 1729   Coping/Psychosocial Response Interventions   Plan Of Care Reviewed With patient;family   Outcome Evaluation   Outcome Summary/Follow up Plan PT evaluation completed. Initially O2 sats 92% on nasal cannula then dropped to 77%. RN placed pt on venturi mask and O2 sats adalid to 98%to 100%. Pt inconsistent with ability to participate with ROM exercises. Function limited primarily by decreased alertness, strength and tolerance for functional mobility and activities. Pt will benefit from skilled PT to gently regain lost function as pt improves medically. If pt discharged prior to goals being met, PT recommends SNF with continued therapy services vs LTACH.         Problem: Inpatient Physical Therapy  Goal: Bed Mobility Goal LTG- PT  Outcome: Ongoing (interventions implemented as appropriate)    08/07/17 1729   Bed Mobility PT LTG   Bed Mobility PT LTG, Date Established 08/07/17   Bed Mobility PT LTG, Time to Achieve by discharge   Bed Mobility PT LTG, Activity Type all bed mobility   Bed Mobility PT LTG, [place] Level supervision required;conditional independence   Bed Mobility PT LTG, Outcome goal ongoing       Goal: Transfer Training Goal 1 LTG- PT  Outcome: Ongoing (interventions implemented as appropriate)  Goal: Gait Training Goal LTG- PT  Outcome: Ongoing (interventions implemented as appropriate)    08/07/17 1729   Gait Training PT LTG   Gait Training Goal PT LTG, Date Established 08/07/17   Gait Training Goal PT LTG, Time to Achieve by discharge   Gait Training Goal PT LTG, [place] Level minimum assist (75% patient effort);contact guard assist   Gait Training Goal PT LTG, Distance to Achieve 5ft   Gait Training Goal PT LTG, Additional Goal 50ftx2   Gait Training Goal PT LTG, Outcome goal ongoing       Goal: Strength Goal LTG- PT  Outcome: Ongoing (interventions  implemented as appropriate)    08/07/17 1729   Strength Goal PT LTG   Strength Goal PT LTG, Date Established 08/07/17   Strength Goal PT LTG, Time to Achieve by discharge   Strength Goal PT LTG, Measure to Achieve Pt will perform 15 reps AAROM exercises   Strength Goal PT LTG, Functional Goal Pt will perform 15 reps AROM exercises   Strength Goal PT LTG, Additional Goal Pt will tolerate OOB 2-3 hours per day   Strength Goal PT LTG, Outcome goal ongoing

## 2017-08-07 NOTE — PLAN OF CARE
Problem: Patient Care Overview (Adult)  Goal: Plan of Care Review  Outcome: Ongoing (interventions implemented as appropriate)    08/07/17 1312   Coping/Psychosocial Response Interventions   Plan Of Care Reviewed With patient   Patient Care Overview   Progress no change   Outcome Evaluation   Outcome Summary/Follow up Plan Diet changed to puree and thin liquids by spoon.          Problem: Inpatient SLP  Goal: Dysphagia- Patient will safely consume diet as per recommendation with no signs/symptoms of aspiration  Outcome: Ongoing (interventions implemented as appropriate)

## 2017-08-07 NOTE — CONSULTS
"Adult Nutrition  Assessment    Patient Name:  Mariela Woodson  YOB: 1925  MRN: [id number]  Admit Date:  8/4/2017    Assessment Date:  8/7/2017          Reason for Assessment       08/07/17 1318    Reason for Assessment    Reason For Assessment/Visit nurse/nurse practitioner consult    Identified At Risk By Screening Criteria other (see comments)   poor intake tat this time along with eating difficulties    Cardiac CHF    Pulmonary/Critical Care A/C respiratory failure                Nutrition/Diet History       08/07/17 1321    Nutrition/Diet History    Typical Food/Fluid Intake Pt currently on Venturi mask. Her daughter is present and she atates that pt has been eating pretty good at the Nursing home until last Thursday.  She is unsure about wt loss \"She has so much fluid on her\"  Does not like Ensure but they will try the clear.  Likes milk.  Per staff--she needs a swallowing eval.  Order is in.              Labs/Tests/Procedures/Meds       08/07/17 1326    Labs/Tests/Procedures/Meds    Labs/Tests Review Reviewed;Creat    Medication Review Reviewed, pertinent;Diuretic            Physical Findings       08/07/17 1327    Physical Findings/Assessment    Additional Documentation Physical Appearance (Group)    Physical Appearance    Overall Physical Appearance on oxygen therapy            Estimated/Assessed Needs       08/07/17 1327    Calculation Measurements    Weight Used For Calculations 48.1 kg (106 lb)    Height Used for Calculations 1.549 m (5' 1\")    Estimated/Assessed Energy Needs    Energy Need Method Kcal/kg    kcal/kg 25    25 Kcal/Kg (kcal) 1202.03    Estimated Kcal Range  1200    Estimated/Assessed Protein Needs    Weight Used for Protein Calculation 48.1 kg (106 lb)    Protein (gm/kg) 1.2    1.2 Gm Protein (gm) 57.7    Estimated Protein Range 58    Estimated/Assessed Fluid Needs    Fluid Need Method Other (comment)   1500cc            Nutrition Prescription Ordered       08/07/17 1329    " Nutrition Prescription PO    Current PO Diet Regular    Fluid Consistency Thin            Evaluation of Received Nutrient/Fluid Intake       08/07/17 1329    PO Evaluation    Number of Days PO Intake Evaluated Insufficient Data    Number of Meals 6    % PO Intake ~17% average            Comments:  Pt admitted with CHF and acute resp failure and with GI bleed, S/p transfusion. She is a NHR and was apparently eating well up until last week. Poor intake noted at this time. SLP has evaluated pt and diet changed to Pureed. Rd will add supplements to help improve intake.           Electronically signed by:  Jolly Love RD  08/07/17 1:42 PM

## 2017-08-07 NOTE — PLAN OF CARE
Problem: Patient Care Overview (Adult)  Goal: Plan of Care Review  Outcome: Ongoing (interventions implemented as appropriate)    08/07/17 0365   Coping/Psychosocial Response Interventions   Plan Of Care Reviewed With caregiver;patient;daughter   Patient Care Overview   Progress no change   Outcome Evaluation   Outcome Summary/Follow up Plan Pt admitted with CHF and acute resp failure. She is a NHR. Poor intake noted at this time. SLP has evaluated pt and diet changed to Pureed. Rd will add supplements to help improve intake.          Problem: Older Inpatient, Acutely Ill (Adult)  Goal: Signs and Symptoms of Listed Potential Problems Will be Absent or Manageable (Older Inpatient, Acutely Ill)  Outcome: Ongoing (interventions implemented as appropriate)

## 2017-08-07 NOTE — PROGRESS NOTES
[facility] Medicine Services  INPATIENT PROGRESS NOTE    Length of Stay: 3  Date of Admission: 8/4/2017  Primary Care Physician: Benito Kaur MD    Subjective   Chief Complaint: acute GI bleed  HPI:      Ms. Woodson is a 89 year old woman with a history of peptic ulcer disease was admitted due to hemoglobin of 6.8 found at [facility]. She has a history of diastolic CHF and COPD. She has been hypoxic on oxygen since Dr. Handley did surgery in May 2017.    Her confusion has improved over the weekend. She was able to hold a conversation. Family reports that she is complaining of pain in her right big toe with discoloration.     Family reports that at her baseline she is able to walk and communicate. She is able to take care of her own bills. Last time she was at her baseline was in May prior to surgery.    F- NA   A- tylenol, morphine   S- NA  T- SCD/TEDs (due to bleeding)  H- head of bed 30°  U- Protonix drip    Review of Systems   Unable to perform ROS: Mental status change      All pertinent negatives and positives are as above. All other systems have been reviewed and are negative unless otherwise stated.             Objective    Temp:  [97.6 °F (36.4 °C)-98.5 °F (36.9 °C)] 97.6 °F (36.4 °C)  Heart Rate:  [51-62] 54  Resp:  [22-28] 26  BP: ()/(39-63) 126/60    Physical Exam   Constitutional: She is oriented to person, place, and time. She appears well-developed and well-nourished. No distress.   HENT:   Head: Normocephalic and atraumatic.   Mouth/Throat: Mucous membranes are dry.   Eyes: EOM are normal. Pupils are equal, round, and reactive to light.   Neck: Normal range of motion. Neck supple. No JVD present. No tracheal deviation present. No thyromegaly present.   Cardiovascular: Normal rate and regular rhythm.  Exam reveals no gallop and no friction rub.    No murmur heard.  Pulmonary/Chest: No stridor.   Abdominal: Soft. Bowel sounds are normal. She exhibits no  distension and no ascites. There is no hepatosplenomegaly. There is no tenderness.   Musculoskeletal: She exhibits no edema (improved).   Neurological: She is alert and oriented to person, place, and time. She has normal reflexes. No cranial nerve deficit.   Skin: Skin is warm and dry. She is not diaphoretic.   Psychiatric: She has a normal mood and affect. Her speech is normal and behavior is normal. Judgment and thought content normal. Cognition and memory are normal.         Results Review:  I have reviewed the labs, radiology results, and diagnostic studies.    Laboratory Data:     Results from last 7 days  Lab Units 08/07/17  0505 08/06/17  0835 08/06/17  0706  08/05/17  1707 08/05/17  1651   SODIUM mmol/L 141  --  138  --   --  138   SODIUM, ARTERIAL mmol/L  --  136.5*  --   < >  --   --    POTASSIUM mmol/L 4.6  --  4.9  --   --  5.1   CHLORIDE mmol/L 94*  --  92*  --   --  94*   CO2 mmol/L 38.0*  --  40.0*  --   --  42.0*   BUN mg/dL 36*  --  32*  --   --  31*   CREATININE mg/dL 1.02*  --  1.01*  --   --  0.81   GLUCOSE mg/dL 99  --  89  --   --  103*   GLUCOSE, ARTERIAL mmol/L  --  71  --   < >  --   --    CALCIUM mg/dL 9.1  --  8.8  --   --  8.9   BILIRUBIN mg/dL 0.4  --  0.4  --  0.3 0.3   ALK PHOS U/L 142*  --  141*  --   --  147*   ALT (SGPT) U/L 42  --  41  --   --  39   AST (SGOT) U/L 30  --  28  --   --  39*   ANION GAP mmol/L 9.0  --  6.0  --   --  2.0*   < > = values in this interval not displayed.  Estimated Creatinine Clearance: 26.9 mL/min (by C-G formula based on Cr of 1.02).            Results from last 7 days  Lab Units 08/07/17  0735 08/07/17  0505 08/06/17  2330 08/06/17  2021 08/06/17  1450  08/05/17  0753   WBC 10*3/mm3  --  7.06  --   --   --   --  5.83   HEMOGLOBIN g/dL 7.6* 7.7* 7.4* 7.9* 7.5*  < > 8.1*   HEMATOCRIT % 26.9* 28.1* 26.6* 27.2* 25.5*  < > 28.0*   PLATELETS 10*3/mm3  --  114*  --   --   --   --  139*   < > = values in this interval not displayed.        Culture Data:   No  results found for: BLOODCX  No results found for: URINECX  No results found for: RESPCX  No results found for: WOUNDCX  No results found for: STOOLCX  No components found for: BODYFLD    Radiology Data:   Imaging Results (last 24 hours)     ** No results found for the last 24 hours. **          I have reviewed the patient current medications.     Assessment/Plan     Hospital Problem List     * (Principal)Acute GI bleeding    Overview Signed 8/7/2017  8:58 AM by Rema Lim MD     -S/P 1 PRBC transfusion - was given incompatible transfusion on 8/5/17. No elevation in bilirubin. Haptoglobin and LDH normal. Family is aware.   -Protonix drip started  -FOBT was positive          Seizure disorder    Overview Signed 8/7/2017  9:21 AM by Rema Lim MD     -phenytoin         Anemia    Overview Addendum 8/7/2017  9:31 AM by Rema Lim MD     -monitor H&H. Last one was 7.6/26.9  -S/P 1 PRBC transfusion on 8/5/17  -Dr. Oseguera consulted   -1 unit typed & crossed     Anisocytosis Mod/2+          Hypochromia Slight/1+         Rare polychromic cell observed           Macrocytes Slight/1+        WBC Morphology Normal        Platelet Estimate Decreased         Rare large platelet observed                    Anxiety    Overview Signed 8/7/2017  8:53 AM by Rema Lim MD     -continue xanax         Chronic congestive heart failure with left ventricular diastolic dysfunction    Overview Addendum 8/7/2017  8:59 AM by Rema Lim MD     ECHO done 6/22/2017  · Left ventricular systolic function is normal. Estimated EF = 55%.  · Left ventricular diastolic dysfunction (grade I) consistent with impaired relaxation.  · Left atrial cavity size is mildly dilated.  · Mild aortic valve stenosis is present.  · Mild aortic valve regurgitation is present.  · Mild mitral valve regurgitation is present  · Moderate tricuspid valve regurgitation is present.    Edema has improved.   IV lasix   Monitoring I&O          Acute on chronic respiratory failure with hypoxia    Overview Addendum 8/7/2017  9:28 AM by Rema Lim MD     -Initial ABG showed pH 7.1, repeat ABG showed 7.2.   -family refused BiPAP   -she has worn oxygen at [facility] since May when she had surgery            Altered mental status, unspecified    Overview Addendum 8/7/2017  9:23 AM by Rema Lim MD     -UA negative   -improving  -speech and swallow evaluation today          Hypothyroidism    Overview Addendum 8/7/2017  9:20 AM by Rema Lim MD     -continue home medication  -TSH 6         Benign essential tremor    Overview Signed 8/7/2017  9:22 AM by Rema Lim MD     -propanolol                Plan:  Consult with Dr. Oseguera today.         Discharge Planning: I expect patient to be discharged to [facility].        This document has been electronically signed by Rema Lim MD on August 7, 2017 9:31 AM

## 2017-08-08 NOTE — SIGNIFICANT NOTE
08/08/17 1409   Rehab Treatment   Discipline occupational therapist   Rehab Evaluation   Evaluation Not Performed patient/family declined evaluation  (OTR checked on this AM and family asked for time for family to talk.  OTR called pt nurse and she states pt on comfort measures and does not want any therapy.)

## 2017-08-08 NOTE — PROGRESS NOTES
MEDICINE DAILY PROGRESS NOTE  NAME: Mariela Woodson  : 1925  MRN: [id number]     LOS: 4 days     PROVIDER OF SERVICE: Robert Thompson MD    Chief Complaint: Acute GI bleeding    Subjective:     Interval History:  History taken from: patient chart family RN  Patient resting comfortably.  Asked for water.  No complaints of pain.    Review of Systems:   Review of Systems   Unable to perform ROS: Acuity of condition       Objective:     Vital Signs  Vitals:    17 0603 17 0619 17 0702 17 0755   BP: 120/56  117/57    BP Location:       Patient Position:       Pulse:  58  57   Resp:       Temp:       TempSrc:       SpO2:  94%  94%   Weight:       Height:           Physical Exam  Physical Exam   Constitutional: She is oriented to person, place, and time. She appears well-developed and well-nourished. No distress.   HENT:   Head: Normocephalic and atraumatic.   Mouth/Throat: Mucous membranes are not dry.   Eyes: EOM are normal. Pupils are equal, round, and reactive to light.   Neck: Normal range of motion. Neck supple. No JVD present. No tracheal deviation present. No thyromegaly present.   Cardiovascular: Normal rate and regular rhythm.  Exam reveals no gallop and no friction rub.    No murmur heard.  Pulmonary/Chest: No stridor.   Abdominal: Soft. Bowel sounds are normal. She exhibits no distension and no ascites. There is no hepatosplenomegaly. There is no tenderness.   Musculoskeletal: She exhibits no edema.   Neurological: She is alert and oriented to person, place, and time. She has normal reflexes. No cranial nerve deficit.   Skin: Skin is warm and dry. She is not diaphoretic.   Psychiatric: She has a normal mood and affect. Her speech is normal and behavior is normal. Judgment and thought content normal. Cognition and memory are normal.       Medication Review    Current Facility-Administered Medications:   •  acetaminophen (TYLENOL) suppository 325 mg, 325 mg, Rectal, Q4H PRN, Tu  MD Bhupendra, 325 mg at 08/07/17 0400  •  ALPRAZolam (XANAX) tablet 0.25 mg, 0.25 mg, Oral, Daily, Jerrica Forman MD, 0.25 mg at 08/07/17 2009  •  dexamethasone (DECADRON) injection 4 mg, 4 mg, Intravenous, Q6H, Teja Liriano MD, 4 mg at 08/08/17 0642  •  dorzolamide-timolol (COSOPT) ophthalmic solution 1 drop, 1 drop, Right Eye, Q12H, Jerrica Forman MD, 1 drop at 08/08/17 0829  •  ferrous sulfate EC tablet 324 mg, 324 mg, Oral, TID With Meals, Teja Liriano MD, 324 mg at 08/08/17 0828  •  hydrOXYzine (VISTARIL) capsule 25 mg, 25 mg, Oral, TID PRN, Jerrica Forman MD, 25 mg at 08/06/17 1025  •  latanoprost (XALATAN) 0.005 % ophthalmic solution 1 drop, 1 drop, Both Eyes, Nightly, Jerrica Forman MD, 1 drop at 08/07/17 2000  •  levothyroxine (SYNTHROID, LEVOTHROID) tablet 100 mcg, 100 mcg, Oral, Daily, Jerrica Forman MD, 100 mcg at 08/08/17 0830  •  norepinephrine (LEVOPHED) 8,000 mcg in sodium chloride 0.9 % 250 mL (32 mcg/mL) infusion, 0.02-0.3 mcg/kg/min, Intravenous, Titrated, Tu Huizar MD, Stopped at 08/06/17 2237  •  nystatin (MYCOSTATIN) powder, , Topical, Q12H, Jerrica Forman MD  •  ondansetron (ZOFRAN) injection 4 mg, 4 mg, Intravenous, Q6H PRN, Jerrica Forman MD  •  pantoprazole (PROTONIX) 40 mg/100 mL (0.4 mg/mL) in 0.9% NS IVPB, 8 mg/hr, Intravenous, Continuous, Declan Montemayor MD, Last Rate: 20 mL/hr at 08/08/17 0904, 8 mg/hr at 08/08/17 0904  •  phenytoin (DILANTIN) ER capsule 100 mg, 100 mg, Oral, Nightly, Jerrica Forman MD, 100 mg at 08/06/17 2026  •  propranolol (INDERAL) tablet 20 mg, 20 mg, Oral, BID, Jerrica Forman MD, 20 mg at 08/07/17 0853  •  sennosides-docusate sodium (SENOKOT-S) 8.6-50 MG tablet 1 tablet, 1 tablet, Oral, BID, Jerrica Forman MD, 1 tablet at 08/08/17 0830  •  sodium chloride 0.9 % flush 1-10 mL, 1-10 mL, Intravenous, PRN, Jerrica Forman MD  •  sodium chloride 0.9 % infusion, 75 mL/hr, Intravenous, Continuous, Rachel Worley MD     Diagnostic  Data    Lab Results (last 24 hours)     Procedure Component Value Units Date/Time    Hemoglobin & Hematocrit, Blood [[id number]]  (Abnormal) Collected:  08/07/17 1139    Specimen:  Blood Updated:  08/07/17 1156     Hemoglobin 7.3 (L) g/dL      Hematocrit 26.3 (L) %     Haptoglobin [[id number]] Collected:  08/05/17 1707    Specimen:  Blood Updated:  08/07/17 1311     Haptoglobin 60 mg/dL     Narrative:       Performed at:  [address] [place] [address]  [id number]  : Sadiq Cordero PhD, Phone:  [phone]    Hemoglobin & Hematocrit, Blood [[id number]]  (Abnormal) Collected:  08/07/17 1600    Specimen:  Blood Updated:  08/07/17 1729     Hemoglobin 7.3 (L) g/dL      Hematocrit 24.9 (L) %     Hemoglobin & Hematocrit, Blood [[id number]]  (Abnormal) Collected:  08/07/17 1946    Specimen:  Blood Updated:  08/07/17 2005     Hemoglobin 7.3 (L) g/dL      Hematocrit 27.7 (L) %     Lactate Dehydrogenase [[id number]]  (Normal) Collected:  08/08/17 0357    Specimen:  Blood Updated:  08/08/17 0455      U/L     Comprehensive Metabolic Panel [[id number]]  (Abnormal) Collected:  08/08/17 0357    Specimen:  Blood Updated:  08/08/17 0505     Glucose 132 (H) mg/dL      BUN 40 (H) mg/dL      Creatinine 1.22 (H) mg/dL      Sodium 141 mmol/L      Potassium 4.8 mmol/L      Chloride 95 mmol/L      CO2 39.0 (H) mmol/L      Calcium 8.7 mg/dL      Total Protein 5.7 (L) g/dL      Albumin 3.20 (L) g/dL      ALT (SGPT) 44 U/L      AST (SGOT) 40 (H) U/L      Alkaline Phosphatase 147 (H) U/L      Total Bilirubin 0.4 mg/dL      eGFR Non African Amer 41 mL/min/1.73      Globulin 2.5 gm/dL      A/G Ratio 1.3 g/dL      BUN/Creatinine Ratio 32.8 (H)     Anion Gap 7.0 mmol/L     Narrative:       The MDRD GFR formula is only valid for adults with stable renal function between ages 18 and 70.    CBC & Differential [[id number]] Collected:  08/08/17 0357    Specimen:  Blood Updated:  08/08/17 0546    Narrative:       The  following orders were created for panel order CBC & Differential.  Procedure                               Abnormality         Status                     ---------                               -----------         ------                     Scan Slide[[id number]]                                                                  CBC Auto Differential[[id number]]        Abnormal            Final result                 Please view results for these tests on the individual orders.    CBC Auto Differential [[id number]]  (Abnormal) Collected:  08/08/17 0357    Specimen:  Blood Updated:  08/08/17 0546     WBC 7.32 10*3/mm3      RBC 2.54 (L) 10*6/mm3      Hemoglobin 8.1 (L) g/dL      Hematocrit 28.2 (L) %      .0 (H) fL      MCH 31.9 pg      MCHC 28.7 (L) g/dL      RDW 17.0 (H) %      RDW-SD 68.3 (H) fl      MPV 10.7 fL      Platelets 163 10*3/mm3      Neutrophil % 85.7 (H) %      Lymphocyte % 8.3 (L) %      Monocyte % 5.6 %      Eosinophil % 0.0 %      Basophil % 0.0 %      Immature Grans % 0.4 %      Neutrophils, Absolute 6.27 10*3/mm3      Lymphocytes, Absolute 0.61 10*3/mm3      Monocytes, Absolute 0.41 10*3/mm3      Eosinophils, Absolute 0.00 10*3/mm3      Basophils, Absolute 0.00 10*3/mm3      Immature Grans, Absolute 0.03 (H) 10*3/mm3      nRBC 1.5 (H) /100 WBC           I reviewed the patient's new clinical results.    Assessment/Plan:     Active Hospital Problems (** Indicates Principal Problem)    Diagnosis Date Noted   • **Acute GI bleeding [K92.2] 08/07/2017 08/08/17.  GI following.  Nothing invasive at this time.  Hemoglobin/hematocrit stable at present.    08/07/17.  -S/P 1 PRBC transfusion - was given incompatible transfusion on 8/5/17. No elevation in bilirubin. Haptoglobin and LDH normal. Family is aware. Protonix drip started  FOBT was positive      • Anxiety [F41.9] 08/07/2017     -continue xanax     • Chronic congestive heart failure with left ventricular diastolic dysfunction [I50.32] 08/07/2017      ECHO done 6/22/2017  · Left ventricular systolic function is normal. Estimated EF = 55%.  · Left ventricular diastolic dysfunction (grade I) consistent with impaired relaxation.  · Left atrial cavity size is mildly dilated.  · Mild aortic valve stenosis is present.  · Mild aortic valve regurgitation is present.  · Mild mitral valve regurgitation is present  · Moderate tricuspid valve regurgitation is present.    Edema has improved.   IV lasix   Monitoring I&O     • Acute on chronic respiratory failure with hypoxia [J96.21] 08/07/2017     Nasal cannula.  No BiPAP or intubation.  Plan to make comfort measures.       • Altered mental status, unspecified [R41.82] 08/07/2017 08/08/17.  Mentation reduced due to acuity of condition.  Stable.     • Hypothyroidism [E03.9] 08/07/2017     -continue home medication  -TSH 6     • Benign essential tremor [G25.0] 08/07/2017     -propanolol      • Anemia [D64.9] 06/22/2017 08/08/17.  Hemoglobin and hematocrit stable.  GI following.  Family at this time wants nothing invasive.  Heme/onc following as well.    Results from last 7 days  Lab Units 08/08/17  0357 08/07/17  1946 08/07/17  1600 08/07/17  1139 08/07/17  0735 08/07/17  0505 08/06/17  2330   HEMOGLOBIN g/dL 8.1* 7.3* 7.3* 7.3* 7.6* 7.7* 7.4*        • Seizure disorder [G40.909] 05/25/2017     -phenytoin        Resolved Hospital Problems    Diagnosis Date Noted Date Resolved   No resolved problems to display.     Plan for comfort measures today.    Will monitor patient's hospital course and adjust treatment as hospital course dictates.    DVT prophylaxis: SCD/TEDs  Code status is Conditional Code    Plan for disposition:Comfort measures      Time:           This document has been electronically signed by Robert Thompson MD on August 8, 2017 11:02 AM

## 2017-08-08 NOTE — CONSULTS
Peter Thomas DO,[facility]  Gastroenterology  Hepatology  Endoscopy  Board Certified in Internal Medicine and gastroenterology  [address], [address]  [place]. [address]  - [phone]   F - [phone]     GASTROENTEROLOGY CONSULT NOTE   PETER THOMAS DO.         SUBJECTIVE:   8/7/2017    Name: Mariela Woodson  DOD: 1/31/1925    REASON FOR CONSULT: Chronic anemia    Chief Complaint:     Chief Complaint   Patient presents with   • Abnormal Lab       Subjective : Recurrent anemia, progressively getting worse over the past several months requiring repeated blood transfusions     Patient is 89 y.o. female, personal history of peptic ulcer disease with the last time that I saw her in 2008 for bleeding peptic ulcer disease, presents with recurring anemia.    For the past several months, the patient's anemia has been getting progressively worse.  Initially, it was perceived to be related to her hip surgery.  The patient received Lovenox.  She required multiple units of blood transfused.  She subsequently has had the discontinuation of the Lovenox.  The patient has had no visible signs of any bleeding.  The patient has had some dark stools that have been noted.  She has been treated with blood transfusion as well as intravenous iron therapy.  Recently, she has been tried to have oral iron and she will not tolerate this or take this.    I'm called to see her on an urgent basis.  The patient family want to know what can be done for her.  She has had progressive problems with congestive heart failure.  She currently is on a 55% Venturi mask.  She has had recurrent congestive heart failure.  The patient is somewhat confused and lethargic.  Likewise, the patient apparently is now having increasing amounts of difficulties with reduced urinary output.    Her family has made it very plain that her wishes are not to be placed on any type of mechanical ventilation.     ROS/HISTORY/ CURRENT  MEDICATIONS/OBJECTIVE/VS/PE:   Review of Systems:   Review of Systems   Unable to perform ROS: Mental status change       History:     Past Medical History:   Diagnosis Date   • CHF (congestive heart failure)    • COPD (chronic obstructive pulmonary disease)    • History of transfusion    • Hypothyroidism    • Iron deficiency anemia    • Polio 1953   • Seizures      Past Surgical History:   Procedure Laterality Date   • CATARACT EXTRACTION     • HIP INTERTROCHANTERIC NAILING Left 5/22/2017    Procedure: HIP INTERTROCHANTERIC NAILING - left - short;  Surgeon: Ramos Handley MD;  Location: [facility];  Service:      Family History   Problem Relation Age of Onset   • Breast cancer Mother    • Colon cancer Father    • Prostate cancer Father    • Breast cancer Sister    • Prostate cancer Brother    • Breast cancer Paternal Aunt    • Breast cancer Daughter    • Melanoma Daughter    • Cancer Son    • Leukemia Brother      Social History   Substance Use Topics   • Smoking status: Former Smoker     Packs/day: 0.50     Types: Cigarettes     Quit date: 1987   • Smokeless tobacco: None   • Alcohol use No     Prescriptions Prior to Admission   Medication Sig Dispense Refill Last Dose   • ALPRAZolam (XANAX) 0.25 MG tablet Take 1 tablet by mouth Daily. (Patient taking differently: Take 0.25 mg by mouth Every Night.) 30 tablet 0 8/3/2017   • bimatoprost (LUMIGAN) 0.01 % ophthalmic drops Administer 1 drop to the right eye Daily.   8/3/2017   • dorzolamide-timolol (COSOPT) 22.3-6.8 MG/ML ophthalmic solution Administer  to the right eye 2 (Two) Times a Day.   8/4/2017   • furosemide (LASIX) 20 MG tablet Take 2 tablets by mouth Daily. (Patient taking differently: Take  by mouth 2 (Two) Times a Day.) 30 tablet 1 Taking   • lansoprazole (PREVACID) 15 MG capsule Take 15 mg by mouth Daily.   8/4/2017   • levothyroxine (SYNTHROID, LEVOTHROID) 75 MCG tablet Take 1 tablet by mouth Daily. (Patient taking differently: Take 100 mcg by  mouth Daily.) 30 tablet 1 2017   • Multiple Vitamins-Minerals (PRESERVISION AREDS) capsule Take  by mouth 2 (Two) Times a Day.   2017   • phenytoin (DILANTIN) 100 MG ER capsule Take 100 mg by mouth Every Night.   8/3/2017   • potassium chloride (MICRO-K) 10 MEQ CR capsule Take 2 capsules by mouth Daily. 30 capsule 1 2017   • propranolol (INDERAL) 20 MG tablet Take 20 mg by mouth 2 (Two) Times a Day.   2017   • sennosides-docusate sodium (SENOKOT-S) 8.6-50 MG tablet Take 1 tablet by mouth 2 (Two) Times a Day. 1 tablet 1 2017   • [] albuterol (PROVENTIL HFA;VENTOLIN HFA) 108 (90 BASE) MCG/ACT inhaler Inhale 2 puffs Every 6 (Six) Hours As Needed.   Taking   • aspirin 81 MG tablet Take 1 tablet by mouth Daily. 1 tablet 1 Taking   • fluticasone (FLONASE) 50 MCG/ACT nasal spray 2 sprays by Each Nare route Daily.   Taking   • lidocaine (LIDODERM) 5 % Place 1 patch on the skin Daily. Remove & Discard patch within 12 hours or as directed by MD 20 patch 1 Taking   • promethazine-codeine (PHENERGAN with CODEINE) 6.25-10 MG/5ML syrup Take 2.5 mL by mouth Every Night. 180 mL 2 Taking     Allergies:  Valium [diazepam]    I have reviewed the patients medical history, surgical history and family history in the available medical record system.     Current Medications:     Current Facility-Administered Medications   Medication Dose Route Frequency Provider Last Rate Last Dose   • acetaminophen (TYLENOL) suppository 325 mg  325 mg Rectal Q4H PRN Tu Huizar MD   325 mg at 17 0400   • ALPRAZolam (XANAX) tablet 0.25 mg  0.25 mg Oral Daily Jerrica Forman MD   0.25 mg at 17   • dexamethasone (DECADRON) injection 4 mg  4 mg Intravenous Q6H Teja Liriano MD   4 mg at 17 1847   • dorzolamide-timolol (COSOPT) ophthalmic solution 1 drop  1 drop Right Eye Q12H Jerrica Forman MD   1 drop at 17   • ferrous sulfate EC tablet 324 mg  324 mg Oral TID With Meals Teja Liriano,  MD   324 mg at 08/07/17 1847   • furosemide (LASIX) injection 40 mg  40 mg Intravenous Q12H Declan Montemayor MD   40 mg at 08/07/17 2000   • hydrOXYzine (VISTARIL) capsule 25 mg  25 mg Oral TID PRN Jerrica Forman MD   25 mg at 08/06/17 1025   • latanoprost (XALATAN) 0.005 % ophthalmic solution 1 drop  1 drop Both Eyes Nightly Jerrica Forman MD   1 drop at 08/07/17 2000   • levothyroxine (SYNTHROID, LEVOTHROID) tablet 100 mcg  100 mcg Oral Daily Jerrica Forman MD   100 mcg at 08/07/17 0854   • norepinephrine (LEVOPHED) 8,000 mcg in sodium chloride 0.9 % 250 mL (32 mcg/mL) infusion  0.02-0.3 mcg/kg/min Intravenous Titrated Tu Huizar MD   Stopped at 08/06/17 2237   • nystatin (MYCOSTATIN) powder   Topical Q12H Jerrica Forman MD       • ondansetron (ZOFRAN) injection 4 mg  4 mg Intravenous Q6H PRN Jerrica Forman MD       • pantoprazole (PROTONIX) 40 mg/100 mL (0.4 mg/mL) in 0.9% NS IVPB  8 mg/hr Intravenous Continuous Declan Montemayor MD 20 mL/hr at 08/07/17 1753 8 mg/hr at 08/07/17 1753   • phenytoin (DILANTIN) ER capsule 100 mg  100 mg Oral Nightly Jerrica Forman MD   100 mg at 08/06/17 2026   • propranolol (INDERAL) tablet 20 mg  20 mg Oral BID Jerrica Forman MD   20 mg at 08/07/17 0853   • sennosides-docusate sodium (SENOKOT-S) 8.6-50 MG tablet 1 tablet  1 tablet Oral BID Jerrica Forman MD   1 tablet at 08/07/17 1848   • sodium chloride 0.9 % flush 1-10 mL  1-10 mL Intravenous PRN Jerrica Forman MD           Objective     Physical Exam:   Temp:  [97.6 °F (36.4 °C)] 97.6 °F (36.4 °C)  Heart Rate:  [52-60] 60  Resp:  [18-28] 18  BP: ()/(42-67) 116/55    Physical Exam:  General Appearance:    Alert, cooperative, in no acute distress   Head:    Normocephalic, without obvious abnormality, atraumatic   Eyes:            Lids and lashes normal, conjunctivae and sclerae normal, no   icterus, no pallor, corneas clear, PERRLA   Ears:    Ears appear intact with no abnormalities noted    Throat:   No oral lesions, no thrush, oral mucosa moist   Neck:   No adenopathy, supple, trachea midline, no thyromegaly, no     carotid bruit, no JVD   Back:     No kyphosis present, no scoliosis present, no skin lesions,       erythema or scars, no tenderness to percussion or                   palpation,   range of motion normal   Lungs:   Rhonchi and rales there are diffusely present    Heart:    IRregular rhythm and normal rate, normal S1 and S2, no            murmur, no gallop, no rub, no click   Breast Exam:    Deferred   Abdomen:     Normal bowel sounds, no masses, no organomegaly, soft        non-tender, non-distended, no guarding, no rebound                 tenderness   Genitalia:    Deferred   Extremities:   Moves all extremities well, no edema, no cyanosis, no              redness   Pulses:   Pulses palpable and equal bilaterally   Skin:   No bleeding, bruising or rash   Lymph nodes:   No palpable adenopathy   Neurologic:   Cranial nerves 2 - 12 grossly intact, sensation intact, DTR        present and equal bilaterally      Results Review:     Lab Results   Component Value Date    WBC 7.06 08/07/2017    WBC 5.83 08/05/2017    WBC 8.50 06/28/2017    HGB 7.3 (L) 08/07/2017    HGB 7.3 (L) 08/07/2017    HGB 7.6 (L) 08/07/2017    HCT 24.9 (L) 08/07/2017    HCT 26.3 (L) 08/07/2017    HCT 26.9 (L) 08/07/2017     (L) 08/07/2017     (L) 08/05/2017     06/28/2017       Results from last 7 days  Lab Units 08/07/17  0505 08/06/17  0706 08/05/17  1651   ALK PHOS U/L 142* 141* 147*   ALT (SGPT) U/L 42 41 39   AST (SGOT) U/L 30 28 39*       Results from last 7 days  Lab Units 08/07/17  0505 08/06/17  0706 08/05/17  1707 08/05/17  1651   BILIRUBIN mg/dL 0.4 0.4 0.3 0.3   ALK PHOS U/L 142* 141*  --  147*     No results found for: LIPASE  Lab Results   Component Value Date    INR 1.11 06/22/2017    INR 1.22 (H) 06/13/2017    INR 1.13 05/25/2017          Radiology Review:  Imaging Results (last 72  hours)     Procedure Component Value Units Date/Time    IR Insert Midline Without Port Pump 5 Plus [[id number]] Resulted:  08/05/17 1101     Updated:  08/05/17 1101    Narrative:       This procedure was auto-finalized with no dictation required.    US Guided Vascular Access [[id number]] Resulted:  08/05/17 1155     Updated:  08/05/17 1155    Narrative:       This procedure was auto-finalized with no dictation required.    CT Abdomen Pelvis With Contrast [[id number]] Collected:  08/05/17 2049     Updated:  08/05/17 2134    Narrative:         CT angiogram chest with contrast and CT abdomen and pelvis with  contrast on 8/5/2017     CLINICAL INDICATION: Shortness of breath, fever, generalized  abdominal pain, lethargic    TECHNIQUE: Multiple axial images are obtained throughout the  chest following the administration of IV contrast.  Computer  generated 3D reconstructions/MIPS were performed. Multiple axial  images are also obtained throughout the abdomen and pelvis  following the administration of IV and oral contrast. This study  was performed with techniques to keep radiation doses as low as  reasonably achievable, (ALARA).   Total DLP is 679.2 mGy*cm.    COMPARISON: CT chest from 6/13/2017     FINDINGS:     CHEST: There are small-to-moderate sized bilateral pleural  effusions with mild adjacent bilateral lower lung atelectasis.  Emphysematous changes of the lungs are noted. There is mild  dependent atelectasis within the lingula and right middle lobe.  There is no pericardial effusion. Coronary artery calcifications  and other vascular calcifications are noted. There is reflux of  contrast into the IVC and hepatic veins indicating elevated right  heart pressure. There is no aortic aneurysm or dissection. 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 The left  vertebral artery originates directly off the aortic arch. There  are no filling  defects within the pulmonary arteries to suggest  pulmonary embolus. No acute bony abnormality is noted.    ABDOMEN: There is bilateral adrenal prominence that appears  stable likely related to hyperplasia or adenomas. Small left  renal cysts are noted. The solid abdominal organs are otherwise  unremarkable. Extensive vascular calcifications are noted. There  is no abdominal adenopathy. There is no free fluid or free air  within the abdomen. The abdominal portion of the GI tract is  unremarkable. Anasarca is noted in the subcutaneous tissues.    Pelvis: There is diverticulosis. Alvarez catheter is noted in the  bladder. Pelvic organs appear unremarkable by CT. There is no  free fluid in the pelvis. There is no pelvic adenopathy. The  pelvic portion of the GI tract including the appendix is  otherwise unremarkable. Degenerative changes are noted in the  spine. Intramedullary anitha is partially imaged in the left hip.      Impression:       1. No evidence of pulmonary embolus.  2. Bilateral pleural effusions with some anasarca suggesting some  volume overload and/or third spacing. There is also evidence of  elevated right heart pressure.  3. Diverticulosis.    Electronically signed by:  Tato Cowan  8/5/2017 9:33 PM CDT  Workstation: [id number]    CT Angiogram Chest With Contrast [[id number]] Collected:  08/05/17 2049     Updated:  08/05/17 2134    Narrative:         CT angiogram chest with contrast and CT abdomen and pelvis with  contrast on 8/5/2017     CLINICAL INDICATION: Shortness of breath, fever, generalized  abdominal pain, lethargic    TECHNIQUE: Multiple axial images are obtained throughout the  chest following the administration of IV contrast.  Computer  generated 3D reconstructions/MIPS were performed. Multiple axial  images are also obtained throughout the abdomen and pelvis  following the administration of IV and oral contrast. This study  was performed with techniques to keep radiation doses as  low as  reasonably achievable, (ALARA).   Total DLP is 679.2 mGy*cm.    COMPARISON: CT chest from 6/13/2017     FINDINGS:     CHEST: There are small-to-moderate sized bilateral pleural  effusions with mild adjacent bilateral lower lung atelectasis.  Emphysematous changes of the lungs are noted. There is mild  dependent atelectasis within the lingula and right middle lobe.  There is no pericardial effusion. Coronary artery calcifications  and other vascular calcifications are noted. There is reflux of  contrast into the IVC and hepatic veins indicating elevated right  heart pressure. There is no aortic aneurysm or dissection. 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 The left  vertebral artery originates directly off the aortic arch. There  are no filling defects within the pulmonary arteries to suggest  pulmonary embolus. No acute bony abnormality is noted.    ABDOMEN: There is bilateral adrenal prominence that appears  stable likely related to hyperplasia or adenomas. Small left  renal cysts are noted. The solid abdominal organs are otherwise  unremarkable. Extensive vascular calcifications are noted. There  is no abdominal adenopathy. There is no free fluid or free air  within the abdomen. The abdominal portion of the GI tract is  unremarkable. Anasarca is noted in the subcutaneous tissues.    Pelvis: There is diverticulosis. Alvarez catheter is noted in the  bladder. Pelvic organs appear unremarkable by CT. There is no  free fluid in the pelvis. There is no pelvic adenopathy. The  pelvic portion of the GI tract including the appendix is  otherwise unremarkable. Degenerative changes are noted in the  spine. Intramedullary anitha is partially imaged in the left hip.      Impression:       1. No evidence of pulmonary embolus.  2. Bilateral pleural effusions with some anasarca suggesting some  volume overload and/or third spacing. There is  also evidence of  elevated right heart pressure.  3. Diverticulosis.    Electronically signed by:  Tato Cowan  8/5/2017 9:33 PM CDT  Workstation: [id number]           I reviewed the patient's new clinical results.  I reviewed the patient's new imaging results and agree with the interpretation.     ASSESSMENT/PLAN:   ASSESSMENT:   1.  Anemia secondary to chronic blood loss, suspected.  Etiologies can include recurrent peptic ulcer disease versus gastric neoplasm versus vascular malformations of the gastrointestinal system  2.  Family history of colon cancer in first-degree relative  3.  Severe congestive heart failure with hypoxia   PLAN:   1.  I discussed the patient's situation with her grandchildren.  At this time, the performance of any type of endoscopy carries with it a high risk of complications from the respiratory system.  She would require intubation.  They say that she does not want that to happen..  2.  I told them if we are able to get the patient over her severe congestive heart failure, then we might be able to perform these endoscopic test.  However, they are unsure whether even doing that would be worthwhile as she would not be able to tolerate any type of surgery if that was necessary.  3.  I spent quite a bit of time with them and reviewed everything to their satisfaction.     Teja Gutierrez DO  08/07/17  8:01 PM

## 2017-08-08 NOTE — SIGNIFICANT NOTE
08/08/17 5885   Rehab Treatment   Discipline speech language pathologist   Treatment Not Performed other (see comments)  (Pt drowsy this date. Pt changed to venturi mask due to oxygen dropping. )   SLP educated family on making sure pt is alert and awake for safe po intake to decrease risk of aspiration. Nurse in room when discussing this with daughter.

## 2017-08-08 NOTE — SIGNIFICANT NOTE
08/08/17 1343   Rehab Treatment   Discipline physical therapy assistant   Treatment Not Performed patient/family declined treatment

## 2017-08-08 NOTE — CONSULTS
[facility]  [address] [place] [address]. [id number]  T - [phone]  F  [phone]     Consultation         PATIENT  DEMOGRAPHICS   PATIENT NAME: Mariela Woodson                      PHYSICIAN: Rachel Worley MD  : 1925  MRN: [id number]    Subjective   SUBJECTIVE   Referring Provider: Dr Thompson  Reason for Consultation: EDI oliguria  History of present illness:      Ms Woodson is a 89-year-old female who has past medical history of left hip surgery in May 2017.  She also has a history of peptic ulcer disease.  Patient came here with another episode of worsening anemia.  Post hip surgery she was on the Lovenox and this is being discontinued but now again she presented with melanotic stool.  She did receive blood in the last admission and also in the outpatient setting at [facility] along with IV iron therapy.  She has possible another bleed from peptic ulcer disease but she is too frail to have endoscopy.    Patient also has anasarca and ascites and pleural effusion.  She's been on diuretics but now is not making much urine.  She has a 160 cc of urine output overnigh.  Her baseline creatinine is less than 1 and is now up to 1.2.  At the present moment patient's family has decided to keep her comfortable.  They don't want any intubation or resuscitation.  Also she is not a candidate for endoscopy.      Past Medical History:   Diagnosis Date   • CHF (congestive heart failure)    • COPD (chronic obstructive pulmonary disease)    • History of transfusion    • Hypothyroidism    • Iron deficiency anemia    • Polio    • Seizures      Past Surgical History:   Procedure Laterality Date   • CATARACT EXTRACTION     • HIP INTERTROCHANTERIC NAILING Left 2017    Procedure: HIP INTERTROCHANTERIC NAILING - left - short;  Surgeon: Ramos Handley MD;  Location: [facility];  Service:      Family History   Problem Relation Age of Onset   • Breast cancer Mother    • Colon cancer  "Father    • Prostate cancer Father    • Breast cancer Sister    • Prostate cancer Brother    • Breast cancer Paternal Aunt    • Breast cancer Daughter    • Melanoma Daughter    • Cancer Son    • Leukemia Brother      Social History   Substance Use Topics   • Smoking status: Former Smoker     Packs/day: 0.50     Types: Cigarettes     Quit date: 1987   • Smokeless tobacco: None   • Alcohol use No     Allergies:  Valium [diazepam]     REVIEW OF SYSTEMS    Review of Systems   Unable to perform ROS: Mental status change       Objective   OBJECTIVE   Vital Signs  Temp:  [96.4 °F (35.8 °C)-97.6 °F (36.4 °C)] 96.9 °F (36.1 °C)  Heart Rate:  [51-61] 57  Resp:  [18-20] 18  BP: (113-160)/(53-68) 117/57    Flowsheet Rows         First Filed Value    Admission Height  62\" (157.5 cm) Documented at 08/04/2017 1258    Admission Weight  127 lb (57.6 kg) Documented at 08/04/2017 1258           I/O last 3 completed shifts:  In: 845 [P.O.:80; IV Piggyback:765]  Out: 642 [Urine:642]    PHYSICAL EXAM    Physical Exam   Constitutional: She appears well-developed.   HENT:   Head: Normocephalic.   Eyes: Pupils are equal, round, and reactive to light.   Cardiovascular: Normal rate, regular rhythm and normal heart sounds.    Pulmonary/Chest: Breath sounds normal. She is in respiratory distress.   Abdominal: Soft. Bowel sounds are normal.   Neurological: She exhibits normal muscle tone.       RESULTS   Results Review:      Results from last 7 days  Lab Units 08/08/17  0357 08/07/17  0505 08/06/17  0835 08/06/17  0706   SODIUM mmol/L 141 141  --  138   SODIUM, ARTERIAL mmol/L  --   --  136.5*  --    POTASSIUM mmol/L 4.8 4.6  --  4.9   CHLORIDE mmol/L 95 94*  --  92*   CO2 mmol/L 39.0* 38.0*  --  40.0*   BUN mg/dL 40* 36*  --  32*   CREATININE mg/dL 1.22* 1.02*  --  1.01*   CALCIUM mg/dL 8.7 9.1  --  8.8   BILIRUBIN mg/dL 0.4 0.4  --  0.4   ALK PHOS U/L 147* 142*  --  141*   ALT (SGPT) U/L 44 42  --  41   AST (SGOT) U/L 40* 30  --  28   GLUCOSE " mg/dL 132* 99  --  89   GLUCOSE, ARTERIAL mmol/L  --   --  71  --        Estimated Creatinine Clearance: 22.5 mL/min (by C-G formula based on Cr of 1.22).                  Results from last 7 days  Lab Units 08/08/17  0357 08/07/17  1946 08/07/17  1600 08/07/17  1139 08/07/17  0735 08/07/17  0505  08/05/17  0753   WBC 10*3/mm3 7.32  --   --   --   --  7.06  --  5.83   HEMOGLOBIN g/dL 8.1* 7.3* 7.3* 7.3* 7.6* 7.7*  < > 8.1*   PLATELETS 10*3/mm3 163  --   --   --   --  114*  --  139*   < > = values in this interval not displayed.           MEDICATIONS      ALPRAZolam 0.25 mg Oral Daily   dexamethasone 4 mg Intravenous Q6H   dorzolamide-timolol 1 drop Right Eye Q12H   ferrous sulfate 324 mg Oral TID With Meals   latanoprost 1 drop Both Eyes Nightly   levothyroxine 100 mcg Oral Daily   nystatin  Topical Q12H   phenytoin 100 mg Oral Nightly   propranolol 20 mg Oral BID   sennosides-docusate sodium 1 tablet Oral BID       norepinephrine 0.02-0.3 mcg/kg/min Last Rate: Stopped (08/06/17 2237)   pantoprazole 8 mg/hr Last Rate: 8 mg/hr (08/08/17 0904)   sodium chloride 75 mL/hr Last Rate: 75 mL/hr (08/08/17 1156)     Prescriptions Prior to Admission   Medication Sig Dispense Refill Last Dose   • ALPRAZolam (XANAX) 0.25 MG tablet Take 1 tablet by mouth Daily. (Patient taking differently: Take 0.25 mg by mouth Every Night.) 30 tablet 0 8/3/2017   • bimatoprost (LUMIGAN) 0.01 % ophthalmic drops Administer 1 drop to the right eye Daily.   8/3/2017   • dorzolamide-timolol (COSOPT) 22.3-6.8 MG/ML ophthalmic solution Administer  to the right eye 2 (Two) Times a Day.   8/4/2017   • furosemide (LASIX) 20 MG tablet Take 2 tablets by mouth Daily. (Patient taking differently: Take  by mouth 2 (Two) Times a Day.) 30 tablet 1 Taking   • lansoprazole (PREVACID) 15 MG capsule Take 15 mg by mouth Daily.   8/4/2017   • levothyroxine (SYNTHROID, LEVOTHROID) 75 MCG tablet Take 1 tablet by mouth Daily. (Patient taking differently: Take 100 mcg by  mouth Daily.) 30 tablet 1 2017   • Multiple Vitamins-Minerals (PRESERVISION AREDS) capsule Take  by mouth 2 (Two) Times a Day.   2017   • phenytoin (DILANTIN) 100 MG ER capsule Take 100 mg by mouth Every Night.   8/3/2017   • potassium chloride (MICRO-K) 10 MEQ CR capsule Take 2 capsules by mouth Daily. 30 capsule 1 2017   • propranolol (INDERAL) 20 MG tablet Take 20 mg by mouth 2 (Two) Times a Day.   2017   • sennosides-docusate sodium (SENOKOT-S) 8.6-50 MG tablet Take 1 tablet by mouth 2 (Two) Times a Day. 1 tablet 1 2017   • [] albuterol (PROVENTIL HFA;VENTOLIN HFA) 108 (90 BASE) MCG/ACT inhaler Inhale 2 puffs Every 6 (Six) Hours As Needed.   Taking   • aspirin 81 MG tablet Take 1 tablet by mouth Daily. 1 tablet 1 Taking   • fluticasone (FLONASE) 50 MCG/ACT nasal spray 2 sprays by Each Nare route Daily.   Taking   • lidocaine (LIDODERM) 5 % Place 1 patch on the skin Daily. Remove & Discard patch within 12 hours or as directed by MD 20 patch 1 Taking   • promethazine-codeine (PHENERGAN with CODEINE) 6.25-10 MG/5ML syrup Take 2.5 mL by mouth Every Night. 180 mL 2 Taking     Assessment/Plan   ASSESSMENT / PLAN    Principal Problem:    Acute GI bleeding  Active Problems:    Seizure disorder    Anemia    Anxiety    Chronic congestive heart failure with left ventricular diastolic dysfunction    Acute on chronic respiratory failure with hypoxia    Altered mental status, unspecified    Hypothyroidism    Benign essential tremor    1.mild acute kidney injury in the setting of possible intravascular volume depletion with third spacing.  She doesn't have any marked lower extremity edema.  Her mouth is dry.  I think so she is intravascularly depleted with third spacing.    I will hold the Lasix.  I'll give her a liter of fluid only.  she is going to be a comfort measures now and family has decided to keep her breathing and pain comfortable.  She is not a candidate for endoscopy.  We'll be happy to  see her if family wants any discussion.    2.history of peptic ulcer disease with recurrent bleeding and now again severe anemia and not a candidate for endoscopy.    3.history of congestive heart failure with third spacing with pleural effusion and ascites.  Patient has now renal failure and also had a contrast exposure recently.  She may have now contrast-induced nephropathy.  We'll hold the diuretics due to possible intravascular depletion.    Thank you Dr. Thompson for the referral will continue follow the patient in the hospital stay if needed.         I discussed the patients findings and my recommendations with family and nursing staff         This document has been electronically signed by Rachel Worley MD on August 8, 2017 11:57 AM

## 2017-08-08 NOTE — PLAN OF CARE
Problem: Patient Care Overview (Adult)  Goal: Plan of Care Review  Outcome: Ongoing (interventions implemented as appropriate)    08/08/17 4110   Outcome Evaluation   Outcome Summary/Follow up Plan Pt stable throughout the night. Staying on venturi 55% - if mask comes off or pt deep sleeps sats will drop to low 80's. Remains in sinus ana with frequent PVC's. Family refused H&H to be drawn.        Goal: Adult Individualization and Mutuality  Outcome: Ongoing (interventions implemented as appropriate)  Goal: Discharge Needs Assessment  Outcome: Ongoing (interventions implemented as appropriate)    Problem: Fall Risk (Adult)  Goal: Absence of Falls  Outcome: Ongoing (interventions implemented as appropriate)    Problem: Fluid Volume Deficit (Adult)  Goal: Identify Related Risk Factors and Signs and Symptoms  Outcome: Ongoing (interventions implemented as appropriate)  Goal: Fluid/Electrolyte Balance  Outcome: Ongoing (interventions implemented as appropriate)  Goal: Comfort/Well Being  Outcome: Ongoing (interventions implemented as appropriate)    Problem: Older Inpatient, Acutely Ill (Adult)  Goal: Signs and Symptoms of Listed Potential Problems Will be Absent or Manageable (Older Inpatient, Acutely Ill)  Outcome: Ongoing (interventions implemented as appropriate)

## 2017-08-09 NOTE — NURSING NOTE
Patient off venti mask and on nasal O2 per daughters request at this time. Scheduled morphine given after daughters permission at this time

## 2017-08-09 NOTE — SIGNIFICANT NOTE
08/09/17 1540   Rehab Treatment   Discipline speech language pathologist   Treatment Not Performed other (see comments)  (Pt was placed on comfort measures. )   Will d/c from services due to pt placed on comfort measures.

## 2017-08-09 NOTE — PLAN OF CARE
Problem: Patient Care Overview (Adult)  Goal: Plan of Care Review  Outcome: Ongoing (interventions implemented as appropriate)    Problem: Fall Risk (Adult)  Goal: Absence of Falls  Outcome: Ongoing (interventions implemented as appropriate)    Problem: Fluid Volume Deficit (Adult)  Goal: Identify Related Risk Factors and Signs and Symptoms  Outcome: Outcome(s) achieved Date Met:  08/09/17  Goal: Fluid/Electrolyte Balance  Outcome: Ongoing (interventions implemented as appropriate)  Goal: Comfort/Well Being  Outcome: Ongoing (interventions implemented as appropriate)    Problem: Older Inpatient, Acutely Ill (Adult)  Goal: Signs and Symptoms of Listed Potential Problems Will be Absent or Manageable (Older Inpatient, Acutely Ill)  Outcome: Ongoing (interventions implemented as appropriate)

## 2017-08-09 NOTE — CONSULTS
Nutrition Services    Patient Name:  Mariela Woodson  YOB: 1925  MRN: [id number]  Admit Date:  8/4/2017        Pt made comfort care at this time.  RD will monitor for any changes in tx plans      Electronically signed by:  Jolly Love RD  08/09/17 1:12 PM

## 2017-08-09 NOTE — SIGNIFICANT NOTE
RN request no tx this date      08/09/17 1010   Rehab Treatment   Discipline occupational therapy assistant   Treatment Not Performed other (see comments)  (RN request no tx this date )

## 2017-08-09 NOTE — NURSING NOTE
Discussed with daughter this am about comfort measures and need for stopping fluids and oral medication.  Daughter voiced concerns about understanding at that time and wanting to talk to the physican.  Anika OLIVAS  When rounded again explained comfort measures to the daughter again at this time and answered questions.  Daughter called me to the room [id number] am to have me stop the IV fluids and decrease the O2 at that time and to have Anika write the orders to DC the fluids.

## 2017-08-09 NOTE — PROGRESS NOTES
[facility] Services  INPATIENT PROGRESS NOTE    Length of Stay: 5  Date of Admission: 8/4/2017  Primary Care Physician: Benito Kaur MD    Subjective   Chief Complaint: no complaints  HPI:  89 year old  female who was sent up from ICU yesterday after family decided to stop aggressive treatments and pursue comfort measures.  The patient was previously being treated for acute GI bleeding, CHF, and respiratory failure.  During today's visit, patient is unresponsive and nearing end of life.      Review of Systems   Unable to perform ROS: Patient unresponsive        All pertinent negatives and positives are as above. All other systems have been reviewed and are negative unless otherwise stated.     Objective    Temp:  [96.4 °F (35.8 °C)-97.6 °F (36.4 °C)] 97 °F (36.1 °C)  Heart Rate:  [60-69] 60  Resp:  [20-24] 20  BP: (110-130)/(49-60) 110/53    Physical Exam   HENT:   Head: Normocephalic.   Cardiovascular: Normal rate.  Exam reveals distant heart sounds and decreased pulses.    Pulmonary/Chest: Effort normal. Apnea noted. She has rhonchi in the right upper field and the left upper field.   Cheyne-Stoke respirations noted.    Abdominal: Soft. Bowel sounds are normal.   Musculoskeletal: She exhibits no edema.   Neurological: She is unresponsive.   Skin: Skin is dry. There is cyanosis. There is pallor.   Bilateral lower extremities are mottled and cold            Results Review:  I have reviewed the labs, radiology results, and diagnostic studies.    Laboratory Data:     Results from last 7 days  Lab Units 08/08/17  0357 08/07/17  0505 08/06/17  0835 08/06/17  0706   SODIUM mmol/L 141 141  --  138   SODIUM, ARTERIAL mmol/L  --   --  136.5*  --    POTASSIUM mmol/L 4.8 4.6  --  4.9   CHLORIDE mmol/L 95 94*  --  92*   CO2 mmol/L 39.0* 38.0*  --  40.0*   BUN mg/dL 40* 36*  --  32*   CREATININE mg/dL 1.22* 1.02*  --  1.01*   GLUCOSE mg/dL 132* 99  --  89    GLUCOSE, ARTERIAL mmol/L  --   --  71  --    CALCIUM mg/dL 8.7 9.1  --  8.8   BILIRUBIN mg/dL 0.4 0.4  --  0.4   ALK PHOS U/L 147* 142*  --  141*   ALT (SGPT) U/L 44 42  --  41   AST (SGOT) U/L 40* 30  --  28   ANION GAP mmol/L 7.0 9.0  --  6.0     Estimated Creatinine Clearance: 24.2 mL/min (by C-G formula based on Cr of 1.22).            Results from last 7 days  Lab Units 17  0357 17  1946 17  1600 17  1139 17  0735 17  0505  17  0753   WBC 10*3/mm3 7.32  --   --   --   --  7.06  --  5.83   HEMOGLOBIN g/dL 8.1* 7.3* 7.3* 7.3* 7.6* 7.7*  < > 8.1*   HEMATOCRIT % 28.2* 27.7* 24.9* 26.3* 26.9* 28.1*  < > 28.0*   PLATELETS 10*3/mm3 163  --   --   --   --  114*  --  139*   < > = values in this interval not displayed.        Culture Data:   No results found for: BLOODCX  No results found for: URINECX  No results found for: RESPCX  No results found for: WOUNDCX  No results found for: STOOLCX  No components found for: BODYFLD    Radiology Data:   Imaging Results (last 24 hours)     ** No results found for the last 24 hours. **          I have reviewed the patient current medications.     Assessment/Plan     End of life care: Scheduled and PRN Morphine for comfort.  Ativan PRN for anxiety/agitation.  Scopolamine and atropine for management of secretions.        Discharge Planning: I expect patient to be discharged to  within the next 24 hours.  Patient's condition discussed with her daughter, Zarina Ya, who is at bedside and decision was made to discontinue all medications except those for pain, anxiety, and management of secretions.          This document has been electronically signed by DEISY Monique on 2017 1:28 PM

## 2017-08-09 NOTE — SIGNIFICANT NOTE
08/09/17 0934   Rehab Treatment   Discipline physical therapy assistant   Treatment Not Performed unable to treat, medical status change  (nurse advised no Tx today)

## 2017-08-10 NOTE — THERAPY DISCHARGE NOTE
Acute Care - Physical Therapy Discharge Summary  [facility]       Patient Name: Mariela Woodson  : 1925  MRN: [id number]    Today's Date: 8/10/2017  Onset of Illness/Injury or Date of Surgery Date: 17    Date of Referral to PT: 17  Referring Physician: Dr. Rema Lim      Admit Date: 2017      PT Recommendation and Plan    Visit Dx:    ICD-10-CM ICD-9-CM   1. Anemia, unspecified type D64.9 285.9   2. Oropharyngeal dysphagia R13.12 787.22   3. Impaired physical mobility Z74.09 781.99             Outcome Measures       17 1530          How much help from another person do you currently need...    Turning from your back to your side while in flat bed without using bedrails? 2  -GOIVANA      Moving from lying on back to sitting on the side of a flat bed without bedrails? 2  -GIOVANA      Moving to and from a bed to a chair (including a wheelchair)? 1  -GIOVANA      Standing up from a chair using your arms (e.g., wheelchair, bedside chair)? 1  -GIOVANA      Climbing 3-5 steps with a railing? 1  -GIOVANA      To walk in hospital room? 1  -GIOVANA      AM-PAC 6 Clicks Score 8  -GIOVANA      Functional Assessment    Outcome Measure Options AM-PAC 6 Clicks Basic Mobility (PT)  -GIOVANA        User Key  (r) = Recorded By, (t) = Taken By, (c) = Cosigned By    Initials Name Provider Type    GIOVANA Story, PT Physical Therapist                      IP PT Goals       17 1729          Bed Mobility PT LTG    Bed Mobility PT LTG, Date Established 17  -      Bed Mobility PT LTG, Time to Achieve by discharge  -      Bed Mobility PT LTG, Activity Type all bed mobility  -GIOVANA      Bed Mobility PT LTG, [place] Level supervision required;conditional independence  -GIOVANA      Bed Mobility PT LTG, Outcome goal ongoing  -      Transfer Training PT LTG    Transfer Training PT LTG, Date Established 17  -      Transfer Training PT LTG, Time to Achieve by discharge  -      Transfer Training PT LTG, Activity Type  bed to chair /chair to bed;sit to stand/stand to sit  -      Transfer Training PT LTG, [place] Level minimum assist (75% patient effort);contact guard assist  -      Transfer Training PT LTG, Assist Device walker, rolling  -      Transfer Training PT LTG, Outcome goal ongoing  -      Gait Training PT LTG    Gait Training Goal PT LTG, Date Established 17  -      Gait Training Goal PT LTG, Time to Achieve by discharge  -      Gait Training Goal PT LTG, [place] Level minimum assist (75% patient effort);contact guard assist  -      Gait Training Goal PT LTG, Distance to Achieve 5ft  -      Gait Training Goal PT LTG, Additional Goal 50ftx2  -      Gait Training Goal PT LTG, Outcome goal ongoing  -      Strength Goal PT LTG    Strength Goal PT LTG, Date Established 17  -      Strength Goal PT LTG, Time to Achieve by discharge  -      Strength Goal PT LTG, Measure to Achieve Pt will perform 15 reps AAROM exercises  -      Strength Goal PT LTG, Functional Goal Pt will perform 15 reps AROM exercises  -      Strength Goal PT LTG, Additional Goal Pt will tolerate OOB 2-3 hours per day  -      Strength Goal PT LTG, Outcome goal ongoing  -        User Key  (r) = Recorded By, (t) = Taken By, (c) = Cosigned By    Initials Name Provider Type    GIOVANA Story, PT Physical Therapist              PT Discharge Summary  Anticipated Discharge Disposition: skilled nursing facility  Reason for Discharge: Patient   Outcomes Achieved: Refer to plan of care for updates on goals achieved  Discharge Destination: other (comment) (Pt )      Jr Richardson, PTA   8/10/2017

## 2017-08-11 ENCOUNTER — APPOINTMENT (OUTPATIENT)
Dept: ONCOLOGY | Facility: HOSPITAL | Age: 82
End: 2017-08-11

## 2017-08-15 NOTE — H&P
[facility] Medicine Admission        Date of Admission: 8/4/2017        Primary Care Physician: Benito Kaur MD        Chief Complaint: Nausea      HPI:Thisis a pleasant 89 y old woman who is at [facility] for rehab after  Hip surgery by Dr Handley . Patient has a hx of iron deficiency  Anemia followed by Dr Oseguera and had a remote hx of pud . She get transfused on a regular basis at the nursing home   Today her Hb was 6.3 she was sent to our ED   Few days ago while  Cleaning her the staff noticed some blood but it was a one time  Episode , she does not report any bleeding . She just felt nauseated . She denies chest pain  Or sob      Past Medical History:  has a past medical history of CHF (congestive heart failure); COPD (chronic obstructive pulmonary disease); Hypothyroidism; Iron deficiency anemia; Polio (1953); and Seizures.     Past Surgical History:  has a past surgical history that includes Cataract extraction and Hip Intertrochanteric Nailing (Left, 5/22/2017).     Family History: family history includes Breast cancer in her daughter, mother, paternal aunt, and sister; Cancer in her son; Colon cancer in her father; Leukemia in her brother; Melanoma in her daughter; Prostate cancer in her brother and father.     Social History:  reports that she quit smoking about 30 years ago. Her smoking use included Cigarettes. She smoked 0.50 packs per day. She does not have any smokeless tobacco history on file. She reports that she does not drink alcohol or use illicit drugs.     Allergies:        Allergies   Allergen Reactions   • Valium [Diazepam]           Medications:           Prior to Admission medications    Medication Sig Start Date End Date Taking? Authorizing Provider   albuterol (PROVENTIL HFA;VENTOLIN HFA) 108 (90 BASE) MCG/ACT inhaler Inhale 2 puffs Every 6 (Six) Hours. 4/6/17 8/5/17 Yes Historical Provider, MD   ALPRAZolam (XANAX) 0.25 MG tablet Take 1 tablet by mouth  Daily.  Patient taking differently: Take 0.25 mg by mouth Every Night. 6/7/17   Yes Tariq Aparicio MD   aspirin 81 MG tablet Take 1 tablet by mouth Daily. 6/7/17   Yes Tariq Aparicio MD   bimatoprost (LUMIGAN) 0.01 % ophthalmic drops Administer 1 drop to the right eye Daily.     Yes Historical Provider, MD   dorzolamide-timolol (COSOPT) 22.3-6.8 MG/ML ophthalmic solution Administer  to the right eye 2 (Two) Times a Day.     Yes Historical Provider, MD   fluticasone (FLONASE) 50 MCG/ACT nasal spray 2 sprays by Each Nare route Daily. 1/19/17   Yes Historical Provider, MD   furosemide (LASIX) 20 MG tablet Take 2 tablets by mouth Daily. 6/28/17   Yes Jim Badillo MD   lansoprazole (PREVACID) 15 MG capsule Take 15 mg by mouth Daily.     Yes Historical Provider, MD   levothyroxine (SYNTHROID, LEVOTHROID) 75 MCG tablet Take 1 tablet by mouth Daily. 6/28/17   Yes Jim Badillo MD   lidocaine (LIDODERM) 5 % Place 1 patch on the skin Daily. Remove & Discard patch within 12 hours or as directed by MD 6/7/17   Yes Tariq Aparicio MD   Multiple Vitamins-Minerals (PRESERVISION AREDS) capsule Take  by mouth 2 (Two) Times a Day.     Yes Historical Provider, MD   phenytoin (DILANTIN) 100 MG ER capsule Take 100 mg by mouth Every Night. 12/19/16   Yes Historical Provider, MD   potassium chloride (MICRO-K) 10 MEQ CR capsule Take 2 capsules by mouth Daily. 6/28/17   Yes Jim Badillo MD   promethazine-codeine (PHENERGAN with CODEINE) 6.25-10 MG/5ML syrup Take 2.5 mL by mouth Every Night. 6/7/17   Yes Tariq Aparicio MD   propranolol (INDERAL) 20 MG tablet Take 20 mg by mouth 2 (Two) Times a Day. 1/25/17   Yes Historical Provider, MD   sennosides-docusate sodium (SENOKOT-S) 8.6-50 MG tablet Take 1 tablet by mouth 2 (Two) Times a Day. 6/7/17   Yes Tariq Aparicio MD            Review of Systems:  Review of Systems   Constitutional: Negative for activity change, appetite change, chills, diaphoresis,  fatigue, fever and unexpected weight change.   HENT: Negative.  Negative for congestion, dental problem, drooling, ear discharge, ear pain, facial swelling, hearing loss, mouth sores, nosebleeds, postnasal drip, rhinorrhea, sinus pressure, sneezing, tinnitus, trouble swallowing and voice change.    Eyes: Negative for photophobia and discharge.   Respiratory: Negative for apnea, cough, choking, shortness of breath, wheezing and stridor.    Cardiovascular: Negative for chest pain, palpitations and leg swelling.   Gastrointestinal: Positive for nausea. Negative for abdominal pain, anal bleeding, blood in stool, constipation, diarrhea, rectal pain and vomiting.   Endocrine: Negative for cold intolerance, heat intolerance, polydipsia, polyphagia and polyuria.   Genitourinary: Negative for dysuria, enuresis, frequency, hematuria and urgency.   Musculoskeletal: Negative for arthralgias, back pain, joint swelling, myalgias, neck pain and neck stiffness.   Skin: Negative for color change, pallor, rash and wound.   Allergic/Immunologic: Negative for food allergies and immunocompromised state.   Neurological: Positive for weakness. Negative for dizziness, tremors, seizures, syncope, facial asymmetry, speech difficulty, light-headedness, numbness and headaches.   Hematological: Negative for adenopathy.   Psychiatric/Behavioral: Negative for agitation, behavioral problems, confusion, hallucinations, sleep disturbance and suicidal ideas. The patient is not nervous/anxious.       Otherwise complete ROS is negative except as mentioned above.     Physical Exam:   Temp:  [97.5 °F (36.4 °C)] 97.5 °F (36.4 °C)  Heart Rate:  [59] 59  Resp:  [18] 18  BP: (127)/(56) 127/56  Physical Exam   Constitutional: She is oriented to person, place, and time. She appears well-developed and well-nourished. No distress.   HENT:   Head: Normocephalic and atraumatic.   Mouth/Throat: Mucous membranes are dry.   Eyes: EOM are normal. Pupils are equal,  round, and reactive to light.   Neck: Normal range of motion. Neck supple. No JVD present. No tracheal deviation present. No thyromegaly present.   Cardiovascular: Normal rate and regular rhythm.  Exam reveals no gallop and no friction rub.    No murmur heard.  Pulmonary/Chest: No stridor.   Abdominal: Soft. Bowel sounds are normal. She exhibits no distension and no ascites. There is no hepatosplenomegaly. There is no tenderness.   Musculoskeletal: She exhibits edema.   Neurological: She is alert and oriented to person, place, and time. She has normal reflexes. No cranial nerve deficit.   Skin: Skin is warm and dry. She is not diaphoretic.   Psychiatric: She has a normal mood and affect. Her speech is normal and behavior is normal. Judgment and thought content normal. Cognition and memory are normal.            Results Reviewed:  I have personally reviewed current lab, radiology, and data and agree with results.  Lab Results (last 24 hours)     Procedure Component Value Units Date/Time     Extra Tubes [[id number]] Collected:  08/04/17 1551     Specimen:  Blood from Blood, Venous Line Updated:  08/04/17 1556     Narrative:        The following orders were created for panel order Extra Tubes.  Procedure                               Abnormality         Status                     ---------                               -----------         ------                     Gold Top - SST[[id number]]                                   In process                    Please view results for these tests on the individual orders.     [facility] [[id number]] Collected:  08/04/17 1551     Specimen:  Blood Updated:  08/04/17 1556             Imaging Results (last 24 hours)      ** No results found for the last 24 hours. **                Assessment:  Iron def anemia               Plan:  Admit to med surg floor   Type and screen   Transfuse one  Unit today then another one tomorrow  Iron profile vit b12 folate ferritin   Stool for  occult blood   Iv zofran   Continue home meds as  Medical course dictates   Dvt prophylaxis wes flor (family refusing blood thinners and hb is very low)  Talked to DR Oseguera over the phone  He will see her as out patient      I discussed the patients findings and my recommendations with: patient and daughter      Jerrica Forman MD  08/04/17  4:36 PM

## 2017-08-21 NOTE — DISCHARGE SUMMARY
[facility] Medicine Services  DEATH SUMMARY       Date of Admission: 8/4/2017  Date of Death:  8/21/2017 at approximately 1605  Primary Care Physician: Benito Kaur MD    Presenting Problem/History of Present Illness:  Anemia, unspecified type [D64.9]     Final Death Diagnoses:  Hospital Problem List     * (Principal)Acute GI bleeding    Overview Addendum 8/8/2017 11:06 AM by Robert Thompson MD     08/08/17.  GI following.  Nothing invasive at this time.  Hemoglobin/hematocrit stable at present.    08/07/17.  -S/P 1 PRBC transfusion - was given incompatible transfusion on 8/5/17. No elevation in bilirubin. Haptoglobin and LDH normal. Family is aware. Protonix drip started  FOBT was positive          Seizure disorder    Overview Signed 8/7/2017  9:21 AM by Rema Lim MD     -phenytoin         Anemia    Overview Addendum 8/8/2017 11:05 AM by Robert Thompson MD     08/08/17.  Hemoglobin and hematocrit stable.  GI following.  Family at this time wants nothing invasive.  Heme/onc following as well.    Results from last 7 days  Lab Units 08/08/17  0357 08/07/17  1946 08/07/17  1600 08/07/17  1139 08/07/17  0735 08/07/17  0505 08/06/17  2330   HEMOGLOBIN g/dL 8.1* 7.3* 7.3* 7.3* 7.6* 7.7* 7.4*            Anxiety    Overview Signed 8/7/2017  8:53 AM by Rema Lim MD     -continue xanax         Chronic congestive heart failure with left ventricular diastolic dysfunction    Overview Addendum 8/7/2017  8:59 AM by Rema Lim MD     ECHO done 6/22/2017  · Left ventricular systolic function is normal. Estimated EF = 55%.  · Left ventricular diastolic dysfunction (grade I) consistent with impaired relaxation.  · Left atrial cavity size is mildly dilated.  · Mild aortic valve stenosis is present.  · Mild aortic valve regurgitation is present.  · Mild mitral valve regurgitation is present  · Moderate tricuspid valve regurgitation is present.    Edema has improved.    IV lasix   Monitoring I&O         Acute on chronic respiratory failure with hypoxia    Overview Addendum 2017 11:07 AM by Robert Thompson MD     Nasal cannula.  No BiPAP or intubation.  Plan to make comfort measures.           Altered mental status, unspecified    Overview Addendum 2017 11:06 AM by Robert Thompson MD     17.  Mentation reduced due to acuity of condition.  Stable.         Hypothyroidism    Overview Addendum 2017  9:20 AM by Rema Lim MD     -continue home medication  -TSH 6         Benign essential tremor    Overview Signed 2017  9:22 AM by Rema Lim MD     -propanolol                Consults:   Consults     Date and Time Order Name Status Description    2017 1716 Inpatient Consult to Gastroenterology Completed     2017 1202 Inpatient Consult to Nephrology Completed     2017 1448 Hospitalist (on-call MD unless specified) Completed           Procedures Performed:                 Pertinent Test Results:   Lab Results (last 24 hours)     ** No results found for the last 24 hours. **            Hospital Course:  The patient is a 89 y.o. female who was admitted 2017 with GI bleeding, congestive heart failure and respiratory failure.  During the patient's stay she required blood transfusions, IV diuresis, and bipap therapy in attempts to correct her dyspnea.  Her condition declined on 2017 and she was transferred to ICU.  With continued care, the patients condition showed no significant improvement and on 2017, the family opted to stop aggressive treatment and pursue comfort measures only.  The patient  on 2017 with her family at bedside.           This document has been electronically signed by DEISY Monique on 2017 5:11 PM

## 2023-06-13 NOTE — PROGRESS NOTES
"DATE OF VISIT: 3/21/2017    REASON FOR VISIT: Iron deficiency anemia    HISTORY OF PRESENT ILLNESS:    89-year-old female with a past medical history significant for hypothyroidism, congestive heart failure, anemia is here for follow-up visit today.  Patient remains on iron sulfate 3 times a week.  Denies any blood in the stool or urine.  Denies any fever or chills or night sweats.    PAST MEDICAL HISTORY:    Past Medical History   Diagnosis Date   • CHF (congestive heart failure)    • COPD (chronic obstructive pulmonary disease)    • Hypothyroidism    • Iron deficiency anemia    • Polio 1953   • Seizures        SOCIAL HISTORY:    Social History   Substance Use Topics   • Smoking status: Former Smoker     Packs/day: 0.50     Types: Cigarettes     Quit date: 1987   • Smokeless tobacco: None   • Alcohol use None       Surgical History :  Past Surgical History   Procedure Laterality Date   • Cataract extraction         ALLERGIES:    Allergies   Allergen Reactions   • Valium [Diazepam]        REVIEW OF SYSTEMS:      CONSTITUTIONAL:  No fever, chills, or night sweats.     HEENT:  No epistaxis, mouth sores, or difficulty swallowing.    RESPIRATORY:  No new shortness of breath or cough at present.    CARDIOVASCULAR:  No chest pain or palpitations.    GASTROINTESTINAL:  No abdominal pain, nausea, vomiting, or blood in the stool.    GENITOURINARY:  No dysuria or hematuria.    MUSCULOSKELETAL:  No any new back pain or arthralgias.     NEUROLOGICAL:  No tingling or numbness. No new headache or dizziness.     LYMPHATICS:  Denies any abnormal swollen and anywhere in the body.    SKIN:  Denies any new skin rash.    PHYSICAL EXAMINATION:      VITAL SIGNS:    Visit Vitals   • /72   • Pulse 61   • Temp 96.8 °F (36 °C)   • Resp 16   • Ht 62\" (157.5 cm)   • Wt 120 lb 4.8 oz (54.6 kg)   • BMI 22 kg/m2       GENERAL:  Not in any distress.    HEENT:  Normocephalic, Atraumatic.Mild Conjunctival pallor. No icterus. Extraocular " Movements Intact. No Fascial Asymmetry noted.    NECK:  No adenopathy. No JVD.    RESPIRATORY:  Fair air entry bilateral. No rhonchi or wheezing.    CARDIOVASCULAR:  S1, S2. Regular rate and rhythm. No murmur or gallop appreciated.    ABDOMEN:  Soft, obese, nontender. Bowel sounds present in all four quadrants.  No organomegaly appreciated.    EXTREMITIES:  No edema.No Calf Tenderness.    NEUROLOGIC:  Alert, awake and oriented ×3.  No  Motor or sensory deficit appreciated. Cranial Nerves 2-12 grossly intact.    SKIN : No new skin lesion identified  DIAGNOSTIC DATA:    GLUCOSE   Date Value Ref Range Status   11/09/2016 83 60 - 100 mg/dl Final     SODIUM   Date Value Ref Range Status   11/09/2016 138 137 - 145 mmol/L Final     POTASSIUM   Date Value Ref Range Status   11/09/2016 4.4 3.5 - 5.1 mmol/L Final     CO2   Date Value Ref Range Status   11/09/2016 32 (H) 22 - 31 mmol/L Final     CHLORIDE   Date Value Ref Range Status   11/09/2016 98 95 - 110 mmol/L Final     ANION GAP   Date Value Ref Range Status   11/09/2016 8.0 5.0 - 15.0 mmol/L Final     CREATININE   Date Value Ref Range Status   11/09/2016 0.8 0.5 - 1.0 mg/dl Final     BUN   Date Value Ref Range Status   11/09/2016 21 7 - 21 mg/dl Final     CALCIUM   Date Value Ref Range Status   11/09/2016 9.7 8.4 - 10.2 mg/dl Final     ALKALINE PHOSPHATASE   Date Value Ref Range Status   11/09/2016 96 38 - 126 U/L Final     TOTAL PROTEIN   Date Value Ref Range Status   11/09/2016 7.5 6.3 - 8.6 gm/dl Final     ALT (SGPT)   Date Value Ref Range Status   11/09/2016 37 9 - 52 U/L Final     AST (SGOT)   Date Value Ref Range Status   11/09/2016 46 (H) 14 - 36 U/L Final     TOTAL BILIRUBIN   Date Value Ref Range Status   11/09/2016 0.4 0.2 - 1.3 mg/dl Final     ALBUMIN   Date Value Ref Range Status   11/09/2016 4.1 3.4 - 4.8 gm/dl Final     Lab Results   Component Value Date    WBC 5.80 03/21/2017    HGB 13.3 03/21/2017    HCT 38.9 03/21/2017    MCV 94.9 03/21/2017      (L) 03/21/2017     Lab Results   Component Value Date    NEUTROABS 3.63 03/21/2017    IRON 348 (H) 11/09/2016    TIBC 356 11/09/2016    LABIRON 97.8 (H) 11/09/2016    FERRITIN 18.5 11/09/2016    [id number] 966 (H) 12/29/2015    FOLATE >20.00 12/29/2015         ASSESSMENT AND PLAN:      1.  Iron deficiency anemia: Status post intravenous iron.  Patient is on SSS tonic 3 times a week.  Her hemoglobin is stable at 13.3.  Her iron level and saturation were high in November 2016.  At this point we will take her off iron supplement, we will see her back in about 4 months with a repeat CBC and anemia workup done prior to next clinic visit.     2.  Hypertension    3.  Hypothyroidism    4.  Mild thrombocytopenia: We will monitor for now with CBC.    5.  Health maintenance: Patient does not smoke.  She is not a candidate for further screening colonoscopy.  She remains full code.        Blake Oseguera MD  3/21/2017  10:22 AM        EMR Dragon/Transcription disclaimer:   Much of this encounter note is an electronic transcription/translation of spoken language to printed text. The electronic translation of spoken language may permit erroneous, or at times, nonsensical words or phrases to be inadvertently transcribed; Although I have reviewed the note for such errors, some may still exist.   Dressing (No Sutures): pressure dressing with telfa

## (undated) DEVICE — NDL HYPO PRECISIONGLIDE/REG 18G 1IN PNK

## (undated) DEVICE — STPLR SKIN VISISTAT WD 35CT

## (undated) DEVICE — GLV SURG TRIUMPH LT PF LTX 8 STRL

## (undated) DEVICE — SPNG GZ WOVN 4X4IN 12PLY 10/BX STRL

## (undated) DEVICE — DRSNG GZ CURAD XEROFORM NONADHS 5X9IN STRL

## (undated) DEVICE — PAD GRND REM POLYHESIVE A/ DISP

## (undated) DEVICE — PAD,ABDOMINAL,8"X10",ST,LF: Brand: MEDLINE

## (undated) DEVICE — GLV SURG SENSICARE GREEN W/ALOE PF LF 7 STRL

## (undated) DEVICE — CVR FLUOROSCOPE C/ARM W/TP 36X28IN

## (undated) DEVICE — DRILL, AO, STERILE

## (undated) DEVICE — GLV SURG TRIUMPH PF LTX 7 STRL

## (undated) DEVICE — GUIDE WIRE, BALL-TIPPED, STERILE

## (undated) DEVICE — SOL IRR NACL 0.9PCT BT 1000ML

## (undated) DEVICE — GOWN,PREVENTION PLUS,XLONG/XLARGE,STRL: Brand: MEDLINE

## (undated) DEVICE — TP ELAS ELASTIKON ADHS 4IN 2.5YD

## (undated) DEVICE — SUT VIC 0 CT 36IN J958H

## (undated) DEVICE — APPL CHLORAPREP W/TINT 26ML ORNG

## (undated) DEVICE — SYR 10ML

## (undated) DEVICE — GLV SURG SENSICARE GREEN W/ALOE PF LF 8.5 STRL

## (undated) DEVICE — TP MEFIX 4IN 11YD

## (undated) DEVICE — GLV SURG SENSICARE ALOE LF PF SZ7.5 GRN

## (undated) DEVICE — BNDG ELAS ELITE V/CLOSE 4IN 5YD LF STRL

## (undated) DEVICE — PK HIP LF 60

## (undated) DEVICE — GLV SURG TRIUMPH LT PF LTX 7 STRL

## (undated) DEVICE — GOWN SURG PREVENTION PLUS IMPERV XLNG 2XL

## (undated) DEVICE — DRSNG GZ CURAD XEROFORM NONADHR OVERWRAP 5X9IN